# Patient Record
Sex: FEMALE | Race: WHITE | NOT HISPANIC OR LATINO | Employment: OTHER | URBAN - METROPOLITAN AREA
[De-identification: names, ages, dates, MRNs, and addresses within clinical notes are randomized per-mention and may not be internally consistent; named-entity substitution may affect disease eponyms.]

---

## 2017-01-01 ENCOUNTER — RESOLUTE PROFESSIONAL BILLING HOSPITAL PROF FEE (OUTPATIENT)
Dept: CARDIOLOGY | Facility: MEDICAL CENTER | Age: 72
End: 2017-01-01
Payer: MEDICARE

## 2017-01-01 ENCOUNTER — HOSPITAL ENCOUNTER (INPATIENT)
Facility: MEDICAL CENTER | Age: 72
LOS: 2 days | DRG: 247 | End: 2017-01-03
Attending: EMERGENCY MEDICINE | Admitting: INTERNAL MEDICINE
Payer: MEDICARE

## 2017-01-01 ENCOUNTER — APPOINTMENT (OUTPATIENT)
Dept: RADIOLOGY | Facility: MEDICAL CENTER | Age: 72
DRG: 247 | End: 2017-01-01
Attending: EMERGENCY MEDICINE
Payer: MEDICARE

## 2017-01-01 DIAGNOSIS — R07.2 PRECORDIAL PAIN: ICD-10-CM

## 2017-01-01 DIAGNOSIS — R00.1 SYMPTOMATIC BRADYCARDIA: ICD-10-CM

## 2017-01-01 PROBLEM — I49.5 SICK SINUS SYNDROME (HCC): Status: ACTIVE | Noted: 2017-01-01

## 2017-01-01 PROBLEM — R07.9 CHEST PAIN: Status: ACTIVE | Noted: 2017-01-01

## 2017-01-01 LAB
ALBUMIN SERPL BCP-MCNC: 4.1 G/DL (ref 3.2–4.9)
ALBUMIN/GLOB SERPL: 1.4 G/DL
ALP SERPL-CCNC: 107 U/L (ref 30–99)
ALT SERPL-CCNC: 15 U/L (ref 2–50)
ANION GAP SERPL CALC-SCNC: 8 MMOL/L (ref 0–11.9)
ANION GAP SERPL CALC-SCNC: 8 MMOL/L (ref 0–11.9)
APTT PPP: 28.6 SEC (ref 24.7–36)
AST SERPL-CCNC: 16 U/L (ref 12–45)
BASOPHILS # BLD AUTO: 1.2 % (ref 0–1.8)
BASOPHILS # BLD: 0.12 K/UL (ref 0–0.12)
BILIRUB SERPL-MCNC: 0.4 MG/DL (ref 0.1–1.5)
BNP SERPL-MCNC: 75 PG/ML (ref 0–100)
BUN SERPL-MCNC: 12 MG/DL (ref 8–22)
BUN SERPL-MCNC: 16 MG/DL (ref 8–22)
CALCIUM SERPL-MCNC: 8.5 MG/DL (ref 8.5–10.5)
CALCIUM SERPL-MCNC: 9.6 MG/DL (ref 8.5–10.5)
CHLORIDE SERPL-SCNC: 101 MMOL/L (ref 96–112)
CHLORIDE SERPL-SCNC: 105 MMOL/L (ref 96–112)
CO2 SERPL-SCNC: 23 MMOL/L (ref 20–33)
CO2 SERPL-SCNC: 26 MMOL/L (ref 20–33)
CREAT SERPL-MCNC: 0.61 MG/DL (ref 0.5–1.4)
CREAT SERPL-MCNC: 0.72 MG/DL (ref 0.5–1.4)
EKG IMPRESSION: NORMAL
EOSINOPHIL # BLD AUTO: 0.23 K/UL (ref 0–0.51)
EOSINOPHIL NFR BLD: 2.3 % (ref 0–6.9)
ERYTHROCYTE [DISTWIDTH] IN BLOOD BY AUTOMATED COUNT: 52 FL (ref 35.9–50)
ERYTHROCYTE [DISTWIDTH] IN BLOOD BY AUTOMATED COUNT: 52.2 FL (ref 35.9–50)
GFR SERPL CREATININE-BSD FRML MDRD: >60 ML/MIN/1.73 M 2
GFR SERPL CREATININE-BSD FRML MDRD: >60 ML/MIN/1.73 M 2
GLOBULIN SER CALC-MCNC: 2.9 G/DL (ref 1.9–3.5)
GLUCOSE SERPL-MCNC: 100 MG/DL (ref 65–99)
GLUCOSE SERPL-MCNC: 109 MG/DL (ref 65–99)
HCT VFR BLD AUTO: 39.4 % (ref 37–47)
HCT VFR BLD AUTO: 45.1 % (ref 37–47)
HGB BLD-MCNC: 12.8 G/DL (ref 12–16)
HGB BLD-MCNC: 14.5 G/DL (ref 12–16)
IMM GRANULOCYTES # BLD AUTO: 0.03 K/UL (ref 0–0.11)
IMM GRANULOCYTES NFR BLD AUTO: 0.3 % (ref 0–0.9)
INR PPP: 0.9 (ref 0.87–1.13)
INR PPP: 1.61 (ref 0.87–1.13)
LIPASE SERPL-CCNC: 44 U/L (ref 11–82)
LYMPHOCYTES # BLD AUTO: 2.12 K/UL (ref 1–4.8)
LYMPHOCYTES NFR BLD: 21.6 % (ref 22–41)
MCH RBC QN AUTO: 31.5 PG (ref 27–33)
MCH RBC QN AUTO: 32 PG (ref 27–33)
MCHC RBC AUTO-ENTMCNC: 32.2 G/DL (ref 33.6–35)
MCHC RBC AUTO-ENTMCNC: 32.5 G/DL (ref 33.6–35)
MCV RBC AUTO: 98 FL (ref 81.4–97.8)
MCV RBC AUTO: 98.5 FL (ref 81.4–97.8)
MONOCYTES # BLD AUTO: 0.66 K/UL (ref 0–0.85)
MONOCYTES NFR BLD AUTO: 6.7 % (ref 0–13.4)
NEUTROPHILS # BLD AUTO: 6.66 K/UL (ref 2–7.15)
NEUTROPHILS NFR BLD: 67.9 % (ref 44–72)
NRBC # BLD AUTO: 0 K/UL
NRBC BLD AUTO-RTO: 0 /100 WBC
PLATELET # BLD AUTO: 237 K/UL (ref 164–446)
PLATELET # BLD AUTO: 254 K/UL (ref 164–446)
PMV BLD AUTO: 11.1 FL (ref 9–12.9)
PMV BLD AUTO: 11.2 FL (ref 9–12.9)
POTASSIUM SERPL-SCNC: 3.8 MMOL/L (ref 3.6–5.5)
POTASSIUM SERPL-SCNC: 3.8 MMOL/L (ref 3.6–5.5)
PROT SERPL-MCNC: 7 G/DL (ref 6–8.2)
PROTHROMBIN TIME: 12.4 SEC (ref 12–14.6)
PROTHROMBIN TIME: 19.6 SEC (ref 12–14.6)
RBC # BLD AUTO: 4 M/UL (ref 4.2–5.4)
RBC # BLD AUTO: 4.6 M/UL (ref 4.2–5.4)
SODIUM SERPL-SCNC: 135 MMOL/L (ref 135–145)
SODIUM SERPL-SCNC: 136 MMOL/L (ref 135–145)
TROPONIN I SERPL-MCNC: 0.06 NG/ML (ref 0–0.04)
TROPONIN I SERPL-MCNC: 2.26 NG/ML (ref 0–0.04)
TROPONIN I SERPL-MCNC: 36.21 NG/ML (ref 0–0.04)
TSH SERPL DL<=0.005 MIU/L-ACNC: 1.87 UIU/ML (ref 0.3–3.7)
WBC # BLD AUTO: 12.4 K/UL (ref 4.8–10.8)
WBC # BLD AUTO: 9.8 K/UL (ref 4.8–10.8)

## 2017-01-01 PROCEDURE — 36415 COLL VENOUS BLD VENIPUNCTURE: CPT

## 2017-01-01 PROCEDURE — 027035Z DILATION OF CORONARY ARTERY, ONE ARTERY WITH TWO DRUG-ELUTING INTRALUMINAL DEVICES, PERCUTANEOUS APPROACH: ICD-10-PCS | Performed by: INTERNAL MEDICINE

## 2017-01-01 PROCEDURE — 700117 HCHG RX CONTRAST REV CODE 255: Performed by: INTERNAL MEDICINE

## 2017-01-01 PROCEDURE — C9600 PERC DRUG-EL COR STENT SING: HCPCS | Mod: LC

## 2017-01-01 PROCEDURE — 99291 CRITICAL CARE FIRST HOUR: CPT

## 2017-01-01 PROCEDURE — 3E0234Z INTRODUCTION OF SERUM, TOXOID AND VACCINE INTO MUSCLE, PERCUTANEOUS APPROACH: ICD-10-PCS | Performed by: INTERNAL MEDICINE

## 2017-01-01 PROCEDURE — 93005 ELECTROCARDIOGRAM TRACING: CPT

## 2017-01-01 PROCEDURE — 93454 CORONARY ARTERY ANGIO S&I: CPT

## 2017-01-01 PROCEDURE — 85027 COMPLETE CBC AUTOMATED: CPT

## 2017-01-01 PROCEDURE — C1894 INTRO/SHEATH, NON-LASER: HCPCS

## 2017-01-01 PROCEDURE — 700101 HCHG RX REV CODE 250

## 2017-01-01 PROCEDURE — C1874 STENT, COATED/COV W/DEL SYS: HCPCS

## 2017-01-01 PROCEDURE — 93010 ELECTROCARDIOGRAM REPORT: CPT | Mod: 76 | Performed by: INTERNAL MEDICINE

## 2017-01-01 PROCEDURE — 84443 ASSAY THYROID STIM HORMONE: CPT

## 2017-01-01 PROCEDURE — 700102 HCHG RX REV CODE 250 W/ 637 OVERRIDE(OP): Performed by: HOSPITALIST

## 2017-01-01 PROCEDURE — 85025 COMPLETE CBC W/AUTO DIFF WBC: CPT

## 2017-01-01 PROCEDURE — 99223 1ST HOSP IP/OBS HIGH 75: CPT | Performed by: INTERNAL MEDICINE

## 2017-01-01 PROCEDURE — 700102 HCHG RX REV CODE 250 W/ 637 OVERRIDE(OP)

## 2017-01-01 PROCEDURE — 83690 ASSAY OF LIPASE: CPT

## 2017-01-01 PROCEDURE — 96374 THER/PROPH/DIAG INJ IV PUSH: CPT

## 2017-01-01 PROCEDURE — C1725 CATH, TRANSLUMIN NON-LASER: HCPCS

## 2017-01-01 PROCEDURE — 85730 THROMBOPLASTIN TIME PARTIAL: CPT

## 2017-01-01 PROCEDURE — 71010 DX-CHEST-LIMITED (1 VIEW): CPT

## 2017-01-01 PROCEDURE — 93005 ELECTROCARDIOGRAM TRACING: CPT | Performed by: INTERNAL MEDICINE

## 2017-01-01 PROCEDURE — 360979 HCHG DIAGNOSTIC CATH

## 2017-01-01 PROCEDURE — C1769 GUIDE WIRE: HCPCS

## 2017-01-01 PROCEDURE — 83880 ASSAY OF NATRIURETIC PEPTIDE: CPT

## 2017-01-01 PROCEDURE — 770022 HCHG ROOM/CARE - ICU (200)

## 2017-01-01 PROCEDURE — 307093 HCHG TR BAND RADIAL

## 2017-01-01 PROCEDURE — 304952 HCHG R 2 PADS

## 2017-01-01 PROCEDURE — 700105 HCHG RX REV CODE 258: Performed by: INTERNAL MEDICINE

## 2017-01-01 PROCEDURE — C1887 CATHETER, GUIDING: HCPCS

## 2017-01-01 PROCEDURE — 80048 BASIC METABOLIC PNL TOTAL CA: CPT

## 2017-01-01 PROCEDURE — 80053 COMPREHEN METABOLIC PANEL: CPT

## 2017-01-01 PROCEDURE — B2111ZZ FLUOROSCOPY OF MULTIPLE CORONARY ARTERIES USING LOW OSMOLAR CONTRAST: ICD-10-PCS | Performed by: INTERNAL MEDICINE

## 2017-01-01 PROCEDURE — 84484 ASSAY OF TROPONIN QUANT: CPT

## 2017-01-01 PROCEDURE — 93005 ELECTROCARDIOGRAM TRACING: CPT | Performed by: EMERGENCY MEDICINE

## 2017-01-01 PROCEDURE — 700111 HCHG RX REV CODE 636 W/ 250 OVERRIDE (IP)

## 2017-01-01 PROCEDURE — A9270 NON-COVERED ITEM OR SERVICE: HCPCS

## 2017-01-01 PROCEDURE — 99223 1ST HOSP IP/OBS HIGH 75: CPT | Mod: 25 | Performed by: INTERNAL MEDICINE

## 2017-01-01 PROCEDURE — 700111 HCHG RX REV CODE 636 W/ 250 OVERRIDE (IP): Performed by: EMERGENCY MEDICINE

## 2017-01-01 PROCEDURE — A9270 NON-COVERED ITEM OR SERVICE: HCPCS | Performed by: EMERGENCY MEDICINE

## 2017-01-01 PROCEDURE — A9270 NON-COVERED ITEM OR SERVICE: HCPCS | Performed by: HOSPITALIST

## 2017-01-01 PROCEDURE — 85610 PROTHROMBIN TIME: CPT

## 2017-01-01 PROCEDURE — 700102 HCHG RX REV CODE 250 W/ 637 OVERRIDE(OP): Performed by: EMERGENCY MEDICINE

## 2017-01-01 RX ORDER — ACETAMINOPHEN 325 MG/1
650 TABLET ORAL EVERY 6 HOURS PRN
Status: DISCONTINUED | OUTPATIENT
Start: 2017-01-01 | End: 2017-01-03 | Stop reason: HOSPADM

## 2017-01-01 RX ORDER — MIDAZOLAM HYDROCHLORIDE 1 MG/ML
INJECTION INTRAMUSCULAR; INTRAVENOUS
Status: COMPLETED
Start: 2017-01-01 | End: 2017-01-01

## 2017-01-01 RX ORDER — CLOPIDOGREL BISULFATE 75 MG/1
150 TABLET ORAL DAILY
Status: DISCONTINUED | OUTPATIENT
Start: 2017-01-02 | End: 2017-01-03 | Stop reason: HOSPADM

## 2017-01-01 RX ORDER — CLOPIDOGREL 300 MG/1
TABLET, FILM COATED ORAL
Status: COMPLETED
Start: 2017-01-01 | End: 2017-01-01

## 2017-01-01 RX ORDER — VERAPAMIL HYDROCHLORIDE 2.5 MG/ML
INJECTION, SOLUTION INTRAVENOUS
Status: COMPLETED
Start: 2017-01-01 | End: 2017-01-01

## 2017-01-01 RX ORDER — SODIUM CHLORIDE 9 MG/ML
INJECTION, SOLUTION INTRAVENOUS CONTINUOUS
Status: DISPENSED | OUTPATIENT
Start: 2017-01-01 | End: 2017-01-02

## 2017-01-01 RX ORDER — SIMVASTATIN 10 MG
10 TABLET ORAL DAILY
Status: ON HOLD | COMMUNITY
End: 2017-01-03

## 2017-01-01 RX ORDER — LOSARTAN POTASSIUM 25 MG/1
50 TABLET ORAL
Status: DISCONTINUED | OUTPATIENT
Start: 2017-01-02 | End: 2017-01-03 | Stop reason: HOSPADM

## 2017-01-01 RX ORDER — BIVALIRUDIN 250 MG/5ML
INJECTION, POWDER, LYOPHILIZED, FOR SOLUTION INTRAVENOUS
Status: COMPLETED
Start: 2017-01-01 | End: 2017-01-01

## 2017-01-01 RX ORDER — HEPARIN SODIUM,PORCINE 1000/ML
VIAL (ML) INJECTION
Status: COMPLETED
Start: 2017-01-01 | End: 2017-01-01

## 2017-01-01 RX ORDER — SIMVASTATIN 20 MG
10 TABLET ORAL DAILY
Status: DISCONTINUED | OUTPATIENT
Start: 2017-01-02 | End: 2017-01-02

## 2017-01-01 RX ORDER — ASPIRIN 325 MG
325 TABLET ORAL DAILY
Status: DISCONTINUED | OUTPATIENT
Start: 2017-01-02 | End: 2017-01-03 | Stop reason: HOSPADM

## 2017-01-01 RX ORDER — CELECOXIB 100 MG/1
100 CAPSULE ORAL 2 TIMES DAILY PRN
Status: ON HOLD | COMMUNITY
End: 2017-01-03

## 2017-01-01 RX ORDER — ASPIRIN 81 MG/1
324 TABLET, CHEWABLE ORAL ONCE
Status: COMPLETED | OUTPATIENT
Start: 2017-01-01 | End: 2017-01-01

## 2017-01-01 RX ORDER — LIDOCAINE HYDROCHLORIDE 20 MG/ML
INJECTION, SOLUTION INFILTRATION; PERINEURAL
Status: COMPLETED
Start: 2017-01-01 | End: 2017-01-01

## 2017-01-01 RX ADMIN — IOHEXOL 274 ML: 350 INJECTION, SOLUTION INTRAVENOUS at 17:46

## 2017-01-01 RX ADMIN — ASPIRIN 324 MG: 81 TABLET, CHEWABLE ORAL at 12:43

## 2017-01-01 RX ADMIN — SODIUM CHLORIDE: 9 INJECTION, SOLUTION INTRAVENOUS at 18:33

## 2017-01-01 RX ADMIN — CLOPIDOGREL BISULFATE 600 MG: 300 TABLET, FILM COATED ORAL at 17:47

## 2017-01-01 RX ADMIN — MIDAZOLAM HYDROCHLORIDE 2 MG: 1 INJECTION, SOLUTION INTRAMUSCULAR; INTRAVENOUS at 17:12

## 2017-01-01 RX ADMIN — ATROPINE SULFATE 0.5 MG: 0.1 INJECTION PARENTERAL at 17:43

## 2017-01-01 RX ADMIN — ACETAMINOPHEN 650 MG: 325 TABLET, FILM COATED ORAL at 22:41

## 2017-01-01 RX ADMIN — HEPARIN SODIUM: 1000 INJECTION, SOLUTION INTRAVENOUS; SUBCUTANEOUS at 16:54

## 2017-01-01 RX ADMIN — NITROGLYCERIN 10 ML: 5 INJECTION, SOLUTION INTRAVENOUS at 16:54

## 2017-01-01 RX ADMIN — ATROPINE SULFATE 1 MG: 1 INJECTION, SOLUTION INTRAMUSCULAR; INTRAVENOUS; SUBCUTANEOUS at 11:58

## 2017-01-01 RX ADMIN — LIDOCAINE HYDROCHLORIDE: 20 INJECTION, SOLUTION INFILTRATION; PERINEURAL at 16:54

## 2017-01-01 RX ADMIN — HEPARIN SODIUM 2000 UNITS: 200 INJECTION, SOLUTION INTRAVENOUS at 16:55

## 2017-01-01 RX ADMIN — VERAPAMIL HYDROCHLORIDE 5 MG: 2.5 INJECTION, SOLUTION INTRAVENOUS at 16:54

## 2017-01-01 RX ADMIN — BIVALIRUDIN 250 MG: 250 INJECTION, POWDER, LYOPHILIZED, FOR SOLUTION INTRAVENOUS at 17:12

## 2017-01-01 RX ADMIN — BIVALIRUDIN 250 MG: 250 INJECTION, POWDER, LYOPHILIZED, FOR SOLUTION INTRAVENOUS at 17:47

## 2017-01-01 RX ADMIN — FENTANYL CITRATE 100 MCG: 50 INJECTION, SOLUTION INTRAMUSCULAR; INTRAVENOUS at 17:47

## 2017-01-01 ASSESSMENT — LIFESTYLE VARIABLES
ALCOHOL_USE: NO
DO YOU DRINK ALCOHOL: NO
EVER_SMOKED: YES

## 2017-01-01 ASSESSMENT — PAIN SCALES - GENERAL
PAINLEVEL_OUTOF10: 1
PAINLEVEL_OUTOF10: 0
PAINLEVEL_OUTOF10: 0
PAINLEVEL_OUTOF10: 8
PAINLEVEL_OUTOF10: 3

## 2017-01-01 NOTE — ED NOTES
Pt reports chest pain since this am. Pt was getting ready to go to store.pt denies sob. EKG done. Pt reports feeling diaphoretic at time of event. Pt reports pain at 5/10 pain. Pt in NAD. VSS.

## 2017-01-01 NOTE — IP AVS SNAPSHOT
[employer] Access Code: Activation code not generated  Current [employer] Status: Active    StrongView[employer]  A secure, online tool to manage your health information     [employer]’s [employer]® is a secure, online tool that connects you to your personalized health information from the privacy of your home -- day or night - making it very easy for you to manage your healthcare. Once the activation process is completed, you can even access your medical information using the [employer] bethany, which is available for free in the Apple Bethany store or Google Play store.     [employer] provides the following levels of access (as shown below):   My Chart Features   [facility] Primary Care Doctor [facility]  Specialists [facility]  Urgent  Care Non-[facility]  Primary Care  Doctor   Email your healthcare team securely and privately 24/7 X X X X   Manage appointments: schedule your next appointment; view details of past/upcoming appointments X      Request prescription refills. X      View recent personal medical records, including lab and immunizations X X X X   View health record, including health history, allergies, medications X X X X   Read reports about your outpatient visits, procedures, consult and ER notes X X X X   See your discharge summary, which is a recap of your hospital and/or ER visit that includes your diagnosis, lab results, and care plan. X X       How to register for [employer]:  1. Go to  [email or web address].  2. Click on the Sign Up Now box, which takes you to the New Member Sign Up page. You will need to provide the following information:  a. Enter your [employer] Access Code exactly as it appears at the top of this page. (You will not need to use this code after you’ve completed the sign-up process. If you do not sign up before the expiration date, you must request a new code.)   b. Enter your date of birth.   c. Enter your home email address.   d. Click Submit, and follow the next screen’s instructions.  3. Create a [employer] ID. This will  be your Movable login ID and cannot be changed, so think of one that is secure and easy to remember.  4. Create a Movable password. You can change your password at any time.  5. Enter your Password Reset Question and Answer. This can be used at a later time if you forget your password.   6. Enter your e-mail address. This allows you to receive e-mail notifications when new information is available in Movable.  7. Click Sign Up. You can now view your health information.    For assistance activating your Mov[employer] account, call [phone]

## 2017-01-01 NOTE — ED NOTES
Heart rate dropped down to 20's with pt feeling faint and appeared pale.  ERP called to room  Pt given 1mg Atropine with HR going to 70's. Pt reports improvement in symptoms   Pads on pt. Repeat EKG completed

## 2017-01-01 NOTE — ED PROVIDER NOTES
"ED Provider Note    CHIEF COMPLAINT  Chief Complaint   Patient presents with   • Chest Pain       HPI  Griselda Farmer is a 71 y.o. female who presents complaining of chest discomfort. She's been having some \"soreness\" or pressure across her chest in the central part way into the left. Contrary to the nursing notes, she did not fall on that side, said she tripped and fell on her right side, tripping over a trailer when she was spooked by a cat. She's been taking some Ultram for this large \"can be contributing. Associate with the patient having some shortness of breath, nausea. He seemed to be coming and going. They returned here in the ER, and I was called to the room with a heart rate in the 20s. She never had this before. She is not any beta blockers. She denies any previous heart problems. No abdominal pain. There is no other complaint.    PAST MEDICAL HISTORY  Past Medical History   Diagnosis Date   • Insomnia    • Flushing reaction      has had full work up with cardiology, would awake with symptoms at night   • Hyperlipidemia 10/20/2010   • GERD (gastroesophageal reflux disease)    • Liver cyst      has been seen by GI, ultrasound 9/12   • Need for Tdap vaccination      in 2012   • S/P colonoscopy 11/9/2012   • Indigestion    • Urinary bladder disorder    • Unspecified urinary incontinence    • Dental disorder      upper and lower dentures   • FRIDA (obstructive sleep apnea)      states no cpap   • Heart burn 2014     pt on prevacid   • Arthritis      generalized + hands   • Myocardial infarct (HCC) 1984     pt denies states sometimes irreg rhythm   • Angina 2014     pt denies    • Hypertension 2014     pt states well controlled on meds       FAMILY HISTORY  Family History   Problem Relation Age of Onset   • Diabetes Mother      mild   • Heart Disease Neg Hx    • Hypertension Neg Hx    • Cancer Father      melanoma mild       SOCIAL HISTORY  Social History   Substance Use Topics   • Smoking status: Former " "Smoker -- 0.50 packs/day for 15 years     Types: Cigarettes     Quit date: 08/11/1975   • Smokeless tobacco: Never Used   • Alcohol Use: No         SURGICAL HISTORY  Past Surgical History   Procedure Laterality Date   • Tonsillectomy     • Hysterectomy, vaginal       ovaries were left   • Recovery  3/29/2013     Performed by [facility]Recovery Surgery at SURGERY SAME DAY [facility] ORS   • Cataract phaco with iol  9/19/2013     Performed by Sylvester Raza M.D. at SURGERY [facility] ORS   • Cataract phaco with iol  10/3/2013     Performed by Sylvester Raza M.D. at SURGERY [facility] ORS   • Knee arthroplasty total  12/16/2014     Performed by Jose G Trotter M.D. at SURGERY [facility] ORS       CURRENT MEDICATIONS  Home Medications     Reviewed by Nilesh Dailey (Pharmacy Tech) on 01/01/17 at 1240  Med List Status: Not Addressed    Medication Last Dose Status    celecoxib (CELEBREX) 100 MG Cap 1/1/2017 Active    Cholecalciferol (VITAMIN D) 2000 UNIT TABS 1/1/2017 Active    lansoprazole (PREVACID) 30 MG CAPSULE DELAYED RELEASE 1/1/2017 Active    losartan (COZAAR) 100 MG Tab 1/1/2017 Active    simvastatin (ZOCOR) 10 MG Tab 1/1/2017 Active    SLO-NIACIN PO 12/31/2016 Active                I have reviewed the nurses notes and/or the list brought with the patient.    ALLERGIES  Allergies   Allergen Reactions   • Codeine Vomiting     Rxn = years ago          REVIEW OF SYSTEMS  See HPI for further details. Review of systems as above, otherwise all other systems are negative.     PHYSICAL EXAM  VITAL SIGNS: /77 mmHg  Pulse 73  Temp(Src) 36 °C (96.8 °F) (Temporal)  Resp 25  Ht 1.651 m (5' 5\")  Wt 94.6 kg (208 lb 8.9 oz)  BMI 34.71 kg/m2  SpO2 100%  Constitutional: Pale, diaphoretic, she looks queasy. Acutely ill.  HENT: Mucus membranes moist.  Oropharynx is clear.  Eyes: Pupils equally round.  No scleral icterus.   Neck: Full nontender range of motion.  Lymphatic: No cervical lymphadenopathy noted. "   Cardiovascular: Heart rates in the 20-30s, and appears to be sinus bradycardia.  No murmurs, rubs, nor gallop appreciated.   Thorax & Lungs: Chest is nontender.  Lungs are clear to auscultation with good air movement bilaterally.  No wheeze, rhonchi, nor rales.   Abdomen: Soft, with no tenderness, rebound nor guarding.  No mass, pulsatile mass, nor hepatosplenomegaly appreciated.  Skin: No petechia noted. She is in bruising over her right lower extremity.  Extremities/Musculoskeletal: No sign of trauma.  Calves are nontender with no cords nor edema.  No Marco's sign.  Pulses are intact all around.   Neurologic: Alert & oriented.  Strength and sensation is intact all around.  Gait is not assessed  Psychiatric: Normal affect appropriate for the clinical situation.    EKG #1  I interpreted this EKG myself.  This is a 12-lead study.  The rhythm is sinus bradycardia with a rate of 50.  There are no ST segment nor T wave abnormalities.  Interpretation: No ST segment elevation myocardial infarction.    EKG #2  I interpreted this EKG myself.  This is a 12-lead study.  The rhythm is sinus with a rate of 98.  There are no ST segment nor T wave abnormalities.  Interpretation: No ST segment elevation myocardial infarction. Resolution of sinus bradycardia otherwise no change.    LABS  Labs Reviewed   TROPONIN - Abnormal; Notable for the following:     Troponin I 0.06 (*)     All other components within normal limits    Narrative:     Indicate which anticoagulants the patient is on:->UNKNOWN   CBC WITH DIFFERENTIAL - Abnormal; Notable for the following:     MCV 98.0 (*)     MCHC 32.2 (*)     RDW 52.2 (*)     Lymphocytes 21.60 (*)     All other components within normal limits    Narrative:     Indicate which anticoagulants the patient is on:->UNKNOWN   COMP METABOLIC PANEL - Abnormal; Notable for the following:     Glucose 109 (*)     Alkaline Phosphatase 107 (*)     All other components within normal limits    Narrative:      Indicate which anticoagulants the patient is on:->UNKNOWN   BTYPE NATRIURETIC PEPTIDE    Narrative:     Indicate which anticoagulants the patient is on:->UNKNOWN   PROTHROMBIN TIME    Narrative:     Indicate which anticoagulants the patient is on:->UNKNOWN   APTT    Narrative:     Indicate which anticoagulants the patient is on:->UNKNOWN   LIPASE    Narrative:     Indicate which anticoagulants the patient is on:->UNKNOWN   ESTIMATED GFR    Narrative:     Indicate which anticoagulants the patient is on:->UNKNOWN         RADIOLOGY/PROCEDURES  I have reviewed the patient's film interpretations myself, and does not show an infiltrate. Formal read is still pending.    MEDICAL RECORD  I have reviewed patient's medical record and pertinent results are listed above.    COURSE & MEDICAL DECISION MAKING  I have reviewed any medical record information, laboratory studies and radiographic results as noted above.  This patient presents with chest pain, shortness of breath and feeling poorly. This is intermittent, and she has an episode of this while here in the department, I was called to the bed emergently. I found her to be appearing quite ill, with a bradycardia in the 20s or 30s. She was given atropine with immediate improvement in her pulse rate, and soon thereafter improvement in her symptoms. I checked on her several times since then, she continues to be asymptomatic and has a normal rate. External pacing pads have been applied although she has not required these as of now.    Laboratories returning as noted above. She is a minimal elevation of her glucose, and her 1st troponin is outside the normal range but not significantly elevated. I discussed the patient's case with Dr. Saunders from cardiology, he'll be seeing her. Also discussed the patient's case with Dr. Arboleda, hospitalist will be admitting. At this point, I believe the patient is fine to go to telemetry (as opposed to the ICU).    FINAL IMPRESSION  1. Symptomatic  bradycardia    2. Precordial pain     3.  Critical care time, 35 minutes, which includes obtaining history from patient historians, examination of patient, reevaluation of patient, direction of nursing staff, and discussion with admitting and consulting physicians. It does not include any procedures.       This dictation was created using voice recognition software.    Electronically signed by: Chito Allen, 1/1/2017 1:02 PM

## 2017-01-01 NOTE — CONSULTS
Cardiology Consult Note:    Anastacia To  Date & Time note created:    1/1/2017   2:58 PM     Referring MD:  Dr. Allen    Patient ID:   Name:             Griselda Farmer   YOB: 1945  Age:                 71 y.o.  female   MRN:               [id number]                                                             Chief Complaint / Reason for consult:  Symptomatic bradycardia    History of Present Illness:    This is a 71 years old woman with prior history of hypertension for which she is taken losartan, presented to the hospital because of not feeling well. Patient is been treated for possible pneumonia or influenza. Cardiology has been consult it because on telemetry, patient was noted to have up to 3 seconds sinus pause. Patient was symptomatic when these episodes happened. She felt very lightheaded and nauseated. She was lying in bed without having any type of stress or pain at that time. She did fell and hit the right side of her chest about 2 weeks ago. She still has occasional pain on the left side of her rib cage.    Griselda Farmer does not have any history of heart attack arrhythmias in the past. She never had cardiac catheterization or ablations procedure in the past.     Review of Systems:      Constitutional: Denies fevers, Denies weight changes  Eyes: Denies changes in vision, no eye pain  Ears/Nose/Throat/Mouth: Denies nasal congestion or sore throat   Cardiovascular: some chest pain, no palpitations   Respiratory: no shortness of breath , Denies cough  Gastrointestinal/Hepatic: Denies abdominal pain, nausea, vomiting, diarrhea, constipation or GI bleeding   Genitourinary: Denies dysuria or frequency  Musculoskeletal/Rheum: Denies  joint pain and swelling   Skin: Denies rash  Neurological: Denies headache, confusion, memory loss or focal weakness/parasthesias  Psychiatric: denies mood disorder   Endocrine: Neela thyroid problems  Heme/Oncology/Lymph Nodes: Denies  enlarged lymph nodes, denies brusing or known bleeding disorder  All other systems were reviewed and are negative (AMA/CMS criteria)                Past Medical History:   Past Medical History   Diagnosis Date   • Insomnia    • Flushing reaction      has had full work up with cardiology, would awake with symptoms at night   • Hyperlipidemia 10/20/2010   • GERD (gastroesophageal reflux disease)    • Liver cyst      has been seen by GI, ultrasound 9/12   • Need for Tdap vaccination      in 2012   • S/P colonoscopy 11/9/2012   • Indigestion    • Urinary bladder disorder    • Unspecified urinary incontinence    • Dental disorder      upper and lower dentures   • FRIDA (obstructive sleep apnea)      states no cpap   • Heart burn 2014     pt on prevacid   • Arthritis      generalized + hands   • Myocardial infarct (HCC) 1984     pt denies states sometimes irreg rhythm   • Angina 2014     pt denies    • Hypertension 2014     pt states well controlled on meds   • Sick sinus syndrome (HCC) 1/1/2017   • Symptomatic sinus bradycardia 1/1/2017     Active Hospital Problems    Diagnosis   • Chest pain [R07.9]   • Sick sinus syndrome (HCC) [I49.5]   • Symptomatic sinus bradycardia [R00.1]       Past Surgical History:  Past Surgical History   Procedure Laterality Date   • Tonsillectomy     • Hysterectomy, vaginal       ovaries were left   • Recovery  3/29/2013     Performed by Cath-Recovery Surgery at SURGERY SAME DAY [facility]   • Cataract phaco with iol  9/19/2013     Performed by Sylvester Raza M.D. at [facility]   • Cataract phaco with iol  10/3/2013     Performed by Sylvester Raza M.D. at [facility]   • Knee arthroplasty total  12/16/2014     Performed by Jose G Trotter M.D. at SURGERY [facility] Medications:    Current facility-administered medications:   •  [START ON 1/2/2017] losartan (COZAAR) tablet 50 mg, 50 mg, Oral, QDAY, Michelle Gallegos M.D.  •  [START ON  "1/2/2017] simvastatin (ZOCOR) tablet 10 mg, 10 mg, Oral, DAILY, Michelle Gallegos M.D.    Current outpatient prescriptions:   •  celecoxib (CELEBREX) 100 MG Cap, Take 100 mg by mouth 2 times a day as needed., Disp: , Rfl:   •  SLO-NIACIN PO, Take 1 Tab by mouth every evening., Disp: , Rfl:   •  simvastatin (ZOCOR) 10 MG Tab, Take 10 mg by mouth every day., Disp: , Rfl:   •  losartan (COZAAR) 100 MG Tab, Take 0.5 Tabs by mouth every day., Disp: 90 Tab, Rfl: 3  •  lansoprazole (PREVACID) 30 MG CAPSULE DELAYED RELEASE, TAKE ONE CAPSULE BY MOUTH EVERY DAY, Disp: 90 Cap, Rfl: 3  •  Cholecalciferol (VITAMIN D) 2000 UNIT TABS, Take 1 Tab by mouth every day., Disp: , Rfl:     Current Outpatient Medications:    (Not in a hospital admission)    Medication Allergy:  Allergies   Allergen Reactions   • Codeine Vomiting     Rxn = years ago          Family History:  Family History   Problem Relation Age of Onset   • Diabetes Mother      mild   • Heart Disease Neg Hx    • Hypertension Neg Hx    • Cancer Father      melanoma mild       Social History:  Social History     Social History   • Marital Status:      Spouse Name: N/A   • Number of Children: N/A   • Years of Education: N/A     Occupational History   • retired- human resources  for [employer]     Social History Main Topics   • Smoking status: Former Smoker -- 0.50 packs/day for 15 years     Types: Cigarettes     Quit date: 08/11/1975   • Smokeless tobacco: Never Used   • Alcohol Use: No   • Drug Use: No   • Sexual Activity: Not on file     Other Topics Concern   • Not on file     Social History Narrative         Physical Exam:  Vitals/ General Appearance:   Weight/BMI: Body mass index is 34.71 kg/(m^2).  Blood pressure 160/77, pulse 57, temperature 36 °C (96.8 °F), temperature source Temporal, resp. rate 24, height 1.651 m (5' 5\"), weight 94.6 kg (208 lb 8.9 oz), SpO2 100 %.  Filed Vitals:    01/01/17 1230 01/01/17 1331 01/01/17 1400 01/01/17 " 1423   BP:       Pulse: 73 58 66 57   Temp:       TempSrc:       Resp: 25 20 24 24   Height:       Weight:       SpO2: 100% 100% 98% 100%     Oxygen Therapy:  Pulse Oximetry: 100 %    Constitutional:   Well developed, Well nourished, No acute distress  HENMT:  Normocephalic, Atraumatic, Oropharynx moist mucous membranes, No oral exudates, Nose normal.  No thyromegaly.  Eyes:  EOMI, Conjunctiva normal, No discharge.  Neck:  Normal range of motion, No cervical tenderness,  no JVD.  Cardiovascular:  Normal heart rate, Normal rhythm, No murmurs, No rubs, No gallops.   Extremitites with intact distal pulses, no cyanosis, or edema.  Lungs:  Normal breath sounds, breath sounds clear to auscultation bilaterally,  no rales, no rhonchi, no wheezing.   Abdomen: Bowel sounds normal, Soft, No tenderness, No guarding, No rebound, No masses, No hepatosplenomegaly.  Skin: Warm, Dry, No erythema, No rash, no induration.  Neurologic: Alert & oriented x 3, No focal deficits noted, cranial nerves II through X are intact.  Psychiatric: Affect normal, Judgment normal, Mood normal.      MDM (Data Review):     Records reviewed and summarized in current documentation    Lab Data Review:  Recent Results (from the past 24 hour(s))   EKG (ER)    Collection Time: 17 11:12 AM   Result Value Ref Range    Report       [facility] Emergency Dept.    Test Date:  2017  Pt Name:    ONESIMO DURAN                Department: ER  MRN:        [id number]                      Room:  Gender:     F                            Technician: [id number]  :        [id number]                   Requested By:ER TRIAGE PROTOCOL  Order #:    [id number]                    Reading MD:    Measurements  Intervals                                Axis  Rate:       50                           P:          20  WA:         180                          QRS:        -35  QRSD:       92                           T:          86  QT:         456  QTc:         416    Interpretive Statements  SINUS BRADYCARDIA  LEFT AXIS DEVIATION  BORDERLINE REPOLARIZATION ABNORMALITY  Compared to ECG 12/05/2014 09:03:16  Sinus rhythm no longer present  T-wave abnormality no longer present  Left anterior fascicular block no longer present     TROPONIN    Collection Time: 01/01/17 11:41 AM   Result Value Ref Range    Troponin I 0.06 (H) 0.00 - 0.04 ng/mL   BTYPE NATRIURETIC PEPTIDE    Collection Time: 01/01/17 11:41 AM   Result Value Ref Range    B Natriuretic Peptide 75 0 - 100 pg/mL   CBC WITH DIFFERENTIAL    Collection Time: 01/01/17 11:41 AM   Result Value Ref Range    WBC 9.8 4.8 - 10.8 K/uL    RBC 4.60 4.20 - 5.40 M/uL    Hemoglobin 14.5 12.0 - 16.0 g/dL    Hematocrit 45.1 37.0 - 47.0 %    MCV 98.0 (H) 81.4 - 97.8 fL    MCH 31.5 27.0 - 33.0 pg    MCHC 32.2 (L) 33.6 - 35.0 g/dL    RDW 52.2 (H) 35.9 - 50.0 fL    Platelet Count 254 164 - 446 K/uL    MPV 11.2 9.0 - 12.9 fL    Neutrophils-Polys 67.90 44.00 - 72.00 %    Lymphocytes 21.60 (L) 22.00 - 41.00 %    Monocytes 6.70 0.00 - 13.40 %    Eosinophils 2.30 0.00 - 6.90 %    Basophils 1.20 0.00 - 1.80 %    Immature Granulocytes 0.30 0.00 - 0.90 %    Nucleated RBC 0.00 /100 WBC    Neutrophils (Absolute) 6.66 2.00 - 7.15 K/uL    Lymphs (Absolute) 2.12 1.00 - 4.80 K/uL    Monos (Absolute) 0.66 0.00 - 0.85 K/uL    Eos (Absolute) 0.23 0.00 - 0.51 K/uL    Baso (Absolute) 0.12 0.00 - 0.12 K/uL    Immature Granulocytes (abs) 0.03 0.00 - 0.11 K/uL    NRBC (Absolute) 0.00 K/uL   COMP METABOLIC PANEL    Collection Time: 01/01/17 11:41 AM   Result Value Ref Range    Sodium 135 135 - 145 mmol/L    Potassium 3.8 3.6 - 5.5 mmol/L    Chloride 101 96 - 112 mmol/L    Co2 26 20 - 33 mmol/L    Anion Gap 8.0 0.0 - 11.9    Glucose 109 (H) 65 - 99 mg/dL    Bun 16 8 - 22 mg/dL    Creatinine 0.72 0.50 - 1.40 mg/dL    Calcium 9.6 8.5 - 10.5 mg/dL    AST(SGOT) 16 12 - 45 U/L    ALT(SGPT) 15 2 - 50 U/L    Alkaline Phosphatase 107 (H) 30 - 99 U/L    Total Bilirubin  0.4 0.1 - 1.5 mg/dL    Albumin 4.1 3.2 - 4.9 g/dL    Total Protein 7.0 6.0 - 8.2 g/dL    Globulin 2.9 1.9 - 3.5 g/dL    A-G Ratio 1.4 g/dL   PROTHROMBIN TIME    Collection Time: 17 11:41 AM   Result Value Ref Range    PT 12.4 12.0 - 14.6 sec    INR 0.90 0.87 - 1.13   APTT    Collection Time: 17 11:41 AM   Result Value Ref Range    APTT 28.6 24.7 - 36.0 sec   LIPASE    Collection Time: 17 11:41 AM   Result Value Ref Range    Lipase 44 11 - 82 U/L   ESTIMATED GFR    Collection Time: 17 11:41 AM   Result Value Ref Range    GFR If African American >60 >60 mL/min/1.73 m 2    GFR If Non African American >60 >60 mL/min/1.73 m 2   EKG (NOW)    Collection Time: 17 12:00 PM   Result Value Ref Range    Report       [facility] Emergency Dept.    Test Date:  2017  Pt Name:    ONESIMO DURAN                Department: ER  MRN:        [id number]                      Room:       [facility]  Gender:     F                            Technician: [id number]  :        [id number]                   Requested By:BHANU MORALES  Order #:    [id number]                    Reading MD:    Measurements  Intervals                                Axis  Rate:       98                           P:          40  NY:         184                          QRS:        -31  QRSD:       86                           T:          81  QT:         376  QTc:        481    Interpretive Statements  SINUS RHYTHM  LEFT AXIS DEVIATION  LATE PRECORDIAL R/S TRANSITION  NONSPECIFIC REPOL ABNORMALITY, DIFFUSE LEADS  Compared to ECG 2017 11:12:19  Sinus bradycardia no longer present         Imaging/Procedures Review:    Chest Xray:  Reviewed    EKG:   As in HPI. I have reviewed patient's ECG, which shows normal sinus rhythm, normal [place], QT intervals. No evidence of acute coronary syndrome.    MDM (Assessment and Plan):     Active Hospital Problems    Diagnosis   • Chest pain [R07.9]   • Sick sinus syndrome (HCC) [I49.5]   •  Symptomatic sinus bradycardia [R00.1]       At this time, I suspect that patient is having symptomatically bradycardia with sick sinus syndrome.  Based on her clinical presentation, I don't think that this is a vagal event.  Optimize electrolytes to keep Mg above 2.5 and Potassium above 4.5.    Ok to obtain TTE.    Thank you for referring this patient to our cardiology service.  We will follow patient with you.    Anastacia Saunders MD.  [facility].    Addendum:  Her troponin is now at 2.3  Given her clinical presentation, will perform cardiac catherizaiton today for NSTEMI. Suspect RCA lesion.  Temporary pacer might be needed.  If no obstructive CAD on cath, will consider PPM for symptomatic bradycardia.

## 2017-01-01 NOTE — IP AVS SNAPSHOT
1/3/2017          Griselda Farmer  [address]  [place] [address]    Dear Griselda:    formerly [facility] wants to ensure your discharge home is safe and you or your loved ones have had all your questions answered regarding your care after you leave the hospital.    You may receive a telephone call within two days of your discharge.  This call is to make certain you understand your discharge instructions as well as ensure we provided you with the best care possible during your stay with us.     The call will only last approximately 3-5 minutes and will be done by a nurse.    Once again, we want to ensure your discharge home is safe and that you have a clear understanding of any next steps in your care.  If you have any questions or concerns, please do not hesitate to contact us, we are here for you.  Thank you for choosing [facility] for your healthcare needs.    Sincerely,    Nasir Nash    [facility]

## 2017-01-01 NOTE — PROGRESS NOTES
The Medication Reconciliation has been completed per patient  Allergies have been reviewed  Antibiotic use in 30 days - NONE    Pharmacy:  [facility] [place]

## 2017-01-01 NOTE — IP AVS SNAPSHOT
" <p align=\"LEFT\"><IMG SRC=\"//[email or web address]\" alt=\"Image\" WIDTH=\"50%\" HEIGHT=\"200\" BORDER=\"\"></p>                   Name:Griselda Farmer  Medical Record Number:[id number]  CSN: [id number]    YOB: 1945   Age: 71 y.o.  Sex: female  HT:1.651 m (5' 5\") WT: 96.2 kg (212 lb 1.3 oz)          Admit Date: 1/1/2017     Discharge Date:   Today's Date: 1/3/2017  Attending Doctor:  ZENAIDA Saeed*                  Allergies:  Codeine          Your appointments     Jun 05, 2017  7:00 AM   Adult Draw/Collection with LAB [facility]   [facility] (--)    [address] [place] [address] [place] [address] [phone]            Jun 08, 2017  7:40 AM   Established Patient with JACEK Lemos   [facility]    [address] [place] [address] [phone]           You will be receiving a confirmation call a few days before your appointment from our automated call confirmation system.              Follow-up Information     1. Schedule an appointment as soon as possible for a visit with Caro Peck M.D..    Specialty:  Pediatrics    Contact information    [address] [place] [address] [place] [address] [phone]          2. Follow up with Sy Mandujano M.D.. Schedule an appointment as soon as possible for a visit in 1 month.    Specialty:  Cardiology    Contact information    [address]  [address]  [place] [address] [phone]           Medication List      Take these Medications        Instructions    aspirin EC 81 MG Tbec   Commonly known as:  ECOTRIN    Take 1 Tab by mouth every day.   Dose:  81 mg       atorvastatin 40 MG Tabs   Commonly known as:  LIPITOR    Take 1 Tab by mouth every bedtime.   Dose:  40 mg       clopidogrel 75 MG Tabs   Commonly known as:  PLAVIX    Take 1 Tab by mouth every day.   Dose:  75 mg       lansoprazole 30 MG Cpdr   Commonly known as:  PREVACID    TAKE ONE CAPSULE BY MOUTH EVERY DAY       losartan 100 MG " Tabs   Commonly known as:  COZAAR    Take 0.5 Tabs by mouth every day.   Dose:  50 mg       SLO-NIACIN PO    Take 1 Tab by mouth every evening.   Dose:  1 Tab       vitamin D 2000 UNIT Tabs    Take 1 Tab by mouth every day.   Dose:  1 Tab

## 2017-01-01 NOTE — IP AVS SNAPSHOT
" After Visit Summary                                                                                                                  Name:Griselda Farmer  Medical Record Number:[id number]  CSN: [id number]    YOB: 1945   Age: 71 y.o.  Sex: female  HT:1.651 m (5' 5\") WT: 96.2 kg (212 lb 1.3 oz)          Admit Date: 1/1/2017     Discharge Date:   Today's Date: 1/3/2017  Attending Doctor:  ZENAIDA Saeed*                  Allergies:  Codeine            Discharge Instructions       Discharge Instructions    Discharged to home by car with relative. Discharged via wheelchair, hospital escort: Yes.  Special equipment needed: Not Applicable    Be sure to schedule a follow-up appointment with your primary care doctor or any specialists as instructed.     Discharge Plan:   Pneumococcal Vaccine Given - only chart on this line when given: Given (See MAR)  Influenza Vaccine Indication: Not indicated: Previously immunized this influenza season and > 8 years of age    I understand that a diet low in cholesterol, fat, and sodium is recommended for good health. Unless I have been given specific instructions below for another diet, I accept this instruction as my diet prescription.   Other diet: None    Special Instructions: Diagnosis:  Acute Coronary Syndrome (ACS) is a diagnosis that encompasses cardiac-related chest pain and heart attack. ACS occurs when the blood flow to the heart muscle is severely reduced or cut off completely due to a slow process called atherosclerosis.  Atherosclerosis is a disease in which the coronary arteries become narrow from a buildup of fat, cholesterol, and other substances that combine to form plaque. If the plaque breaks, a blood clot will form and block the blood flow to the heart muscle. This lack of blood flow can cause damage or death to the heart muscle which is called a heart attack or Myocardial Infarction (MI). There are two different types of MIs:  ST Elevation " Myocardial Infarction or STEMI (the most severe type of heart attack) and Non-ST Elevation Myocardial Infarction or NSTEMI.    Treatment Plan:  · Cardiac Diet  - Low fat, low salt, low cholesterol   · Cardiac Rehab  - Your doctor has ordered you a referral to [facility].  Call [phone] to schedule an appointment.  · Attend my follow-up appointment with my Cardiologist.  · Take my medications as prescribed by my doctor  · Exercise daily  · Lower my bad cholesterol and raise my good cholesterol and lower my blood pressure    Medications:  Certain medications are used to treat ACS.  Remember to always take medications as prescribed and never stop talking medications unless told by your doctor.    You have been prescribed the following medicatons:    Aspirin - Aspirin is used as a blood thinning medication and you will require this medication indefinitely.  Anti-platelet/blood thinner - Your Anti-platelet/Blood thinning medication is called Clopedigrel, and is used in combination with aspirin to prevent clots from forming in your heart and/or around your stent.  Your doctor will determine how long you need to be on this medicine, but generally if you have a Drug Eluding stent, you will need to take this medication for at least one month, and you have a Bare Metal stent, you will need the medication for at least 3 months.  Some patients require this medication indefinitely.  Statin - Statin Atorvastatin is used to lower cholesterol.  Angiotensin Receptor Blocker (ARB) - Angiotensin Receptor Blocker Losartan is used to lower blood pressure and treat heart failure.  Nitroglycerine - Nitroglycerine is used to relieve chest pain.    · Is patient discharged on Warfarin / Coumadin?   No     · Is patient Post Blood Transfusion?  No    Depression / Suicide Risk    As you are discharged from this [facility] facility, it is important to learn how to keep safe from harming yourself.    Recognize the warning signs:  · Abrupt  changes in personality, positive or negative- including increase in energy   · Giving away possessions  · Change in eating patterns- significant weight changes-  positive or negative  · Change in sleeping patterns- unable to sleep or sleeping all the time   · Unwillingness or inability to communicate  · Depression  · Unusual sadness, discouragement and loneliness  · Talk of wanting to die  · Neglect of personal appearance   · Rebelliousness- reckless behavior  · Withdrawal from people/activities they love  · Confusion- inability to concentrate     If you or a loved one observes any of these behaviors or has concerns about self-harm, here's what you can do:  · Talk about it- your feelings and reasons for harming yourself  · Remove any means that you might use to hurt yourself (examples: pills, rope, extension cords, firearm)  · Get professional help from the community (Mental Health, Substance Abuse, psychological counseling)  · Do not be alone:Call your Safe Contact- someone whom you trust who will be there for you.  · Call your local CRISIS HOTLINE 917-4368 or 303-326-1678  · Call your local Children's Mobile Crisis Response Team [place] (653) 191-8653 or [employer]  · Call the toll free National Suicide Prevention Hotlines   · National Suicide Prevention Lifeline 405-992-VSGB (0298)  · National Hope Line Network 800-SUICIDE (346-7644)    Heart Attack  A heart attack (myocardial infarction, MI) causes damage to your heart that cannot be fixed. A heart attack can happen when a heart (coronary) artery becomes blocked or narrowed. This cuts off the blood supply and oxygen to your heart.  When one or more of your coronary arteries becomes blocked, that area of your heart begins to die. This causes the pain you feel during a heart attack. Heart attack pain can also occur in one part of the body but be felt in another part of the body (referred pain). You may feel referred heart attack pain in your left  arm, neck, or jaw. Pain may even be felt in the right arm.  CAUSES   Many conditions can cause a heart attack. These include:   · Atherosclerosis. This is when a fatty substance (plaque) gradually builds up in the arteries. This buildup can block or reduce the blood supply to one or more of the heart arteries.  · A blood clot. A blood clot can develop suddenly when plaque breaks up (ruptures) within a heart artery. A blood clot can block the heart artery, which prevents blood flow to the heart.    · Severe tightening (spasm) of the heart artery. This cuts off blood flow through the artery.    RISK FACTORS  People at risk for heart attack usually have one or more of the following risk factors:   2. High blood pressure (hypertension).  3. High cholesterol.  4. Smoking.  5. Being male.  6. Being overweight or obese.  7. Older aged.     8. A family history of heart disease.  9. Lack of exercise.  10. Diabetes.  11. Stress.  12. Drinking too much alcohol.  13. Using illegal street drugs, such as cocaine and methamphetamines.  SYMPTOMS   Heart attack symptoms can vary from person to person. Symptoms depend on factors like gender and age.   7. In both men and women, heart attack symptoms can include the followin. Chest pain. This may feel like crushing, squeezing, or a feeling of pressure.  2. Shortness of breath.  3. Heartburn or indigestion with or without vomiting, shortness of breath, or sweating.  4. Sudden cold sweats.  5. Sudden light-headedness.  6. Upper back pain.    8. Women can have unique heart attack symptoms, such as:    1. Unexplained feelings of nervousness or anxiety.  2. Discomfort between the shoulder blades or upper back.  3. Tingling in the hands and arms.    9. Older people (of both genders) can have subtle heart attack symptoms, such as:    1. Sweating.  2. Shortness of breath.  3. General tiredness or not feeling well.    DIAGNOSIS   Diagnosing a heart attack involves several tests. They  include:   2. An assessment of your vital signs. This includes checking your:  1. Heart rhythm.  2. Blood pressure.  3. Breathing rate.  4. Oxygen level.    3. An ECG (electrocardiogram) to measure the electrical activity of your heart.  4. Blood tests called cardiac markers. In these tests, blood is drawn at scheduled times to check for the specific proteins or enzymes released by damaged heart muscle.  5. A chest X-ray.  6. An echocardiogram to evaluate heart motion and blood flow.  7. Coronary angiography to look at the heart arteries.    TREATMENT   Treatment for a heart attack may include:   2. Medicine that breaks up or dissolves blood clots in the heart artery.  3. Angioplasty.  4. Cardiac stent placement.  5. Intra-aortic balloon pump therapy (IABP).  6. Open heart surgery (coronary artery bypass graft, CABG).  HOME CARE INSTRUCTIONS  · Take medicines only as directed by your health care provider. You may need to take medicine after a heart attack to:    ¨ Keep your blood from clotting too easily.  ¨ Control your blood pressure.  ¨ Lower your cholesterol.  ¨ Control abnormal heart rhythms.    · Do not take the following medicines unless your health care provider approves:  ¨ Nonsteroidal anti-inflammatory drugs (NSAIDs), such as ibuprofen, naproxen, or celecoxib.  ¨ Vitamin supplements that contain vitamin A, vitamin E, or both.  ¨ Hormone replacement therapy that contains estrogen with or without progestin.  · Make lifestyle changes as directed by your health care provider. These may include:    ¨ Quitting smoking, if you smoke.  ¨ Getting regular exercise. Ask your health care provider to suggest some activities that are safe for you.  ¨ Eating a heart-healthy diet. A registered dietitian can help you learn healthy eating options.  ¨ Maintaining a healthy weight.    ¨ Managing diabetes, if necessary.  ¨ Reducing stress.  ¨ Limiting how much alcohol you drink.  SEEK IMMEDIATE MEDICAL CARE IF:   · You have  sudden, unexplained chest discomfort.  · You have sudden, unexplained discomfort in your arms, back, neck, or jaw.  · You have shortness of breath at any time.  · You suddenly start to sweat or your skin gets clammy.  · You feel nauseous or vomit.  · You suddenly feel light-headed or dizzy.  · Your heart begins to beat fast or feels like it is skipping beats.  These symptoms may represent a serious problem that is an emergency. Do not wait to see if the symptoms will go away. Get medical help right away. Call your local emergency services (911 in the [place] Do not drive yourself to the hospital.     This information is not intended to replace advice given to you by your health care provider. Make sure you discuss any questions you have with your health care provider.     Document Released: 12/18/2006 Document Revised: 01/08/2016 Document Reviewed: 02/20/2015  [employer] Interactive Patient Education ©2016 [employer]    Angiogram, Angioplasty, or Stent Placement  Care After  One of the following procedures was done today.  ANGIOGRAM:  A catheter was placed through the blood vessel in your groin, contrast was injected into the vessels, and pictures were taken.  ANGIOPLASTY:  A catheter was placed through the blood vessel in your groin and directed to an area of blocked blood flow. A balloon, and possibly a metal stent were used to open the blockage. If no other blockages are present below this area, your symptoms should improve. If blockages are present below this area, surgery may still be necessary.  STENT:  A catheter was placed in your groin through which a metal mesh tube was placed in a narrowed part of the artery to facilitate blood flow.  You were given intravenous sedation. These medications are rapidly cleared from your bloodstream. You may feel some discomfort at the insertion site after the local anesthetic wears off. This discomfort should gradually improve over the next several days.  · Only take  over-the-counter or prescription medicines for pain, discomfort, or fever as directed by your caregiver.  · Complications are very uncommon after this procedure. Go to the nearest emergency department if you develop any of the following symptoms:  · Worsening pain.  · Bleeding.  · Swelling at the puncture site.  · Lightheadedness.  · Dizziness or fainting.  · Fever or chills.  · If oozing, bleeding, or a lump appears at the puncture site, apply firm pressure directly to the site steadily for 15 minutes and go to the emergency department.  · Keep the skin around the insertion site dry. You may take showers after 24 hours. If the area does get wet, dry the skin completely. Avoid baths until the skin puncture site heals, usually 5 to 7 days.  · Development of redness, increased soreness, or swelling may be signs of a skin infection. Contact your physician.  · Rest for the remainder of the day and avoid any heavy lifting (more than 10 pounds or 4.5 kg). Do not operate heavy machinery, drive, or make legal decisions for the first 24 hours after the procedure. Have a responsible person drive you home.  · You may resume your usual diet after the procedure. Avoid alcoholic beverages for 24 hours after the procedure.  Document Released: 12/18/2006 Document Revised: 03/11/2013 Document Reviewed: 10/17/2007  [employer]® Patient Information ©2014 [employer].  Radial Site Care  Refer to this sheet in the next few weeks. These instructions provide you with information about caring for yourself after your procedure. Your health care provider may also give you more specific instructions. Your treatment has been planned according to current medical practices, but problems sometimes occur. Call your health care provider if you have any problems or questions after your procedure.  WHAT TO EXPECT AFTER THE PROCEDURE  After your procedure, it is typical to have the following:  · Bruising at the radial site that usually fades within 1-2  weeks.  · Blood collecting in the tissue (hematoma) that may be painful to the touch. It should usually decrease in size and tenderness within 1-2 weeks.  HOME CARE INSTRUCTIONS  14. Take medicines only as directed by your health care provider.  15. You may shower 24-48 hours after the procedure or as directed by your health care provider. Remove the bandage (dressing) and gently wash the site with plain soap and water. Pat the area dry with a clean towel. Do not rub the site, because this may cause bleeding.  16. Do not take baths, swim, or use a hot tub until your health care provider approves.  17. Check your insertion site every day for redness, swelling, or drainage.  18. Do not apply powder or lotion to the site.  19. Do not flex or bend the affected arm for 24 hours or as directed by your health care provider.  20. Do not push or pull heavy objects with the affected arm for 24 hours or as directed by your health care provider.  21. Do not lift over 10 lb (4.5 kg) for 5 days after your procedure or as directed by your health care provider.  22. Ask your health care provider when it is okay to:  1. Return to work or school.  2. Resume usual physical activities or sports.  3. Resume sexual activity.  23. Do not drive home if you are discharged the same day as the procedure. Have someone else drive you.  24. You may drive 24 hours after the procedure unless otherwise instructed by your health care provider.  25. Do not operate machinery or power tools for 24 hours after the procedure.  26. If your procedure was done as an outpatient procedure, which means that you went home the same day as your procedure, a responsible adult should be with you for the first 24 hours after you arrive home.  27. Keep all follow-up visits as directed by your health care provider. This is important.  SEEK MEDICAL CARE IF:  10. You have a fever.  11. You have chills.  12. You have increased bleeding from the radial site. Hold pressure  on the site.  SEEK IMMEDIATE MEDICAL CARE IF:  8. You have unusual pain at the radial site.  9. You have redness, warmth, or swelling at the radial site.  10. You have drainage (other than a small amount of blood on the dressing) from the radial site.  11. The radial site is bleeding, and the bleeding does not stop after 30 minutes of holding steady pressure on the site.  12. Your arm or hand becomes pale, cool, tingly, or numb.     This information is not intended to replace advice given to you by your health care provider. Make sure you discuss any questions you have with your health care provider.     Document Released: 01/20/2012 Document Revised: 01/08/2016 Document Reviewed: 07/06/2015  [employer] Interactive Patient Education ©2016 [employer]        Your appointments     Jun 05, 2017  7:00 AM   Adult Draw/Collection with [facility] (--)    [address] [place] [address] [place] [id number] [phone]            Jun 08, 2017  7:40 AM   Established Patient with JACEK Lemos   [facility]    [address] [place] [id number] [phone]           You will be receiving a confirmation call a few days before your appointment from our automated call confirmation system.              Follow-up Information     1. Schedule an appointment as soon as possible for a visit with Caro Peck M.D..    Specialty:  Pediatrics    Contact information    [facility]  [place] [address] [phone]          2. Follow up with Sy Mandujano M.D.. Schedule an appointment as soon as possible for a visit in 1 month.    Specialty:  Cardiology    Contact information    [address]  [address]  [place] [id number] [phone]           Discharge Medication Instructions:    Below are the medications your physician expects you to take upon discharge:    Review all your home medications and newly ordered medications with your doctor and/or pharmacist.  Follow medication instructions as directed by your doctor and/or pharmacist.    Please keep your medication list with you and share with your physician.               Medication List      START taking these medications        Instructions    atorvastatin 40 MG Tabs   Last time this was given:  40 mg on 1/2/2017  8:11 PM   Commonly known as:  LIPITOR    Take 1 Tab by mouth every bedtime.   Dose:  40 mg       clopidogrel 75 MG Tabs   Last time this was given:  150 mg on 1/3/2017  8:07 AM   Commonly known as:  PLAVIX    Take 1 Tab by mouth every day.   Dose:  75 mg         CONTINUE taking these medications        Instructions    aspirin EC 81 MG Tbec   Commonly known as:  ECOTRIN    Take 1 Tab by mouth every day.   Dose:  81 mg       lansoprazole 30 MG Cpdr   Commonly known as:  PREVACID    TAKE ONE CAPSULE BY MOUTH EVERY DAY       losartan 100 MG Tabs   Last time this was given:  50 mg on 1/3/2017  8:09 AM   Commonly known as:  COZAAR    Take 0.5 Tabs by mouth every day.   Dose:  50 mg       SLO-NIACIN PO    Take 1 Tab by mouth every evening.   Dose:  1 Tab       vitamin D 2000 UNIT Tabs    Take 1 Tab by mouth every day.   Dose:  1 Tab         STOP taking these medications     celecoxib 100 MG Caps   Commonly known as:  CELEBREX       simvastatin 10 MG Tabs   Commonly known as:  ZOCOR               Instructions           Diet / Nutrition:    Follow any diet instructions given to you by your doctor or the dietician, including how much salt (sodium) you are allowed each day.    If you are overweight, talk to your doctor about a weight reduction plan.    Activity:    Remain physically active following your doctor's instructions about exercise and activity.    Rest often.     Any time you become even a little tired or short of breath, SIT DOWN and rest.    Worsening Symptoms:    Report any of the following signs and symptoms to the doctor's office immediately:    *Pain of jaw, arm, or neck  *Chest pain not relieved by  medication                               *Dizziness or loss of consciousness  *Difficulty breathing even when at rest   *More tired than usual                                       *Bleeding drainage or swelling of surgical site  *Swelling of feet, ankles, legs or stomach                 *Fever (>100ºF)  *Pink or blood tinged sputum  *Weight gain (3lbs/day or 5lbs /week)           *Shock from internal defibrillator (if applicable)  *Palpitations or irregular heartbeats                *Cool and/or numb extremities    Stroke Awareness    Common Risk Factors for Stroke include:    Age  Atrial Fibrillation  Carotid Artery Stenosis  Diabetes Mellitus  Excessive alcohol consumption  High blood pressure  Overweight   Physical inactivity  Smoking    Warning signs and symptoms of a stroke include:    *Sudden numbness or weakness of the face, arm or leg (especially on one side of the body).  *Sudden confusion, trouble speaking or understanding.  *Sudden trouble seeing in one or both eyes.  *Sudden trouble walking, dizziness, loss of balance or coordination.Sudden severe headache with no known cause.    It is very important to get treatment quickly when a stroke occurs. If you experience any of the above warning signs, call 911 immediately.                   Disclaimer         Quit Smoking / Tobacco Use:    I understand the use of any tobacco products increases my chance of suffering from future heart disease or stroke and could cause other illnesses which may shorten my life. Quitting the use of tobacco products is the single most important thing I can do to improve my health. For further information on smoking / tobacco cessation call a Toll Free Quit Line at 1-100.941.8414 (*[employer]) or 1-710.562.2923 (American Lung Association) or you can access the web based program at www.lungusa.org.    [place] Tobacco Users Help Line:  (702) 389-8864       Toll Free: 1-926.274.4416  Quit Tobacco Program [facility]  Management Services (714)304-5284    Crisis Hotline:    [place] Crisis Hotline:  7-036-NVEHNVG or 1-137.475.7543    [place] Crisis Hotline:    1-855.249.1299 or 011-967-3480    Discharge Survey:   Thank you for choosing [facility]. We hope we did everything we could to make your hospital stay a pleasant one. You may be receiving a phone survey and we would appreciate your time and participation in answering the questions. Your input is very valuable to us in our efforts to improve our service to our patients and their families.        My signature on this form indicates that:    1. I have reviewed and understand the above information.  2. My questions regarding this information have been answered to my satisfaction.  3. I have formulated a plan with my discharge nurse to obtain my prescribed medications for home.                  Disclaimer         __________________________________                     __________       ________                       Patient Signature                                                 Date                    Time

## 2017-01-01 NOTE — ED NOTES
"Pt sitting in stretcher, c/o \"soreness\" to L side of chest. Pt states she has mechanical fall recently and fell onto L side. Pt states she thought pain was d/t fall but with pain started to experience SOB, nausea and diaphoresis so she came into ED. Per Marjorie pt started to have these symptoms again in the room and showed bradycardia in the 20's. Pt was given 1 mg IV atropine, currently NSR with HR at 75. Pt states pain in chest is 4/10, denies any SOB, nausea at this time. Pt is pink, warm, dry. Pt updated on poc, acknowledged. Code cart in room, pt attached to continuous monitoring as well as ZOLL monitor. Call light within reach.   "

## 2017-01-02 PROBLEM — I21.4 NSTEMI (NON-ST ELEVATED MYOCARDIAL INFARCTION) (HCC): Status: ACTIVE | Noted: 2017-01-02

## 2017-01-02 PROBLEM — R00.1 BRADYCARDIA WITH LESS THAN 30 BEATS PER MINUTE: Status: ACTIVE | Noted: 2017-01-02

## 2017-01-02 LAB
EKG IMPRESSION: NORMAL
LV EJECT FRACT  99904: 50
LV EJECT FRACT MOD 2C 99903: 65.15
LV EJECT FRACT MOD 4C 99902: 55.31
LV EJECT FRACT MOD BP 99901: 58.2
TROPONIN I SERPL-MCNC: 38.1 NG/ML (ref 0–0.04)

## 2017-01-02 PROCEDURE — 770020 HCHG ROOM/CARE - TELE (206)

## 2017-01-02 PROCEDURE — A9270 NON-COVERED ITEM OR SERVICE: HCPCS | Performed by: INTERNAL MEDICINE

## 2017-01-02 PROCEDURE — 84484 ASSAY OF TROPONIN QUANT: CPT

## 2017-01-02 PROCEDURE — 700102 HCHG RX REV CODE 250 W/ 637 OVERRIDE(OP): Performed by: INTERNAL MEDICINE

## 2017-01-02 PROCEDURE — 700111 HCHG RX REV CODE 636 W/ 250 OVERRIDE (IP): Performed by: INTERNAL MEDICINE

## 2017-01-02 PROCEDURE — 93010 ELECTROCARDIOGRAM REPORT: CPT | Performed by: INTERNAL MEDICINE

## 2017-01-02 PROCEDURE — 99232 SBSQ HOSP IP/OBS MODERATE 35: CPT | Performed by: HOSPITALIST

## 2017-01-02 PROCEDURE — 93306 TTE W/DOPPLER COMPLETE: CPT

## 2017-01-02 PROCEDURE — 90471 IMMUNIZATION ADMIN: CPT

## 2017-01-02 PROCEDURE — 90732 PPSV23 VACC 2 YRS+ SUBQ/IM: CPT | Performed by: INTERNAL MEDICINE

## 2017-01-02 PROCEDURE — 93005 ELECTROCARDIOGRAM TRACING: CPT | Performed by: INTERNAL MEDICINE

## 2017-01-02 PROCEDURE — 99233 SBSQ HOSP IP/OBS HIGH 50: CPT | Mod: 25 | Performed by: INTERNAL MEDICINE

## 2017-01-02 PROCEDURE — 93306 TTE W/DOPPLER COMPLETE: CPT | Mod: 26 | Performed by: INTERNAL MEDICINE

## 2017-01-02 PROCEDURE — 700105 HCHG RX REV CODE 258: Performed by: INTERNAL MEDICINE

## 2017-01-02 RX ORDER — PHENAZOPYRIDINE HYDROCHLORIDE 100 MG/1
100 TABLET, FILM COATED ORAL EVERY 8 HOURS PRN
Status: DISCONTINUED | OUTPATIENT
Start: 2017-01-02 | End: 2017-01-03 | Stop reason: HOSPADM

## 2017-01-02 RX ORDER — ATORVASTATIN CALCIUM 40 MG/1
40 TABLET, FILM COATED ORAL
Status: DISCONTINUED | OUTPATIENT
Start: 2017-01-02 | End: 2017-01-03 | Stop reason: HOSPADM

## 2017-01-02 RX ADMIN — ASPIRIN 325 MG: 325 TABLET, COATED ORAL at 08:04

## 2017-01-02 RX ADMIN — LOSARTAN POTASSIUM 50 MG: 25 TABLET, FILM COATED ORAL at 08:06

## 2017-01-02 RX ADMIN — ATORVASTATIN CALCIUM 40 MG: 40 TABLET, FILM COATED ORAL at 20:11

## 2017-01-02 RX ADMIN — CLOPIDOGREL 150 MG: 75 TABLET, FILM COATED ORAL at 08:05

## 2017-01-02 RX ADMIN — SODIUM CHLORIDE: 9 INJECTION, SOLUTION INTRAVENOUS at 05:28

## 2017-01-02 RX ADMIN — PNEUMOCOCCAL VACCINE POLYVALENT 25 MCG
25; 25; 25; 25; 25; 25; 25; 25; 25; 25; 25; 25; 25; 25; 25; 25; 25; 25; 25; 25; 25; 25; 25 INJECTION, SOLUTION INTRAMUSCULAR; SUBCUTANEOUS at 05:43

## 2017-01-02 RX ADMIN — SIMVASTATIN 10 MG: 20 TABLET, FILM COATED ORAL at 08:06

## 2017-01-02 ASSESSMENT — ENCOUNTER SYMPTOMS
FEVER: 0
VOMITING: 0
CARDIOVASCULAR NEGATIVE: 1
HEADACHES: 0
DEPRESSION: 0
BRUISES/BLEEDS EASILY: 0
WEAKNESS: 0
SHORTNESS OF BREATH: 0
DIZZINESS: 0
NERVOUS/ANXIOUS: 0
NAUSEA: 0
CHILLS: 0
MUSCULOSKELETAL NEGATIVE: 1
MYALGIAS: 0
COUGH: 0
BACK PAIN: 0
ABDOMINAL PAIN: 0
FLANK PAIN: 0
WEIGHT LOSS: 0

## 2017-01-02 ASSESSMENT — PAIN SCALES - GENERAL
PAINLEVEL_OUTOF10: 0
PAINLEVEL_OUTOF10: 0
PAINLEVEL_OUTOF10: 1
PAINLEVEL_OUTOF10: 0

## 2017-01-02 NOTE — PROGRESS NOTES
Cardiology Progress Note               Author: Sy Mandujano Date & Time created: 2017  9:25 AM     ID: 71 year old woman who presented with bradyarrhythmia and concern for sick sinus syndrome turned out to have NSTEMI with cultprit left dominant circumflex s/p PCI with a drug-eluting stent last night    Interval History:  - bradycardic, no significant pauses, no acute events (apart from PCI)    Review of Systems   Cardiovascular: Negative.        Physical Exam   Constitutional: She is oriented to person, place, and time. She appears well-developed and well-nourished. No distress.   Pleasant, obese, elderly woman in no distress   HENT:   Head: Normocephalic and atraumatic.   Eyes: Conjunctivae and EOM are normal. Pupils are equal, round, and reactive to light. No scleral icterus.   Neck: Neck supple. No JVD present. No tracheal deviation present.   Cardiovascular: Normal rate, regular rhythm, normal heart sounds and intact distal pulses.  Exam reveals no gallop and no friction rub.    No murmur heard.  Pulses:       Dorsalis pedis pulses are 2+ on the right side, and 2+ on the left side.   No carotid bruits   Pulmonary/Chest: Effort normal and breath sounds normal. No stridor. No respiratory distress. She has no wheezes. She has no rales.   Abdominal: Soft. Bowel sounds are normal. She exhibits no distension.   Musculoskeletal: She exhibits no edema.   Radial catheterization site noted, dressed, c/d/i no surrounding erythema or induration, normal capillary refill in fingers   Neurological: She is alert and oriented to person, place, and time.   Skin: Skin is warm and dry. No rash noted. She is not diaphoretic. No erythema. No pallor.   Psychiatric: She has a normal mood and affect. Judgment and thought content normal.   Vitals reviewed.      Hemodynamics:  Temp (24hrs), Av.3 °C (97.4 °F), Min:36 °C (96.8 °F), Max:36.7 °C (98.1 °F)  Temperature: 36.4 °C (97.5 °F)  Pulse  Av.5  Min: 22  Max: 101Heart  Rate (Monitored): 62  Blood Pressure : 137/60 mmHg, NIBP: 137/60 mmHg     Respiratory:    Respiration: 19, Pulse Oximetry: 93 %           Fluids:     Weight: 96.2 kg (212 lb 1.3 oz)  GI/Nutrition:  Orders Placed This Encounter   Procedures   • Diet Order     Standing Status: Standing      Number of Occurrences: 1      Standing Expiration Date:      Order Specific Question:  Diet:     Answer:  Cardiac [6]     Lab Results:  Recent Labs      01/01/17   1141 01/01/17 2040   WBC  9.8  12.4*   RBC  4.60  4.00*   HEMOGLOBIN  14.5  12.8   HEMATOCRIT  45.1  39.4   MCV  98.0*  98.5*   MCH  31.5  32.0   MCHC  32.2*  32.5*   RDW  52.2*  52.0*   PLATELETCT  254  237   MPV  11.2  11.1     Recent Labs      01/01/17   1141 01/01/17 2040   SODIUM  135  136   POTASSIUM  3.8  3.8   CHLORIDE  101  105   CO2  26  23   GLUCOSE  109*  100*   BUN  16  12   CREATININE  0.72  0.61   CALCIUM  9.6  8.5     Recent Labs      01/01/17   1141  01/01/17 2040   APTT  28.6   --    INR  0.90  1.61*     Recent Labs      01/01/17   1141   BNPBTYPENAT  75     Recent Labs      01/01/17   1141  01/01/17   1418  01/01/17 2040  01/02/17   0228   TROPONINI  0.06*  2.26*  36.21*  38.10*   BNPBTYPENAT  75   --    --    --              Medical Decision Making, by Problem:  Active Hospital Problems    Diagnosis   • NSTEMI (non-ST elevated myocardial infarction) (HCC) [I21.4]   • Chest pain [R07.9]   • Sick sinus syndrome (HCC) [I49.5]   • Symptomatic sinus bradycardia [R00.1]   • GERD (gastroesophageal reflux disease) [K21.9]   • Hyperlipidemia [E78.5]   • Hypertension [I10]   • FRIAD (obstructive sleep apnea) [G47.33]       Plan:    NSTEMI: Doing well and asymptomatic   - ready for transfer to telemetry from my perspective   - continue aspirin and plavix (though clarifying dose with interventionalist)   - statin changed to atorvastatin 40 mg PO QHS   - continue ARB   - beta blocker contraindicated in the setting of bradycardia and recent pauses    Sinus  bradycardia: conduction system disease and CAD involving amanda branches now status post PCI. No indication for pacemaker at this time.    EKG reviewed, Medications reviewed, Labs reviewed and Radiology images reviewed (today's EKG shows inferior Q waves (evolving infarct), LAFB, incomplete RBBB, no dynamic ischemic changes)                  Thank you for allowing me to participate in the care of this patient. Please call if I any clarification would be useful.    Sy Mandujano MD  Cardiologist, [facility] [place]

## 2017-01-02 NOTE — PROGRESS NOTES
Lab called with an increase in troponin level.   This critical lab result is within parameters and is expected post-cath procedure by  for this patient

## 2017-01-02 NOTE — PROGRESS NOTES
Report received, assumed pt care at 0645. Pt is A+O X 4. Pt denies pain. Pt ambulates to restroom. Bed in lowest position.  Call light within reach. POC discussed; questions answered; white board updated. Pt denies further needs at this time.    12 hour chart check completed

## 2017-01-02 NOTE — H&P
CHIEF COMPLAINT:  Chest pain.    HISTORY OF PRESENT ILLNESS:  This is a very pleasant 71-year-old female with   no prior cardiac history and only past medical history of hypertension who had   a fall last week, tripping over a cat and had some chest wall pain as a   result of that.  She woke up this morning and was feeling okay, but then she   started having some tightness around her left breast and underneath her left   breast, she initially wanted to attribute it to GERD, but then it did not   quite feel like GERD and then she wanted to attribute to the fall, but it   continued to intensify, she felt very clammy, sweaty and nauseous, and that is   when she decided she better get additional evaluation.  She denied any   accompanying short of breath.  She describes the chest pain as a bruise achy   feeling, it is mostly gone now, but not entirely.  She still has a vague   sensation of squeezing under her chest and a tightness, but it is much better   than earlier.  On presentation, she was significantly bradycardic in the 20s,   now she just states she feels very very tired.  This chest pain came on at   rest.  She had chest pain back in the 1980s for which they prescribed   nitroglycerin, but she has not had it since the 1980s and she is not really   sure what they said the cause of it was, they just stopped prescribing the   nitroglycerin because she was no longer having the pain.    PAST MEDICAL HISTORY:  She denies diabetes, heart disease or stroke.  She does   have hypertension and GERD.    PAST SURGICAL HISTORY:  She had a left knee replacement.  She had a   hysterectomy, tonsillectomy, and skin cancer removed from left side of her   jaw.    FAMILY MEDICAL HISTORY:  Positive for skin cancer, negative for heart disease.    Her mother had diabetes and a lot of people have arthritis.    SOCIAL HISTORY:  She lives alone.  Her son lives in [place].  She has a   daughter who lives in [facility].  She does not  smoke.  She does not   drink any alcohol.    CODE STATUS:  Full code.    REVIEW OF SYSTEMS:  She has had no fever, no chills, no sweats other than the   clamminess and sweatiness associated with this episode.  No flu-like symptoms.    No nausea, no vomiting.  Her bowel movements have been normal.  She has had   no urinary changes or dysuria.    PHYSICAL EXAMINATION:  GENERAL:  The patient is slightly pale, but not diaphoretic.  She appears   minimally uncomfortable, but is in no acute distress.  Her heart rate now is   in the 50s, sinus christian.  VITAL SIGNS:  She is afebrile, blood pressure 160s/70s, saturating 94% on 1   liter.  HEENT:  Her pupils are equal, round and reactive.  Extraocular movements are   clear and moist.  NECK:  Supple.  LUNGS:  Clear with good breath sounds.  She has mild kyphosis of the spine.  HEART:  Regular and christian.  She has minimal chest wall tenderness that does   not reproduce her pain.  ABDOMEN:  Obese but soft, nontender with positive bowel sounds.  No masses.  EXTREMITIES:  Show no clubbing, no cyanosis, no edema.  NEUROLOGIC:  She is grossly intact.    LABORATORY STUDIES:  Initial EKG, I did review the tracing myself.  She has   sinus bradycardia with poor voltage, nonspecific ST and T-wave changes, but no   significant ST elevation.      CBC and chemistry panels are unremarkable. Initial Troponin is  0.06  BNP is 75. Coagulation studies normal. TSH Normal    * Acute coronary syndrome w/ bradycardia  Patient is asking whether she can eat, however,   given the fact that she still has pain that the bradycardia presenting with   the chest pain and her chest pain story is a fairly convincing, I think we   should wait for the next troponins before deciding whether or not she can eat.   Patient does have external pacemaker on and I suspect she has acute coronary   syndrome, so she is appropriate for admission to the Cardiac ICU.      Patient iscritically ill with probable acute coronary  syndrome and significant   bradycardia which did require atropine in the emergency room.  I suspect she   has evolving MI.  She is at risk for further clinical deterioration and death.    Total critical care time spent on this admission is 40 minutes.       ____________________________________     MD MATTHEW SMALL / STEPHON    DD:  01/01/2017 21:24:36  DT:  01/01/2017 21:47:26    D#:  [id number]  Job#:  [id number]

## 2017-01-02 NOTE — PROCEDURES
DATE OF SERVICE:  01/01/2017    PROCEDURE:  Cardiac catheterization with Percutaneous Coronary Intervention.  A. Selective coronary angiography.  B. Coronary stent implantation of the distal dominant circumflex artery with   overlapping 2.75x20 mm and 3.0x12 mm drug-eluting Synergy stents.  C. Right radial artery approach.    PHYSICIAN:  Jose G Vallecillo MD    REFERRING PHYSICIAN:  Sadu Saunders MD    COMPLICATIONS:  None.    MEDICATIONS:  1.  Versed 2 mg IV.  2.  Fentanyl 100 mcg IV.  3.  Lidocaine 2% subcutaneous.  4.  Heparin 3000 units IA.  5.  Nitroglycerin 100 mcg IA.  6.  Verapamil 2.5 mg IA.  7.  Angiomax IV bolus and infusion.  8.  Atropine 0.5 mg IV.  9.  Nitroglycerin 600 mcg intracoronary in divided doses.  10.  Plavix 600 mg p.o.    INDICATIONS:  A 71-year-old female who presents with recurrent substernal   anginal chest pain associated with diaphoresis with transient recurrent   Bradycardia requiring atropine.  Serial EKGs showed inferior and lateral ST   elevation with reciprocal ST segment depression compared to previous ECG   tracings.  Troponin levels increased to 3.  The patient referred for cardiac   catheterization with consideration of therapeutic coronary intervention.    DESCRIPTION OF PROCEDURE:  After informed consent was obtained, the patient   was brought to the cardiac catheterization laboratory.    After establishing the presence of adequate collateral circulation to the   right hand with a pressure and oximetry-guided Luis's test, the volar surface   of the right wrist was prepped, draped and anesthetized in the usual manner.    Using modified Seldinger technique, a 6-Albanian x10 cm introducer sheath was   inserted in the right radial artery.  Heparin, verapamil and nitroglycerin   were given via the side port.    Next, a 6-Albanian JL 3.5 left coronary catheter was inserted in the ostium of   the left coronary artery and left coronary angiograms were obtained in various    projections.    Next, a 6-Marshallese JR 4.0 right coronary catheter was inserted in the ostium of   the right coronary artery and right coronary angiograms were obtained in   various projections.    After reviewing the films, we elected to proceed with coronary intervention of   the distal dominant circumflex artery, which showed a subtotal 99% stenosis.    Next, Angiomax was given.    Next, a 6-Marshallese extra backup 3.75 guiding catheter was inserted in the ostium   of the left coronary artery.    Next, a 0.014 Whisper wire was advanced into the distal circumflex artery.    Next, a 2.0x15 mm balloon catheter was inserted across the distal stenosis and   inflated up to 10 atmospheres.    Next, a 2.75x20 mm drug-eluting Synergy stent was inserted across the lesion   and inflated up to 8 atmospheres with a post-deployment inflation of 16   atmospheres.    Next, a 3.0x12 mm drug-eluting Synergy stent was placed just proximal to the   initial stent with slight overlap and inflated up to 11 atmospheres.  The   balloon was then advanced into the mid portion of the stented segment across   the anastomosis of both stents and inflated up to 11 atmospheres.  IC nitroglycerin   was given and final angiograms were performed.    At the end of procedure, catheters were removed.  Hemostasis was achieved with   a wrist band device.  Patient tolerated the procedure well.    FINDINGS:  HEMODYNAMICS:  Central aortic pressure systolic 116, diastolic 74, mean of   93.    LEFT VENTRICULOGRAPHY:  This was not performed.  Echocardiogram pending.    CORONARY ARTERIOGRAPHY:  1.  Left main artery:  Left main vessel is a large caliber vessel, angiographically   normal, and bifurcates into the left anterior descending artery and a large caliber   dominant circumflex artery.  2.  Left anterior descending artery:  The LAD gives rise to a normal   complement of septal and diagonal branches and extends around the apex.  The   LAD has diffuse mild focal  intimal atheroma with no stenotic segments.  3.  Circumflex artery:  The circumflex is a large, dominant vessel.  It   gives rise to a large bifurcating first marginal branch and a small caliber   posterolateral branch and then the posterior descending artery.  There is a 99%   stenosis of the proximal portion of the posterior descending artery.  4.  Right coronary artery:  The RCA is a small diminutive vessel, which gives   rise to a conus or SA amanda branch and RV branches.    POST-STENT IMPLANTATION of the distal circumflex artery with overlapping   drug-eluting stents of 3.0x12 mm Synergy and a 2.75x20 mm Synergy stent,   demonstrates good stent expansion, < 10% residual stenosis, no evidence of   dissection or thrombus and MANUEL 3 antegrade flow.    CONCLUSION:  1.  Distal circumflex 99% stenosis.  2.  Distal circumflex overlapping stents, 3.0x12 mm and 2.75x20 mm drug-eluting stents.       ____________________________________     MD JIM DUMONT / STEPHON    DD:  01/01/2017 18:14:02  DT:  01/01/2017 18:39:15    D#:  [id number]  Job#:  [id number]

## 2017-01-02 NOTE — PROGRESS NOTES
Lab called with critical result of Troponin at 2.26. Critical lab result read back to lab.   Dr. Saunders notified of critical lab result at 1535.  Critical lab result read back by Dr. Saunders.

## 2017-01-02 NOTE — CARE PLAN
Problem: Safety  Goal: Will remain free from injury  Mobility assessed and documented- one person assist needed. Treaded socks on pt, bed in low position. Pt instructed to call prior to getting out of bed. Assistance given when pt needing to use restroom. Pt steady, tolerates mobility well     Problem: Skin Integrity  Goal: Risk for impaired skin integrity will decrease  Air removed from hemoband per protocol. Skin intact, R radial pulse 2+. No complaints of pain per the pt

## 2017-01-02 NOTE — PROGRESS NOTES
Lab called with troponin level of 38.10.    This critical lab result is within parameters established by  for this patient

## 2017-01-02 NOTE — PROGRESS NOTES
Pt's HR is consistently in the 40's, pt is asymptomatic. Dr. Rocha notified- orders to continue to monitor and to notify MD if pt becomes symptomatic

## 2017-01-02 NOTE — PROGRESS NOTES
Pt admitted from ER. Report received. Family at bedside. Pt and family oriented to POC and unit routine; questions answered; denies further needs at this time.

## 2017-01-02 NOTE — PROGRESS NOTES
Hospital Medicine Progress Note, Adult, Complex               Author: Pura Arevalo Date & Time created: 1/2/2017  8:50 AM     Interval History:  72 yo F with NSTEMI, post stent.    She feels great and looking forward to when she goes home  No chest pain or shortness of breath with ambulation  Chronic urinary urgency and frequency takes Azo at home    Review of Systems:  Review of Systems   Constitutional: Negative for fever, chills, weight loss and malaise/fatigue.   Respiratory: Negative for cough and shortness of breath.    Cardiovascular: Negative for chest pain and leg swelling.   Gastrointestinal: Negative for nausea, vomiting and abdominal pain.   Genitourinary: Positive for dysuria. Negative for urgency, frequency, hematuria and flank pain.   Musculoskeletal: Negative.  Negative for myalgias and back pain.   Skin: Negative for rash.   Neurological: Negative for dizziness, weakness and headaches.   Endo/Heme/Allergies: Negative.  Does not bruise/bleed easily.   Psychiatric/Behavioral: Negative for depression. The patient is not nervous/anxious.    All other systems reviewed and are negative.      Physical Exam:  Physical Exam   Constitutional: She appears well-developed and well-nourished. No distress.   HENT:   Nose: Nose normal.   Mouth/Throat: Oropharynx is clear and moist. No oropharyngeal exudate.   Eyes: Conjunctivae are normal. Right eye exhibits no discharge. Left eye exhibits no discharge. No scleral icterus.   Neck: No JVD present. No tracheal deviation present.   Cardiovascular: Normal rate, regular rhythm and normal heart sounds.    Pulmonary/Chest: Effort normal and breath sounds normal. No stridor. No respiratory distress. She has no wheezes. She has no rales. She exhibits no tenderness.   Abdominal: Soft. Bowel sounds are normal. She exhibits no distension. There is no tenderness.   Musculoskeletal: She exhibits no edema or tenderness.   Neurological: She is alert. No cranial nerve deficit.  She exhibits normal muscle tone.   Skin: Skin is warm and dry. She is not diaphoretic. No pallor.   Psychiatric: She has a normal mood and affect. Her behavior is normal.   Nursing note and vitals reviewed.      Labs:        Invalid input(s): [id number]  Recent Labs      17   1141  17   1418  170  17   0228   TROPONINI  0.06*  2.26*  36.21*  38.10*   BNPBTYPENAT  75   --    --    --      Recent Labs      17   11417   SODIUM  135  136   POTASSIUM  3.8  3.8   CHLORIDE  101  105   CO2  26  23   BUN  16  12   CREATININE  0.72  0.61   CALCIUM  9.6  8.5     Recent Labs      17   11417   ALTSGPT  15   --    ASTSGOT  16   --    ALKPHOSPHAT  107*   --    TBILIRUBIN  0.4   --    LIPASE  44   --    GLUCOSE  109*  100*     Recent Labs      17   11417   RBC  4.60  4.00*   HEMOGLOBIN  14.5  12.8   HEMATOCRIT  45.1  39.4   PLATELETCT  254  237   PROTHROMBTM  12.4  19.6*   APTT  28.6   --    INR  0.90  1.61*     Recent Labs      17   11417   WBC  9.8  12.4*   NEUTSPOLYS  67.90   --    LYMPHOCYTES  21.60*   --    MONOCYTES  6.70   --    EOSINOPHILS  2.30   --    BASOPHILS  1.20   --    ASTSGOT  16   --    ALTSGPT  15   --    ALKPHOSPHAT  107*   --    TBILIRUBIN  0.4   --            Hemodynamics:  Temp (24hrs), Av.3 °C (97.4 °F), Min:36 °C (96.8 °F), Max:36.7 °C (98.1 °F)  Temperature: 36.4 °C (97.6 °F)  Pulse  Av.3  Min: 22  Max: 101Heart Rate (Monitored): (!) 59  Blood Pressure : 137/60 mmHg, NIBP: 145/61 mmHg     Respiratory:    Respiration: 20, Pulse Oximetry: 95 %           Fluids:    Intake/Output Summary (Last 24 hours) at 17 0850  Last data filed at 17 0600   Gross per 24 hour   Intake   1518 ml   Output    985 ml   Net    533 ml     Weight: 96.2 kg (212 lb 1.3 oz)  GI/Nutrition:  Orders Placed This Encounter   Procedures   • Diet Order     Standing Status: Standing      Number of  Occurrences: 1      Standing Expiration Date:      Order Specific Question:  Diet:     Answer:  Cardiac [6]     Medical Decision Making, by Problem:  Active Hospital Problems    Diagnosis   • NSTEMI (non-ST elevated myocardial infarction) (HCC) [I21.4]  Post stent  Aspirin, atorvastatin, plavix and cozaar   • Chest pain [R07.9] resolved   • Sick sinus syndrome (HCC) [I49.5] resolved, due to RCA lesion   • Symptomatic sinus bradycardia [R00.1] resolved   • GERD (gastroesophageal reflux disease) [K21.9] no issue at current   • Hyperlipidemia [E78.5] lipitor   • Hypertension [I10]   • FRIDA (obstructive sleep apnea) [G47.33] machine at home   Chronic dysuria: pyridium as needed  Pt currently feels better    Home when cleared by cardiology    Labs reviewed, EKG reviewed, Medications reviewed and Radiology images reviewed  Rhodes catheter: No Rhodes      DVT Prophylaxis: Not indicated at this time, ambulatory  DVT prophylaxis - mechanical: SCDs

## 2017-01-02 NOTE — PROGRESS NOTES
MS:  SB-SR 48-64 with unsustained HR as low as 42 with occasional PVCs rare couplets and rare triplets.  0.18/0.08/0.44

## 2017-01-02 NOTE — CARE PLAN
Problem: Bowel/Gastric:  Goal: Normal bowel function is maintained or improved  Outcome: PROGRESSING AS EXPECTED  LBM 1/1/17; Pt report regularity    Problem: Pain Management  Goal: Pain level will decrease to patient’s comfort goal  Outcome: PROGRESSING AS EXPECTED  Pt denies pain. Will reassess pain level at least Q4H and as needed.

## 2017-01-02 NOTE — CARE PLAN
Problem: Safety  Goal: Will remain free from falls  Outcome: PROGRESSING AS EXPECTED  Pt is high fall risk. Hourly rounding in place. Treaded socks on; appropriate sign on door placed for number of assistance needed. Personal belongings and call light within reach. Bed alarm in place. Pt in room next to RN station    Problem: Knowledge Deficit  Goal: Knowledge of disease process/condition, treatment plan, diagnostic tests, and medications will improve  Outcome: PROGRESSING AS EXPECTED  Pt and Family oriented to unit routine. Pt and family educated regarding activity, diet, meds and plan of care; questions answered; verbalized understanding. Pt and Family denies having further needs at this time.

## 2017-01-02 NOTE — DISCHARGE PLANNING
Admit Bradycardia    NSTEMI.  Cath lab.  A/O no pain.  SB-SR.  Cards following.  No IV ABX.       70 y/o  female.  Amy resident.  Admit profile indicates Lives alone able to care for self.  [facility] insurance and PCP list for post acute DC needs.  Has Adult son  Erick [phone]      Will follow.

## 2017-01-03 VITALS
HEIGHT: 65 IN | RESPIRATION RATE: 20 BRPM | DIASTOLIC BLOOD PRESSURE: 60 MMHG | TEMPERATURE: 98.2 F | OXYGEN SATURATION: 93 % | BODY MASS INDEX: 35.34 KG/M2 | WEIGHT: 212.08 LBS | SYSTOLIC BLOOD PRESSURE: 137 MMHG | HEART RATE: 67 BPM

## 2017-01-03 PROCEDURE — A9270 NON-COVERED ITEM OR SERVICE: HCPCS | Performed by: INTERNAL MEDICINE

## 2017-01-03 PROCEDURE — 700102 HCHG RX REV CODE 250 W/ 637 OVERRIDE(OP): Performed by: HOSPITALIST

## 2017-01-03 PROCEDURE — 99239 HOSP IP/OBS DSCHRG MGMT >30: CPT | Performed by: HOSPITALIST

## 2017-01-03 PROCEDURE — A9270 NON-COVERED ITEM OR SERVICE: HCPCS | Performed by: HOSPITALIST

## 2017-01-03 PROCEDURE — 700102 HCHG RX REV CODE 250 W/ 637 OVERRIDE(OP): Performed by: INTERNAL MEDICINE

## 2017-01-03 RX ORDER — DEXTROSE MONOHYDRATE 50 MG/ML
INJECTION, SOLUTION INTRAVENOUS
Status: DISCONTINUED
Start: 2017-01-03 | End: 2017-01-03 | Stop reason: HOSPADM

## 2017-01-03 RX ORDER — CLOPIDOGREL BISULFATE 75 MG/1
75 TABLET ORAL DAILY
Qty: 30 TAB | Refills: 11 | Status: SHIPPED | OUTPATIENT
Start: 2017-01-03 | End: 2017-02-09 | Stop reason: SDUPTHER

## 2017-01-03 RX ORDER — ATORVASTATIN CALCIUM 40 MG/1
40 TABLET, FILM COATED ORAL
Qty: 30 TAB | Refills: 3 | Status: SHIPPED | OUTPATIENT
Start: 2017-01-03 | End: 2017-02-09 | Stop reason: SDUPTHER

## 2017-01-03 RX ADMIN — PHENAZOPYRIDINE HYDROCHLORIDE 100 MG: 100 TABLET ORAL at 09:30

## 2017-01-03 RX ADMIN — CLOPIDOGREL 150 MG: 75 TABLET, FILM COATED ORAL at 08:07

## 2017-01-03 RX ADMIN — ASPIRIN 325 MG: 325 TABLET, COATED ORAL at 08:08

## 2017-01-03 RX ADMIN — LOSARTAN POTASSIUM 50 MG: 25 TABLET, FILM COATED ORAL at 08:09

## 2017-01-03 RX ADMIN — ACETAMINOPHEN 650 MG: 325 TABLET, FILM COATED ORAL at 09:30

## 2017-01-03 ASSESSMENT — PAIN SCALES - GENERAL
PAINLEVEL_OUTOF10: 0

## 2017-01-03 ASSESSMENT — LIFESTYLE VARIABLES: EVER_SMOKED: NEVER

## 2017-01-03 NOTE — PROGRESS NOTES
Pt received from another RN at 1100 for change in assignment. Pt stable, sitting in the chair. SR-SB on monitor. bp stable.

## 2017-01-03 NOTE — DISCHARGE INSTRUCTIONS
Discharge Instructions    Discharged to home by car with relative. Discharged via wheelchair, hospital escort: Yes.  Special equipment needed: Not Applicable    Be sure to schedule a follow-up appointment with your primary care doctor or any specialists as instructed.     Discharge Plan:   Pneumococcal Vaccine Given - only chart on this line when given: Given (See MAR)  Influenza Vaccine Indication: Not indicated: Previously immunized this influenza season and > 8 years of age    I understand that a diet low in cholesterol, fat, and sodium is recommended for good health. Unless I have been given specific instructions below for another diet, I accept this instruction as my diet prescription.   Other diet: None    Special Instructions: Diagnosis:  Acute Coronary Syndrome (ACS) is a diagnosis that encompasses cardiac-related chest pain and heart attack. ACS occurs when the blood flow to the heart muscle is severely reduced or cut off completely due to a slow process called atherosclerosis.  Atherosclerosis is a disease in which the coronary arteries become narrow from a buildup of fat, cholesterol, and other substances that combine to form plaque. If the plaque breaks, a blood clot will form and block the blood flow to the heart muscle. This lack of blood flow can cause damage or death to the heart muscle which is called a heart attack or Myocardial Infarction (MI). There are two different types of MIs:  ST Elevation Myocardial Infarction or STEMI (the most severe type of heart attack) and Non-ST Elevation Myocardial Infarction or NSTEMI.    Treatment Plan:  · Cardiac Diet  - Low fat, low salt, low cholesterol   · Cardiac Rehab  - Your doctor has ordered you a referral to [facility].  Call [phone] to schedule an appointment.  · Attend my follow-up appointment with my Cardiologist.  · Take my medications as prescribed by my doctor  · Exercise daily  · Lower my bad cholesterol and raise my good cholesterol and lower  my blood pressure    Medications:  Certain medications are used to treat ACS.  Remember to always take medications as prescribed and never stop talking medications unless told by your doctor.    You have been prescribed the following medicatons:    Aspirin - Aspirin is used as a blood thinning medication and you will require this medication indefinitely.  Anti-platelet/blood thinner - Your Anti-platelet/Blood thinning medication is called Clopedigrel, and is used in combination with aspirin to prevent clots from forming in your heart and/or around your stent.  Your doctor will determine how long you need to be on this medicine, but generally if you have a Drug Eluding stent, you will need to take this medication for at least one month, and you have a Bare Metal stent, you will need the medication for at least 3 months.  Some patients require this medication indefinitely.  Statin - Statin Atorvastatin is used to lower cholesterol.  Angiotensin Receptor Blocker (ARB) - Angiotensin Receptor Blocker Losartan is used to lower blood pressure and treat heart failure.  Nitroglycerine - Nitroglycerine is used to relieve chest pain.    · Is patient discharged on Warfarin / Coumadin?   No     · Is patient Post Blood Transfusion?  No    Depression / Suicide Risk    As you are discharged from this [facility] facility, it is important to learn how to keep safe from harming yourself.    Recognize the warning signs:  · Abrupt changes in personality, positive or negative- including increase in energy   · Giving away possessions  · Change in eating patterns- significant weight changes-  positive or negative  · Change in sleeping patterns- unable to sleep or sleeping all the time   · Unwillingness or inability to communicate  · Depression  · Unusual sadness, discouragement and loneliness  · Talk of wanting to die  · Neglect of personal appearance   · Rebelliousness- reckless behavior  · Withdrawal from people/activities they  love  · Confusion- inability to concentrate     If you or a loved one observes any of these behaviors or has concerns about self-harm, here's what you can do:  · Talk about it- your feelings and reasons for harming yourself  · Remove any means that you might use to hurt yourself (examples: pills, rope, extension cords, firearm)  · Get professional help from the community (Mental Health, Substance Abuse, psychological counseling)  · Do not be alone:Call your Safe Contact- someone whom you trust who will be there for you.  · Call your local CRISIS HOTLINE 166-9323 or 114-712-7405  · Call your local Children's Mobile Crisis Response Team [place] (311) 620-4460 or www.HackerOne  · Call the toll free National Suicide Prevention Hotlines   · National Suicide Prevention Lifeline 236-543-GTHR (1912)  · Emef[employer] Line Network 800-SUICIDE (836-7660)    Heart Attack  A heart attack (myocardial infarction, MI) causes damage to your heart that cannot be fixed. A heart attack can happen when a heart (coronary) artery becomes blocked or narrowed. This cuts off the blood supply and oxygen to your heart.  When one or more of your coronary arteries becomes blocked, that area of your heart begins to die. This causes the pain you feel during a heart attack. Heart attack pain can also occur in one part of the body but be felt in another part of the body (referred pain). You may feel referred heart attack pain in your left arm, neck, or jaw. Pain may even be felt in the right arm.  CAUSES   Many conditions can cause a heart attack. These include:   · Atherosclerosis. This is when a fatty substance (plaque) gradually builds up in the arteries. This buildup can block or reduce the blood supply to one or more of the heart arteries.  · A blood clot. A blood clot can develop suddenly when plaque breaks up (ruptures) within a heart artery. A blood clot can block the heart artery, which prevents blood flow to the heart.     · Severe tightening (spasm) of the heart artery. This cuts off blood flow through the artery.    RISK FACTORS  People at risk for heart attack usually have one or more of the following risk factors:   2. High blood pressure (hypertension).  3. High cholesterol.  4. Smoking.  5. Being male.  6. Being overweight or obese.  7. Older aged.     8. A family history of heart disease.  9. Lack of exercise.  10. Diabetes.  11. Stress.  12. Drinking too much alcohol.  13. Using illegal street drugs, such as cocaine and methamphetamines.  SYMPTOMS   Heart attack symptoms can vary from person to person. Symptoms depend on factors like gender and age.   7. In both men and women, heart attack symptoms can include the followin. Chest pain. This may feel like crushing, squeezing, or a feeling of pressure.  2. Shortness of breath.  3. Heartburn or indigestion with or without vomiting, shortness of breath, or sweating.  4. Sudden cold sweats.  5. Sudden light-headedness.  6. Upper back pain.    8. Women can have unique heart attack symptoms, such as:    1. Unexplained feelings of nervousness or anxiety.  2. Discomfort between the shoulder blades or upper back.  3. Tingling in the hands and arms.    9. Older people (of both genders) can have subtle heart attack symptoms, such as:    1. Sweating.  2. Shortness of breath.  3. General tiredness or not feeling well.    DIAGNOSIS   Diagnosing a heart attack involves several tests. They include:   2. An assessment of your vital signs. This includes checking your:  1. Heart rhythm.  2. Blood pressure.  3. Breathing rate.  4. Oxygen level.    3. An ECG (electrocardiogram) to measure the electrical activity of your heart.  4. Blood tests called cardiac markers. In these tests, blood is drawn at scheduled times to check for the specific proteins or enzymes released by damaged heart muscle.  5. A chest X-ray.  6. An echocardiogram to evaluate heart motion and blood flow.  7. Coronary  angiography to look at the heart arteries.    TREATMENT   Treatment for a heart attack may include:   2. Medicine that breaks up or dissolves blood clots in the heart artery.  3. Angioplasty.  4. Cardiac stent placement.  5. Intra-aortic balloon pump therapy (IABP).  6. Open heart surgery (coronary artery bypass graft, CABG).  HOME CARE INSTRUCTIONS  · Take medicines only as directed by your health care provider. You may need to take medicine after a heart attack to:    ¨ Keep your blood from clotting too easily.  ¨ Control your blood pressure.  ¨ Lower your cholesterol.  ¨ Control abnormal heart rhythms.    · Do not take the following medicines unless your health care provider approves:  ¨ Nonsteroidal anti-inflammatory drugs (NSAIDs), such as ibuprofen, naproxen, or celecoxib.  ¨ Vitamin supplements that contain vitamin A, vitamin E, or both.  ¨ Hormone replacement therapy that contains estrogen with or without progestin.  · Make lifestyle changes as directed by your health care provider. These may include:    ¨ Quitting smoking, if you smoke.  ¨ Getting regular exercise. Ask your health care provider to suggest some activities that are safe for you.  ¨ Eating a heart-healthy diet. A registered dietitian can help you learn healthy eating options.  ¨ Maintaining a healthy weight.    ¨ Managing diabetes, if necessary.  ¨ Reducing stress.  ¨ Limiting how much alcohol you drink.  SEEK IMMEDIATE MEDICAL CARE IF:   · You have sudden, unexplained chest discomfort.  · You have sudden, unexplained discomfort in your arms, back, neck, or jaw.  · You have shortness of breath at any time.  · You suddenly start to sweat or your skin gets clammy.  · You feel nauseous or vomit.  · You suddenly feel light-headed or dizzy.  · Your heart begins to beat fast or feels like it is skipping beats.  These symptoms may represent a serious problem that is an emergency. Do not wait to see if the symptoms will go away. Get medical help right  away. Call your local emergency services (911 in the [place] Do not drive yourself to the hospital.     This information is not intended to replace advice given to you by your health care provider. Make sure you discuss any questions you have with your health care provider.     Document Released: 12/18/2006 Document Revised: 01/08/2016 Document Reviewed: 02/20/2015  [employer] Interactive Patient Education ©2016 [employer]    Angiogram, Angioplasty, or Stent Placement  Care After  One of the following procedures was done today.  ANGIOGRAM:  A catheter was placed through the blood vessel in your groin, contrast was injected into the vessels, and pictures were taken.  ANGIOPLASTY:  A catheter was placed through the blood vessel in your groin and directed to an area of blocked blood flow. A balloon, and possibly a metal stent were used to open the blockage. If no other blockages are present below this area, your symptoms should improve. If blockages are present below this area, surgery may still be necessary.  STENT:  A catheter was placed in your groin through which a metal mesh tube was placed in a narrowed part of the artery to facilitate blood flow.  You were given intravenous sedation. These medications are rapidly cleared from your bloodstream. You may feel some discomfort at the insertion site after the local anesthetic wears off. This discomfort should gradually improve over the next several days.  · Only take over-the-counter or prescription medicines for pain, discomfort, or fever as directed by your caregiver.  · Complications are very uncommon after this procedure. Go to the nearest emergency department if you develop any of the following symptoms:  · Worsening pain.  · Bleeding.  · Swelling at the puncture site.  · Lightheadedness.  · Dizziness or fainting.  · Fever or chills.  · If oozing, bleeding, or a lump appears at the puncture site, apply firm pressure directly to the site steadily for 15 minutes  and go to the emergency department.  · Keep the skin around the insertion site dry. You may take showers after 24 hours. If the area does get wet, dry the skin completely. Avoid baths until the skin puncture site heals, usually 5 to 7 days.  · Development of redness, increased soreness, or swelling may be signs of a skin infection. Contact your physician.  · Rest for the remainder of the day and avoid any heavy lifting (more than 10 pounds or 4.5 kg). Do not operate heavy machinery, drive, or make legal decisions for the first 24 hours after the procedure. Have a responsible person drive you home.  · You may resume your usual diet after the procedure. Avoid alcoholic beverages for 24 hours after the procedure.  Document Released: 12/18/2006 Document Revised: 03/11/2013 Document Reviewed: 10/17/2007  [employer]® Patient Information ©2014 [employer].  Radial Site Care  Refer to this sheet in the next few weeks. These instructions provide you with information about caring for yourself after your procedure. Your health care provider may also give you more specific instructions. Your treatment has been planned according to current medical practices, but problems sometimes occur. Call your health care provider if you have any problems or questions after your procedure.  WHAT TO EXPECT AFTER THE PROCEDURE  After your procedure, it is typical to have the following:  · Bruising at the radial site that usually fades within 1-2 weeks.  · Blood collecting in the tissue (hematoma) that may be painful to the touch. It should usually decrease in size and tenderness within 1-2 weeks.  HOME CARE INSTRUCTIONS  14. Take medicines only as directed by your health care provider.  15. You may shower 24-48 hours after the procedure or as directed by your health care provider. Remove the bandage (dressing) and gently wash the site with plain soap and water. Pat the area dry with a clean towel. Do not rub the site, because this may cause  bleeding.  16. Do not take baths, swim, or use a hot tub until your health care provider approves.  17. Check your insertion site every day for redness, swelling, or drainage.  18. Do not apply powder or lotion to the site.  19. Do not flex or bend the affected arm for 24 hours or as directed by your health care provider.  20. Do not push or pull heavy objects with the affected arm for 24 hours or as directed by your health care provider.  21. Do not lift over 10 lb (4.5 kg) for 5 days after your procedure or as directed by your health care provider.  22. Ask your health care provider when it is okay to:  1. Return to work or school.  2. Resume usual physical activities or sports.  3. Resume sexual activity.  23. Do not drive home if you are discharged the same day as the procedure. Have someone else drive you.  24. You may drive 24 hours after the procedure unless otherwise instructed by your health care provider.  25. Do not operate machinery or power tools for 24 hours after the procedure.  26. If your procedure was done as an outpatient procedure, which means that you went home the same day as your procedure, a responsible adult should be with you for the first 24 hours after you arrive home.  27. Keep all follow-up visits as directed by your health care provider. This is important.  SEEK MEDICAL CARE IF:  10. You have a fever.  11. You have chills.  12. You have increased bleeding from the radial site. Hold pressure on the site.  SEEK IMMEDIATE MEDICAL CARE IF:  8. You have unusual pain at the radial site.  9. You have redness, warmth, or swelling at the radial site.  10. You have drainage (other than a small amount of blood on the dressing) from the radial site.  11. The radial site is bleeding, and the bleeding does not stop after 30 minutes of holding steady pressure on the site.  12. Your arm or hand becomes pale, cool, tingly, or numb.     This information is not intended to replace advice given to you by  your health care provider. Make sure you discuss any questions you have with your health care provider.     Document Released: 01/20/2012 Document Revised: 01/08/2016 Document Reviewed: 07/06/2015  Elsevier Interactive Patient Education ©2016 [employer].

## 2017-01-03 NOTE — DISCHARGE PLANNING
Overnight slept well.  Wants to DC ome.  A/O SR-SB.  50-80's.  No pressors.   Cardiac diet.  Eats most of it.  Up walking by her self.   Up to bathroom.     Plan: Follow for post acute needs.

## 2017-01-03 NOTE — CARE PLAN
Problem: Knowledge Deficit  Goal: Knowledge of disease process/condition, treatment plan, diagnostic tests, and medications will improve  Cardiac anatomy and disease process discussed with patient.  All questions answered at this time.     Problem: Urinary Elimination:  Goal: Ability to reestablish a normal urinary elimination pattern will improve  Patient complains of urinary frequency.  Frequent toileting offered.

## 2017-01-03 NOTE — PROGRESS NOTES
Multidisciplinary Rounds: Nursing    Griselda Asiya Farmer is a 71 y.o. Female admitted 1/1/2017 for     Bradycardia with less than 30 beats per minute  Chest pain  Bradycardia with less than 30 beats per minute  Chest pain  Bradycardia with less than 30 beats per minute  Chest pain    ID Rounds     Overnight Events: Ms. Farmer slept throughout the night, and states that she feels great this morning and is eager to go home.  Neuro/Sedation/Pain: AAO4; PERRLA; Bilaterally Strong in all extremities.  CV: Rhythm NSR-SB Rate: 50-80 Blood Pressure: 140-170 Pressors None  ID: Tmax: 36.8  GI/Nutrition: Last BM 1/1/2017, Cardiac Diet, and eating well  :     Urine Output       Intake/Output Summary (Last 24 hours) at 01/03/17 0902  Last data filed at 01/03/17 0800   Gross per 24 hour   Intake   1230 ml   Output    950 ml   Net    280 ml        IV Fluid: None  Lines: Right AC 18g  Rhodes: No  Mobility: Patient is up walking, steady gait    Further points to address    POC is for the patient to D/C home after clearance from cardiology service.

## 2017-01-03 NOTE — PROGRESS NOTES
MS:  SB-SR with occasional PVCs, rare couplets and triplets  0.20/0.10/0.40    12 Hour Chart Check

## 2017-01-04 ENCOUNTER — PATIENT OUTREACH (OUTPATIENT)
Dept: HEALTH INFORMATION MANAGEMENT | Facility: OTHER | Age: 72
End: 2017-01-04

## 2017-01-04 NOTE — DISCHARGE SUMMARY
DATE OF ADMISSION:  01/01/2017    DATE OF DISCHARGE:  01/03/2017    DISCHARGE DIAGNOSES:  1.  Bradycardia with possible sick sinus syndrome.  2.  Non-ST elevation myocardial infarction, status post drug eluting stent.  3.  History of hypertension.  4.  Dyslipidemia.  5.  Obstructive sleep apnea on continuous positive airway pressure.    CONSULTATIONS:  Dr. Saunders, cardiology, was consulted.    PROCEDURES:  She underwent a heart catheterization 01/01/2017, with Dr. Vallecillo, had a drug-eluting stents to the circumflex artery.    IMAGING STUDIES:  Echocardiogram showed an ejection fraction of 50% with mild   concentric left ventricular hypertrophy and aortic sclerosis without stenosis.    LABORATORY DATA:  Her troponin went as high as 38, creatinine is 0.61.  TSH is   1.8.    HOSPITAL COURSE:  On 01/01/2017, this 71-year-old female patient of Dr. Peck   presented with chest pain, was noted to have significant bradycardia with rate   down in the 20s.  She had elevated troponins, underwent heart catheterization   with stenting x2 to the circumflex as noted above.  Her bradycardia improved   substantially.  She has no indication for pacemaker.  Today, she has been seen   and evaluated.  She is without chest pain, feeling markedly improved and will   be discharged home in stable condition.  She has been seen by Dr. Sy Mandujano, cardiology, and he has authorized discharge home.    The patient will be on dual antiplatelet therapy and increased dose of her   statin.  She will not be on a beta blocker because of her bradycardia.    DISCHARGE MEDICATIONS:  Lipitor 40 mg daily, Plavix 75 mg daily, aspirin 81 mg   daily, Prevacid 30 mg daily, losartan 150 mg daily, niacin slow release   tablet daily, vitamin D 2000 units daily.    FOLLOWUP:  She is to follow up with cardiology for an appointment and her   primary care provider, Dr. Peck.    Thirty five minutes were spent on discharge arrangements.        ____________________________________     STEPHANIE MD FREDDY KESSLER    DD:  01/03/2017 12:40:26  DT:  01/03/2017 15:33:39    D#:  [id number]  Job#:  [id number]

## 2017-01-19 ENCOUNTER — APPOINTMENT (OUTPATIENT)
Dept: RADIOLOGY | Facility: MEDICAL CENTER | Age: 72
DRG: 281 | End: 2017-01-19
Attending: EMERGENCY MEDICINE
Payer: MEDICARE

## 2017-01-19 ENCOUNTER — RESOLUTE PROFESSIONAL BILLING HOSPITAL PROF FEE (OUTPATIENT)
Dept: HOSPITALIST | Facility: MEDICAL CENTER | Age: 72
End: 2017-01-19
Payer: MEDICARE

## 2017-01-19 ENCOUNTER — OFFICE VISIT (OUTPATIENT)
Dept: URGENT CARE | Facility: PHYSICIAN GROUP | Age: 72
End: 2017-01-19
Payer: MEDICARE

## 2017-01-19 ENCOUNTER — HOSPITAL ENCOUNTER (INPATIENT)
Facility: MEDICAL CENTER | Age: 72
LOS: 1 days | DRG: 281 | End: 2017-01-20
Attending: EMERGENCY MEDICINE | Admitting: HOSPITALIST
Payer: MEDICARE

## 2017-01-19 VITALS
HEART RATE: 117 BPM | SYSTOLIC BLOOD PRESSURE: 130 MMHG | RESPIRATION RATE: 16 BRPM | WEIGHT: 208 LBS | BODY MASS INDEX: 34.61 KG/M2 | DIASTOLIC BLOOD PRESSURE: 100 MMHG | TEMPERATURE: 97.3 F | OXYGEN SATURATION: 97 %

## 2017-01-19 DIAGNOSIS — R07.9 CHEST PAIN, UNSPECIFIED TYPE: ICD-10-CM

## 2017-01-19 DIAGNOSIS — Z95.5 PRESENCE OF STENT IN CORONARY ARTERY: ICD-10-CM

## 2017-01-19 LAB
ALBUMIN SERPL BCP-MCNC: 4.2 G/DL (ref 3.2–4.9)
ALBUMIN/GLOB SERPL: 1.5 G/DL
ALP SERPL-CCNC: 109 U/L (ref 30–99)
ALT SERPL-CCNC: 13 U/L (ref 2–50)
ANION GAP SERPL CALC-SCNC: 11 MMOL/L (ref 0–11.9)
AST SERPL-CCNC: 15 U/L (ref 12–45)
BASOPHILS # BLD AUTO: 0.9 % (ref 0–1.8)
BASOPHILS # BLD: 0.1 K/UL (ref 0–0.12)
BILIRUB SERPL-MCNC: 0.4 MG/DL (ref 0.1–1.5)
BNP SERPL-MCNC: 95 PG/ML (ref 0–100)
BUN SERPL-MCNC: 15 MG/DL (ref 8–22)
CALCIUM SERPL-MCNC: 9.5 MG/DL (ref 8.5–10.5)
CHLORIDE SERPL-SCNC: 103 MMOL/L (ref 96–112)
CO2 SERPL-SCNC: 25 MMOL/L (ref 20–33)
CREAT SERPL-MCNC: 0.82 MG/DL (ref 0.5–1.4)
EOSINOPHIL # BLD AUTO: 0.13 K/UL (ref 0–0.51)
EOSINOPHIL NFR BLD: 1.1 % (ref 0–6.9)
ERYTHROCYTE [DISTWIDTH] IN BLOOD BY AUTOMATED COUNT: 47.9 FL (ref 35.9–50)
GFR SERPL CREATININE-BSD FRML MDRD: >60 ML/MIN/1.73 M 2
GLOBULIN SER CALC-MCNC: 2.8 G/DL (ref 1.9–3.5)
GLUCOSE SERPL-MCNC: 109 MG/DL (ref 65–99)
HCT VFR BLD AUTO: 44.7 % (ref 37–47)
HGB BLD-MCNC: 15 G/DL (ref 12–16)
IMM GRANULOCYTES # BLD AUTO: 0.05 K/UL (ref 0–0.11)
IMM GRANULOCYTES NFR BLD AUTO: 0.4 % (ref 0–0.9)
INR PPP: 1.02 (ref 0.87–1.13)
LYMPHOCYTES # BLD AUTO: 2.13 K/UL (ref 1–4.8)
LYMPHOCYTES NFR BLD: 18.6 % (ref 22–41)
MCH RBC QN AUTO: 32.3 PG (ref 27–33)
MCHC RBC AUTO-ENTMCNC: 33.6 G/DL (ref 33.6–35)
MCV RBC AUTO: 96.1 FL (ref 81.4–97.8)
MONOCYTES # BLD AUTO: 0.72 K/UL (ref 0–0.85)
MONOCYTES NFR BLD AUTO: 6.3 % (ref 0–13.4)
NEUTROPHILS # BLD AUTO: 8.32 K/UL (ref 2–7.15)
NEUTROPHILS NFR BLD: 72.7 % (ref 44–72)
NRBC # BLD AUTO: 0 K/UL
NRBC BLD AUTO-RTO: 0 /100 WBC
PLATELET # BLD AUTO: 283 K/UL (ref 164–446)
PMV BLD AUTO: 11.5 FL (ref 9–12.9)
POTASSIUM SERPL-SCNC: 3.8 MMOL/L (ref 3.6–5.5)
PROT SERPL-MCNC: 7 G/DL (ref 6–8.2)
PROTHROMBIN TIME: 13.7 SEC (ref 12–14.6)
RBC # BLD AUTO: 4.65 M/UL (ref 4.2–5.4)
SODIUM SERPL-SCNC: 139 MMOL/L (ref 135–145)
TROPONIN I SERPL-MCNC: 0.01 NG/ML (ref 0–0.04)
TROPONIN I SERPL-MCNC: <0.01 NG/ML (ref 0–0.04)
WBC # BLD AUTO: 11.5 K/UL (ref 4.8–10.8)

## 2017-01-19 PROCEDURE — 36415 COLL VENOUS BLD VENIPUNCTURE: CPT

## 2017-01-19 PROCEDURE — 84484 ASSAY OF TROPONIN QUANT: CPT

## 2017-01-19 PROCEDURE — C1894 INTRO/SHEATH, NON-LASER: HCPCS

## 2017-01-19 PROCEDURE — 700117 HCHG RX CONTRAST REV CODE 255: Performed by: INTERNAL MEDICINE

## 2017-01-19 PROCEDURE — 99152 MOD SED SAME PHYS/QHP 5/>YRS: CPT

## 2017-01-19 PROCEDURE — 93005 ELECTROCARDIOGRAM TRACING: CPT | Performed by: EMERGENCY MEDICINE

## 2017-01-19 PROCEDURE — 85610 PROTHROMBIN TIME: CPT

## 2017-01-19 PROCEDURE — 93458 L HRT ARTERY/VENTRICLE ANGIO: CPT

## 2017-01-19 PROCEDURE — A9270 NON-COVERED ITEM OR SERVICE: HCPCS | Performed by: HOSPITALIST

## 2017-01-19 PROCEDURE — 99223 1ST HOSP IP/OBS HIGH 75: CPT | Mod: 25 | Performed by: INTERNAL MEDICINE

## 2017-01-19 PROCEDURE — 307093 HCHG TR BAND RADIAL

## 2017-01-19 PROCEDURE — 700111 HCHG RX REV CODE 636 W/ 250 OVERRIDE (IP): Performed by: HOSPITALIST

## 2017-01-19 PROCEDURE — 360979 HCHG DIAGNOSTIC CATH

## 2017-01-19 PROCEDURE — 93010 ELECTROCARDIOGRAM REPORT: CPT | Performed by: INTERNAL MEDICINE

## 2017-01-19 PROCEDURE — C1769 GUIDE WIRE: HCPCS

## 2017-01-19 PROCEDURE — 4A023N7 MEASUREMENT OF CARDIAC SAMPLING AND PRESSURE, LEFT HEART, PERCUTANEOUS APPROACH: ICD-10-PCS | Performed by: INTERNAL MEDICINE

## 2017-01-19 PROCEDURE — B2111ZZ FLUOROSCOPY OF MULTIPLE CORONARY ARTERIES USING LOW OSMOLAR CONTRAST: ICD-10-PCS | Performed by: INTERNAL MEDICINE

## 2017-01-19 PROCEDURE — 770020 HCHG ROOM/CARE - TELE (206)

## 2017-01-19 PROCEDURE — 700111 HCHG RX REV CODE 636 W/ 250 OVERRIDE (IP)

## 2017-01-19 PROCEDURE — 93005 ELECTROCARDIOGRAM TRACING: CPT | Performed by: HOSPITALIST

## 2017-01-19 PROCEDURE — 85025 COMPLETE CBC W/AUTO DIFF WBC: CPT

## 2017-01-19 PROCEDURE — 99214 OFFICE O/P EST MOD 30 MIN: CPT | Performed by: PHYSICIAN ASSISTANT

## 2017-01-19 PROCEDURE — B2151ZZ FLUOROSCOPY OF LEFT HEART USING LOW OSMOLAR CONTRAST: ICD-10-PCS | Performed by: INTERNAL MEDICINE

## 2017-01-19 PROCEDURE — 99285 EMERGENCY DEPT VISIT HI MDM: CPT

## 2017-01-19 PROCEDURE — 71010 DX-CHEST-PORTABLE (1 VIEW): CPT

## 2017-01-19 PROCEDURE — 83880 ASSAY OF NATRIURETIC PEPTIDE: CPT

## 2017-01-19 PROCEDURE — 700101 HCHG RX REV CODE 250

## 2017-01-19 PROCEDURE — 700102 HCHG RX REV CODE 250 W/ 637 OVERRIDE(OP): Performed by: HOSPITALIST

## 2017-01-19 PROCEDURE — 80053 COMPREHEN METABOLIC PANEL: CPT

## 2017-01-19 RX ORDER — LIDOCAINE HYDROCHLORIDE 20 MG/ML
INJECTION, SOLUTION INFILTRATION; PERINEURAL
Status: COMPLETED
Start: 2017-01-19 | End: 2017-01-19

## 2017-01-19 RX ORDER — MORPHINE SULFATE 4 MG/ML
2-4 INJECTION, SOLUTION INTRAMUSCULAR; INTRAVENOUS
Status: DISCONTINUED | OUTPATIENT
Start: 2017-01-19 | End: 2017-01-20 | Stop reason: HOSPADM

## 2017-01-19 RX ORDER — NITROGLYCERIN 0.4 MG/1
0.4 TABLET SUBLINGUAL ONCE
Status: COMPLETED | OUTPATIENT
Start: 2017-01-19 | End: 2017-01-19

## 2017-01-19 RX ORDER — LACTULOSE 20 G/30ML
30 SOLUTION ORAL
Status: DISCONTINUED | OUTPATIENT
Start: 2017-01-19 | End: 2017-01-20 | Stop reason: HOSPADM

## 2017-01-19 RX ORDER — NITROGLYCERIN 0.4 MG/1
0.4 TABLET SUBLINGUAL
Status: DISCONTINUED | OUTPATIENT
Start: 2017-01-19 | End: 2017-01-20 | Stop reason: HOSPADM

## 2017-01-19 RX ORDER — HEPARIN SODIUM,PORCINE 1000/ML
VIAL (ML) INJECTION
Status: COMPLETED
Start: 2017-01-19 | End: 2017-01-19

## 2017-01-19 RX ORDER — CALCIUM CARBONATE 500 MG/1
1000 TABLET, CHEWABLE ORAL
Status: DISCONTINUED | OUTPATIENT
Start: 2017-01-19 | End: 2017-01-20 | Stop reason: HOSPADM

## 2017-01-19 RX ORDER — LOSARTAN POTASSIUM 50 MG/1
50 TABLET ORAL DAILY
Status: DISCONTINUED | OUTPATIENT
Start: 2017-01-20 | End: 2017-01-20 | Stop reason: HOSPADM

## 2017-01-19 RX ORDER — HEPARIN SODIUM 5000 [USP'U]/ML
5000 INJECTION, SOLUTION INTRAVENOUS; SUBCUTANEOUS EVERY 8 HOURS
Status: DISCONTINUED | OUTPATIENT
Start: 2017-01-19 | End: 2017-01-20 | Stop reason: HOSPADM

## 2017-01-19 RX ORDER — VERAPAMIL HYDROCHLORIDE 2.5 MG/ML
INJECTION, SOLUTION INTRAVENOUS
Status: COMPLETED
Start: 2017-01-19 | End: 2017-01-19

## 2017-01-19 RX ORDER — DOCUSATE SODIUM 100 MG/1
100 CAPSULE, LIQUID FILLED ORAL EVERY MORNING
Status: DISCONTINUED | OUTPATIENT
Start: 2017-01-20 | End: 2017-01-20 | Stop reason: HOSPADM

## 2017-01-19 RX ORDER — BISACODYL 10 MG
10 SUPPOSITORY, RECTAL RECTAL
Status: DISCONTINUED | OUTPATIENT
Start: 2017-01-19 | End: 2017-01-20 | Stop reason: HOSPADM

## 2017-01-19 RX ORDER — ATORVASTATIN CALCIUM 40 MG/1
40 TABLET, FILM COATED ORAL
Status: DISCONTINUED | OUTPATIENT
Start: 2017-01-19 | End: 2017-01-20 | Stop reason: HOSPADM

## 2017-01-19 RX ORDER — LABETALOL HYDROCHLORIDE 5 MG/ML
10 INJECTION, SOLUTION INTRAVENOUS EVERY 4 HOURS PRN
Status: DISCONTINUED | OUTPATIENT
Start: 2017-01-19 | End: 2017-01-20 | Stop reason: HOSPADM

## 2017-01-19 RX ORDER — MIDAZOLAM HYDROCHLORIDE 1 MG/ML
INJECTION INTRAMUSCULAR; INTRAVENOUS
Status: COMPLETED
Start: 2017-01-19 | End: 2017-01-19

## 2017-01-19 RX ORDER — CLOPIDOGREL BISULFATE 75 MG/1
75 TABLET ORAL DAILY
Status: DISCONTINUED | OUTPATIENT
Start: 2017-01-20 | End: 2017-01-20 | Stop reason: HOSPADM

## 2017-01-19 RX ORDER — ENEMA 19; 7 G/133ML; G/133ML
1 ENEMA RECTAL
Status: DISCONTINUED | OUTPATIENT
Start: 2017-01-19 | End: 2017-01-20 | Stop reason: HOSPADM

## 2017-01-19 RX ORDER — ACETAMINOPHEN 325 MG/1
650 TABLET ORAL EVERY 6 HOURS PRN
Status: DISCONTINUED | OUTPATIENT
Start: 2017-01-19 | End: 2017-01-20 | Stop reason: HOSPADM

## 2017-01-19 RX ORDER — ASPIRIN 81 MG/1
324 TABLET, CHEWABLE ORAL ONCE
Status: COMPLETED | OUTPATIENT
Start: 2017-01-19 | End: 2017-01-19

## 2017-01-19 RX ORDER — AMOXICILLIN 250 MG
1 CAPSULE ORAL
Status: DISCONTINUED | OUTPATIENT
Start: 2017-01-19 | End: 2017-01-20 | Stop reason: HOSPADM

## 2017-01-19 RX ORDER — OMEPRAZOLE 20 MG/1
20 CAPSULE, DELAYED RELEASE ORAL DAILY
Status: DISCONTINUED | OUTPATIENT
Start: 2017-01-20 | End: 2017-01-20 | Stop reason: HOSPADM

## 2017-01-19 RX ORDER — AMOXICILLIN 250 MG
1 CAPSULE ORAL NIGHTLY
Status: DISCONTINUED | OUTPATIENT
Start: 2017-01-20 | End: 2017-01-20 | Stop reason: HOSPADM

## 2017-01-19 RX ORDER — ASPIRIN 81 MG/1
324 TABLET, CHEWABLE ORAL ONCE
Status: DISCONTINUED | OUTPATIENT
Start: 2017-01-19 | End: 2017-01-20 | Stop reason: HOSPADM

## 2017-01-19 RX ADMIN — HEPARIN SODIUM 2000 UNITS: 200 INJECTION, SOLUTION INTRAVENOUS at 17:33

## 2017-01-19 RX ADMIN — ATORVASTATIN CALCIUM 40 MG: 40 TABLET, FILM COATED ORAL at 20:44

## 2017-01-19 RX ADMIN — NITROGLYCERIN 0.4 MG: 0.4 TABLET SUBLINGUAL at 12:19

## 2017-01-19 RX ADMIN — VERAPAMIL HYDROCHLORIDE 2.5 MG: 2.5 INJECTION, SOLUTION INTRAVENOUS at 17:34

## 2017-01-19 RX ADMIN — NITROGLYCERIN 0.4 MG: 0.4 TABLET SUBLINGUAL at 16:38

## 2017-01-19 RX ADMIN — ASPIRIN 324 MG: 81 TABLET, CHEWABLE ORAL at 12:17

## 2017-01-19 RX ADMIN — CALCIUM CARBONATE 1000 MG: 500 TABLET ORAL at 22:22

## 2017-01-19 RX ADMIN — LIDOCAINE HYDROCHLORIDE: 20 INJECTION, SOLUTION INFILTRATION; PERINEURAL at 17:33

## 2017-01-19 RX ADMIN — HEPARIN SODIUM: 1000 INJECTION, SOLUTION INTRAVENOUS; SUBCUTANEOUS at 17:33

## 2017-01-19 RX ADMIN — NITROGLYCERIN 10 ML: 20 INJECTION INTRAVENOUS at 17:34

## 2017-01-19 RX ADMIN — MIDAZOLAM 1 MG: 1 INJECTION INTRAMUSCULAR; INTRAVENOUS at 17:34

## 2017-01-19 RX ADMIN — NITROGLYCERIN 0.4 MG: 0.4 TABLET SUBLINGUAL at 16:34

## 2017-01-19 RX ADMIN — FENTANYL CITRATE 50 MCG: 50 INJECTION, SOLUTION INTRAMUSCULAR; INTRAVENOUS at 17:34

## 2017-01-19 RX ADMIN — IOHEXOL 128 ML: 350 INJECTION, SOLUTION INTRAVENOUS at 17:44

## 2017-01-19 RX ADMIN — HEPARIN SODIUM 5000 UNITS: 5000 INJECTION, SOLUTION INTRAVENOUS; SUBCUTANEOUS at 20:45

## 2017-01-19 RX ADMIN — NITROGLYCERIN 0.4 MG: 0.4 TABLET SUBLINGUAL at 16:29

## 2017-01-19 ASSESSMENT — PAIN SCALES - GENERAL
PAINLEVEL_OUTOF10: 2
PAINLEVEL_OUTOF10: 0
PAINLEVEL_OUTOF10: 2
PAINLEVEL_OUTOF10: 7
PAINLEVEL_OUTOF10: 0
PAINLEVEL_OUTOF10: 0

## 2017-01-19 ASSESSMENT — LIFESTYLE VARIABLES
DO YOU DRINK ALCOHOL: NO
DO YOU DRINK ALCOHOL: NO
EVER_SMOKED: YES

## 2017-01-19 ASSESSMENT — ENCOUNTER SYMPTOMS
FEVER: 0
NAUSEA: 1
SHORTNESS OF BREATH: 1
DIZZINESS: 1
CHILLS: 0
TINGLING: 1

## 2017-01-19 ASSESSMENT — COPD QUESTIONNAIRES
DURING THE PAST 4 WEEKS HOW MUCH DID YOU FEEL SHORT OF BREATH: NONE/LITTLE OF THE TIME
COPD SCREENING SCORE: 2
HAVE YOU SMOKED AT LEAST 100 CIGARETTES IN YOUR ENTIRE LIFE: NO/DON'T KNOW
DO YOU EVER COUGH UP ANY MUCUS OR PHLEGM?: NO/ONLY WITH OCCASIONAL COLDS OR INFECTIONS

## 2017-01-19 NOTE — IP AVS SNAPSHOT
1/20/2017          Griselda Farmer  [address]  [place] [address]    Dear Griselda:    [facility] wants to ensure your discharge home is safe and you or your loved ones have had all your questions answered regarding your care after you leave the hospital.    You may receive a telephone call within two days of your discharge.  This call is to make certain you understand your discharge instructions as well as ensure we provided you with the best care possible during your stay with us.     The call will only last approximately 3-5 minutes and will be done by a nurse.    Once again, we want to ensure your discharge home is safe and that you have a clear understanding of any next steps in your care.  If you have any questions or concerns, please do not hesitate to contact us, we are here for you.  Thank you for choosing [facility] for your healthcare needs.    Sincerely,    Nasir Nash    [facility]

## 2017-01-19 NOTE — PROGRESS NOTES
Chief Complaint   Patient presents with   • Chest Pain       HISTORY OF PRESENT ILLNESS: Patient is a 72 y.o. female who presents today because she has a 30 minute history of crushing chest pain, shortness of breath, lightheadedness, and belching. These are symptoms that she had less than 2 weeks ago and went to the emergency room and was admitted and had 2 coronary artery stent placements. She had been doing well up until half an hour ago when her symptoms started. No diaphoresis, loss of consciousness, numbness or tingling in her extremities.    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   • Nonhealing nonsurgical wound 06/23/2015   • Verruca plana 06/23/2015   • Overriding toe of left foot 01/20/2015   • Primary localized osteoarthrosis, lower leg 12/16/2014   • Left knee pain 08/28/2014   • Elevated fasting glucose 07/16/2014   • Osteoarthrosis involving multiple sites 07/15/2014   • Vitamin D deficiency disease 07/15/2014   • CAD (coronary artery disease) 03/29/2013   • Dyspnea 03/18/2013   • Abnormal ECG 03/18/2013   • GERD (gastroesophageal reflux disease) 03/06/2013   • Clicking jaw syndrome 03/06/2013   • S/P colonoscopy 11/09/2012   • Cough due to ACE inhibitor 06/26/2012   • Liver cyst 10/17/2011   • Arthritis 11/29/2010   • Hyperlipidemia 10/20/2010   • Hypertension 08/11/2010   • Insomnia 08/11/2010   • FRIDA (obstructive sleep apnea) 08/11/2010       Allergies:Codeine    Current Outpatient Prescriptions Ordered in Epic   Medication Sig  Dispense Refill   • atorvastatin (LIPITOR) 40 MG Tab Take 1 Tab by mouth every bedtime. 30 Tab 3   • clopidogrel (PLAVIX) 75 MG Tab Take 1 Tab by mouth every day. 30 Tab 11   • aspirin EC (ECOTRIN) 81 MG Tablet Delayed Response Take 1 Tab by mouth every day. 30 Tab 0   • SLO-NIACIN PO Take 1 Tab by mouth every evening.     • losartan (COZAAR) 100 MG Tab Take 0.5 Tabs by mouth every day. 90 Tab 3   • lansoprazole (PREVACID) 30 MG CAPSULE DELAYED RELEASE TAKE ONE CAPSULE BY MOUTH EVERY DAY 90 Cap 3   • Cholecalciferol (VITAMIN D) 2000 UNIT TABS Take 1 Tab by mouth every day.       Current Facility-Administered Medications Ordered in Epic   Medication Dose Route Frequency Provider Last Rate Last Dose   • aspirin (ASA) chewable tab 324 mg  324 mg Oral Once Ted Ventura PA-C       • nitroglycerin (NITROSTAT) tablet 0.4 mg  0.4 mg Sublingual Once Ted Ventura PA-C           Past Medical History   Diagnosis Date   • Insomnia    • Flushing reaction      has had full work up with cardiology, would awake with symptoms at night   • Hyperlipidemia 10/20/2010   • GERD (gastroesophageal reflux disease)    • Liver cyst      has been seen by GI, ultrasound 9/12   • Need for Tdap vaccination      in 2012   • S/P colonoscopy 11/9/2012   • Indigestion    • Urinary bladder disorder    • Unspecified urinary incontinence    • Dental disorder      upper and lower dentures   • FRIDA (obstructive sleep apnea)      states no cpap   • Heart burn 2014     pt on prevacid   • Arthritis      generalized + hands   • Myocardial infarct (CMS-[facility]) 1984     pt denies states sometimes irreg rhythm   • Angina 2014     pt denies    • Hypertension 2014     pt states well controlled on meds   • Sick sinus syndrome (CMS-[facility]) 1/1/2017   • Symptomatic sinus bradycardia 1/1/2017       Social History   Substance Use Topics   • Smoking status: Former Smoker -- 0.50 packs/day for 15 years     Types: Cigarettes     Quit date: 08/11/1975   • Smokeless  tobacco: Never Used   • Alcohol Use: No       Family Status   Relation Status Death Age   • Mother  88   • Father  93     Family History   Problem Relation Age of Onset   • Diabetes Mother      mild   • Heart Disease Neg Hx    • Hypertension Neg Hx    • Cancer Father      melanoma mild       ROS:  Review of Systems   Constitutional: Negative for fever, chills, weight loss and malaise/fatigue.   HENT: Negative for ear pain, nosebleeds, congestion, sore throat and neck pain.    Eyes: Negative for blurred vision.   Respiratory: Negative for cough, sputum production, positive for shortness of breath and no wheezing.    Cardiovascular: Negative for chest pain, palpitations, orthopnea and leg swelling.     Exam:  Blood pressure 134/96, pulse 112, temperature 36.3 °C (97.3 °F), resp. rate 16, weight 94.348 kg (208 lb), SpO2 97 %.  General:  Well nourished, well developed female in NAD  Head:Normocephalic, atraumatic  Eyes: PERRLA, EOM within normal limits, no conjunctival injection, no scleral icterus, visual fields and acuity grossly intact.  Pulmonary: chest is symmetrical with respiration, no wheezes, crackles, or rhonchi.  Cardiovascular: regular rate and rhythm without murmurs, rubs, or gallops.  Extremities: no clubbing, cyanosis, or edema.    EKG in the office shows a sinus tachycardia rhythm with a rate of 104 . There are inferior Q waves, as well as poor R-wave progression and diminished T waves in the anterior leads. No ectopy    Please note that this dictation was created using voice recognition software. I have made every reasonable attempt to correct obvious errors, but I expect that there are errors of grammar and possibly content that I did not discover before finalizing the note.    Assessment/Plan:  1. Chest pain, unspecified type  aspirin (ASA) chewable tab 324 mg    nitroglycerin (NITROSTAT) tablet 0.4 mg   2. Presence of stent in coronary artery       I called report to the emergency  room. Ambulance was summoned to the scene, assumed care of patient for transfer.

## 2017-01-19 NOTE — IP AVS SNAPSHOT
VISUALPLANT Access Code: Activation code not generated  Current VISUALPLANT Status: Active    [employer]  A secure, online tool to manage your health information     [employer]’s VISUALPLANT® is a secure, online tool that connects you to your personalized health information from the privacy of your home -- day or night - making it very easy for you to manage your healthcare. Once the activation process is completed, you can even access your medical information using the VISUALPLANT bethany, which is available for free in the Apple Bethany store or Google Play store.     VISUALPLANT provides the following levels of access (as shown below):   My Chart Features   [facility] Primary Care Doctor [facility]  Specialists [facility]  Urgent  Care Non-[facility]  Primary Care  Doctor   Email your healthcare team securely and privately 24/7 X X X X   Manage appointments: schedule your next appointment; view details of past/upcoming appointments X      Request prescription refills. X      View recent personal medical records, including lab and immunizations X X X X   View health record, including health history, allergies, medications X X X X   Read reports about your outpatient visits, procedures, consult and ER notes X X X X   See your discharge summary, which is a recap of your hospital and/or ER visit that includes your diagnosis, lab results, and care plan. X X       How to register for VISUALPLANT:  1. Go to  [email or web address].  2. Click on the Sign Up Now box, which takes you to the New Member Sign Up page. You will need to provide the following information:  a. Enter your VISUALPLANT Access Code exactly as it appears at the top of this page. (You will not need to use this code after you’ve completed the sign-up process. If you do not sign up before the expiration date, you must request a new code.)   b. Enter your date of birth.   c. Enter your home email address.   d. Click Submit, and follow the next screen’s instructions.  3. Create a VISUALPLANT ID. This will  be your Brainly login ID and cannot be changed, so think of one that is secure and easy to remember.  4. Create a Brainly password. You can change your password at any time.  5. Enter your Password Reset Question and Answer. This can be used at a later time if you forget your password.   6. Enter your e-mail address. This allows you to receive e-mail notifications when new information is available in Brainly.  7. Click Sign Up. You can now view your health information.    For assistance activating your Brainly account, call [phone]

## 2017-01-19 NOTE — ED PROVIDER NOTES
ED Provider Note    Scribed for Fred Gaines M.D. by Lindsey Siddiqi. 1/19/2017, 1:42 PM.    Primary care provider: Caro Peck M.D.  Means of arrival: Ambulance   History obtained from: Patient   History limited by: None     CHIEF COMPLAINT  Chief Complaint   Patient presents with   • Chest Pain       HPI  Griselda Farmer is a 72 y.o. female who presents to the Emergency Department with chest pain. Patient states it feels like someone is squeezing her heart. She had two stents placed on 1/1/2017. She states her symptoms feel similar to those occurring prior to her surgery. She reports associated shortness of breath and dizziness. Patient was given nitro and 4 baby aspirin at the clinic, which helped her symptoms improve. She also reports tingling to the lips and nausea. She denies history of gallbladder issues. Patient denies issues on PO intake, fever, or chills.     Review of old medical records shows the following:  Date of admission: 01/01/2017  Date of discharge: 01/03/2017    Discharge diagnoses:  1. Bradycardia with possible sick sinus syndrome.   2. Non-ST elevation myocardial infarction, status post drug eluting stent.   3. History of hypertension.   4. Dyslipidemia.   5. Obstructive sleep apnea on continuous positive airway pressure.     Consultations: Dr. Saunders, cardiology, was consulted.     Procedures: She underwent a heart catheterization 01/01/2017, with Dr. Vallecillo, had a drug-eluting stents to the circumflex artery.     Imaging studies: Echocardiogram showed an ejection fraction of 50% with mild concentric left ventricular hypertrophy and aortic sclerosis without stenosis.     Laboratory data: Her troponin went as high as 38, creatinine is 0.61. TSH is 1.8.       REVIEW OF SYSTEMS  Review of Systems   Constitutional: Negative for fever and chills.   Respiratory: Positive for shortness of breath.    Cardiovascular: Positive for chest pain.   Gastrointestinal: Positive for nausea.         Negative issues on PO intake.    Neurological: Positive for dizziness and tingling (lips).   All other systems reviewed and are negative.      PAST MEDICAL HISTORY   has a past medical history of Insomnia; Flushing reaction; Hyperlipidemia (10/20/2010); GERD (gastroesophageal reflux disease); Liver cyst; Need for Tdap vaccination; S/P colonoscopy (11/9/2012); Indigestion; Urinary bladder disorder; Unspecified urinary incontinence; Dental disorder; FRIDA (obstructive sleep apnea); Heart burn (2014); Arthritis; Myocardial infarct (CMS-[facility]) (1984); Angina (2014); Hypertension (2014); Sick sinus syndrome (CMS-HCC) (1/1/2017); and Symptomatic sinus bradycardia (1/1/2017).    SURGICAL HISTORY   has past surgical history that includes tonsillectomy; hysterectomy, vaginal; recovery (3/29/2013); cataract phaco with iol (9/19/2013); cataract phaco with iol (10/3/2013); and knee arthroplasty total (12/16/2014).    SOCIAL HISTORY  Social History   Substance Use Topics   • Smoking status: Former Smoker -- 0.50 packs/day for 15 years     Types: Cigarettes     Quit date: 08/11/1975   • Smokeless tobacco: Never Used   • Alcohol Use: No      History   Drug Use No       FAMILY HISTORY  Family History   Problem Relation Age of Onset   • Diabetes Mother      mild   • Heart Disease Neg Hx    • Hypertension Neg Hx    • Cancer Father      melanoma mild       CURRENT MEDICATIONS  Home Medications     Reviewed by Marjorie Flores R.N. (Registered Nurse) on 01/19/17 at 1334  Med List Status: Partial    Medication Last Dose Status    aspirin EC (ECOTRIN) 81 MG Tablet Delayed Response 1/19/2017 Active    atorvastatin (LIPITOR) 40 MG Tab 1/18/2017 Active    Cholecalciferol (VITAMIN D) 2000 UNIT TABS 1/18/2017 Active    clopidogrel (PLAVIX) 75 MG Tab 1/19/2017 Active    lansoprazole (PREVACID) 30 MG CAPSULE DELAYED RELEASE 1/1/2017 Active    losartan (COZAAR) 100 MG Tab 1/18/2017 Active    SLO-NIACIN PO 1/18/2017 Active           "      ALLERGIES  Allergies   Allergen Reactions   • Codeine Vomiting     Rxn = years ago          PHYSICAL EXAM  VITAL SIGNS: /56 mmHg  Pulse 74  Temp(Src) 36.3 °C (97.3 °F)  Resp 11  Ht 1.651 m (5' 5\")  Wt 86.183 kg (190 lb)  BMI 31.62 kg/m2    Constitutional: Well developed, Well nourished, No acute distress, Non-toxic appearance.   HENT: Normocephalic, Atraumatic, Bilateral external ears normal, Oropharynx moist, no evidence of dehydration, No oral exudates, Nose normal.   Eyes: PERRLA, EOMI, Conjunctiva normal, No discharge.   Neck: Normal range of motion, No tenderness, Supple, No stridor. No masses. No evidence of meningitis or meningismus.   Lymphatic: No lymphadenopathy noted.   Cardiovascular: Normal heart rate, Normal rhythm, No murmurs, No rubs, No gallops.   Thorax & Lungs: Normal breath sounds, No respiratory distress, No wheezing or rhonchi, No chest tenderness.   Abdomen: Bowel sounds quiet, Soft, No tenderness, No masses, No pulsatile masses. No guarding or rebound. No evidence of peritoneal findings.   Skin: Warm, Dry, No erythema, No rash. No exanthem.   Extremities:  No edema, No tenderness, No cyanosis, No clubbing.   Musculoskeletal: Good range of motion in all major joints. No major deformities noted.   Neurologic: Alert & oriented x 3, Normal motor function, No focal deficits noted.   Psychiatric: Affect normal, mood normal.                                                              LABS  Results for orders placed or performed during the hospital encounter of 01/19/17   CBC WITH DIFFERENTIAL   Result Value Ref Range    WBC 11.5 (H) 4.8 - 10.8 K/uL    RBC 4.65 4.20 - 5.40 M/uL    Hemoglobin 15.0 12.0 - 16.0 g/dL    Hematocrit 44.7 37.0 - 47.0 %    MCV 96.1 81.4 - 97.8 fL    MCH 32.3 27.0 - 33.0 pg    MCHC 33.6 33.6 - 35.0 g/dL    RDW 47.9 35.9 - 50.0 fL    Platelet Count 283 164 - 446 K/uL    MPV 11.5 9.0 - 12.9 fL    Neutrophils-Polys 72.70 (H) 44.00 - 72.00 %    Lymphocytes " 18.60 (L) 22.00 - 41.00 %    Monocytes 6.30 0.00 - 13.40 %    Eosinophils 1.10 0.00 - 6.90 %    Basophils 0.90 0.00 - 1.80 %    Immature Granulocytes 0.40 0.00 - 0.90 %    Nucleated RBC 0.00 /100 WBC    Neutrophils (Absolute) 8.32 (H) 2.00 - 7.15 K/uL    Lymphs (Absolute) 2.13 1.00 - 4.80 K/uL    Monos (Absolute) 0.72 0.00 - 0.85 K/uL    Eos (Absolute) 0.13 0.00 - 0.51 K/uL    Baso (Absolute) 0.10 0.00 - 0.12 K/uL    Immature Granulocytes (abs) 0.05 0.00 - 0.11 K/uL    NRBC (Absolute) 0.00 K/uL   COMP METABOLIC PANEL   Result Value Ref Range    Sodium 139 135 - 145 mmol/L    Potassium 3.8 3.6 - 5.5 mmol/L    Chloride 103 96 - 112 mmol/L    Co2 25 20 - 33 mmol/L    Anion Gap 11.0 0.0 - 11.9    Glucose 109 (H) 65 - 99 mg/dL    Bun 15 8 - 22 mg/dL    Creatinine 0.82 0.50 - 1.40 mg/dL    Calcium 9.5 8.5 - 10.5 mg/dL    AST(SGOT) 15 12 - 45 U/L    ALT(SGPT) 13 2 - 50 U/L    Alkaline Phosphatase 109 (H) 30 - 99 U/L    Total Bilirubin 0.4 0.1 - 1.5 mg/dL    Albumin 4.2 3.2 - 4.9 g/dL    Total Protein 7.0 6.0 - 8.2 g/dL    Globulin 2.8 1.9 - 3.5 g/dL    A-G Ratio 1.5 g/dL   TROPONIN   Result Value Ref Range    Troponin I <0.01 0.00 - 0.04 ng/mL   ESTIMATED GFR   Result Value Ref Range    GFR If African American >60 >60 mL/min/1.73 m 2    GFR If Non African American >60 >60 mL/min/1.73 m 2    All labs reviewed by me.    EKG  Interpreted by me    Rhythm: normal sinus   Rate:  which is normal  Axis: normal  Ectopy: none  Conduction: normal  ST Segments: no acute change  T Waves: no acute change  Q Waves: Inferior and anterior    Clinical Impression: no acute changes and normal EKG      New anterior Q waves as compared to previous EKG.    RADIOLOGY  DX-CHEST-PORTABLE (1 VIEW)   Final Result      Borderline prominence of the cardiomediastinal silhouette.      Atherosclerotic plaque.      The radiologist's interpretation of all radiological studies have been reviewed by me.    COURSE & MEDICAL DECISION MAKING  Nursing notes, VS,  PMSFHx reviewed in chart.    Review of old medical records shows the following:  Date of admission: 01/01/2017  Date of discharge: 01/03/2017    Discharge diagnoses:  1. Bradycardia with possible sick sinus syndrome.   2. Non-ST elevation myocardial infarction, status post drug eluting stent.   3. History of hypertension.   4. Dyslipidemia.   5. Obstructive sleep apnea on continuous positive airway pressure.     Consultations: Dr. Saunders, cardiology, was consulted.     Procedures: She underwent a heart catheterization 01/01/2017, with Dr. Vallecillo, had a drug-eluting stents to the circumflex artery.     Imaging studies: Echocardiogram showed an ejection fraction of 50% with mild concentric left ventricular hypertrophy and aortic sclerosis without stenosis.     Laboratory data: Her troponin went as high as 38, creatinine is 0.61. TSH is 1.8.       1:42 PM - Patient seen and examined at bedside. Patient will be treated with aspirin 324 mg oral. Ordered chest x-ray, CBC with differential, CMP, troponin, BNP, and EKG to evaluate her symptoms. The differential diagnoses include but are not limited to: chest pain, acute coronary syndrome, shunt failure.      Patient has remained symptom-free no hypoxia dysrhythmia or hypotension.    4:45 PM the patient remains symptom-free.    Presentation discussed with internal medicine patient will be admitted.    FINAL IMPRESSION  1. Acute chest pain  2. Recent non-ST MI  3. Abdominal bloating     Lindsey ROACH ([place]), am scribing for, and in the presence of, Fred Gaines M.D..    Electronically signed by: Lindsey Siddiqi ([place]), 1/19/2017    Fred ROACH M.D. personally performed the services described in this documentation, as scribed by Lindsey Siddiqi in my presence, and it is both accurate and complete.    The note accurately reflects work and decisions made by me.  Fred Gaines  1/19/2017  2:48 PM

## 2017-01-19 NOTE — IP AVS SNAPSHOT
" After Visit Summary                                                                                                                  Name:Griselda Farmer  Medical Record Number:[id number]  CSN: [id number]    YOB: 1945   Age: 72 y.o.  Sex: female  HT:1.651 m (5' 5\") WT: 86.183 kg (190 lb)          Admit Date: 1/19/2017     Discharge Date:   Today's Date: 1/20/2017  Attending Doctor:  Hank Foley M.D.                  Allergies:  Codeine            Discharge Instructions       Discharge Instructions    Discharged to home by car with relative. Discharged via wheelchair, hospital escort: Yes.  Special equipment needed: Not Applicable    Be sure to schedule a follow-up appointment with your primary care doctor or any specialists as instructed.     Discharge Plan:   Diet Plan: Discussed  Activity Level: Discussed  Confirmed Follow up Appointment: Appointment Scheduled  Confirmed Symptoms Management: Discussed  Medication Reconciliation Updated: Yes  Influenza Vaccine Indication: Not indicated: Previously immunized this influenza season and > 8 years of age    I understand that a diet low in cholesterol, fat, and sodium is recommended for good health. Unless I have been given specific instructions below for another diet, I accept this instruction as my diet prescription.   Other diet: cardiac    Special Instructions: Diagnosis:  Acute Coronary Syndrome (ACS) is a diagnosis that encompasses cardiac-related chest pain and heart attack. ACS occurs when the blood flow to the heart muscle is severely reduced or cut off completely due to a slow process called atherosclerosis.  Atherosclerosis is a disease in which the coronary arteries become narrow from a buildup of fat, cholesterol, and other substances that combine to form plaque. If the plaque breaks, a blood clot will form and block the blood flow to the heart muscle. This lack of blood flow can cause damage or death to the heart muscle which is called " a heart attack or Myocardial Infarction (MI). There are two different types of MIs:  ST Elevation Myocardial Infarction or STEMI (the most severe type of heart attack) and Non-ST Elevation Myocardial Infarction or NSTEMI.    Treatment Plan:  · Cardiac Diet  - Low fat, low salt, low cholesterol   · Cardiac Rehab  - Your doctor has ordered you a referral to [facility].  Call [phone] to schedule an appointment.  · Attend my follow-up appointment with my Cardiologist.  · Take my medications as prescribed by my doctor  · Exercise daily  · Lower my bad cholesterol and raise my good cholesterol, lower my blood pressure and Reduce stress    Medications:  Certain medications are used to treat ACS.  Remember to always take medications as prescribed and never stop talking medications unless told by your doctor.    You have been prescribed the following medicatons:    Aspirin - Aspirin is used as a blood thinning medication and you will require this medication indefinitely.  Anti-platelet/blood thinner - Your Anti-platelet/Blood thinning medication is called Clopidogrel, and is used in combination with aspirin to prevent clots from forming in your heart and/or around your stent.  Your doctor will determine how long you need to be on this medicine, but generally if you have a Drug Eluding stent, you will need to take this medication for at least one month, and you have a Bare Metal stent, you will need the medication for at least 3 months.  Some patients require this medication indefinitely.  · Is patient discharged on Warfarin / Coumadin?   No     · Is patient Post Blood Transfusion?  No    Depression / Suicide Risk    As you are discharged from this [facility] facility, it is important to learn how to keep safe from harming yourself.    Recognize the warning signs:  · Abrupt changes in personality, positive or negative- including increase in energy   · Giving away possessions  · Change in eating patterns- significant  weight changes-  positive or negative  · Change in sleeping patterns- unable to sleep or sleeping all the time   · Unwillingness or inability to communicate  · Depression  · Unusual sadness, discouragement and loneliness  · Talk of wanting to die  · Neglect of personal appearance   · Rebelliousness- reckless behavior  · Withdrawal from people/activities they love  · Confusion- inability to concentrate     If you or a loved one observes any of these behaviors or has concerns about self-harm, here's what you can do:  · Talk about it- your feelings and reasons for harming yourself  · Remove any means that you might use to hurt yourself (examples: pills, rope, extension cords, firearm)  · Get professional help from the community (Mental Health, Substance Abuse, psychological counseling)  · Do not be alone:Call your Safe Contact- someone whom you trust who will be there for you.  · Call your local CRISIS HOTLINE 155-9898 or 015-630-9846  · Call your local Children's Mobile Crisis Response Team [place] (182) 893-7152 or www1000jobboersen.de  · Call the toll free National Suicide Prevention Hotlines   · National Suicide Prevention Lifeline 182-756-FNLZ (1363)  · National Hope Line Network 800-SUICIDE (596-0272)        Your appointments     Feb 09, 2017  3:00 PM   HOSPITAL FOLLOW UP with VANDANA Goode.   [facility] (--)    [address] [place] [address] [address]  [place] [id number] [phone]            Feb 16, 2017 11:20 AM   [facility] [place] [facility]    [facility] [facility]  [place] [address] [phone]           You will be receiving a confirmation call a few days before your appointment from our automated call confirmation system.            Jun 05, 2017  7:00 AM   Adult Draw/Collection with [facility]   [facility] (--)    [address] [place] [address] Dr. [place] [id number] [phone]            Jun 08, 2017   7:40 AM   Established Patient with JACEK Lemos   [facility]    [address] [facility]  [place] [address] [phone]           You will be receiving a confirmation call a few days before your appointment from our automated call confirmation system.                 Discharge Medication Instructions:    Below are the medications your physician expects you to take upon discharge:    Review all your home medications and newly ordered medications with your doctor and/or pharmacist. Follow medication instructions as directed by your doctor and/or pharmacist.    Please keep your medication list with you and share with your physician.               Medication List      START taking these medications        Instructions    nitroglycerin 0.4 MG Subl   Last time this was given:  0.4 mg on 1/19/2017  4:38 PM   Commonly known as:  NITROSTAT   Next Dose Due:  Place 1 tab under tongue as needed for chest pain. (Up to 3 doses if systolic or top number of your blood pressure is greater than 90)    Place 1 Tab under tongue as needed for Chest Pain (up to 3 doses (if SBP greater than 90 mmHg)).   Dose:  0.4 mg         CONTINUE taking these medications        Instructions    aspirin EC 81 MG Tbec   Last time this was given:  81 mg on 1/20/2017  8:56 AM   Commonly known as:  ECOTRIN   Next Dose Due:  Take every evening beginning today 1/20/2017    Take 81 mg by mouth every evening.   Dose:  81 mg       atorvastatin 40 MG Tabs   Last time this was given:  40 mg on 1/19/2017  8:44 PM   Commonly known as:  LIPITOR   Next Dose Due:  Take at bedtime beginning today 1/20/2017    Take 1 Tab by mouth every bedtime.   Dose:  40 mg       clopidogrel 75 MG Tabs   Last time this was given:  75 mg on 1/20/2017  8:56 AM   Commonly known as:  PLAVIX   Next Dose Due:  Take each morning before breakfast.    Take 1 Tab by mouth every day.   Dose:  75 mg       lansoprazole 30 MG Cpdr   Commonly known as:   PREVACID   Next Dose Due:  Take each morning before breakfast.    TAKE ONE CAPSULE BY MOUTH EVERY DAY       losartan 100 MG Tabs   Last time this was given:  50 mg on 1/20/2017  8:56 AM   Commonly known as:  COZAAR   Next Dose Due:  Take each morning before breakfast.    Take 0.5 Tabs by mouth every day.   Dose:  50 mg       SLO-NIACIN PO   Next Dose Due:  Take every evening, beginning tonight.     Take 1 Tab by mouth every evening. OTC   Dose:  1 Tab       vitamin D 2000 UNIT Tabs   Next Dose Due:  Take every morning, beginning tomorrow 101/21/2017    Take 1 Tab by mouth every day.   Dose:  1 Tab               Instructions           Diet / Nutrition:    Follow any diet instructions given to you by your doctor or the dietician, including how much salt (sodium) you are allowed each day.    If you are overweight, talk to your doctor about a weight reduction plan.    Activity:    Remain physically active following your doctor's instructions about exercise and activity.    Rest often.     Any time you become even a little tired or short of breath, SIT DOWN and rest.    Worsening Symptoms:    Report any of the following signs and symptoms to the doctor's office immediately:    *Pain of jaw, arm, or neck  *Chest pain not relieved by medication                               *Dizziness or loss of consciousness  *Difficulty breathing even when at rest   *More tired than usual                                       *Bleeding drainage or swelling of surgical site  *Swelling of feet, ankles, legs or stomach                 *Fever (>100ºF)  *Pink or blood tinged sputum  *Weight gain (3lbs/day or 5lbs /week)           *Shock from internal defibrillator (if applicable)  *Palpitations or irregular heartbeats                *Cool and/or numb extremities    Stroke Awareness    Common Risk Factors for Stroke include:    Age  Atrial Fibrillation  Carotid Artery Stenosis  Diabetes Mellitus  Excessive alcohol consumption  High blood  pressure  Overweight   Physical inactivity  Smoking    Warning signs and symptoms of a stroke include:    *Sudden numbness or weakness of the face, arm or leg (especially on one side of the body).  *Sudden confusion, trouble speaking or understanding.  *Sudden trouble seeing in one or both eyes.  *Sudden trouble walking, dizziness, loss of balance or coordination.Sudden severe headache with no known cause.    It is very important to get treatment quickly when a stroke occurs. If you experience any of the above warning signs, call 911 immediately.                   Disclaimer         Quit Smoking / Tobacco Use:    I understand the use of any tobacco products increases my chance of suffering from future heart disease or stroke and could cause other illnesses which may shorten my life. Quitting the use of tobacco products is the single most important thing I can do to improve my health. For further information on smoking / tobacco cessation call a Toll Free Quit Line at 1-711.765.2099 (*National Cancer [place]) or 1-145.389.2710 (American Lung Association) or you can access the web based program at www.lungSportomato.org.    [place] Tobacco Users Help Line:  (995) 558-9755       Toll Free: 1-929.262.8109  Quit Tobacco Program [facility] (310)203-2721    Crisis Hotline:    [place] Crisis Hotline:  7-189-GSKMUYF or 1-671.781.1703    [place] Crisis Hotline:    1-729.979.3651 or 471-569-3472    Discharge Survey:   Thank you for choosing [facility]. We hope we did everything we could to make your hospital stay a pleasant one. You may be receiving a phone survey and we would appreciate your time and participation in answering the questions. Your input is very valuable to us in our efforts to improve our service to our patients and their families.        My signature on this form indicates that:    1. I have reviewed and understand the above information.  2. My questions regarding this information have  been answered to my satisfaction.  3. I have formulated a plan with my discharge nurse to obtain my prescribed medications for home.                  Disclaimer         __________________________________                     __________       ________                       Patient Signature                                                 Date                    Time

## 2017-01-19 NOTE — ED NOTES
Break rn note: pt assisted up to restroom. Pt denies any dizziness or c/p while walking. Returned to bed without incident. Xray at bedside

## 2017-01-19 NOTE — IP AVS SNAPSHOT
" <p align=\"LEFT\"><IMG SRC=\"//EMRWB/blob$/Images/[email or web address]\" alt=\"Image\" WIDTH=\"50%\" HEIGHT=\"200\" BORDER=\"\"></p>                   Name:Griselda Farmer  Medical Record Number:[id number]  CSN: [id number]    YOB: 1945   Age: 72 y.o.  Sex: female  HT:1.651 m (5' 5\") WT: 86.183 kg (190 lb)          Admit Date: 1/19/2017     Discharge Date:   Today's Date: 1/20/2017  Attending Doctor:  Hank Foley M.D.                  Allergies:  Codeine          Your appointments     Feb 09, 2017  3:00 PM   HOSPITAL FOLLOW UP with KERI Goode   [facility] (--)    [address] [facility] [address]   [phone]            Feb 16, 2017 11:20 AM   Established Patient with Caro Peck M.D.   [facility] ([place])    [address] [place] [address] [phone]           You will be receiving a confirmation call a few days before your appointment from our automated call confirmation system.            Jun 05, 2017  7:00 AM   Adult Draw/Collection with LAB [facility]   [facility] (--)    [address] [place] [address]   [place] [address]   [phone]            Jun 08, 2017  7:40 AM   Established Patient with JACEK Lemos   [facility] ([place]    [address] [facility] [address] [phone]           You will be receiving a confirmation call a few days before your appointment from our automated call confirmation system.                 Medication List      Take these Medications        Instructions    aspirin EC 81 MG Tbec   Commonly known as:  ECOTRIN    Take 81 mg by mouth every evening.   Dose:  81 mg       atorvastatin 40 MG Tabs   Commonly known as:  LIPITOR    Take 1 Tab by mouth every bedtime.   Dose:  40 mg       clopidogrel 75 MG Tabs   Commonly known as:  PLAVIX    Take 1 Tab by mouth every day.   Dose:  75 mg       lansoprazole 30 MG Cpdr   Commonly known as:  PREVACID   " TAKE ONE CAPSULE BY MOUTH EVERY DAY       losartan 100 MG Tabs   Commonly known as:  COZAAR    Take 0.5 Tabs by mouth every day.   Dose:  50 mg       nitroglycerin 0.4 MG Subl   Commonly known as:  NITROSTAT    Place 1 Tab under tongue as needed for Chest Pain (up to 3 doses (if SBP greater than 90 mmHg)).   Dose:  0.4 mg       SLO-NIACIN PO    Take 1 Tab by mouth every evening. OTC   Dose:  1 Tab       vitamin D 2000 UNIT Tabs    Take 1 Tab by mouth every day.   Dose:  1 Tab

## 2017-01-19 NOTE — ED NOTES
Pt BIB EMS from urgent care of chest pain. Non radiating chest pain developed while starting down. Tingling to lips, (+) Nausea, (+) SOB (+) Dizziness. Pt received one nitro and 4 81mg ASA PTA ,symptoms improving.   Pt had two stents on 1/1/17

## 2017-01-19 NOTE — MR AVS SNAPSHOT
Griselda Farmer   2017 11:40 AM   Office Visit   MRN: [id number]    Department:  [facility]   Dept Phone:  [phone]    Description:  Female : 1945   Provider:  Ted Ventura PA-C           Reason for Visit     Chest Pain           Allergies as of 2017     Allergen Noted Reactions    Codeine 2010   Vomiting    Rxn = years ago         You were diagnosed with     Chest pain, unspecified type   [4489230]       Presence of stent in coronary artery   [807047]         Vital Signs     Blood Pressure Pulse Temperature Respirations Weight Oxygen Saturation    134/96 mmHg 112 36.3 °C (97.3 °F) 16 94.348 kg (208 lb) 97%    Smoking Status                   Former Smoker           Basic Information     Date Of Birth Sex Race Ethnicity Preferred Language    1945 Female White Non- English      Your appointments     2017  3:00 PM   HOSPITAL FOLLOW UP with KERI Goode   [facility] (--)    [address], [facility] [address]  [facility] [address]   [phone]            2017 11:20 AM   Established Patient with Caro Peck M.D.   [facility] ([place])    [address] [place] [address] [phone]           You will be receiving a confirmation call a few days before your appointment from our automated call confirmation system.            2017  7:00 AM   Adult Draw/Collection with [facility] (--)    [address] [place] [address]   [phone]            2017  7:40 AM   Established Patient with JACEK Lemos   [facility] ([place]    [address] [facility] [place] [address] [phone]           You will be receiving a confirmation call a few days before your appointment from our automated call confirmation system.              Problem List              ICD-10-CM Priority Class Noted - Resolved    Hypertension I10   8/11/2010 - Present    Insomnia G47.00   8/11/2010 - Present    FRIDA (obstructive sleep apnea) G47.33   8/11/2010 - Present    Hyperlipidemia E78.5   10/20/2010 - Present    Arthritis M19.90   11/29/2010 - Present    Liver cyst K76.89   10/17/2011 - Present    Cough due to ACE inhibitor R05, T46.4X5A   6/26/2012 - Present    S/P colonoscopy Z98.890   11/9/2012 - Present    GERD (gastroesophageal reflux disease) K21.9   3/6/2013 - Present    Clicking jaw syndrome M26.629   3/6/2013 - Present    Dyspnea R06.00   3/18/2013 - Present    Abnormal ECG R94.31   3/18/2013 - Present    CAD (coronary artery disease) I25.10   3/29/2013 - Present    Osteoarthrosis involving multiple sites M15.9   7/15/2014 - Present    Vitamin D deficiency disease E55.9   7/15/2014 - Present    Elevated fasting glucose R73.01   7/16/2014 - Present    Left knee pain M25.562   8/28/2014 - Present    Primary localized osteoarthrosis, lower leg M17.10   12/16/2014 - Present    Overriding toe of left foot M20.5X2   1/20/2015 - Present    Nonhealing nonsurgical wound T14.8   6/23/2015 - Present    Verruca plana B07.8   6/23/2015 - Present    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   4/25/2016 - Present    Right shoulder pain M25.511   6/6/2016 - Present    Incomplete tear of right rotator cuff M75.111   9/19/2016 - Present    Obesity (BMI 30-39.9) E66.9   12/7/2016 - Present    Chest pain R07.9   1/1/2017 - Present    Sick sinus syndrome (CMS-HCC) I49.5 High  1/1/2017 - Present    Symptomatic sinus bradycardia R00.1   1/1/2017 - Present    NSTEMI (non-ST elevated myocardial infarction) (CMS-HCC) I21.4 High  1/2/2017 - Present    Bradycardia with less than 30 beats per minute R00.1   1/2/2017 - Present      Health Maintenance        Date Due Completion Dates    IMM DTaP/Tdap/Td Vaccine (1 - Tdap) 1/15/1962 ---    BONE DENSITY 3/23/2016 3/23/2011    MAMMOGRAM 2/23/2017 2/23/2015, 6/29/2012, 3/23/2011    COLONOSCOPY 10/9/2021 10/9/2011  (N/S)    Override on 10/9/2011: (N/S)            Current Immunizations     13-VALENT PCV PREVNAR 9/7/2015    Influenza TIV (IM) 11/7/2016, 1/28/2016, 10/1/2014, 9/23/2013, 11/9/2012  8:24 AM, 10/3/2011    Pneumococcal Vaccine (UF)Historical Data 10/17/2009    Pneumococcal polysaccharide vaccine (PPSV-23) 1/2/2017  5:43 AM    SHINGLES VACCINE 11/9/2012  8:24 AM    Tetanus Vaccine 1/1/2005      Below and/or attached are the medications your provider expects you to take. Review all of your home medications and newly ordered medications with your provider and/or pharmacist. Follow medication instructions as directed by your provider and/or pharmacist. Please keep your medication list with you and share with your provider. Update the information when medications are discontinued, doses are changed, or new medications (including over-the-counter products) are added; and carry medication information at all times in the event of emergency situations     Allergies:  CODEINE - Vomiting               Medications  Valid as of: January 19, 2017 - 12:02 PM    Generic Name Brand Name Tablet Size Instructions for use    Aspirin (Tablet Delayed Response) ECOTRIN 81 MG Take 1 Tab by mouth every day.        Atorvastatin Calcium (Tab) LIPITOR 40 MG Take 1 Tab by mouth every bedtime.        Cholecalciferol (Tab) vitamin D 2000 UNIT Take 1 Tab by mouth every day.        Clopidogrel Bisulfate (Tab) PLAVIX 75 MG Take 1 Tab by mouth every day.        Lansoprazole (CAPSULE DELAYED RELEASE) PREVACID 30 MG TAKE ONE CAPSULE BY MOUTH EVERY DAY        Losartan Potassium (Tab) COZAAR 100 MG Take 0.5 Tabs by mouth every day.        Niacin   Take 1 Tab by mouth every evening.        .                 Medicines prescribed today were sent to:     [facility] [address] [place] [address], [place] - [address] [facility]    [address] [facility] [address]    Phone: [phone] Fax: [phone]    Open 24 Hours?: No      Medication refill instructions:        If your prescription bottle indicates you have medication refills left, it is not necessary to call your provider’s office. Please contact your pharmacy and they will refill your medication.    If your prescription bottle indicates you do not have any refills left, you may request refills at any time through one of the following ways: The online [facility] system (except Urgent Care), by calling your provider’s office, or by asking your pharmacy to contact your provider’s office with a refill request. Medication refills are processed only during regular business hours and may not be available until the next business day. Your provider may request additional information or to have a follow-up visit with you prior to refilling your medication.   *Please Note: Medication refills are assigned a new Rx number when refilled electronically. Your pharmacy may indicate that no refills were authorized even though a new prescription for the same medication is available at the pharmacy. Please request the medicine by name with the pharmacy before contacting your provider for a refill.           [facility] Access Code: Activation code not generated  Current [facility] Status: Active

## 2017-01-20 VITALS
HEART RATE: 63 BPM | RESPIRATION RATE: 17 BRPM | TEMPERATURE: 97.1 F | DIASTOLIC BLOOD PRESSURE: 77 MMHG | SYSTOLIC BLOOD PRESSURE: 133 MMHG | OXYGEN SATURATION: 98 % | WEIGHT: 190 LBS | BODY MASS INDEX: 31.65 KG/M2 | HEIGHT: 65 IN

## 2017-01-20 PROBLEM — R07.9 CHEST PAIN: Status: RESOLVED | Noted: 2017-01-01 | Resolved: 2017-01-20

## 2017-01-20 LAB
ANION GAP SERPL CALC-SCNC: 9 MMOL/L (ref 0–11.9)
BUN SERPL-MCNC: 16 MG/DL (ref 8–22)
CALCIUM SERPL-MCNC: 9.8 MG/DL (ref 8.5–10.5)
CHLORIDE SERPL-SCNC: 104 MMOL/L (ref 96–112)
CHOLEST SERPL-MCNC: 120 MG/DL (ref 100–199)
CO2 SERPL-SCNC: 25 MMOL/L (ref 20–33)
CREAT SERPL-MCNC: 0.82 MG/DL (ref 0.5–1.4)
EKG IMPRESSION: NORMAL
EKG IMPRESSION: NORMAL
GFR SERPL CREATININE-BSD FRML MDRD: >60 ML/MIN/1.73 M 2
GLUCOSE SERPL-MCNC: 106 MG/DL (ref 65–99)
HDLC SERPL-MCNC: 64 MG/DL
LDLC SERPL CALC-MCNC: 38 MG/DL
POTASSIUM SERPL-SCNC: 3.6 MMOL/L (ref 3.6–5.5)
SODIUM SERPL-SCNC: 138 MMOL/L (ref 135–145)
TRIGL SERPL-MCNC: 92 MG/DL (ref 0–149)

## 2017-01-20 PROCEDURE — 99239 HOSP IP/OBS DSCHRG MGMT >30: CPT | Performed by: HOSPITALIST

## 2017-01-20 PROCEDURE — 80048 BASIC METABOLIC PNL TOTAL CA: CPT

## 2017-01-20 PROCEDURE — 36415 COLL VENOUS BLD VENIPUNCTURE: CPT

## 2017-01-20 PROCEDURE — 99233 SBSQ HOSP IP/OBS HIGH 50: CPT | Mod: 25 | Performed by: INTERNAL MEDICINE

## 2017-01-20 PROCEDURE — 80061 LIPID PANEL: CPT

## 2017-01-20 PROCEDURE — A9270 NON-COVERED ITEM OR SERVICE: HCPCS | Performed by: HOSPITALIST

## 2017-01-20 PROCEDURE — 700102 HCHG RX REV CODE 250 W/ 637 OVERRIDE(OP): Performed by: HOSPITALIST

## 2017-01-20 PROCEDURE — 700111 HCHG RX REV CODE 636 W/ 250 OVERRIDE (IP): Performed by: HOSPITALIST

## 2017-01-20 RX ORDER — NITROGLYCERIN 0.4 MG/1
0.4 TABLET SUBLINGUAL PRN
Qty: 25 TAB | Refills: 3 | Status: SHIPPED | OUTPATIENT
Start: 2017-01-20 | End: 2018-04-03 | Stop reason: SDUPTHER

## 2017-01-20 RX ADMIN — HEPARIN SODIUM 5000 UNITS: 5000 INJECTION, SOLUTION INTRAVENOUS; SUBCUTANEOUS at 05:10

## 2017-01-20 RX ADMIN — CLOPIDOGREL 75 MG: 75 TABLET, FILM COATED ORAL at 08:56

## 2017-01-20 RX ADMIN — LOSARTAN POTASSIUM 50 MG: 50 TABLET, FILM COATED ORAL at 08:56

## 2017-01-20 RX ADMIN — CALCIUM CARBONATE 1000 MG: 500 TABLET ORAL at 08:55

## 2017-01-20 RX ADMIN — ASPIRIN 81 MG: 81 TABLET ORAL at 08:56

## 2017-01-20 ASSESSMENT — ENCOUNTER SYMPTOMS
CLAUDICATION: 0
CONSTITUTIONAL NEGATIVE: 1
RESPIRATORY NEGATIVE: 1
GASTROINTESTINAL NEGATIVE: 1
ORTHOPNEA: 0
SPUTUM PRODUCTION: 0
EYES NEGATIVE: 1
WEAKNESS: 0
COUGH: 0
SORE THROAT: 0
MUSCULOSKELETAL NEGATIVE: 1
CHILLS: 0
WHEEZING: 0
HEMOPTYSIS: 0
DIZZINESS: 0
CARDIOVASCULAR NEGATIVE: 1
BRUISES/BLEEDS EASILY: 0
FEVER: 0
PND: 0
LOSS OF CONSCIOUSNESS: 0
STRIDOR: 0
NEUROLOGICAL NEGATIVE: 1
SHORTNESS OF BREATH: 0
PALPITATIONS: 0

## 2017-01-20 ASSESSMENT — PATIENT HEALTH QUESTIONNAIRE - PHQ9
SUM OF ALL RESPONSES TO PHQ QUESTIONS 1-9: 0
1. LITTLE INTEREST OR PLEASURE IN DOING THINGS: NOT AT ALL
SUM OF ALL RESPONSES TO PHQ9 QUESTIONS 1 AND 2: 0

## 2017-01-20 ASSESSMENT — PAIN SCALES - GENERAL
PAINLEVEL_OUTOF10: 0

## 2017-01-20 ASSESSMENT — LIFESTYLE VARIABLES
EVER_SMOKED: YES
DO YOU DRINK ALCOHOL: NO

## 2017-01-20 NOTE — PROCEDURES
DATE OF SERVICE:  01/19/2017    PROCEDURE:  Cardiac catheterization.  A.  Left heart catheterization.  B.  Left ventriculography.  C.  Selective coronary arteriography.  D.  Right radial artery approach.    PHYSICIAN:  Jose G Vallecillo MD    REFERRING PHYSICIAN: Eleno Akhtar MD    COMPLICATIONS:  None.    MEDICATIONS:  1.  Versed 1 mg IV.  2.  Fentanyl 50 mcg IV.  3.  Lidocaine 2% subcutaneous.  4.  Heparin 3000 units IA.  5.  Nitroglycerin 100 mcg intra-arterial.  6.  Verapamil 2.5 mg IA.    INDICATIONS: The patient is a 72-year-old female with recent inferolateral myocardial   infarction on 01/01/2017 and coronary stent implantation of the distal   dominant circumflex artery with 2 overlapping drug-eluting stents, 3.0x12 mm   and 2.75x20 mm drug-eluting stents who now presents with recurrent anginal   chest pain.  Myocardial infarction was ruled out.  EKG showed no acute EKG   changes.  The patient was referred for a diagnostic cardiac catheterization to   rule out recurrent coronary artery or stent obstruction.    DESCRIPTION OF PROCEDURE:  After informed consent was obtained, the patient   brought to the cardiac catheterization laboratory.    After establishing the presence of adequate collateral circulation to the   right hand with a pressure and oximetry-guided Luis's test, the volar surface   of the right wrist was prepped, draped and anesthetized in the usual manner.    Using a modified Seldinger technique, a 6-Malaysian x10 cm introducer sheath was   inserted in the right radial artery.  Heparin, verapamil, and nitroglycerin   were given via the side port.    Next, a 6-Malaysian JL 3.5 left coronary catheter was inserted in the ostium of   the left coronary artery and left coronary angiograms were obtained in various   projections.    Next, a 6-Malaysian JR 4.0 right coronary catheter was inserted in the ostium of   the right coronary artery and right coronary angiograms were obtained in   various  projections.    Next, a 6-Bengali pigtail catheter was inserted in left ventricle under fluoroscopic   guidance.  Single plane MARCELINO and a single plane ELOY angiogram was obtained.    Pre and post angiogram LVEDP, LV and aortic pressures were obtained.    At the end of procedure, catheters were removed.  Hemostasis was achieved with   a wrist band device.  Patient tolerated the procedure well.    FINDINGS:   HEMODYNAMICS:  LEFT HEART PRESSURES:  LVEDP of 14.  Left ventricular systolic pressure 134,   central aortic pressure systolic 121, diastolic 63.    LEFT VENTRICULOGRAPHY:  Left ventricular chamber size is normal.  There is a   focal mid inferior wall diverticulum with very mild hypokinesis of the distal   inferior wall with vigorous contractility anterior wall and normal appearing   contractility of the proximal inferior wall.  Calculated ejection fraction is   54% visually.  Ejection fraction appears to be greater than 65%.    CORONARY ARTERIOGRAPHY:  1.  LEFT MAIN ARTERY:  Left main vessel is of large caliber vessel, angiographically   normal, and bifurcates into the left anterior descending artery and a large dominant   circumflex artery.  2.  LEFT ANTERIOR DESCENDING ARTERY:  The LAD is a moderately large caliber   vessel, gives rise to a normal complement of septal and diagonal branches and   extends around the apex.  The LAD has mild diffuse focal intimal atheroma, but   no stenotic segments.  Diagonal branches are widely patent.  3.  CIRCUMFLEX ARTERY:  The circumflex is a large caliber dominant vessel,   gives rise to a large bifurcating first marginal branch and a small caliber   posterolateral branch and then the posterior descending artery.  The distal   circumflex artery stents extending into the proximal posterior descending artery   are widely patent. There is no evidence of in-stent restenosis or acute stent   thrombosis. There is MANUEL 3 antegrade flow.   4.  RIGHT CORONARY ARTERY:  The RCA is a  nondominant vessel and gives   rise to 2 small RV branches and is angiographically widely patent.    CONCLUSION:  1.  EF 65% with mild inferior wall motion abnormalities.  2.  Patent distal dominant circumflex artery stents.    PLAN:  Continue medical therapy.       ____________________________________     MD JIM DUMONT / STEPHON    DD:  01/19/2017 18:22:12  DT:  01/19/2017 18:43:59    D#:  [id number]  Job#:  [id number]

## 2017-01-20 NOTE — CARE PLAN
Problem: Communication  Goal: The ability to communicate needs accurately and effectively will improve  Outcome: PROGRESSING AS EXPECTED  PT communicates effectively and is able to teach back disease process, medications, and discharge goals.     Problem: Safety  Goal: Will remain free from injury  Outcome: PROGRESSING AS EXPECTED  PT utilizes the call bell effectively, is able to teach back her limitations concerning ambulation, and has had no injury during her stay.

## 2017-01-20 NOTE — PROGRESS NOTES
Bedside report completed. Pt denies chest pain, sitting up in bed eating dinner. Post-op vitals running. Son at bedside. Bed alarm on, safety policy reviewed with patient. Reviewed plan of care with pt and day RN. Answered all questions the son and patient had.

## 2017-01-20 NOTE — PROGRESS NOTES
Pt had some mild chest pain and epigastric pain, which started after eating dinner. Pain was relieved with Tums and omeprazole. Pt also states that when she lifts her breasts, the pain is relieved. Will pass onto day team.  Pt now sleeping, vitals stable.

## 2017-01-20 NOTE — CONSULTS
Cardiology Consult Note:    Eleno Akhtar  Date & Time note created:    1/19/2017   6:23 PM     Referring MD:  Dr. Obrien    Patient ID:   Name:             Griselda Farmer   YOB: 1945  Age:                 72 y.o.  female   MRN:               [id number]                                                             Reason for Consult:      Chest pain    History of Present Illness:    72-year-old female with recent stents to the RCA/PDA approximately 2 weeks ago. The replaced for the reasons of chest pain which he described as a belching like pressure sensation in her chest with radiation to her left side of her chest. There is also some associated mild shortness of breath. Today she was sitting at home and developed the exact same sensations. This lasted approximately an hour and a half and she decided to have further evaluation. She is brought to the ER where her ECG showed no ischemic ST segment changes and pathologic Q waves in the inferior and anterior leads. Her troponin was unremarkable. She was given 3 sublingual nitroglycerin which brought her pain down from a 5 to 2.    Review of Systems:      Constitutional: Denies fevers, Denies weight changes  Eyes: Denies changes in vision, no eye pain  Ears/Nose/Throat/Mouth: Denies nasal congestion or sore throat   Cardiovascular: + chest pain, no palpitations   Respiratory: no shortness of breath , Denies cough  Gastrointestinal/Hepatic: Denies abdominal pain, nausea, vomiting, diarrhea, constipation or GI bleeding   Genitourinary: Denies dysuria or frequency  Musculoskeletal/Rheum: Denies  joint pain and swelling, no edema  Skin: Denies rash  Neurological: Denies headache, confusion, memory loss or focal weakness/parasthesias  Psychiatric: denies mood disorder   Endocrine: Neela thyroid problems  Heme/Oncology/Lymph Nodes: Denies enlarged lymph nodes, denies brusing or known bleeding disorder  All other systems were reviewed and are  negative (AMA/CMS criteria)                Past Medical History:   Past Medical History   Diagnosis Date   • Insomnia    • Flushing reaction      has had full work up with cardiology, would awake with symptoms at night   • Hyperlipidemia 10/20/2010   • GERD (gastroesophageal reflux disease)    • Liver cyst      has been seen by GI, ultrasound 9/12   • Need for Tdap vaccination      in 2012   • S/P colonoscopy 11/9/2012   • Indigestion    • Urinary bladder disorder    • Unspecified urinary incontinence    • Dental disorder      upper and lower dentures   • FRIDA (obstructive sleep apnea)      states no cpap   • Heart burn 2014     pt on prevacid   • Arthritis      generalized + hands   • Myocardial infarct (CMS-HCC) 1984     pt denies states sometimes irreg rhythm   • Angina 2014     pt denies    • Hypertension 2014     pt states well controlled on meds   • Sick sinus syndrome (CMS-HCC) 1/1/2017   • Symptomatic sinus bradycardia 1/1/2017     Active Hospital Problems    Diagnosis   • Chest pain [R07.9]       Past Surgical History:  Past Surgical History   Procedure Laterality Date   • Tonsillectomy     • Hysterectomy, vaginal       ovaries were left   • Recovery  3/29/2013     Performed by Cath-Recovery Surgery at SURGERY SAME DAY [facility]   • Cataract phaco with iol  9/19/2013     Performed by Sylvester Raza M.D. at [facility]   • Cataract phaco with iol  10/3/2013     Performed by Sylvester Raza M.D. at [facility]   • Knee arthroplasty total  12/16/2014     Performed by Jose G Trotter M.D. at SURGERY [facility] Medications:  Current Facility-Administered Medications   Medication Dose   • aspirin (ASA) chewable tab 324 mg  324 mg   • [START ON 1/20/2017] losartan (COZAAR) tablet 50 mg  50 mg   • [START ON 1/20/2017] omeprazole (PRILOSEC) capsule 20 mg  20 mg   • [START ON 1/20/2017] clopidogrel (PLAVIX) tablet 75 mg  75 mg   • atorvastatin (LIPITOR) tablet 40 mg   40 mg   • [START ON 1/20/2017] aspirin EC (ECOTRIN) tablet 81 mg  81 mg   • [START ON 1/20/2017] docusate sodium (COLACE) capsule 100 mg  100 mg    And   • [START ON 1/20/2017] senna-docusate (PERICOLACE or SENOKOT S) 8.6-50 MG per tablet 1 Tab  1 Tab    And   • senna-docusate (PERICOLACE or SENOKOT S) 8.6-50 MG per tablet 1 Tab  1 Tab    And   • lactulose 20 GM/30ML solution 30 mL  30 mL    And   • bisacodyl (DULCOLAX) suppository 10 mg  10 mg    And   • fleet enema 133 mL  1 Each   • heparin injection 5,000 Units  5,000 Units   • acetaminophen (TYLENOL) tablet 650 mg  650 mg   • nitroglycerin (NITROSTAT) tablet 0.4 mg  0.4 mg   • morphine (pf) 4 mg/ml injection 2-4 mg  2-4 mg   • labetalol (NORMODYNE,TRANDATE) injection 10 mg  10 mg         Current Outpatient Medications:  Prescriptions prior to admission   Medication Sig Dispense Refill Last Dose   • atorvastatin (LIPITOR) 40 MG Tab Take 1 Tab by mouth every bedtime. 30 Tab 3 1/18/2017 at pm   • clopidogrel (PLAVIX) 75 MG Tab Take 1 Tab by mouth every day. 30 Tab 11 1/19/2017 at am   • aspirin EC (ECOTRIN) 81 MG Tablet Delayed Response Take 81 mg by mouth every evening. 30 Tab 0 1/18/2017 at pm   • SLO-NIACIN PO Take 1 Tab by mouth every evening. OTC   1/18/2017 at pm   • losartan (COZAAR) 100 MG Tab Take 0.5 Tabs by mouth every day. 90 Tab 3 1/19/2017 at am   • lansoprazole (PREVACID) 30 MG CAPSULE DELAYED RELEASE TAKE ONE CAPSULE BY MOUTH EVERY DAY 90 Cap 3 1/19/2017 at am   • Cholecalciferol (VITAMIN D) 2000 UNIT TABS Take 1 Tab by mouth every day.   1/18/2017 at 0900       Medication Allergy:  Allergies   Allergen Reactions   • Codeine Itching and Vomiting     Rxn = years ago          Family History:  Family History   Problem Relation Age of Onset   • Diabetes Mother      mild   • Heart Disease Neg Hx    • Hypertension Neg Hx    • Cancer Father      melanoma mild       Social History:  Social History     Social History   • Marital Status:      Spouse  "Name: N/A   • Number of Children: N/A   • Years of Education: N/A     Occupational History   • retired- human resources  for [employer]     Social History Main Topics   • Smoking status: Former Smoker -- 0.50 packs/day for 15 years     Types: Cigarettes     Quit date: 08/11/1975   • Smokeless tobacco: Never Used   • Alcohol Use: No   • Drug Use: No   • Sexual Activity: Not on file     Other Topics Concern   • Not on file     Social History Narrative         Physical Exam:  Vitals/ General Appearance:   Weight/BMI: Body mass index is 31.62 kg/(m^2).  Blood pressure 118/75, pulse 71, temperature 36.4 °C (97.6 °F), resp. rate 18, height 1.651 m (5' 5\"), weight 86.183 kg (190 lb), SpO2 97 %.  Filed Vitals:    01/19/17 1459 01/19/17 1615 01/19/17 1634 01/19/17 1800   BP: 137/99 158/82 136/84 118/75   Pulse: 77 87  71   Temp:    36.4 °C (97.6 °F)   Resp: 15 14  18   Height:       Weight:       SpO2: 98% 99%  97%     Oxygen Therapy:  Pulse Oximetry: 97 %, O2 (LPM): 2, O2 Delivery: Silicone Nasal Cannula    Constitutional:   Well developed, Well nourished, No acute distress  HENMT:  Normocephalic, Atraumatic, Oropharynx moist mucous membranes, No oral exudates, Nose normal.  No thyromegaly.  Eyes:  EOMI, Conjunctiva normal, No discharge.  Neck:  Normal range of motion, No cervical tenderness,  no JVD.  Cardiovascular:  Normal heart rate, Normal rhythm, No murmurs, No rubs, No gallops.   Extremitites with intact distal pulses, no cyanosis, or edema.  Lungs:  Normal breath sounds, breath sounds clear to auscultation bilaterally,  no crackles, no wheezing.   Abdomen: Bowel sounds normal, Soft, No tenderness, No guarding, No rebound, No masses, No hepatosplenomegaly.  Skin: Warm, Dry, No erythema, No rash, no induration.  Neurologic: Alert & oriented x 3, No focal deficits noted, cranial nerves II through X are grossly intact.  Psychiatric: Affect normal, Judgment normal, Mood normal.      [facility] (Data " Review):     Records reviewed and summarized in current documentation    Lab Data Review:  Recent Results (from the past 24 hour(s))   CBC WITH DIFFERENTIAL    Collection Time: 01/19/17  1:42 PM   Result Value Ref Range    WBC 11.5 (H) 4.8 - 10.8 K/uL    RBC 4.65 4.20 - 5.40 M/uL    Hemoglobin 15.0 12.0 - 16.0 g/dL    Hematocrit 44.7 37.0 - 47.0 %    MCV 96.1 81.4 - 97.8 fL    MCH 32.3 27.0 - 33.0 pg    MCHC 33.6 33.6 - 35.0 g/dL    RDW 47.9 35.9 - 50.0 fL    Platelet Count 283 164 - 446 K/uL    MPV 11.5 9.0 - 12.9 fL    Neutrophils-Polys 72.70 (H) 44.00 - 72.00 %    Lymphocytes 18.60 (L) 22.00 - 41.00 %    Monocytes 6.30 0.00 - 13.40 %    Eosinophils 1.10 0.00 - 6.90 %    Basophils 0.90 0.00 - 1.80 %    Immature Granulocytes 0.40 0.00 - 0.90 %    Nucleated RBC 0.00 /100 WBC    Neutrophils (Absolute) 8.32 (H) 2.00 - 7.15 K/uL    Lymphs (Absolute) 2.13 1.00 - 4.80 K/uL    Monos (Absolute) 0.72 0.00 - 0.85 K/uL    Eos (Absolute) 0.13 0.00 - 0.51 K/uL    Baso (Absolute) 0.10 0.00 - 0.12 K/uL    Immature Granulocytes (abs) 0.05 0.00 - 0.11 K/uL    NRBC (Absolute) 0.00 K/uL   COMP METABOLIC PANEL    Collection Time: 01/19/17  1:42 PM   Result Value Ref Range    Sodium 139 135 - 145 mmol/L    Potassium 3.8 3.6 - 5.5 mmol/L    Chloride 103 96 - 112 mmol/L    Co2 25 20 - 33 mmol/L    Anion Gap 11.0 0.0 - 11.9    Glucose 109 (H) 65 - 99 mg/dL    Bun 15 8 - 22 mg/dL    Creatinine 0.82 0.50 - 1.40 mg/dL    Calcium 9.5 8.5 - 10.5 mg/dL    AST(SGOT) 15 12 - 45 U/L    ALT(SGPT) 13 2 - 50 U/L    Alkaline Phosphatase 109 (H) 30 - 99 U/L    Total Bilirubin 0.4 0.1 - 1.5 mg/dL    Albumin 4.2 3.2 - 4.9 g/dL    Total Protein 7.0 6.0 - 8.2 g/dL    Globulin 2.8 1.9 - 3.5 g/dL    A-G Ratio 1.5 g/dL   TROPONIN    Collection Time: 01/19/17  1:42 PM   Result Value Ref Range    Troponin I <0.01 0.00 - 0.04 ng/mL   BTYPE NATRIURETIC PEPTIDE    Collection Time: 01/19/17  1:42 PM   Result Value Ref Range    B Natriuretic Peptide 95 0 - 100 pg/mL    ESTIMATED GFR    Collection Time: 17  1:42 PM   Result Value Ref Range    GFR If African American >60 >60 mL/min/1.73 m 2    GFR If Non African American >60 >60 mL/min/1.73 m 2   EKG (NOW)    Collection Time: 17  1:47 PM   Result Value Ref Range    Report       [facility] Emergency Dept.    Test Date:  2017  Pt Name:    ONESIMO DURAN                Department: ER  MRN:        [id number]                      Room:       TRAUMA - EXAM 1  Gender:     F                            Technician: [id number]  :        1945                   Requested By:PAM NICOLE  Order #:    [id number]                    Reading MD:    Measurements  Intervals                                Axis  Rate:       93                           P:          9  RI:         200                          QRS:        -57  QRSD:       82                           T:          -30  QT:         344  QTc:        428    Interpretive Statements  SINUS RHYTHM  INFERIOR INFARCT, AGE INDETERMINATE  ANTERIOR INFARCT, AGE INDETERMINATE  Compared to ECG 2017 05:09:36  Sinus bradycardia no longer present  T-wave abnormality no longer present  Possible ischemia no longer present  Myocardial infarct finding still present         Imaging/Procedures Review:    Chest Xray:  Reviewed    EKG:   As in HPI.     ECHO:  N/A    MDM (Assessment and Plan):     Active Hospital Problems    Diagnosis   • Chest pain [R07.9]     72-year-old female with atypical chest pain but the exact same descriptions of what she had last time which led to 2 stents placed in the RCA to PDA region.  Her chest pain is atypical but does have some typical components. Her troponin is equivocal. Based upon discussion with the patient and the fact that her symptoms do match her prior symptoms we would like to obtain a coronary angiogram for further diangostic clarification.    Thank for you allowing me to take part in your patient's care, please call should  you have any questions or would like to discuss this patient.

## 2017-01-20 NOTE — PROGRESS NOTES
PT discharged. IV and cardiac monitor removed. PT educated on medications, follow-up, and discharge plan. PT discharged to home via car with her son driving.

## 2017-01-20 NOTE — CARE PLAN
Problem: Safety  Goal: Will remain free from injury  Intervention: Provide assistance with mobility  Bed alarm on, bed locked in low position, education provided on preventing falls, treaded socks on. Assisted to restroom.      Problem: Venous Thromboembolism (VTW)/Deep Vein Thrombosis (DVT) Prevention:  Goal: Patient will participate in Venous Thrombosis (VTE)/Deep Vein Thrombosis (DVT)Prevention Measures  Intervention: Assess and monitor for anticoagulation complications  Administered heparin prophylaxis and educated on range of motion exercises

## 2017-01-20 NOTE — H&P
PRIMARY CARE PHYSICIAN:  Caro Peck MD.  I believe she is out in [place].    CHIEF COMPLAINT:  Upper abdominal pain.    HISTORY OF PRESENT ILLNESS:  This is a 72-year-old female with his previous   medical history is significant for an NSTEMI, which she suffered earlier this   month.  At that time, she underwent cardiac catheterization by Dr. Jose G Vallecillo with 2 drug-eluting stents placed in the distal RCA.  She was   subsequently discharged on Plavix and aspirin.    The patient reports last night, she woke up and felt some dizziness and   abdominal discomfort.  She went to the bathroom, then went back to sleep.    When she woke this morning, these symptoms were present, but were more   intense.  They have wax and wane to the day and progressed through the day.    She describes a sense of pressure and tightness around the upper portion of   her belly and lower chest.  These symptoms are associated with some chills and   a sensation of dizziness.  She has had some nausea, but no vomiting.  She   states the symptoms are exactly like what she experienced earlier this month   when she had her .    REVIEW OF SYSTEMS:  Positive as noted above, otherwise all systems are   reviewed and negative.    PREVIOUS MEDICAL HISTORY:  1.  Coronary artery disease with recent NSTEMI as noted.  2.  Dyslipidemia.  3.  Gastroesophageal reflux disease.  4.  Obstructive sleep apnea:  The patient is not on CPAP or BiPAP.  5.  Hypertension.  6.  Questionable sick sinus syndrome.  7.  Systolic congestive heart failure with an LVEF of 50%.    SURGICAL HISTORY:  1.  Tonsillectomy.  2.  Hysterectomy.  3.  Cataract extraction.  4.  Total knee arthroplasty.    MEDICATIONS:  1.  Aspirin 81 mg p.o. daily.  2.  Atorvastatin 40 mg at bedtime.  3.  Vitamin D supplement.  4.  Plavix 75 mg p.o. daily.  5.  Prevacid 30 mg p.o. daily.  6.  Cozaar 100 mg:  One half p.o. daily.  7.  Slo-Niacin, unknown dose.    ALLERGIES:   CODEINE.    SOCIAL HISTORY:  The patient quit smoking in 1975, total of 7-1/2 pack year   exposure.  She does not drink any alcohol.    FAMILY HISTORY:  Reviewed but not relevant to presentation.    PHYSICAL EXAMINATION:  VITAL SIGNS:  Temperature 36.3, heart rate 87, respiratory rate 14, /84,   sating 99% on room air.  GENERAL:  The patient is awake, alert.  She is in no acute distress.  HEENT:  Head is normocephalic and atraumatic.  Mucous membranes are moist.    Sclerae and conjunctivae are benign.  NECK:  Trachea is in the midline.  Neck is supple.  There is no JVD.  There   are no bruits.  RESPIRATORY:  Clear to auscultation bilaterally.  CARDIAC:  Regular rate and rhythm.  No murmurs, rubs or clicks.  ABDOMEN:  Soft.  No guarding or rebound, hepatosplenomegaly or masses.  EXTREMITIES:  No clubbing, cyanosis or edema.  Calves nontender to palpation.  SKIN:  Warm and dry.  No rashes are appreciated.  NEUROLOGIC:  The patient is alert and oriented.  Her speech is clear and   content is logical.  PSYCHIATRIC:  Mood and affect are appropriate.  Hygiene neat and clean.    EKG:  EKG is reviewed and compared to study from January 2nd.  Today's study   demonstrates normal sinus rhythm with a relative tachycardia.  There are   significant Q-waves in the anterior septal leads as well as the inferior   leads.  There are T-wave inversions noted diffusely.  As compared to the   previous study, the Q-waves in the anterior septal leads are more prominent.    Impression, concerning.    LABORATORY DATA:  White count 11.5, hemoglobin 15.0, platelet count 283.    Sodium is 139, potassium 3.8, BUN 15, creatinine 0.82.  LFTs are benign.    Troponin I is less than 0.01.  BNP is 95.    ASSESSMENT AND PLAN:  This is a 72-year-old female with presentation as   follows:  1.  Chest pain equivalent:  Her presentation is quite concerning for an   occlusion of her stents.  I have discussed the case today with Dr. Eleno Akhtar  who has graciously agreed to see the patient.  We look forward to his   evaluation and recommendations.  For the moment, we will continue her double   antiplatelet therapy and beta blockade.  2.  History of coronary artery disease:  Continue as noted above.  3.  Dyslipidemia:  Continue statin.  4.  Gastroesophageal reflux disease:  Continue proton pump inhibitor.  5.  Obstructive sleep apnea:  Q.h.s. O2.  Not on CPAP or BiPAP at home.  6.  Hypertension:  Continue outpatient regimen and monitor and titrate.  7.  History of systolic congestive heart failure, left ventricular ejection   fraction of 50%.  The patient appears to be compensated.  She is on a beta   blocker and angiotensin receptor blockers presently.       ____________________________________     DO SARAH BETH Finch / STEPHON    DD:  01/19/2017 18:13:23  DT:  01/19/2017 21:00:15    D#:  [id number]  Job#:  [id number]    cc: CARLOS GODWIN MD

## 2017-01-20 NOTE — PROGRESS NOTES
No further chest pain after the one episode after eating, which Tums resolved. Vitals stable all night, and troponins remained negative.

## 2017-01-20 NOTE — PROGRESS NOTES
Per report, 13 PSI in R rad hemoband, no bleeding,  on R thumb    1821 - 3mL removed    1835 - no bleeding, 3mL removed (total 6mL)    1850 - no bleeding, 3mL removed (total 9mL)

## 2017-01-20 NOTE — PROGRESS NOTES
Pt arrived from cath lab s/o R radial clean cath, hemoband intact, pt A/O x 4, vitals stable, pain - denies    possessions & call light within reach, strip alarm ON, bed in low locked position, will continue to monitor.

## 2017-01-20 NOTE — ED NOTES
The Medication Reconciliation has been completed per patient  Allergies have been reviewed  Antibiotic use in 30 days - None at home    Pharmacy:  [facility] [place]

## 2017-01-20 NOTE — DISCHARGE INSTRUCTIONS
Discharge Instructions    Discharged to home by car with relative. Discharged via wheelchair, hospital escort: Yes.  Special equipment needed: Not Applicable    Be sure to schedule a follow-up appointment with your primary care doctor or any specialists as instructed.     Discharge Plan:   Diet Plan: Discussed  Activity Level: Discussed  Confirmed Follow up Appointment: Appointment Scheduled  Confirmed Symptoms Management: Discussed  Medication Reconciliation Updated: Yes  Influenza Vaccine Indication: Not indicated: Previously immunized this influenza season and > 8 years of age    I understand that a diet low in cholesterol, fat, and sodium is recommended for good health. Unless I have been given specific instructions below for another diet, I accept this instruction as my diet prescription.   Other diet: cardiac    Special Instructions: Diagnosis:  Acute Coronary Syndrome (ACS) is a diagnosis that encompasses cardiac-related chest pain and heart attack. ACS occurs when the blood flow to the heart muscle is severely reduced or cut off completely due to a slow process called atherosclerosis.  Atherosclerosis is a disease in which the coronary arteries become narrow from a buildup of fat, cholesterol, and other substances that combine to form plaque. If the plaque breaks, a blood clot will form and block the blood flow to the heart muscle. This lack of blood flow can cause damage or death to the heart muscle which is called a heart attack or Myocardial Infarction (MI). There are two different types of MIs:  ST Elevation Myocardial Infarction or STEMI (the most severe type of heart attack) and Non-ST Elevation Myocardial Infarction or NSTEMI.    Treatment Plan:  · Cardiac Diet  - Low fat, low salt, low cholesterol   · Cardiac Rehab  - Your doctor has ordered you a referral to [facility].  Call [phone] to schedule an appointment.  · Attend my follow-up appointment with my Cardiologist.  · Take my medications as  prescribed by my doctor  · Exercise daily  · Lower my bad cholesterol and raise my good cholesterol, lower my blood pressure and Reduce stress    Medications:  Certain medications are used to treat ACS.  Remember to always take medications as prescribed and never stop talking medications unless told by your doctor.    You have been prescribed the following medicatons:    Aspirin - Aspirin is used as a blood thinning medication and you will require this medication indefinitely.  Anti-platelet/blood thinner - Your Anti-platelet/Blood thinning medication is called Clopidogrel, and is used in combination with aspirin to prevent clots from forming in your heart and/or around your stent.  Your doctor will determine how long you need to be on this medicine, but generally if you have a Drug Eluding stent, you will need to take this medication for at least one month, and you have a Bare Metal stent, you will need the medication for at least 3 months.  Some patients require this medication indefinitely.  · Is patient discharged on Warfarin / Coumadin?   No     · Is patient Post Blood Transfusion?  No    Depression / Suicide Risk    As you are discharged from this [facility] facility, it is important to learn how to keep safe from harming yourself.    Recognize the warning signs:  · Abrupt changes in personality, positive or negative- including increase in energy   · Giving away possessions  · Change in eating patterns- significant weight changes-  positive or negative  · Change in sleeping patterns- unable to sleep or sleeping all the time   · Unwillingness or inability to communicate  · Depression  · Unusual sadness, discouragement and loneliness  · Talk of wanting to die  · Neglect of personal appearance   · Rebelliousness- reckless behavior  · Withdrawal from people/activities they love  · Confusion- inability to concentrate     If you or a loved one observes any of these behaviors or has concerns about self-harm,  here's what you can do:  · Talk about it- your feelings and reasons for harming yourself  · Remove any means that you might use to hurt yourself (examples: pills, rope, extension cords, firearm)  · Get professional help from the community (Mental Health, Substance Abuse, psychological counseling)  · Do not be alone:Call your Safe Contact- someone whom you trust who will be there for you.  · Call your local CRISIS HOTLINE 946-4075 or 996-400-5142  · Call your local Children's Mobile Crisis Response Team [place] (877) 605-1501 or www.uSpeak  · Call the toll free National Suicide Prevention Hotlines   · National Suicide Prevention Lifeline 856-847-LPYJ (9281)  · National Hope Line Network 800-SUICIDE (283-8579)

## 2017-01-22 NOTE — DISCHARGE SUMMARY
CONSULT ON THE CASE:  Dr. Akhtar, cardiology.    REASON FOR EVALUATION:  Chest discomfort.    A 72-year-old female who came in complaining of some atypical chest pain.  She   has known history of coronary artery disease.  Patient was taken to the cath   lab and patient was found _____ patent distal dominant circumflex artery   stents with EF 65%.  Medical management is commended.  Patient was stable for   discharge with all of her previous outpatient medication.  In addition,   patient given a prescription for nitroglycerin 0.4 mg p.r.n. sublingually for   chest discomfort.  Patient is already on aspirin, Plavix, statin, Cozaar _____   mg daily, Prevacid 30 mg daily.  No beta blocker given secondary to   documented bradycardia.  Patient is to follow up with cardiology within the   next 1-2 weeks.    PERTINENT LABORATORY DATA:  Sodium 138, potassium 3.6, BUN of 16, creatinine   0.82.  White cell count 11, hemoglobin 15, hematocrit 44, platelets 283.    X-ray of chest shows no acute abnormality.    IMPRESSION:  1.  Angina.  2.  Known coronary artery disease.  3.  Status post heart catheterization with patent cardiac stents.  4.  History of hypertension.  5.  Bradycardia.  6.  Recent non-ST elevation myocardial infarction.  7.  History of gastroesophageal reflux disease.    On the day of discharge, 37 minutes taken with this patient.  Patient will   follow up with cardiology within the next 1-2 weeks.       ____________________________________     MD CHARLES JARRETT / STEPHON    DD:  01/21/2017 07:49:06  DT:  01/21/2017 20:00:08    D#:  [id number]  Job#:  [id number]

## 2017-02-08 LAB — EKG IMPRESSION: NORMAL

## 2017-02-09 ENCOUNTER — OFFICE VISIT (OUTPATIENT)
Dept: CARDIOLOGY | Facility: MEDICAL CENTER | Age: 72
End: 2017-02-09
Payer: MEDICARE

## 2017-02-09 ENCOUNTER — TELEPHONE (OUTPATIENT)
Dept: CARDIOLOGY | Facility: MEDICAL CENTER | Age: 72
End: 2017-02-09

## 2017-02-09 VITALS
WEIGHT: 208 LBS | SYSTOLIC BLOOD PRESSURE: 106 MMHG | OXYGEN SATURATION: 96 % | DIASTOLIC BLOOD PRESSURE: 72 MMHG | HEIGHT: 64 IN | BODY MASS INDEX: 35.51 KG/M2 | HEART RATE: 98 BPM

## 2017-02-09 DIAGNOSIS — I21.4 NSTEMI (NON-ST ELEVATED MYOCARDIAL INFARCTION) (HCC): Primary | ICD-10-CM

## 2017-02-09 DIAGNOSIS — I25.10 CORONARY ARTERY DISEASE INVOLVING NATIVE CORONARY ARTERY OF NATIVE HEART WITHOUT ANGINA PECTORIS: ICD-10-CM

## 2017-02-09 DIAGNOSIS — G47.33 OSA (OBSTRUCTIVE SLEEP APNEA): ICD-10-CM

## 2017-02-09 DIAGNOSIS — E78.2 MIXED HYPERLIPIDEMIA: ICD-10-CM

## 2017-02-09 DIAGNOSIS — I10 ESSENTIAL HYPERTENSION, BENIGN: ICD-10-CM

## 2017-02-09 LAB — EKG IMPRESSION: NORMAL

## 2017-02-09 PROCEDURE — 93000 ELECTROCARDIOGRAM COMPLETE: CPT | Performed by: NURSE PRACTITIONER

## 2017-02-09 PROCEDURE — 3014F SCREEN MAMMO DOC REV: CPT | Mod: 8P | Performed by: NURSE PRACTITIONER

## 2017-02-09 PROCEDURE — G8598 ASA/ANTIPLAT THER USED: HCPCS | Performed by: NURSE PRACTITIONER

## 2017-02-09 PROCEDURE — 1111F DSCHRG MED/CURRENT MED MERGE: CPT | Performed by: NURSE PRACTITIONER

## 2017-02-09 PROCEDURE — 1036F TOBACCO NON-USER: CPT | Performed by: NURSE PRACTITIONER

## 2017-02-09 PROCEDURE — 4040F PNEUMOC VAC/ADMIN/RCVD: CPT | Performed by: NURSE PRACTITIONER

## 2017-02-09 PROCEDURE — G8482 FLU IMMUNIZE ORDER/ADMIN: HCPCS | Performed by: NURSE PRACTITIONER

## 2017-02-09 PROCEDURE — 3017F COLORECTAL CA SCREEN DOC REV: CPT | Mod: 1P | Performed by: NURSE PRACTITIONER

## 2017-02-09 PROCEDURE — G8432 DEP SCR NOT DOC, RNG: HCPCS | Performed by: NURSE PRACTITIONER

## 2017-02-09 PROCEDURE — 1101F PT FALLS ASSESS-DOCD LE1/YR: CPT | Mod: 8P | Performed by: NURSE PRACTITIONER

## 2017-02-09 PROCEDURE — 99214 OFFICE O/P EST MOD 30 MIN: CPT | Performed by: NURSE PRACTITIONER

## 2017-02-09 PROCEDURE — G8419 CALC BMI OUT NRM PARAM NOF/U: HCPCS | Performed by: NURSE PRACTITIONER

## 2017-02-09 RX ORDER — ATORVASTATIN CALCIUM 40 MG/1
40 TABLET, FILM COATED ORAL EVERY EVENING
Qty: 90 TAB | Refills: 3 | Status: SHIPPED | OUTPATIENT
Start: 2017-02-09 | End: 2017-07-10

## 2017-02-09 RX ORDER — LOSARTAN POTASSIUM 25 MG/1
25 TABLET ORAL DAILY
Qty: 90 TAB | Refills: 3 | Status: ON HOLD | OUTPATIENT
Start: 2017-02-09 | End: 2017-07-11

## 2017-02-09 RX ORDER — CLOPIDOGREL BISULFATE 75 MG/1
75 TABLET ORAL DAILY
Qty: 90 TAB | Refills: 3 | Status: SHIPPED | OUTPATIENT
Start: 2017-02-09 | End: 2018-04-03 | Stop reason: SDUPTHER

## 2017-02-09 RX ORDER — LOSARTAN POTASSIUM 50 MG/1
50 TABLET ORAL DAILY
Qty: 90 TAB | Refills: 3 | Status: SHIPPED | OUTPATIENT
Start: 2017-02-09 | End: 2017-02-09

## 2017-02-09 ASSESSMENT — ENCOUNTER SYMPTOMS
DIZZINESS: 0
ORTHOPNEA: 0
WEAKNESS: 0
COUGH: 0
MYALGIAS: 0
PND: 0
PALPITATIONS: 0
ABDOMINAL PAIN: 0
CLAUDICATION: 0
SHORTNESS OF BREATH: 0
HEARTBURN: 1

## 2017-02-09 NOTE — MR AVS SNAPSHOT
"        Griselda Farmer   2017 3:00 PM   Office Visit   MRN: [id number]    Department:  [facility]   Dept Phone:  [phone]    Description:  Female : 1945   Provider:  KERI Goode           Reason for Visit     Follow-Up           Allergies as of 2017     Allergen Noted Reactions    Codeine 2010   Itching, Vomiting    Rxn = years ago         You were diagnosed with     NSTEMI (non-ST elevated myocardial infarction) (CMS-HCC)   [338346]  -  Primary     Coronary artery disease involving native coronary artery of native heart without angina pectoris   [0249751]       Mixed hyperlipidemia   [272.2.ICD-9-CM]       Essential hypertension, benign   [401.1.ICD-9-CM]       FRIDA (obstructive sleep apnea)   [584862]         Vital Signs     Blood Pressure Pulse Height Weight Body Mass Index Oxygen Saturation    106/72 mmHg 98 1.626 m (5' 4.02\") 94.348 kg (208 lb) 35.69 kg/m2 96%    Smoking Status                   Former Smoker           Basic Information     Date Of Birth Sex Race Ethnicity Preferred Language    1945 Female White Non- English      Your appointments     2017 11:20 AM   Established Patient with Caro Peck M.D.   [facility]    [address] [place] [id number] [phone]           You will be receiving a confirmation call a few days before your appointment from our automated call confirmation system.            2017  7:00 AM   Adult Draw/Collection with [facility] (--)    [address] [place] [address] Dr. Re[place] [id number] [phone]            2017 12:20 PM   FOLLOW UP with Jose G Vallecillo M.D.   [facility] (--)    [address] [place] [address] [address]  [facility] [id number] [phone]            2017  7:40 AM   Established Patient with JACEK Lemos   [facility]    [address] [place] [address] " [place] [address] [phone]           You will be receiving a confirmation call a few days before your appointment from our automated call confirmation system.              Problem List              ICD-10-CM Priority Class Noted - Resolved    Essential hypertension, benign I10   8/11/2010 - Present    Insomnia G47.00   8/11/2010 - Present    FRIDA (obstructive sleep apnea) G47.33   8/11/2010 - Present    Mixed hyperlipidemia E78.2   10/20/2010 - Present    Arthritis M19.90   11/29/2010 - Present    Liver cyst K76.89   10/17/2011 - Present    Cough due to ACE inhibitor R05, T46.4X5A   6/26/2012 - Present    GERD (gastroesophageal reflux disease) K21.9   3/6/2013 - Present    CAD (coronary artery disease) I25.10   3/29/2013 - Present    Osteoarthrosis involving multiple sites M15.9   7/15/2014 - Present    Vitamin D deficiency disease E55.9   7/15/2014 - Present    Elevated fasting glucose R73.01   7/16/2014 - Present    Left knee pain M25.562   8/28/2014 - Present    Primary localized osteoarthrosis, lower leg M17.10   12/16/2014 - Present    Overriding toe of left foot M20.5X2   1/20/2015 - Present    Nonhealing nonsurgical wound T14.8   6/23/2015 - Present    Verruca plana B07.8   6/23/2015 - Present    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   4/25/2016 - Present    Right shoulder pain M25.511   6/6/2016 - Present    Incomplete tear of right rotator cuff M75.111   9/19/2016 - Present    Obesity (BMI 30-39.9) E66.9   12/7/2016 - Present    Sick sinus syndrome (CMS-HCC) I49.5 High  1/1/2017 - Present    Symptomatic sinus bradycardia R00.1   1/1/2017 - Present    NSTEMI (non-ST elevated myocardial infarction) (CMS-HCC) I21.4 High  1/2/2017 - Present    Bradycardia with less than 30 beats per minute R00.1   1/2/2017 - Present      Health Maintenance        Date Due Completion Dates    IMM DTaP/Tdap/Td Vaccine (1 - Tdap) 1/15/1962 ---    BONE DENSITY 3/23/2016 3/23/2011    MAMMOGRAM 2/23/2017 2/23/2015,  6/29/2012, 3/23/2011    COLONOSCOPY 10/9/2021 10/9/2011 (N/S)    Override on 10/9/2011: (N/S)            Results       Current Immunizations     13-VALENT PCV PREVNAR 9/7/2015    Influenza TIV (IM) 11/7/2016, 1/28/2016, 10/1/2014, 9/23/2013, 11/9/2012  8:24 AM, 10/3/2011    Pneumococcal Vaccine (UF)Historical Data 10/17/2009    Pneumococcal polysaccharide vaccine (PPSV-23) 1/2/2017  5:43 AM    SHINGLES VACCINE 11/9/2012  8:24 AM    Tetanus Vaccine 1/1/2005      Below and/or attached are the medications your provider expects you to take. Review all of your home medications and newly ordered medications with your provider and/or pharmacist. Follow medication instructions as directed by your provider and/or pharmacist. Please keep your medication list with you and share with your provider. Update the information when medications are discontinued, doses are changed, or new medications (including over-the-counter products) are added; and carry medication information at all times in the event of emergency situations     Allergies:  CODEINE - Itching,Vomiting               Medications  Valid as of: February 09, 2017 -  4:16 PM    Generic Name Brand Name Tablet Size Instructions for use    Aspirin (Tablet Delayed Response) ECOTRIN 81 MG Take 81 mg by mouth every evening.        Atorvastatin Calcium (Tab) LIPITOR 40 MG Take 1 Tab by mouth every evening.        Cholecalciferol (Tab) vitamin D 2000 UNIT Take 1 Tab by mouth every day.        Clopidogrel Bisulfate (Tab) PLAVIX 75 MG Take 1 Tab by mouth every day.        Lansoprazole (CAPSULE DELAYED RELEASE) PREVACID 30 MG TAKE ONE CAPSULE BY MOUTH EVERY DAY        Losartan Potassium (Tab) COZAAR 50 MG Take 1 Tab by mouth every day.        Nitroglycerin (SL Tab) NITROSTAT 0.4 MG Place 1 Tab under tongue as needed for Chest Pain (up to 3 doses (if SBP greater than 90 mmHg)).        .                 Medicines prescribed today were sent to:     [facility] [address] [place] [address]  - [address] [facility]    [address] [facility] [place] [address]    Phone: [phone] Fax: [phone]    Open 24 Hours?: No      Medication refill instructions:       If your prescription bottle indicates you have medication refills left, it is not necessary to call your provider’s office. Please contact your pharmacy and they will refill your medication.    If your prescription bottle indicates you do not have any refills left, you may request refills at any time through one of the following ways: The online [facility] system (except Urgent Care), by calling your provider’s office, or by asking your pharmacy to contact your provider’s office with a refill request. Medication refills are processed only during regular business hours and may not be available until the next business day. Your provider may request additional information or to have a follow-up visit with you prior to refilling your medication.   *Please Note: Medication refills are assigned a new Rx number when refilled electronically. Your pharmacy may indicate that no refills were authorized even though a new prescription for the same medication is available at the pharmacy. Please request the medicine by name with the pharmacy before contacting your provider for a refill.        Your To Do List     Future Labs/Procedures Complete By Expires    COMP METABOLIC PANEL  As directed 2/9/2018    LIPID PROFILE  As directed 2/9/2018         [facility] Access Code: Activation code not generated  Current [facility] Status: Active

## 2017-02-09 NOTE — PROGRESS NOTES
Subjective:   Griselda Farmer is a 72 y.o. female who presents today for hospital follow-up. She presented to the hospital with chest pain on January 1 and was found to have a non-ST elevation MI. She underwent cardiac catheterization received 2 overlapping stents to her circumflex. She was discharged home without any complications. She returned to the hospital on January 19 complaining of chest pain that she described as heaviness and shortness of breath. She underwent repeat cardiac catheterization which showed that her stents were patent.    She feels that her chest pain on January 19 may have been related to acid reflux. She states that she suffers from reflux and often will have acid in the back for her throat. She does not think that her PPI is working well enough for her. She's not had any chest discomfort since being out of the hospital again.    She denies any shortness of breath. She does have some pain in her right shoulder due to a rotator cuff tear. She complains of feeling fatigued and wonders how long this will last. She denies any shortness of breath, palpitations or ankle edema.    She find it is difficult to lose weight. She has sleep apnea and discontinued her CPAP a few months ago due to discomfort from the mask.    Past Medical History   Diagnosis Date   • Insomnia    • Flushing reaction      has had full work up with cardiology, would awake with symptoms at night   • Hyperlipidemia 10/20/2010   • GERD (gastroesophageal reflux disease)    • Liver cyst      has been seen by GI, ultrasound 9/12   • Need for Tdap vaccination      in 2012   • S/P colonoscopy 11/9/2012   • Indigestion    • Urinary bladder disorder    • Unspecified urinary incontinence    • Dental disorder      upper and lower dentures   • FRIDA (obstructive sleep apnea)      states no cpap   • Heart burn 2014     pt on prevacid   • Arthritis      generalized + hands   • Myocardial infarct (CMS-HCC) 1984     pt denies states  sometimes irreg rhythm   • Angina 2014     pt denies    • Hypertension 2014     pt states well controlled on meds   • Sick sinus syndrome (CMS-HCC) 1/1/2017   • Symptomatic sinus bradycardia 1/1/2017     Past Surgical History   Procedure Laterality Date   • Tonsillectomy     • Hysterectomy, vaginal       ovaries were left   • Recovery  3/29/2013     Performed by Cath-Recovery Surgery at SURGERY SAME DAY [facility] ER ORS   • Cataract phaco with iol  9/19/2013     Performed by Sylvester Raza M.D. at SURGERY SURGICAL [facility] ORS   • Cataract phaco with iol  10/3/2013     Performed by Sylvester Raza M.D. at SURGERY [facility] ORS   • Knee arthroplasty total  12/16/2014     Performed by Jose G Trotter M.D. at SURGERY [facility] ORS   • Cardiac cath  1/1/17     Overlapping Synergy stents to 99% Circ   • Cardiac cath  1/19/17     Stents patent.     Family History   Problem Relation Age of Onset   • Diabetes Mother      mild   • Heart Disease Neg Hx    • Hypertension Neg Hx    • Cancer Father      melanoma mild     History   Smoking status   • Former Smoker -- 0.50 packs/day for 15 years   • Types: Cigarettes   • Quit date: 08/11/1975   Smokeless tobacco   • Never Used     Allergies   Allergen Reactions   • Codeine Itching and Vomiting     Rxn = years ago        Outpatient Encounter Prescriptions as of 2/9/2017   Medication Sig Dispense Refill   • atorvastatin (LIPITOR) 40 MG Tab Take 1 Tab by mouth every evening. 90 Tab 3   • clopidogrel (PLAVIX) 75 MG Tab Take 1 Tab by mouth every day. 90 Tab 3   • losartan (COZAAR) 50 MG Tab Take 1 Tab by mouth every day. 90 Tab 3   • aspirin EC (ECOTRIN) 81 MG Tablet Delayed Response Take 81 mg by mouth every evening. 30 Tab 0   • lansoprazole (PREVACID) 30 MG CAPSULE DELAYED RELEASE TAKE ONE CAPSULE BY MOUTH EVERY DAY 90 Cap 3   • Cholecalciferol (VITAMIN D) 2000 UNIT TABS Take 1 Tab by mouth every day.     • nitroglycerin (NITROSTAT) 0.4 MG SL Tab Place 1 Tab under tongue as needed  "for Chest Pain (up to 3 doses (if SBP greater than 90 mmHg)). 25 Tab 3   • [DISCONTINUED] atorvastatin (LIPITOR) 40 MG Tab Take 1 Tab by mouth every bedtime. 30 Tab 3   • [DISCONTINUED] clopidogrel (PLAVIX) 75 MG Tab Take 1 Tab by mouth every day. 30 Tab 11   • [DISCONTINUED] SLO-NIACIN PO Take 1 Tab by mouth every evening. OTC     • [DISCONTINUED] losartan (COZAAR) 100 MG Tab Take 0.5 Tabs by mouth every day. 90 Tab 3     No facility-administered encounter medications on file as of 2/9/2017.     Review of Systems   Constitutional: Positive for malaise/fatigue.   Respiratory: Negative for cough and shortness of breath.    Cardiovascular: Negative for chest pain, palpitations, orthopnea, claudication, leg swelling and PND.   Gastrointestinal: Positive for heartburn. Negative for abdominal pain.   Musculoskeletal: Positive for joint pain (right shoulder pain, rotator cuff.). Negative for myalgias.   Neurological: Negative for dizziness and weakness.        Objective:   /72 mmHg  Pulse 98  Ht 1.626 m (5' 4.02\")  Wt 94.348 kg (208 lb)  BMI 35.69 kg/m2  SpO2 96%    Physical Exam   Constitutional: She is oriented to person, place, and time. She appears well-developed and well-nourished.   HENT:   Head: Normocephalic.   Eyes: Conjunctivae are normal.   Neck: No JVD present. No thyromegaly present.   Cardiovascular: Normal rate, regular rhythm, S1 normal, S2 normal and normal heart sounds.  Exam reveals no gallop, no S3, no S4 and no friction rub.    No murmur heard.  Pulmonary/Chest: Effort normal and breath sounds normal. No respiratory distress. She has no wheezes. She has no rales.   Abdominal: Soft. Bowel sounds are normal. She exhibits no distension. There is no tenderness.   Musculoskeletal: She exhibits no edema.   Neurological: She is alert and oriented to person, place, and time.   Skin: Skin is warm and dry.   Psychiatric: She has a normal mood and affect.     Results for ORGER NELUZ MARINAPHILOMENA WILSONJONNATHAN (MRN " [id number]) as of 2/9/2017 15:34   Ref. Range 1/20/2017 02:40   Sodium Latest Ref Range: 135-145 mmol/L 138   Potassium Latest Ref Range: 3.6-5.5 mmol/L 3.6   Chloride Latest Ref Range:  mmol/L 104   Co2 Latest Ref Range: 20-33 mmol/L 25   Anion Gap Latest Ref Range: 0.0-11.9  9.0   Glucose Latest Ref Range: 65-99 mg/dL 106 (H)   Bun Latest Ref Range: 8-22 mg/dL 16   Creatinine Latest Ref Range: 0.50-1.40 mg/dL 0.82   GFR If  Latest Ref Range: >60 mL/min/1.73 m 2 >60   GFR If Non  Latest Ref Range: >60 mL/min/1.73 m 2 >60   Calcium Latest Ref Range: 8.5-10.5 mg/dL 9.8   Cholesterol,Tot Latest Ref Range: 100-199 mg/dL 120   Triglycerides Latest Ref Range: 0-149 mg/dL 92   HDL Latest Ref Range: >=40 mg/dL 64   LDL Latest Ref Range: <100 mg/dL 38     January 2, 2017: Transthoracic Echo Report  Left ventricular systolic function is low normal, EF 50%.  Mild concentric left ventricular hypertrophy.  Thickened mitral valve leaflets.  Mild mitral regurgitation.  Aortic sclerosis without stenosis.  No prior study is available for comparison.    Today: EKG shows sinus rhythm. Initially her heart rate was running in the 100s but came down to the mid 80s when the EKG was done. She is not in atrial fibrillation or flutter.    Assessment:     1. NSTEMI (non-ST elevated myocardial infarction) (CMS-[facility])  [facility] EKG (Clinic Performed)   2. Coronary artery disease involving native coronary artery of native heart without angina pectoris  clopidogrel (PLAVIX) 75 MG Tab   3. Mixed hyperlipidemia  atorvastatin (LIPITOR) 40 MG Tab    COMP METABOLIC PANEL    LIPID PROFILE   4. Essential hypertension, benign  losartan (COZAAR) 50 MG Tab   5. FRIDA (obstructive sleep apnea)         Medical Decision Making:  Today's Assessment / Status / Plan:     Non-ST elevation MI: No recurrent chest discomfort since her repeat catheterization.    Coronary artery disease: She has patent stents and her circumflex. She  will be on Plavix for at least a year. She will continue aspirin. If she were to have any exertional anginal symptoms she would let us know or activate EMS.    Hyperlipidemia: She was on a statin prior to her hospitalization. We'll check lipids and metabolic panel in 3 months.    Hypertension: Her blood pressure sectioned towards the low side in the office today. She is cutting losartan 100 mg tablets in half. I will change her to losartan 25 mg tablets since her blood pressure is low.    Sleep apnea: Currently not treated as she is not using her CPAP. She'll be in touch with her primary care for follow-up and probable refitting of her CPAP mask.    She will follow-up in 3 months with Dr. Sanchez with lab work prior. Sooner if problems.    Collaborating Provider: Dr. Vallecillo.

## 2017-02-09 NOTE — Clinical Note
[facility]   [address], [facility] [address]  [place] [address]  Phone: [phone]  Fax: [phone]              Griselda Farmer  1945    Encounter Date: 2/9/2017    KERI Goode          PROGRESS NOTE:  Subjective:   Griselda Farmer is a 72 y.o. female who presents today for hospital follow-up. She presented to the hospital with chest pain on January 1 and was found to have a non-ST elevation MI. She underwent cardiac catheterization received 2 overlapping stents to her circumflex. She was discharged home without any complications. She returned to the hospital on January 19 complaining of chest pain that she described as heaviness and shortness of breath. She underwent repeat cardiac catheterization which showed that her stents were patent.    She feels that her chest pain on January 19 may have been related to acid reflux. She states that she suffers from reflux and often will have acid in the back for her throat. She does not think that her PPI is working well enough for her. She's not had any chest discomfort since being out of the hospital again.    She denies any shortness of breath. She does have some pain in her right shoulder due to a rotator cuff tear. She complains of feeling fatigued and wonders how long this will last. She denies any shortness of breath, palpitations or ankle edema.    She find it is difficult to lose weight. She has sleep apnea and discontinued her CPAP a few months ago due to discomfort from the mask.    Past Medical History   Diagnosis Date   • Insomnia    • Flushing reaction      has had full work up with cardiology, would awake with symptoms at night   • Hyperlipidemia 10/20/2010   • GERD (gastroesophageal reflux disease)    • Liver cyst      has been seen by GI, ultrasound 9/12   • Need for Tdap vaccination      in 2012   • S/P colonoscopy 11/9/2012   • Indigestion    • Urinary bladder disorder    •  Unspecified urinary incontinence    • Dental disorder      upper and lower dentures   • FRIDA (obstructive sleep apnea)      states no cpap   • Heart burn 2014     pt on prevacid   • Arthritis      generalized + hands   • Myocardial infarct (CMS-HCC) 1984     pt denies states sometimes irreg rhythm   • Angina 2014     pt denies    • Hypertension 2014     pt states well controlled on meds   • Sick sinus syndrome (CMS-HCC) 1/1/2017   • Symptomatic sinus bradycardia 1/1/2017     Past Surgical History   Procedure Laterality Date   • Tonsillectomy     • Hysterectomy, vaginal       ovaries were left   • Recovery  3/29/2013     Performed by Cath-Recovery Surgery at SURGERY SAME DAY [facility] ORS   • Cataract phaco with iol  9/19/2013     Performed by Sylvester Raza M.D. at SURGERY [facility] ORS   • Cataract phaco with iol  10/3/2013     Performed by Sylvester Raza M.D. at SURGERY [facility] ORS   • Knee arthroplasty total  12/16/2014     Performed by Jose G Trotter M.D. at SURGERY [facility] ORS   • Cardiac cath  1/1/17     Overlapping Synergy stents to 99% Circ   • Cardiac cath  1/19/17     Stents patent.     Family History   Problem Relation Age of Onset   • Diabetes Mother      mild   • Heart Disease Neg Hx    • Hypertension Neg Hx    • Cancer Father      melanoma mild     History   Smoking status   • Former Smoker -- 0.50 packs/day for 15 years   • Types: Cigarettes   • Quit date: 08/11/1975   Smokeless tobacco   • Never Used     Allergies   Allergen Reactions   • Codeine Itching and Vomiting     Rxn = years ago        Outpatient Encounter Prescriptions as of 2/9/2017   Medication Sig Dispense Refill   • atorvastatin (LIPITOR) 40 MG Tab Take 1 Tab by mouth every evening. 90 Tab 3   • clopidogrel (PLAVIX) 75 MG Tab Take 1 Tab by mouth every day. 90 Tab 3   • losartan (COZAAR) 50 MG Tab Take 1 Tab by mouth every day. 90 Tab 3   • aspirin EC (ECOTRIN) 81 MG Tablet Delayed Response Take 81 mg by mouth every  "evening. 30 Tab 0   • lansoprazole (PREVACID) 30 MG CAPSULE DELAYED RELEASE TAKE ONE CAPSULE BY MOUTH EVERY DAY 90 Cap 3   • Cholecalciferol (VITAMIN D) 2000 UNIT TABS Take 1 Tab by mouth every day.     • nitroglycerin (NITROSTAT) 0.4 MG SL Tab Place 1 Tab under tongue as needed for Chest Pain (up to 3 doses (if SBP greater than 90 mmHg)). 25 Tab 3   • [DISCONTINUED] atorvastatin (LIPITOR) 40 MG Tab Take 1 Tab by mouth every bedtime. 30 Tab 3   • [DISCONTINUED] clopidogrel (PLAVIX) 75 MG Tab Take 1 Tab by mouth every day. 30 Tab 11   • [DISCONTINUED] SLO-NIACIN PO Take 1 Tab by mouth every evening. OTC     • [DISCONTINUED] losartan (COZAAR) 100 MG Tab Take 0.5 Tabs by mouth every day. 90 Tab 3     No facility-administered encounter medications on file as of 2/9/2017.     Review of Systems   Constitutional: Positive for malaise/fatigue.   Respiratory: Negative for cough and shortness of breath.    Cardiovascular: Negative for chest pain, palpitations, orthopnea, claudication, leg swelling and PND.   Gastrointestinal: Positive for heartburn. Negative for abdominal pain.   Musculoskeletal: Positive for joint pain (right shoulder pain, rotator cuff.). Negative for myalgias.   Neurological: Negative for dizziness and weakness.        Objective:   /72 mmHg  Pulse 98  Ht 1.626 m (5' 4.02\")  Wt 94.348 kg (208 lb)  BMI 35.69 kg/m2  SpO2 96%    Physical Exam   Constitutional: She is oriented to person, place, and time. She appears well-developed and well-nourished.   HENT:   Head: Normocephalic.   Eyes: Conjunctivae are normal.   Neck: No JVD present. No thyromegaly present.   Cardiovascular: Normal rate, regular rhythm, S1 normal, S2 normal and normal heart sounds.  Exam reveals no gallop, no S3, no S4 and no friction rub.    No murmur heard.  Pulmonary/Chest: Effort normal and breath sounds normal. No respiratory distress. She has no wheezes. She has no rales.   Abdominal: Soft. Bowel sounds are normal. She " exhibits no distension. There is no tenderness.   Musculoskeletal: She exhibits no edema.   Neurological: She is alert and oriented to person, place, and time.   Skin: Skin is warm and dry.   Psychiatric: She has a normal mood and affect.     Results for ONESIMO DURAN (MRN [id number]) as of 2/9/2017 15:34   Ref. Range 1/20/2017 02:40   Sodium Latest Ref Range: 135-145 mmol/L 138   Potassium Latest Ref Range: 3.6-5.5 mmol/L 3.6   Chloride Latest Ref Range:  mmol/L 104   Co2 Latest Ref Range: 20-33 mmol/L 25   Anion Gap Latest Ref Range: 0.0-11.9  9.0   Glucose Latest Ref Range: 65-99 mg/dL 106 (H)   Bun Latest Ref Range: 8-22 mg/dL 16   Creatinine Latest Ref Range: 0.50-1.40 mg/dL 0.82   GFR If  Latest Ref Range: >60 mL/min/1.73 m 2 >60   GFR If Non  Latest Ref Range: >60 mL/min/1.73 m 2 >60   Calcium Latest Ref Range: 8.5-10.5 mg/dL 9.8   Cholesterol,Tot Latest Ref Range: 100-199 mg/dL 120   Triglycerides Latest Ref Range: 0-149 mg/dL 92   HDL Latest Ref Range: >=40 mg/dL 64   LDL Latest Ref Range: <100 mg/dL 38     January 2, 2017: Transthoracic Echo Report  Left ventricular systolic function is low normal, EF 50%.  Mild concentric left ventricular hypertrophy.  Thickened mitral valve leaflets.  Mild mitral regurgitation.  Aortic sclerosis without stenosis.  No prior study is available for comparison.      Assessment:     1. NSTEMI (non-ST elevated myocardial infarction) (CMS-[facility])  [facility] EKG (Clinic Performed)   2. Coronary artery disease involving native coronary artery of native heart without angina pectoris  clopidogrel (PLAVIX) 75 MG Tab   3. Mixed hyperlipidemia  atorvastatin (LIPITOR) 40 MG Tab    COMP METABOLIC PANEL    LIPID PROFILE   4. Essential hypertension, benign  losartan (COZAAR) 50 MG Tab   5. FRIDA (obstructive sleep apnea)         Medical Decision Making:  Today's Assessment / Status / Plan:     Non-ST elevation MI: No recurrent chest discomfort  since her repeat catheterization.    Coronary artery disease: She has patent stents and her circumflex. She will be on Plavix for at least a year. She will continue aspirin. If she were to have any exertional anginal symptoms she would let us know or activate EMS.    Hyperlipidemia: She was on a statin prior to her hospitalization. We'll check lipids and metabolic panel in 3 months.    Hypertension: Her blood pressure sectioned towards the low side in the office today. She is cutting losartan 100 mg tablets in half. I will change her to losartan 25 mg tablets since her blood pressure is low.    Sleep apnea: Currently not treated as she is not using her CPAP. She'll be in touch with her primary care for follow-up and probable refitting of her CPAP mask.    She will follow-up in 3 months with Dr. Sanchez with lab work prior. Sooner if problems.    Collaborating Provider: Dr. Vallecillo.        No Recipients

## 2017-02-10 ENCOUNTER — TELEPHONE (OUTPATIENT)
Dept: CARDIOLOGY | Facility: MEDICAL CENTER | Age: 72
End: 2017-02-10

## 2017-02-10 NOTE — TELEPHONE ENCOUNTER
LORA for patient with this information.  I will call her tomorrow to make sure she is on the correct dose.

## 2017-02-10 NOTE — TELEPHONE ENCOUNTER
----- Message from KERI Goode sent at 2/9/2017  5:00 PM PST -----  Regarding: losartan dose  Isha, please call this patient until her  that I want her on losartan 25 mg since her blood pressure was low. I inadvertently ordered the losartan 50 mg tablets. I've canceled the order for the 50 mg and set a prescription for losartan 25 mg to the pharmacy with a note that this is the dose that I want.

## 2017-02-16 ENCOUNTER — OFFICE VISIT (OUTPATIENT)
Dept: MEDICAL GROUP | Facility: PHYSICIAN GROUP | Age: 72
End: 2017-02-16
Payer: MEDICARE

## 2017-02-16 VITALS
HEART RATE: 66 BPM | OXYGEN SATURATION: 96 % | DIASTOLIC BLOOD PRESSURE: 74 MMHG | SYSTOLIC BLOOD PRESSURE: 110 MMHG | BODY MASS INDEX: 35.17 KG/M2 | HEIGHT: 64 IN | WEIGHT: 206 LBS | TEMPERATURE: 98.2 F | RESPIRATION RATE: 16 BRPM

## 2017-02-16 DIAGNOSIS — E55.9 VITAMIN D DEFICIENCY DISEASE: ICD-10-CM

## 2017-02-16 DIAGNOSIS — K21.00 GASTROESOPHAGEAL REFLUX DISEASE WITH ESOPHAGITIS: ICD-10-CM

## 2017-02-16 DIAGNOSIS — M75.111 INCOMPLETE TEAR OF RIGHT ROTATOR CUFF: ICD-10-CM

## 2017-02-16 DIAGNOSIS — R73.01 ELEVATED FASTING GLUCOSE: ICD-10-CM

## 2017-02-16 DIAGNOSIS — I21.4 NSTEMI (NON-ST ELEVATED MYOCARDIAL INFARCTION) (HCC): ICD-10-CM

## 2017-02-16 PROCEDURE — G8598 ASA/ANTIPLAT THER USED: HCPCS | Performed by: INTERNAL MEDICINE

## 2017-02-16 PROCEDURE — G8432 DEP SCR NOT DOC, RNG: HCPCS | Performed by: INTERNAL MEDICINE

## 2017-02-16 PROCEDURE — G8482 FLU IMMUNIZE ORDER/ADMIN: HCPCS | Performed by: INTERNAL MEDICINE

## 2017-02-16 PROCEDURE — 1101F PT FALLS ASSESS-DOCD LE1/YR: CPT | Performed by: INTERNAL MEDICINE

## 2017-02-16 PROCEDURE — 4040F PNEUMOC VAC/ADMIN/RCVD: CPT | Performed by: INTERNAL MEDICINE

## 2017-02-16 PROCEDURE — 1111F DSCHRG MED/CURRENT MED MERGE: CPT | Performed by: INTERNAL MEDICINE

## 2017-02-16 PROCEDURE — 1036F TOBACCO NON-USER: CPT | Performed by: INTERNAL MEDICINE

## 2017-02-16 PROCEDURE — 3017F COLORECTAL CA SCREEN DOC REV: CPT | Mod: 1P | Performed by: INTERNAL MEDICINE

## 2017-02-16 PROCEDURE — G8419 CALC BMI OUT NRM PARAM NOF/U: HCPCS | Performed by: INTERNAL MEDICINE

## 2017-02-16 PROCEDURE — 3014F SCREEN MAMMO DOC REV: CPT | Mod: 8P | Performed by: INTERNAL MEDICINE

## 2017-02-16 PROCEDURE — 99214 OFFICE O/P EST MOD 30 MIN: CPT | Performed by: INTERNAL MEDICINE

## 2017-02-16 ASSESSMENT — PATIENT HEALTH QUESTIONNAIRE - PHQ9: CLINICAL INTERPRETATION OF PHQ2 SCORE: 1

## 2017-02-16 NOTE — ASSESSMENT & PLAN NOTE
Patient has a history of GERD initially thought that her symptoms were from when she had her heart attack. She recently has had some mildly worsening symptoms on her Prevacid. She's been using tony tea at night which has helped with symptoms.

## 2017-02-16 NOTE — ASSESSMENT & PLAN NOTE
Patient has an incomplete tear of her right rotator cuff. She's had an injection by orthopedics but she notes that using topical treatments such as Aspercreme helps almost as much. She follows up with orthopedics as needed.

## 2017-02-16 NOTE — PROGRESS NOTES
Chief Complaint   Patient presents with   • Hospital Follow-up     fv heart attack-2 stents       HISTORY OF PRESENT ILLNESS: Patient is a 72 y.o. female established patient who presents today to be seen for acute and chronic issues.    NSTEMI (non-ST elevated myocardial infarction) (CMS-HCC)  Patient is a 72-year-old female who presented to the hospital at the end of last month with a day and a half of chest pain. She initially thought her symptoms were from GERD but she was found to have NSTEMI. Patient went to the Cath Lab where she had 2 stents placed to her circumflex artery. She recently followed up with cardiology in the outpatient setting and she's been doing well. She is on aspirin, statin and losartan. She's had no new chest pain.    GERD (gastroesophageal reflux disease)  Patient has a history of GERD initially thought that her symptoms were from when she had her heart attack. She recently has had some mildly worsening symptoms on her Prevacid. She's been using ginger tea at night which has helped with symptoms.    Elevated fasting glucose  Patient has elevated fasting glucose. She's going to recheck labs this spring.    Incomplete tear of right rotator cuff  Patient has an incomplete tear of her right rotator cuff. She's had an injection by orthopedics but she notes that using topical treatments such as Aspercreme helps almost as much. She follows up with orthopedics as needed.      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   • Nonhealing nonsurgical wound 06/23/2015    • Verruca plana 06/23/2015   • Overriding toe of left foot 01/20/2015   • Primary localized osteoarthrosis, lower leg 12/16/2014   • Left knee pain 08/28/2014   • Elevated fasting glucose 07/16/2014   • Osteoarthrosis involving multiple sites 07/15/2014   • Vitamin D deficiency disease 07/15/2014   • CAD (coronary artery disease) 03/29/2013   • GERD (gastroesophageal reflux disease) 03/06/2013   • Cough due to ACE inhibitor 06/26/2012   • Liver cyst 10/17/2011   • Arthritis 11/29/2010   • Mixed hyperlipidemia 10/20/2010   • Essential hypertension, benign 08/11/2010   • Insomnia 08/11/2010   • FRIDA (obstructive sleep apnea) 08/11/2010       Allergies:Codeine    Current Outpatient Prescriptions Ordered in [facility]   Medication Sig Dispense Refill   • atorvastatin (LIPITOR) 40 MG Tab Take 1 Tab by mouth every evening. 90 Tab 3   • clopidogrel (PLAVIX) 75 MG Tab Take 1 Tab by mouth every day. 90 Tab 3   • losartan (COZAAR) 25 MG Tab Take 1 Tab by mouth every day. 90 Tab 3   • nitroglycerin (NITROSTAT) 0.4 MG SL Tab Place 1 Tab under tongue as needed for Chest Pain (up to 3 doses (if SBP greater than 90 mmHg)). 25 Tab 3   • aspirin EC (ECOTRIN) 81 MG Tablet Delayed Response Take 81 mg by mouth every evening. 30 Tab 0   • lansoprazole (PREVACID) 30 MG CAPSULE DELAYED RELEASE TAKE ONE CAPSULE BY MOUTH EVERY DAY 90 Cap 3   • Cholecalciferol (VITAMIN D) 2000 UNIT TABS Take 1 Tab by mouth every day.       No current [facility]-ordered facility-administered medications on file.       Past Medical History   Diagnosis Date   • Insomnia    • Flushing reaction      has had full work up with cardiology, would awake with symptoms at night   • Hyperlipidemia 10/20/2010   • GERD (gastroesophageal reflux disease)    • Liver cyst      has been seen by GI, ultrasound 9/12   • Need for Tdap vaccination      in 2012   • S/P colonoscopy 11/9/2012   • Indigestion    • Urinary bladder disorder    • Unspecified urinary incontinence    • Dental disorder  "     upper and lower dentures   • FRIDA (obstructive sleep apnea)      states no cpap   • Heart burn 2014     pt on prevacid   • Arthritis      generalized + hands   • Myocardial infarct (CMS-HCC)      pt denies states sometimes irreg rhythm   • Angina 2014     pt denies    • Hypertension 2014     pt states well controlled on meds   • Sick sinus syndrome (CMS-HCC) 2017   • Symptomatic sinus bradycardia 2017       Social History   Substance Use Topics   • Smoking status: Former Smoker -- 0.50 packs/day for 15 years     Types: Cigarettes     Quit date: 1975   • Smokeless tobacco: Never Used   • Alcohol Use: No       Family Status   Relation Status Death Age   • Mother  88   • Father  93     Family History   Problem Relation Age of Onset   • Diabetes Mother      mild   • Heart Disease Neg Hx    • Hypertension Neg Hx    • Cancer Father      melanoma mild       ROS:  Review of Systems   Constitutional: Negative for fever and malaise/fatigue.   HENT: Negative for congestion  Respiratory: Negative for cough  Cardiovascular: Negative for chest pain  Gastrointestinal: Positive for GERD  Musculoskeletal: Positive for episodic shoulder pain  All other systems reviewed and are negative except as in HPI.      Exam:  Blood pressure 110/74, pulse 66, temperature 36.8 °C (98.2 °F), resp. rate 16, height 1.626 m (5' 4.02\"), weight 93.441 kg (206 lb), SpO2 96 %.  General: Obese elderly female in NAD  Head is grossly normal.  Neck: Supple without JVD   Pulmonary: Clear to ausculation and percussion.  Normal effort. No rales, ronchi, or wheezing.  Cardiovascular: Regular rate and rhythm without murmur. Carotid and radial pulses are intact and equal bilaterally.  Extremities: no clubbing, cyanosis, or edema.    Hospital records reviewed   Assessment/Plan:  1. NSTEMI (non-ST elevated myocardial infarction) (CMS-HCC)      controlled, follows with cardiology   2. Gastroesophageal reflux disease with " esophagitis      Controlled, using over-the-counter treatment   3. Incomplete tear of right rotator cuff      Controlled, using topical treatment   4. Elevated fasting glucose  HEMOGLOBIN A1C    Uncontrolled, will recheck labs this spring   5. Vitamin D deficiency disease  VITAMIN D,25 HYDROXY     Please note that this dictation was created using voice recognition software. I have made every reasonable attempt to correct obvious errors, but I expect that there are errors of grammar and possibly content that I did not discover before finalizing the note.

## 2017-02-16 NOTE — MR AVS SNAPSHOT
"        Griselda Farmer   2017 11:20 AM   Office Visit   MRN: [id number]    Department:  [facility]   Dept Phone:  [phone]    Description:  Female : 1945   Provider:  Caro Peck M.D.           Reason for Visit     Hospital Follow-up fv heart attack-2 stents      Allergies as of 2017     Allergen Noted Reactions    Codeine 2010   Itching, Vomiting    Rxn = years ago         You were diagnosed with     NSTEMI (non-ST elevated myocardial infarction) (CMS-HCC)   [131547]       Gastroesophageal reflux disease with esophagitis   [8647765]       Incomplete tear of right rotator cuff   [371676]       Elevated fasting glucose   [059362]       Vitamin D deficiency disease   [675334]         Vital Signs     Blood Pressure Pulse Temperature Respirations Height Weight    110/74 mmHg 66 36.8 °C (98.2 °F) 16 1.626 m (5' 4.02\") 93.441 kg (206 lb)    Body Mass Index Oxygen Saturation Smoking Status             35.34 kg/m2 96% Former Smoker         Basic Information     Date Of Birth Sex Race Ethnicity Preferred Language    1945 Female White Non- English      Your appointments     2017  6:15 AM   Adult Draw/Collection with [facility]   [facility] (--)    [address] [place] [address] Dr. Amy [place]Z [id number] [phone]            2017 12:20 PM   FOLLOW UP with Jose G Vallecillo M.D.   [facility] (--)    [address], [facility] [address]  [place] [id number] [phone]            2017  7:40 AM   Established Patient with JACEK Lemos   [facility] ([facility]my)    [address] [facility]  [place]my [place] [address] [phone]           You will be receiving a confirmation call a few days before your appointment from our automated call confirmation system.              Problem List              ICD-10-CM Priority Class Noted - Resolved    Essential hypertension, benign I10   2010 - Present   " Insomnia G47.00   8/11/2010 - Present    FRIDA (obstructive sleep apnea) G47.33   8/11/2010 - Present    Mixed hyperlipidemia E78.2   10/20/2010 - Present    Arthritis M19.90   11/29/2010 - Present    Liver cyst K76.89   10/17/2011 - Present    Cough due to ACE inhibitor R05, T46.4X5A   6/26/2012 - Present    GERD (gastroesophageal reflux disease) K21.9   3/6/2013 - Present    CAD (coronary artery disease) I25.10   3/29/2013 - Present    Osteoarthrosis involving multiple sites M15.9   7/15/2014 - Present    Vitamin D deficiency disease E55.9   7/15/2014 - Present    Elevated fasting glucose R73.01   7/16/2014 - Present    Left knee pain M25.562   8/28/2014 - Present    Primary localized osteoarthrosis, lower leg M17.10   12/16/2014 - Present    Overriding toe of left foot M20.5X2   1/20/2015 - Present    Nonhealing nonsurgical wound T14.8   6/23/2015 - Present    Verruca plana B07.8   6/23/2015 - Present    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   4/25/2016 - Present    Right shoulder pain M25.511   6/6/2016 - Present    Incomplete tear of right rotator cuff M75.111   9/19/2016 - Present    Obesity (BMI 30-39.9) E66.9   12/7/2016 - Present    Sick sinus syndrome (CMS-HCC) I49.5 High  1/1/2017 - Present    Symptomatic sinus bradycardia R00.1   1/1/2017 - Present    NSTEMI (non-ST elevated myocardial infarction) (CMS-HCC) I21.4 High  1/2/2017 - Present    Bradycardia with less than 30 beats per minute R00.1   1/2/2017 - Present      Health Maintenance        Date Due Completion Dates    IMM DTaP/Tdap/Td Vaccine (1 - Tdap) 1/15/1962 ---    BONE DENSITY 3/23/2016 3/23/2011    MAMMOGRAM 2/23/2017 2/23/2015, 6/29/2012, 3/23/2011    COLONOSCOPY 10/9/2021 10/9/2011 (N/S)    Override on 10/9/2011: (N/S)            Current Immunizations     13-VALENT PCV PREVNAR 9/7/2015    Influenza TIV (IM) 11/7/2016, 1/28/2016, 10/1/2014, 9/23/2013, 11/9/2012  8:24 AM, 10/3/2011    Pneumococcal Vaccine (UF)Historical Data 10/17/2009     Pneumococcal polysaccharide vaccine (PPSV-23) 1/2/2017  5:43 AM    SHINGLES VACCINE 11/9/2012  8:24 AM    Tetanus Vaccine 1/1/2005      Below and/or attached are the medications your provider expects you to take. Review all of your home medications and newly ordered medications with your provider and/or pharmacist. Follow medication instructions as directed by your provider and/or pharmacist. Please keep your medication list with you and share with your provider. Update the information when medications are discontinued, doses are changed, or new medications (including over-the-counter products) are added; and carry medication information at all times in the event of emergency situations     Allergies:  CODEINE - Itching,Vomiting               Medications  Valid as of: February 16, 2017 - 11:44 AM    Generic Name Brand Name Tablet Size Instructions for use    Aspirin (Tablet Delayed Response) ECOTRIN 81 MG Take 81 mg by mouth every evening.        Atorvastatin Calcium (Tab) LIPITOR 40 MG Take 1 Tab by mouth every evening.        Cholecalciferol (Tab) vitamin D 2000 UNIT Take 1 Tab by mouth every day.        Clopidogrel Bisulfate (Tab) PLAVIX 75 MG Take 1 Tab by mouth every day.        Lansoprazole (CAPSULE DELAYED RELEASE) PREVACID 30 MG TAKE ONE CAPSULE BY MOUTH EVERY DAY        Losartan Potassium (Tab) COZAAR 25 MG Take 1 Tab by mouth every day.        Nitroglycerin (SL Tab) NITROSTAT 0.4 MG Place 1 Tab under tongue as needed for Chest Pain (up to 3 doses (if SBP greater than 90 mmHg)).        .                 Medicines prescribed today were sent to:     [facility] [address] [place], [place] [address] [address] [address]    Phone: [phone] Fax: [phone]    Open 24 Hours?: No      Medication refill instructions:       If your prescription bottle indicates you have medication refills left, it is not necessary to call your provider’s office. Please contact your  pharmacy and they will refill your medication.    If your prescription bottle indicates you do not have any refills left, you may request refills at any time through one of the following ways: The online [employer] system (except Urgent Care), by calling your provider’s office, or by asking your pharmacy to contact your provider’s office with a refill request. Medication refills are processed only during regular business hours and may not be available until the next business day. Your provider may request additional information or to have a follow-up visit with you prior to refilling your medication.   *Please Note: Medication refills are assigned a new Rx number when refilled electronically. Your pharmacy may indicate that no refills were authorized even though a new prescription for the same medication is available at the pharmacy. Please request the medicine by name with the pharmacy before contacting your provider for a refill.        Your To Do List     Future Labs/Procedures Complete By Expires    HEMOGLOBIN A1C  As directed 2/16/2018    VITAMIN D,25 HYDROXY  As directed 2/16/2018      Instructions    1. Have labs this summer and see Joleen in June.            [employer] Access Code: Activation code not generated  Current [employer] Status: Active

## 2017-02-16 NOTE — ASSESSMENT & PLAN NOTE
Patient is a 72-year-old female who presented to the hospital at the end of last month with a day and a half of chest pain. She initially thought her symptoms were from GERD but she was found to have NSTEMI. Patient went to the Cath Lab where she had 2 stents placed to her circumflex artery. She recently followed up with cardiology in the outpatient setting and she's been doing well. She is on aspirin, statin and losartan. She's had no new chest pain.

## 2017-04-12 LAB — EKG IMPRESSION: NORMAL

## 2017-05-25 DIAGNOSIS — K21.9 GASTROESOPHAGEAL REFLUX DISEASE, ESOPHAGITIS PRESENCE NOT SPECIFIED: ICD-10-CM

## 2017-05-26 RX ORDER — LANSOPRAZOLE 30 MG/1
30 CAPSULE, DELAYED RELEASE ORAL
Qty: 90 CAP | Refills: 2 | Status: SHIPPED | OUTPATIENT
Start: 2017-05-26 | End: 2018-05-15

## 2017-05-26 NOTE — TELEPHONE ENCOUNTER
Was the patient seen in the last year in this department? Yes     Does patient have an active prescription for medications requested? No     Received Request Via: Pharmacy      Pt met protocol?: Yes, OV 2/17

## 2017-06-01 ENCOUNTER — HOSPITAL ENCOUNTER (OUTPATIENT)
Dept: LAB | Facility: MEDICAL CENTER | Age: 72
End: 2017-06-01
Attending: INTERNAL MEDICINE
Payer: MEDICARE

## 2017-06-01 ENCOUNTER — HOSPITAL ENCOUNTER (OUTPATIENT)
Dept: LAB | Facility: MEDICAL CENTER | Age: 72
End: 2017-06-01
Attending: NURSE PRACTITIONER
Payer: MEDICARE

## 2017-06-01 DIAGNOSIS — R73.01 ELEVATED FASTING GLUCOSE: ICD-10-CM

## 2017-06-01 DIAGNOSIS — E55.9 VITAMIN D DEFICIENCY DISEASE: ICD-10-CM

## 2017-06-01 DIAGNOSIS — E78.2 MIXED HYPERLIPIDEMIA: ICD-10-CM

## 2017-06-01 LAB
25(OH)D3 SERPL-MCNC: 36 NG/ML (ref 30–100)
ALBUMIN SERPL BCP-MCNC: 4.1 G/DL (ref 3.2–4.9)
ALBUMIN/GLOB SERPL: 1.3 G/DL
ALP SERPL-CCNC: 101 U/L (ref 30–99)
ALT SERPL-CCNC: 25 U/L (ref 2–50)
ANION GAP SERPL CALC-SCNC: 11 MMOL/L (ref 0–11.9)
AST SERPL-CCNC: 19 U/L (ref 12–45)
BILIRUB SERPL-MCNC: 0.7 MG/DL (ref 0.1–1.5)
BUN SERPL-MCNC: 12 MG/DL (ref 8–22)
CALCIUM SERPL-MCNC: 9.8 MG/DL (ref 8.5–10.5)
CHLORIDE SERPL-SCNC: 104 MMOL/L (ref 96–112)
CHOLEST SERPL-MCNC: 139 MG/DL (ref 100–199)
CO2 SERPL-SCNC: 25 MMOL/L (ref 20–33)
CREAT SERPL-MCNC: 0.82 MG/DL (ref 0.5–1.4)
EST. AVERAGE GLUCOSE BLD GHB EST-MCNC: 108 MG/DL
GFR SERPL CREATININE-BSD FRML MDRD: >60 ML/MIN/1.73 M 2
GLOBULIN SER CALC-MCNC: 3.1 G/DL (ref 1.9–3.5)
GLUCOSE SERPL-MCNC: 102 MG/DL (ref 65–99)
HBA1C MFR BLD: 5.4 % (ref 0–5.6)
HDLC SERPL-MCNC: 63 MG/DL
LDLC SERPL CALC-MCNC: 49 MG/DL
POTASSIUM SERPL-SCNC: 3.9 MMOL/L (ref 3.6–5.5)
PROT SERPL-MCNC: 7.2 G/DL (ref 6–8.2)
SODIUM SERPL-SCNC: 140 MMOL/L (ref 135–145)
TRIGL SERPL-MCNC: 135 MG/DL (ref 0–149)

## 2017-06-01 PROCEDURE — 82306 VITAMIN D 25 HYDROXY: CPT

## 2017-06-01 PROCEDURE — 83036 HEMOGLOBIN GLYCOSYLATED A1C: CPT

## 2017-06-01 PROCEDURE — 36415 COLL VENOUS BLD VENIPUNCTURE: CPT

## 2017-06-06 ENCOUNTER — OFFICE VISIT (OUTPATIENT)
Dept: CARDIOLOGY | Facility: MEDICAL CENTER | Age: 72
End: 2017-06-06
Payer: MEDICARE

## 2017-06-06 VITALS
BODY MASS INDEX: 35.68 KG/M2 | HEART RATE: 98 BPM | WEIGHT: 209 LBS | SYSTOLIC BLOOD PRESSURE: 140 MMHG | HEIGHT: 64 IN | DIASTOLIC BLOOD PRESSURE: 90 MMHG | OXYGEN SATURATION: 95 %

## 2017-06-06 DIAGNOSIS — Z95.5 S/P CORONARY ARTERY STENT PLACEMENT: ICD-10-CM

## 2017-06-06 DIAGNOSIS — I21.4 NSTEMI (NON-ST ELEVATED MYOCARDIAL INFARCTION) (HCC): ICD-10-CM

## 2017-06-06 DIAGNOSIS — I10 ESSENTIAL HYPERTENSION, BENIGN: ICD-10-CM

## 2017-06-06 DIAGNOSIS — E78.2 MIXED HYPERLIPIDEMIA: ICD-10-CM

## 2017-06-06 DIAGNOSIS — I25.10 CORONARY ARTERY DISEASE, NON-OCCLUSIVE: ICD-10-CM

## 2017-06-06 PROCEDURE — 1101F PT FALLS ASSESS-DOCD LE1/YR: CPT | Performed by: INTERNAL MEDICINE

## 2017-06-06 PROCEDURE — 4040F PNEUMOC VAC/ADMIN/RCVD: CPT | Performed by: INTERNAL MEDICINE

## 2017-06-06 PROCEDURE — 1036F TOBACCO NON-USER: CPT | Performed by: INTERNAL MEDICINE

## 2017-06-06 PROCEDURE — 99214 OFFICE O/P EST MOD 30 MIN: CPT | Performed by: INTERNAL MEDICINE

## 2017-06-06 PROCEDURE — G8598 ASA/ANTIPLAT THER USED: HCPCS | Performed by: INTERNAL MEDICINE

## 2017-06-06 PROCEDURE — 3014F SCREEN MAMMO DOC REV: CPT | Mod: 8P | Performed by: INTERNAL MEDICINE

## 2017-06-06 PROCEDURE — G8432 DEP SCR NOT DOC, RNG: HCPCS | Performed by: INTERNAL MEDICINE

## 2017-06-06 PROCEDURE — G8419 CALC BMI OUT NRM PARAM NOF/U: HCPCS | Performed by: INTERNAL MEDICINE

## 2017-06-06 NOTE — Clinical Note
[facility]   [address], [facility] [address]  [place] [address]  Phone: [phone]  Fax: [phone]              Griselda Farmer  1945    Encounter Date: 6/6/2017    Jose G Vallecillo M.D.          PROGRESS NOTE:  Subjective:   Griselda Farmer is a 72 y.o. female who presents today for follow-up evaluation of coronary artery disease, NSTEMI, hypertension, hyperlipidemia and coronary stent implantation.    Last seen on 2/9/2017 by APN.    Since 2/9/2017 the patient's had no cardiac symptoms.  Tolerating her cardiac medications.    Medical history  NSTEMI 1/1/2017. Circumflex stents.  Hypertension.  Obstructive sleep apnea.  Hyperlipidemia.  Chest pain. Hospitalized 1/19/2017. MI ruled out. Coronary angiography demonstrated patent circumflex stents.       Past Medical History   Diagnosis Date   • Insomnia    • Flushing reaction      has had full work up with cardiology, would awake with symptoms at night   • Hyperlipidemia 10/20/2010   • GERD (gastroesophageal reflux disease)    • Liver cyst      has been seen by GI, ultrasound 9/12   • Need for Tdap vaccination      in 2012   • S/P colonoscopy 11/9/2012   • Indigestion    • Urinary bladder disorder    • Unspecified urinary incontinence    • Dental disorder      upper and lower dentures   • FRIDA (obstructive sleep apnea)      states no cpap   • Heart burn 2014     pt on prevacid   • Arthritis      generalized + hands   • Myocardial infarct (CMS-[facility]) 1984     pt denies states sometimes irreg rhythm   • Angina 2014     pt denies    • Hypertension 2014     pt states well controlled on meds   • Sick sinus syndrome (CMS-HCC) 1/1/2017   • Symptomatic sinus bradycardia 1/1/2017     Past Surgical History   Procedure Laterality Date   • Tonsillectomy     • Hysterectomy, vaginal       ovaries were left   • Recovery  3/29/2013     Performed by Cath-Recovery Surgery at [facility]   • Cataract  "phaco with iol  9/19/2013     Performed by Sylvester Raza M.D. at [facility]   • Cataract phaco with iol  10/3/2013     Performed by Sylvester Raaz M.D. at SUR[facility]   • Knee arthroplasty total  12/16/2014     Performed by Jose G Trotter M.D. at SURGERY [facility]   • Cardiac cath  1/1/17     Overlapping Synergy stents to 99% Circ   • Cardiac cath  1/19/17     Stents patent.     Family History   Problem Relation Age of Onset   • Diabetes Mother      mild   • Heart Disease Neg Hx    • Hypertension Neg Hx    • Cancer Father      melanoma mild     History   Smoking status   • Former Smoker -- 0.50 packs/day for 15 years   • Types: Cigarettes   • Quit date: 08/11/1975   Smokeless tobacco   • Never Used     Allergies   Allergen Reactions   • Codeine Itching and Vomiting     Rxn = years ago        Outpatient Encounter Prescriptions as of 6/6/2017   Medication Sig Dispense Refill   • lansoprazole (PREVACID) 30 MG CAPSULE DELAYED RELEASE Take 1 Cap by mouth every day. 90 Cap 2   • clopidogrel (PLAVIX) 75 MG Tab Take 1 Tab by mouth every day. 90 Tab 3   • losartan (COZAAR) 25 MG Tab Take 1 Tab by mouth every day. 90 Tab 3   • nitroglycerin (NITROSTAT) 0.4 MG SL Tab Place 1 Tab under tongue as needed for Chest Pain (up to 3 doses (if SBP greater than 90 mmHg)). 25 Tab 3   • aspirin EC (ECOTRIN) 81 MG Tablet Delayed Response Take 81 mg by mouth every evening. 30 Tab 0   • Cholecalciferol (VITAMIN D) 2000 UNIT TABS Take 1 Tab by mouth every day.     • atorvastatin (LIPITOR) 40 MG Tab Take 1 Tab by mouth every evening. 90 Tab 3     No facility-administered encounter medications on file as of 6/6/2017.     Review of Systems   Respiratory: Negative for cough and shortness of breath.    Cardiovascular: Negative for chest pain and palpitations.   Neurological: Negative for dizziness and loss of consciousness.        Objective:   /90 mmHg  Pulse 98  Ht 1.626 m (5' 4.02\")  Wt 94.802 kg " (209 lb)  BMI 35.86 kg/m2  SpO2 95%    Physical Exam   Constitutional: She is oriented to person, place, and time. She appears well-nourished. No distress.   HENT:   Head: Normocephalic.   Eyes: EOM are normal. No scleral icterus.   Neck: Carotid bruit is not present.   Normal jugular venous pressure.   Cardiovascular: Normal rate, regular rhythm, S1 normal, S2 normal and normal heart sounds.  Exam reveals no gallop and no friction rub.    No murmur heard.  Pulses:       Carotid pulses are 2+ on the right side, and 2+ on the left side.       Radial pulses are 2+ on the right side, and 2+ on the left side.   Pulmonary/Chest: Effort normal and breath sounds normal. She has no wheezes. She has no rhonchi. She has no rales.   Abdominal:   Protuberant.   Musculoskeletal: She exhibits no edema.   Neurological: She is alert and oriented to person, place, and time.   Skin: Skin is warm and dry.   Psychiatric: She has a normal mood and affect. Her behavior is normal.     01/02/2017 ECHOCARDIOGRAM  Left ventricular systolic function is low normal, EF 50%.  Mild concentric left ventricular hypertrophy.  Thickened mitral valve leaflets.  Mild mitral regurgitation.  Aortic sclerosis without stenosis.    DATE OF SERVICE:  01/01/2017  PROCEDURE:  Cardiac catheterization with Percutaneous Coronary Intervention.  A. Selective coronary angiography.  B. Coronary stent implantation of the distal dominant circumflex artery with    overlapping 2.75x20 mm and 3.0x12 mm drug-eluting Synergy stents.  C. Right radial artery approach.    DATE OF SERVICE:  01/19/2017  PROCEDURE:  Cardiac catheterization.  A.  Left heart catheterization.  B.  Left ventriculography.  C.  Selective coronary arteriography.  D.  Right radial artery approach.  Ejection fraction appears to be greater than 65%.  CONCLUSION:  1.  EF 65% with mild inferior wall motion abnormalities.  2.  Patent distal dominant circumflex artery stents.    Assessment:     1. Coronary  artery disease, non-occlusive     2. NSTEMI (non-ST elevated myocardial infarction) (CMS-[facility])     3. Essential hypertension, benign     4. Mixed hyperlipidemia     5. S/P coronary artery stent placement         Medical Decision Making:  Today's Assessment / Status / Plan:     CAD. Clinically stable.    NSTEMI. 1/2017.    Hypertension. Borderline. Generally controlled as an outpatient.    Hyperlipidemia. On atorvastatin.    Coronary stent implantation. 1/1/2017. Circumflex artery.    Plan  Continue current therapy.  Follow-up 6 months.      Joleen Sherman A.P.R.N.  [address] [facility] [address]  VIA In Basket

## 2017-06-06 NOTE — MR AVS SNAPSHOT
"        Griselda Farmer   2017 12:15 PM   Office Visit   MRN: [id number]    Department:  [facility]   Dept Phone:  [phone]    Description:  Female : 1945   Provider:  Jose G Vallecillo M.D.           Reason for Visit     Follow-Up           Allergies as of 2017     Allergen Noted Reactions    Codeine 2010   Itching, Vomiting    Rxn = years ago         Vital Signs     Blood Pressure Pulse Height Weight Body Mass Index Oxygen Saturation    140/90 mmHg 98 1.626 m (5' 4.02\") 94.802 kg (209 lb) 35.86 kg/m2 95%    Smoking Status                   Former Smoker           Basic Information     Date Of Birth Sex Race Ethnicity Preferred Language    1945 Female White Non- English      Your appointments     2017  7:40 AM   Established Patient with JACEK Lemos   [facility] [address] [address] [phone]           You will be receiving a confirmation call a few days before your appointment from our automated call confirmation system.            Dec 06, 2017 10:45 AM   FOLLOW UP with Jose G Vallecillo M.D.   [facility] (--)    [address] [place] [address] [address]  [facility] [address] [phone]              Problem List              ICD-10-CM Priority Class Noted - Resolved    Essential hypertension, benign I10   2010 - Present    Insomnia G47.00   2010 - Present    FRIDA (obstructive sleep apnea) G47.33   2010 - Present    Mixed hyperlipidemia E78.2   10/20/2010 - Present    Arthritis M19.90   2010 - Present    Liver cyst K76.89   10/17/2011 - Present    Cough due to ACE inhibitor R05, T46.4X5A   2012 - Present    GERD (gastroesophageal reflux disease) K21.9   3/6/2013 - Present    CAD (coronary artery disease) I25.10   3/29/2013 - Present    Osteoarthrosis involving multiple sites M15.9   7/15/2014 - Present    Vitamin D deficiency " disease E55.9   7/15/2014 - Present    Elevated fasting glucose R73.01   7/16/2014 - Present    Left knee pain M25.562   8/28/2014 - Present    Primary localized osteoarthrosis, lower leg M17.10   12/16/2014 - Present    Overriding toe of left foot M20.5X2   1/20/2015 - Present    Nonhealing nonsurgical wound T14.8   6/23/2015 - Present    Verruca plana B07.8   6/23/2015 - Present    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   4/25/2016 - Present    Right shoulder pain M25.511   6/6/2016 - Present    Incomplete tear of right rotator cuff M75.111   9/19/2016 - Present    Obesity (BMI 30-39.9) E66.9   12/7/2016 - Present    Sick sinus syndrome (CMS-HCC) I49.5 High  1/1/2017 - Present    Symptomatic sinus bradycardia R00.1   1/1/2017 - Present    NSTEMI (non-ST elevated myocardial infarction) (CMS-HCC) I21.4 High  1/2/2017 - Present    Bradycardia with less than 30 beats per minute R00.1   1/2/2017 - Present      Health Maintenance        Date Due Completion Dates    IMM DTaP/Tdap/Td Vaccine (1 - Tdap) 1/15/1962 ---    BONE DENSITY 3/23/2016 3/23/2011    MAMMOGRAM 2/23/2017 2/23/2015, 6/29/2012, 3/23/2011    COLONOSCOPY 10/9/2021 10/9/2011 (N/S)    Override on 10/9/2011: (N/S)            Current Immunizations     13-VALENT PCV PREVNAR 9/7/2015    Influenza TIV (IM) 11/7/2016, 1/28/2016, 10/1/2014, 9/23/2013, 11/9/2012  8:24 AM, 10/3/2011    Pneumococcal Vaccine (UF)Historical Data 10/17/2009    Pneumococcal polysaccharide vaccine (PPSV-23) 1/2/2017  5:43 AM    SHINGLES VACCINE 11/9/2012  8:24 AM    Tetanus Vaccine 1/1/2005      Below and/or attached are the medications your provider expects you to take. Review all of your home medications and newly ordered medications with your provider and/or pharmacist. Follow medication instructions as directed by your provider and/or pharmacist. Please keep your medication list with you and share with your provider. Update the information when medications are discontinued, doses are  changed, or new medications (including over-the-counter products) are added; and carry medication information at all times in the event of emergency situations     Allergies:  CODEINE - Itching,Vomiting               Medications  Valid as of: June 06, 2017 - 12:54 PM    Generic Name Brand Name Tablet Size Instructions for use    Aspirin (Tablet Delayed Response) ECOTRIN 81 MG Take 81 mg by mouth every evening.        Atorvastatin Calcium (Tab) LIPITOR 40 MG Take 1 Tab by mouth every evening.        Cholecalciferol (Tab) vitamin D 2000 UNIT Take 1 Tab by mouth every day.        Clopidogrel Bisulfate (Tab) PLAVIX 75 MG Take 1 Tab by mouth every day.        Lansoprazole (CAPSULE DELAYED RELEASE) PREVACID 30 MG Take 1 Cap by mouth every day.        Losartan Potassium (Tab) COZAAR 25 MG Take 1 Tab by mouth every day.        Nitroglycerin (SL Tab) NITROSTAT 0.4 MG Place 1 Tab under tongue as needed for Chest Pain (up to 3 doses (if SBP greater than 90 mmHg)).        .                 Medicines prescribed today were sent to:     [facility] [address] [place] [address]    Phone: [phone] Fax: [phone]    Open 24 Hours?: No      Medication refill instructions:       If your prescription bottle indicates you have medication refills left, it is not necessary to call your provider’s office. Please contact your pharmacy and they will refill your medication.    If your prescription bottle indicates you do not have any refills left, you may request refills at any time through one of the following ways: The online [employer] system (except Urgent Care), by calling your provider’s office, or by asking your pharmacy to contact your provider’s office with a refill request. Medication refills are processed only during regular business hours and may not be available until the next business day. Your provider may request additional information or to have a  follow-up visit with you prior to refilling your medication.   *Please Note: Medication refills are assigned a new Rx number when refilled electronically. Your pharmacy may indicate that no refills were authorized even though a new prescription for the same medication is available at the pharmacy. Please request the medicine by name with the pharmacy before contacting your provider for a refill.           [employer] Access Code: Activation code not generated  Current [employer] Status: Active

## 2017-06-08 ENCOUNTER — OFFICE VISIT (OUTPATIENT)
Dept: MEDICAL GROUP | Facility: PHYSICIAN GROUP | Age: 72
End: 2017-06-08
Payer: MEDICARE

## 2017-06-08 VITALS
DIASTOLIC BLOOD PRESSURE: 72 MMHG | SYSTOLIC BLOOD PRESSURE: 136 MMHG | HEIGHT: 64 IN | WEIGHT: 209 LBS | OXYGEN SATURATION: 94 % | HEART RATE: 78 BPM | TEMPERATURE: 96.6 F | BODY MASS INDEX: 35.68 KG/M2 | RESPIRATION RATE: 16 BRPM

## 2017-06-08 DIAGNOSIS — Z00.00 HEALTHCARE MAINTENANCE: ICD-10-CM

## 2017-06-08 DIAGNOSIS — I10 ESSENTIAL HYPERTENSION, BENIGN: ICD-10-CM

## 2017-06-08 DIAGNOSIS — E78.2 MIXED HYPERLIPIDEMIA: ICD-10-CM

## 2017-06-08 DIAGNOSIS — R73.01 ELEVATED FASTING GLUCOSE: ICD-10-CM

## 2017-06-08 DIAGNOSIS — E55.9 VITAMIN D DEFICIENCY DISEASE: ICD-10-CM

## 2017-06-08 DIAGNOSIS — Z23 NEED FOR VACCINATION: ICD-10-CM

## 2017-06-08 DIAGNOSIS — L60.3 ONYCHORRHEXIS: ICD-10-CM

## 2017-06-08 DIAGNOSIS — K21.00 GASTROESOPHAGEAL REFLUX DISEASE WITH ESOPHAGITIS: ICD-10-CM

## 2017-06-08 PROBLEM — Z95.5 S/P CORONARY ARTERY STENT PLACEMENT: Status: ACTIVE | Noted: 2017-06-08

## 2017-06-08 PROBLEM — I25.10 CORONARY ARTERY DISEASE, NON-OCCLUSIVE: Status: ACTIVE | Noted: 2017-06-08

## 2017-06-08 PROCEDURE — 1101F PT FALLS ASSESS-DOCD LE1/YR: CPT | Performed by: NURSE PRACTITIONER

## 2017-06-08 PROCEDURE — 99214 OFFICE O/P EST MOD 30 MIN: CPT | Mod: 25 | Performed by: NURSE PRACTITIONER

## 2017-06-08 PROCEDURE — 3014F SCREEN MAMMO DOC REV: CPT | Mod: 8P | Performed by: NURSE PRACTITIONER

## 2017-06-08 PROCEDURE — 90715 TDAP VACCINE 7 YRS/> IM: CPT | Performed by: NURSE PRACTITIONER

## 2017-06-08 PROCEDURE — G8598 ASA/ANTIPLAT THER USED: HCPCS | Performed by: NURSE PRACTITIONER

## 2017-06-08 PROCEDURE — 4040F PNEUMOC VAC/ADMIN/RCVD: CPT | Performed by: NURSE PRACTITIONER

## 2017-06-08 PROCEDURE — G8419 CALC BMI OUT NRM PARAM NOF/U: HCPCS | Performed by: NURSE PRACTITIONER

## 2017-06-08 PROCEDURE — 90471 IMMUNIZATION ADMIN: CPT | Performed by: NURSE PRACTITIONER

## 2017-06-08 PROCEDURE — G8432 DEP SCR NOT DOC, RNG: HCPCS | Performed by: NURSE PRACTITIONER

## 2017-06-08 PROCEDURE — 1036F TOBACCO NON-USER: CPT | Performed by: NURSE PRACTITIONER

## 2017-06-08 ASSESSMENT — ENCOUNTER SYMPTOMS
PALPITATIONS: 0
SHORTNESS OF BREATH: 0
LOSS OF CONSCIOUSNESS: 0
COUGH: 0
DIZZINESS: 0

## 2017-06-08 ASSESSMENT — PAIN SCALES - GENERAL: PAINLEVEL: NO PAIN

## 2017-06-08 NOTE — ASSESSMENT & PLAN NOTE
Patient has had elevated fasting glucose in the past, most recent A1c was reviewed and normal at 5.4%, we discussed diet and exercise, we will continue to monitor this.

## 2017-06-08 NOTE — ASSESSMENT & PLAN NOTE
Patient reports that for years she has dealt with vertical ridges in her nails and brittle nails. She tells me that she has a family history of iron deficiency and wonders if this is related. She has no nail color changes, no skin changes, no peeling of the nails. I recommend she continue to monitor, consider a MTV and biotic supplement, we will consider labs if this is something that continues to be bothersome for her.

## 2017-06-08 NOTE — MR AVS SNAPSHOT
"        Griselda Farmer   2017 7:40 AM   Office Visit   MRN: [id number]    Department:  [facility]   Dept Phone:  [phone]    Description:  Female : 1945   Provider:  JACEK Lemos           Reason for Visit     Results labs done on     Immunizations TDap      Allergies as of 2017     Allergen Noted Reactions    Codeine 2010   Itching, Vomiting    Rxn = years ago         You were diagnosed with     Need for vaccination   [267031]       Essential hypertension, benign   [401.1.ICD-9-CM]       Mixed hyperlipidemia   [272.2.ICD-9-CM]       Vitamin D deficiency disease   [905104]       Elevated fasting glucose   [730964]       Healthcare maintenance   [452290]         Vital Signs     Blood Pressure Pulse Temperature Respirations Height Weight    136/72 mmHg 78 35.9 °C (96.6 °F) 16 1.638 m (5' 4.49\") 94.802 kg (209 lb)    Body Mass Index Oxygen Saturation Smoking Status             35.33 kg/m2 94% Former Smoker         Basic Information     Date Of Birth Sex Race Ethnicity Preferred Language    1945 Female White Non- English      Your appointments     Dec 06, 2017 10:45 AM   FOLLOW UP with Jose G Vallecillo M.D.   [facility] (--)    [address] [place] [id number] [phone]            Dec 13, 2017  8:00 AM   NEW TO YOU with Anna Miranda, APRIL.P.RTRENTON   [address] [address] [phone]              Problem List              ICD-10-CM Priority Class Noted - Resolved    Essential hypertension, benign I10   2010 - Present    Insomnia G47.00   2010 - Present    FRIDA (obstructive sleep apnea) G47.33   2010 - Present    Mixed hyperlipidemia E78.2   10/20/2010 - Present    Arthritis M19.90   2010 - Present    Liver cyst K76.89   10/17/2011 - Present    Cough due to ACE inhibitor R05, T46.4X5A   2012 - Present "    GERD (gastroesophageal reflux disease) K21.9   3/6/2013 - Present    CAD (coronary artery disease) I25.10   3/29/2013 - Present    Osteoarthrosis involving multiple sites M15.9   7/15/2014 - Present    Vitamin D deficiency disease E55.9   7/15/2014 - Present    Elevated fasting glucose R73.01   7/16/2014 - Present    Left knee pain M25.562   8/28/2014 - Present    Primary localized osteoarthrosis, lower leg M17.10   12/16/2014 - Present    Overriding toe of left foot M20.5X2   1/20/2015 - Present    Nonhealing nonsurgical wound T14.8   6/23/2015 - Present    Verruca plana B07.8   6/23/2015 - Present    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   4/25/2016 - Present    Right shoulder pain M25.511   6/6/2016 - Present    Incomplete tear of right rotator cuff M75.111   9/19/2016 - Present    Obesity (BMI 30-39.9) E66.9   12/7/2016 - Present    Sick sinus syndrome (CMS-HCC) I49.5 High  1/1/2017 - Present    Symptomatic sinus bradycardia R00.1   1/1/2017 - Present    NSTEMI (non-ST elevated myocardial infarction) (CMS-HCC) I21.4 High  1/2/2017 - Present    Bradycardia with less than 30 beats per minute R00.1   1/2/2017 - Present    Coronary artery disease, non-occlusive I25.10   6/8/2017 - Present    S/P coronary artery stent placement Z95.5   6/8/2017 - Present    Healthcare maintenance Z00.00   6/8/2017 - Present      Health Maintenance        Date Due Completion Dates    IMM DTaP/Tdap/Td Vaccine (1 - Tdap) 1/15/1962 ---    BONE DENSITY 3/23/2016 3/23/2011    MAMMOGRAM 2/23/2017 2/23/2015, 6/29/2012, 3/23/2011    COLONOSCOPY 10/9/2021 10/9/2011 (N/S)    Override on 10/9/2011: (N/S)            Current Immunizations     13-VALENT PCV PREVNAR 9/7/2015    Influenza TIV (IM) 11/7/2016, 1/28/2016, 10/1/2014, 9/23/2013, 11/9/2012  8:24 AM, 10/3/2011    Pneumococcal Vaccine (UF)Historical Data 10/17/2009    Pneumococcal polysaccharide vaccine (PPSV-23) 1/2/2017  5:43 AM    SHINGLES VACCINE 11/9/2012  8:24 AM    Tdap Vaccine   Incomplete    Tetanus Vaccine 1/1/2005      Below and/or attached are the medications your provider expects you to take. Review all of your home medications and newly ordered medications with your provider and/or pharmacist. Follow medication instructions as directed by your provider and/or pharmacist. Please keep your medication list with you and share with your provider. Update the information when medications are discontinued, doses are changed, or new medications (including over-the-counter products) are added; and carry medication information at all times in the event of emergency situations     Allergies:  CODEINE - Itching,Vomiting               Medications  Valid as of: June 08, 2017 -  8:03 AM    Generic Name Brand Name Tablet Size Instructions for use    Aspirin (Tablet Delayed Response) ECOTRIN 81 MG Take 81 mg by mouth every evening.        Atorvastatin Calcium (Tab) LIPITOR 40 MG Take 1 Tab by mouth every evening.        Cholecalciferol (Tab) vitamin D 2000 UNIT Take 1 Tab by mouth every day.        Clopidogrel Bisulfate (Tab) PLAVIX 75 MG Take 1 Tab by mouth every day.        Lansoprazole (CAPSULE DELAYED RELEASE) PREVACID 30 MG Take 1 Cap by mouth every day.        Losartan Potassium (Tab) COZAAR 25 MG Take 1 Tab by mouth every day.        Nitroglycerin (SL Tab) NITROSTAT 0.4 MG Place 1 Tab under tongue as needed for Chest Pain (up to 3 doses (if SBP greater than 90 mmHg)).        .                 Medicines prescribed today were sent to:     [facility] [address] [place] [address] [address] [facility]    [address] [facility] [address]    Phone: [phone] Fax: [phone]    Open 24 Hours?: No      Medication refill instructions:       If your prescription bottle indicates you have medication refills left, it is not necessary to call your provider’s office. Please contact your pharmacy and they will refill your medication.    If your prescription bottle indicates you do not have any  refills left, you may request refills at any time through one of the following ways: The online [employer] system (except Urgent Care), by calling your provider’s office, or by asking your pharmacy to contact your provider’s office with a refill request. Medication refills are processed only during regular business hours and may not be available until the next business day. Your provider may request additional information or to have a follow-up visit with you prior to refilling your medication.   *Please Note: Medication refills are assigned a new Rx number when refilled electronically. Your pharmacy may indicate that no refills were authorized even though a new prescription for the same medication is available at the pharmacy. Please request the medicine by name with the pharmacy before contacting your provider for a refill.        Your To Do List     Future Labs/Procedures Complete By Expires    CBC WITH DIFFERENTIAL  12/1/2017 6/9/2018    COMP METABOLIC PANEL  12/1/2017 6/9/2018    HEMOGLOBIN A1C  12/1/2017 6/9/2018    LIPID PROFILE  12/1/2017 6/9/2018    VITAMIN D,25 HYDROXY  12/1/2017 6/8/2018         [employer] Access Code: Activation code not generated  Current [employer] Status: Active

## 2017-06-08 NOTE — PROGRESS NOTES
[facility]  Primary Care Office Visit - Problem-Oriented        History:     Griselda Farmer is a 72 y.o. female who is here today to discuss Results and Immunizations      Essential hypertension, benign  This is a chronic condition which is well controlled on medications. Patient is tolerating medications without side effects.      Elevated fasting glucose  Patient has had elevated fasting glucose in the past, most recent A1c was reviewed and normal at 5.4%, we discussed diet and exercise, we will continue to monitor this.     GERD (gastroesophageal reflux disease)  This is a chronic condition which is well controlled on medications. Patient is tolerating medications without side effects.      Vitamin D deficiency disease  Vitamin D is normal and reviewed with patient, she continues to take OTC vitamin D supplement.     Onychorrhexis  Patient reports that for years she has dealt with vertical ridges in her nails and brittle nails. She tells me that she has a family history of iron deficiency and wonders if this is related. She has no nail color changes, no skin changes, no peeling of the nails. I recommend she continue to monitor, consider a MTV and biotic supplement, we will consider labs if this is something that continues to be bothersome for her.     Healthcare maintenance  Reviewed USPSTF age appropriate recommendations with patient, she declines bone density and mammo. She would like Tdap today .          Past Medical History   Diagnosis Date   • Insomnia    • Flushing reaction      has had full work up with cardiology, would awake with symptoms at night   • Hyperlipidemia 10/20/2010   • GERD (gastroesophageal reflux disease)    • Liver cyst      has been seen by GI, ultrasound 9/12   • Need for Tdap vaccination      in 2012   • S/P colonoscopy 11/9/2012   • Indigestion    • Urinary bladder disorder    • Unspecified urinary incontinence    • Dental disorder      upper and lower dentures   •  FRIDA (obstructive sleep apnea)      states no cpap   • Heart burn 2014     pt on prevacid   • Arthritis      generalized + hands   • Myocardial infarct (CMS-HCC) 1984     pt denies states sometimes irreg rhythm   • Angina 2014     pt denies    • Hypertension 2014     pt states well controlled on meds   • Sick sinus syndrome (CMS-HCC) 1/1/2017   • Symptomatic sinus bradycardia 1/1/2017     Past Surgical History   Procedure Laterality Date   • Tonsillectomy     • Hysterectomy, vaginal       ovaries were left   • Recovery  3/29/2013     Performed by Cath-Recovery Surgery at SURGERY SAME DAY [facility] ORS   • Cataract phaco with iol  9/19/2013     Performed by Sylvester Raza M.D. at SURGERY [facility] ORS   • Cataract phaco with iol  10/3/2013     Performed by Sylvester Raza M.D. at SURGERY [facility] ORS   • Knee arthroplasty total  12/16/2014     Performed by Jose G Trotter M.D. at SURGERY [facility] ORS   • Cardiac cath  1/1/17     Overlapping Synergy stents to 99% Circ   • Cardiac cath  1/19/17     Stents patent.     Social History     Social History   • Marital Status:      Spouse Name: N/A   • Number of Children: N/A   • Years of Education: N/A     Occupational History   • retired- human resources  for [employer]     Social History Main Topics   • Smoking status: Former Smoker -- 0.50 packs/day for 15 years     Types: Cigarettes     Quit date: 08/11/1975   • Smokeless tobacco: Never Used   • Alcohol Use: No   • Drug Use: No   • Sexual Activity: Not on file     Other Topics Concern   • Not on file     Social History Narrative     History   Smoking status   • Former Smoker -- 0.50 packs/day for 15 years   • Types: Cigarettes   • Quit date: 08/11/1975   Smokeless tobacco   • Never Used     Family History   Problem Relation Age of Onset   • Diabetes Mother      mild   • Heart Disease Neg Hx    • Hypertension Neg Hx    • Cancer Father      melanoma mild     Allergies   Allergen  Reactions   • Codeine Itching and Vomiting     Rxn = years ago          Problem List:     Patient Active Problem List    Diagnosis Date Noted   • NSTEMI (non-ST elevated myocardial infarction) (CMS-HCC) 01/02/2017     Priority: High   • Sick sinus syndrome (CMS-HCC) 01/01/2017     Priority: High   • Coronary artery disease, non-occlusive 06/08/2017   • S/P coronary artery stent placement 06/08/2017   • Healthcare maintenance 06/08/2017   • Onychorrhexis 06/08/2017   • Bradycardia with less than 30 beats per minute 01/02/2017   • Symptomatic sinus bradycardia 01/01/2017   • Obesity (BMI 30-39.9) 12/07/2016   • Incomplete tear of right rotator cuff 09/19/2016   • Right shoulder pain 06/06/2016   • 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   • Nonhealing nonsurgical wound 06/23/2015   • Verruca plana 06/23/2015   • Overriding toe of left foot 01/20/2015   • Primary localized osteoarthrosis, lower leg 12/16/2014   • Left knee pain 08/28/2014   • Elevated fasting glucose 07/16/2014   • Osteoarthrosis involving multiple sites 07/15/2014   • Vitamin D deficiency disease 07/15/2014   • CAD (coronary artery disease) 03/29/2013   • GERD (gastroesophageal reflux disease) 03/06/2013   • Cough due to ACE inhibitor 06/26/2012   • Liver cyst 10/17/2011   • Arthritis 11/29/2010   • Mixed hyperlipidemia 10/20/2010   • Essential hypertension, benign 08/11/2010   • Insomnia 08/11/2010   • FRIDA (obstructive sleep apnea) 08/11/2010         Medications:     Current outpatient prescriptions:   •  lansoprazole (PREVACID) 30 MG CAPSULE DELAYED RELEASE, Take 1 Cap by mouth every day., Disp: 90 Cap, Rfl: 2  •  atorvastatin (LIPITOR) 40 MG Tab, Take 1 Tab by mouth every evening., Disp: 90 Tab, Rfl: 3  •  clopidogrel (PLAVIX) 75 MG Tab, Take 1 Tab by mouth every day., Disp: 90 Tab, Rfl: 3  •  losartan (COZAAR) 25 MG Tab, Take 1 Tab by mouth every day., Disp: 90 Tab, Rfl: 3  •  nitroglycerin (NITROSTAT) 0.4 MG SL Tab, Place 1 Tab under tongue  "as needed for Chest Pain (up to 3 doses (if SBP greater than 90 mmHg))., Disp: 25 Tab, Rfl: 3  •  aspirin EC (ECOTRIN) 81 MG Tablet Delayed Response, Take 81 mg by mouth every evening., Disp: 30 Tab, Rfl: 0  •  Cholecalciferol (VITAMIN D) 2000 UNIT TABS, Take 1 Tab by mouth every day., Disp: , Rfl:       Review of Systems:     Positives per HPI, all other systems reviewed and WNL       Physical Assessment:     VS: /72 mmHg  Pulse 78  Temp(Src) 35.9 °C (96.6 °F)  Resp 16  Ht 1.638 m (5' 4.49\")  Wt 94.802 kg (209 lb)  BMI 35.33 kg/m2  SpO2 94%    General: Well-developed, well-nourished, female     Head: PERRL, EOMI. Normocephalic. No facial asymmetry noted.  Cardiovasc:No JVD.  RRR, no MRG. No thrills or bruits. Pulses 2+ and symmetric at all distal extremities.  Pulmonary: Lungs clear bilaterally.  Normal respiratory effort. No wheeze or crackles.   Skin: superificial vertical lines in all nails, no change in color. No rashes noted. Skin warm, dry, intact    Psych: Dressed appropriately for the weather, pleasant and conversant.  Affect, mood & judgment appropriate.      Assessment/Plan:   Griselda was seen today for results and immunizations.    Diagnoses and all orders for this visit:    Essential hypertension, benign , chronic, stable on present treatment   -     COMP METABOLIC PANEL; Future  -     CBC WITH DIFFERENTIAL; Future    Mixed hyperlipidemia , chronic, stable on present treatment   -     LIPID PROFILE; Future    Vitamin D deficiency disease , chronic, stable on present treatment   -     VITAMIN D,25 HYDROXY; Future    Elevated fasting glucose, stable   -     HEMOGLOBIN A1C; Future    Onychorrhexis, ongoing, stable   - reassurance, continue to monitor, consider OTC MTV and biotic supplement, may consider labs in the future if this continues to be bothersome.     Gastroesophageal reflux disease with esophagitis, chronic, stable     Need for vaccination  -     TDAP VACCINE =>6YO     Healthcare " maintenance  - see HPI for recommendations    Follow up in 6 months with repeat labs for review    Patient is agreeable to the above plan and voiced understanding. All questions answered.     Please note that this dictation was created using voice recognition software. I have made every reasonable attempt to correct obvious errors, but I expect that there are errors of grammar and possibly content that I did not discover before finalizing the note.      JACKY Negro  6/8/2017, 8:23 AM

## 2017-06-08 NOTE — ASSESSMENT & PLAN NOTE
Reviewed USPSTF age appropriate recommendations with patient, she declines bone density and mammo. She would like Tdap today .

## 2017-06-08 NOTE — PROGRESS NOTES
Subjective:   Griselda Farmer is a 72 y.o. female who presents today for follow-up evaluation of coronary artery disease, NSTEMI, hypertension, hyperlipidemia and coronary stent implantation.    Last seen on 2/9/2017 by APN.    Since 2/9/2017 the patient's had no cardiac symptoms.  Tolerating her cardiac medications.    Medical history  NSTEMI 1/1/2017. Circumflex stents.  Hypertension.  Obstructive sleep apnea.  Hyperlipidemia.  Chest pain. Hospitalized 1/19/2017. MI ruled out. Coronary angiography demonstrated patent circumflex stents.       Past Medical History   Diagnosis Date   • Insomnia    • Flushing reaction      has had full work up with cardiology, would awake with symptoms at night   • Hyperlipidemia 10/20/2010   • GERD (gastroesophageal reflux disease)    • Liver cyst      has been seen by GI, ultrasound 9/12   • Need for Tdap vaccination      in 2012   • S/P colonoscopy 11/9/2012   • Indigestion    • Urinary bladder disorder    • Unspecified urinary incontinence    • Dental disorder      upper and lower dentures   • FRIDA (obstructive sleep apnea)      states no cpap   • Heart burn 2014     pt on prevacid   • Arthritis      generalized + hands   • Myocardial infarct (CMS-HCC) 1984     pt denies states sometimes irreg rhythm   • Angina 2014     pt denies    • Hypertension 2014     pt states well controlled on meds   • Sick sinus syndrome (CMS-HCC) 1/1/2017   • Symptomatic sinus bradycardia 1/1/2017     Past Surgical History   Procedure Laterality Date   • Tonsillectomy     • Hysterectomy, vaginal       ovaries were left   • Recovery  3/29/2013     Performed by Cath-Recovery Surgery at SURGERY SAME DAY [facility] ORS   • Cataract phaco with iol  9/19/2013     Performed by Sylvester Raza M.D. at SURGERY [facility]   • Cataract phaco with iol  10/3/2013     Performed by Sylvester Raza M.D. at [facility]   • Knee arthroplasty total  12/16/2014     Performed by Jose G Trotter M.D.  "at SURGERY [place] ORS   • Cardiac cath  1/1/17     Overlapping Synergy stents to 99% Circ   • Cardiac cath  1/19/17     Stents patent.     Family History   Problem Relation Age of Onset   • Diabetes Mother      mild   • Heart Disease Neg Hx    • Hypertension Neg Hx    • Cancer Father      melanoma mild     History   Smoking status   • Former Smoker -- 0.50 packs/day for 15 years   • Types: Cigarettes   • Quit date: 08/11/1975   Smokeless tobacco   • Never Used     Allergies   Allergen Reactions   • Codeine Itching and Vomiting     Rxn = years ago        Outpatient Encounter Prescriptions as of 6/6/2017   Medication Sig Dispense Refill   • lansoprazole (PREVACID) 30 MG CAPSULE DELAYED RELEASE Take 1 Cap by mouth every day. 90 Cap 2   • clopidogrel (PLAVIX) 75 MG Tab Take 1 Tab by mouth every day. 90 Tab 3   • losartan (COZAAR) 25 MG Tab Take 1 Tab by mouth every day. 90 Tab 3   • nitroglycerin (NITROSTAT) 0.4 MG SL Tab Place 1 Tab under tongue as needed for Chest Pain (up to 3 doses (if SBP greater than 90 mmHg)). 25 Tab 3   • aspirin EC (ECOTRIN) 81 MG Tablet Delayed Response Take 81 mg by mouth every evening. 30 Tab 0   • Cholecalciferol (VITAMIN D) 2000 UNIT TABS Take 1 Tab by mouth every day.     • atorvastatin (LIPITOR) 40 MG Tab Take 1 Tab by mouth every evening. 90 Tab 3     No facility-administered encounter medications on file as of 6/6/2017.     Review of Systems   Respiratory: Negative for cough and shortness of breath.    Cardiovascular: Negative for chest pain and palpitations.   Neurological: Negative for dizziness and loss of consciousness.        Objective:   /90 mmHg  Pulse 98  Ht 1.626 m (5' 4.02\")  Wt 94.802 kg (209 lb)  BMI 35.86 kg/m2  SpO2 95%    Physical Exam   Constitutional: She is oriented to person, place, and time. She appears well-nourished. No distress.   HENT:   Head: Normocephalic.   Eyes: EOM are normal. No scleral icterus.   Neck: Carotid bruit is not present. "   Normal jugular venous pressure.   Cardiovascular: Normal rate, regular rhythm, S1 normal, S2 normal and normal heart sounds.  Exam reveals no gallop and no friction rub.    No murmur heard.  Pulses:       Carotid pulses are 2+ on the right side, and 2+ on the left side.       Radial pulses are 2+ on the right side, and 2+ on the left side.   Pulmonary/Chest: Effort normal and breath sounds normal. She has no wheezes. She has no rhonchi. She has no rales.   Abdominal:   Protuberant.   Musculoskeletal: She exhibits no edema.   Neurological: She is alert and oriented to person, place, and time.   Skin: Skin is warm and dry.   Psychiatric: She has a normal mood and affect. Her behavior is normal.     01/02/2017 ECHOCARDIOGRAM  Left ventricular systolic function is low normal, EF 50%.  Mild concentric left ventricular hypertrophy.  Thickened mitral valve leaflets.  Mild mitral regurgitation.  Aortic sclerosis without stenosis.    DATE OF SERVICE:  01/01/2017  PROCEDURE:  Cardiac catheterization with Percutaneous Coronary Intervention.  A. Selective coronary angiography.  B. Coronary stent implantation of the distal dominant circumflex artery with    overlapping 2.75x20 mm and 3.0x12 mm drug-eluting Synergy stents.  C. Right radial artery approach.    DATE OF SERVICE:  01/19/2017  PROCEDURE:  Cardiac catheterization.  A.  Left heart catheterization.  B.  Left ventriculography.  C.  Selective coronary arteriography.  D.  Right radial artery approach.  Ejection fraction appears to be greater than 65%.  CONCLUSION:  1.  EF 65% with mild inferior wall motion abnormalities.  2.  Patent distal dominant circumflex artery stents.    Assessment:     1. Coronary artery disease, non-occlusive     2. NSTEMI (non-ST elevated myocardial infarction) (CMS-HCC)     3. Essential hypertension, benign     4. Mixed hyperlipidemia     5. S/P coronary artery stent placement         Medical Decision Making:  Today's Assessment / Status / Plan:      CAD. Clinically stable.    NSTEMI. 1/2017.    Hypertension. Borderline. Generally controlled as an outpatient.    Hyperlipidemia. On atorvastatin.    Coronary stent implantation. 1/1/2017. Circumflex artery.    Plan  Continue current therapy.  Follow-up 6 months.

## 2017-07-10 ENCOUNTER — OFFICE VISIT (OUTPATIENT)
Dept: URGENT CARE | Facility: PHYSICIAN GROUP | Age: 72
End: 2017-07-10
Payer: MEDICARE

## 2017-07-10 ENCOUNTER — HOSPITAL ENCOUNTER (INPATIENT)
Facility: MEDICAL CENTER | Age: 72
LOS: 1 days | DRG: 293 | End: 2017-07-11
Attending: EMERGENCY MEDICINE | Admitting: INTERNAL MEDICINE
Payer: MEDICARE

## 2017-07-10 ENCOUNTER — APPOINTMENT (OUTPATIENT)
Dept: RADIOLOGY | Facility: MEDICAL CENTER | Age: 72
DRG: 293 | End: 2017-07-10
Attending: EMERGENCY MEDICINE
Payer: MEDICARE

## 2017-07-10 ENCOUNTER — RESOLUTE PROFESSIONAL BILLING HOSPITAL PROF FEE (OUTPATIENT)
Dept: HOSPITALIST | Facility: MEDICAL CENTER | Age: 72
End: 2017-07-10
Payer: MEDICARE

## 2017-07-10 VITALS
BODY MASS INDEX: 34.79 KG/M2 | DIASTOLIC BLOOD PRESSURE: 100 MMHG | HEART RATE: 85 BPM | TEMPERATURE: 97 F | RESPIRATION RATE: 16 BRPM | SYSTOLIC BLOOD PRESSURE: 150 MMHG | WEIGHT: 208.8 LBS | OXYGEN SATURATION: 96 % | HEIGHT: 65 IN

## 2017-07-10 DIAGNOSIS — I10 ESSENTIAL HYPERTENSION, BENIGN: ICD-10-CM

## 2017-07-10 DIAGNOSIS — R07.89 CHEST PRESSURE: ICD-10-CM

## 2017-07-10 DIAGNOSIS — I25.10 CORONARY ARTERY DISEASE INVOLVING NATIVE HEART WITHOUT ANGINA PECTORIS, UNSPECIFIED VESSEL OR LESION TYPE: ICD-10-CM

## 2017-07-10 DIAGNOSIS — I10 ESSENTIAL HYPERTENSION: ICD-10-CM

## 2017-07-10 DIAGNOSIS — R94.31 EKG ABNORMALITY: ICD-10-CM

## 2017-07-10 DIAGNOSIS — I25.2 HISTORY OF NON-ST ELEVATION MYOCARDIAL INFARCTION (NSTEMI): ICD-10-CM

## 2017-07-10 DIAGNOSIS — R07.9 CHEST PAIN, UNSPECIFIED TYPE: ICD-10-CM

## 2017-07-10 DIAGNOSIS — R12 HEARTBURN: ICD-10-CM

## 2017-07-10 DIAGNOSIS — R06.02 SOB (SHORTNESS OF BREATH): ICD-10-CM

## 2017-07-10 PROBLEM — I50.9 CHF EXACERBATION (HCC): Status: ACTIVE | Noted: 2017-07-10

## 2017-07-10 LAB
ALBUMIN SERPL BCP-MCNC: 4.1 G/DL (ref 3.2–4.9)
ALBUMIN/GLOB SERPL: 1.5 G/DL
ALP SERPL-CCNC: 98 U/L (ref 30–99)
ALT SERPL-CCNC: 21 U/L (ref 2–50)
ANION GAP SERPL CALC-SCNC: 10 MMOL/L (ref 0–11.9)
APTT PPP: 31.5 SEC (ref 24.7–36)
AST SERPL-CCNC: 18 U/L (ref 12–45)
BASOPHILS # BLD AUTO: 0.9 % (ref 0–1.8)
BASOPHILS # BLD: 0.09 K/UL (ref 0–0.12)
BILIRUB SERPL-MCNC: 0.6 MG/DL (ref 0.1–1.5)
BNP SERPL-MCNC: 62 PG/ML (ref 0–100)
BUN SERPL-MCNC: 11 MG/DL (ref 8–22)
CALCIUM SERPL-MCNC: 9.5 MG/DL (ref 8.5–10.5)
CHLORIDE SERPL-SCNC: 105 MMOL/L (ref 96–112)
CO2 SERPL-SCNC: 25 MMOL/L (ref 20–33)
CREAT SERPL-MCNC: 0.69 MG/DL (ref 0.5–1.4)
EKG IMPRESSION: NORMAL
EKG IMPRESSION: NORMAL
EOSINOPHIL # BLD AUTO: 0.11 K/UL (ref 0–0.51)
EOSINOPHIL NFR BLD: 1.1 % (ref 0–6.9)
ERYTHROCYTE [DISTWIDTH] IN BLOOD BY AUTOMATED COUNT: 47.1 FL (ref 35.9–50)
GFR SERPL CREATININE-BSD FRML MDRD: >60 ML/MIN/1.73 M 2
GLOBULIN SER CALC-MCNC: 2.8 G/DL (ref 1.9–3.5)
GLUCOSE SERPL-MCNC: 96 MG/DL (ref 65–99)
HCT VFR BLD AUTO: 43.6 % (ref 37–47)
HGB BLD-MCNC: 14.1 G/DL (ref 12–16)
IMM GRANULOCYTES # BLD AUTO: 0.06 K/UL (ref 0–0.11)
IMM GRANULOCYTES NFR BLD AUTO: 0.6 % (ref 0–0.9)
INR PPP: 0.97 (ref 0.87–1.13)
LIPASE SERPL-CCNC: 31 U/L (ref 11–82)
LYMPHOCYTES # BLD AUTO: 2.05 K/UL (ref 1–4.8)
LYMPHOCYTES NFR BLD: 20.2 % (ref 22–41)
MCH RBC QN AUTO: 29.3 PG (ref 27–33)
MCHC RBC AUTO-ENTMCNC: 32.3 G/DL (ref 33.6–35)
MCV RBC AUTO: 90.5 FL (ref 81.4–97.8)
MONOCYTES # BLD AUTO: 0.64 K/UL (ref 0–0.85)
MONOCYTES NFR BLD AUTO: 6.3 % (ref 0–13.4)
NEUTROPHILS # BLD AUTO: 7.19 K/UL (ref 2–7.15)
NEUTROPHILS NFR BLD: 70.9 % (ref 44–72)
NRBC # BLD AUTO: 0 K/UL
NRBC BLD AUTO-RTO: 0 /100 WBC
PLATELET # BLD AUTO: 250 K/UL (ref 164–446)
PMV BLD AUTO: 11.6 FL (ref 9–12.9)
POTASSIUM SERPL-SCNC: 3.7 MMOL/L (ref 3.6–5.5)
PROT SERPL-MCNC: 6.9 G/DL (ref 6–8.2)
PROTHROMBIN TIME: 13.2 SEC (ref 12–14.6)
RBC # BLD AUTO: 4.82 M/UL (ref 4.2–5.4)
SODIUM SERPL-SCNC: 140 MMOL/L (ref 135–145)
TROPONIN I SERPL-MCNC: <0.01 NG/ML (ref 0–0.04)
TROPONIN I SERPL-MCNC: <0.01 NG/ML (ref 0–0.04)
WBC # BLD AUTO: 10.1 K/UL (ref 4.8–10.8)

## 2017-07-10 PROCEDURE — 99223 1ST HOSP IP/OBS HIGH 75: CPT | Performed by: INTERNAL MEDICINE

## 2017-07-10 PROCEDURE — 96374 THER/PROPH/DIAG INJ IV PUSH: CPT

## 2017-07-10 PROCEDURE — 85730 THROMBOPLASTIN TIME PARTIAL: CPT

## 2017-07-10 PROCEDURE — 85610 PROTHROMBIN TIME: CPT

## 2017-07-10 PROCEDURE — 84484 ASSAY OF TROPONIN QUANT: CPT

## 2017-07-10 PROCEDURE — 83690 ASSAY OF LIPASE: CPT

## 2017-07-10 PROCEDURE — 71010 DX-CHEST-LIMITED (1 VIEW): CPT

## 2017-07-10 PROCEDURE — 36415 COLL VENOUS BLD VENIPUNCTURE: CPT

## 2017-07-10 PROCEDURE — 93005 ELECTROCARDIOGRAM TRACING: CPT

## 2017-07-10 PROCEDURE — 700111 HCHG RX REV CODE 636 W/ 250 OVERRIDE (IP): Performed by: INTERNAL MEDICINE

## 2017-07-10 PROCEDURE — 85025 COMPLETE CBC W/AUTO DIFF WBC: CPT

## 2017-07-10 PROCEDURE — 700102 HCHG RX REV CODE 250 W/ 637 OVERRIDE(OP): Performed by: INTERNAL MEDICINE

## 2017-07-10 PROCEDURE — 99214 OFFICE O/P EST MOD 30 MIN: CPT | Performed by: PHYSICIAN ASSISTANT

## 2017-07-10 PROCEDURE — 700102 HCHG RX REV CODE 250 W/ 637 OVERRIDE(OP): Performed by: STUDENT IN AN ORGANIZED HEALTH CARE EDUCATION/TRAINING PROGRAM

## 2017-07-10 PROCEDURE — 93010 ELECTROCARDIOGRAM REPORT: CPT | Performed by: INTERNAL MEDICINE

## 2017-07-10 PROCEDURE — 80053 COMPREHEN METABOLIC PANEL: CPT

## 2017-07-10 PROCEDURE — 770020 HCHG ROOM/CARE - TELE (206)

## 2017-07-10 PROCEDURE — A9270 NON-COVERED ITEM OR SERVICE: HCPCS | Performed by: INTERNAL MEDICINE

## 2017-07-10 PROCEDURE — 83880 ASSAY OF NATRIURETIC PEPTIDE: CPT

## 2017-07-10 PROCEDURE — A9270 NON-COVERED ITEM OR SERVICE: HCPCS | Performed by: STUDENT IN AN ORGANIZED HEALTH CARE EDUCATION/TRAINING PROGRAM

## 2017-07-10 PROCEDURE — 304562 HCHG STAT O2 MASK/CANNULA

## 2017-07-10 PROCEDURE — 99285 EMERGENCY DEPT VISIT HI MDM: CPT

## 2017-07-10 PROCEDURE — 93005 ELECTROCARDIOGRAM TRACING: CPT | Performed by: INTERNAL MEDICINE

## 2017-07-10 RX ORDER — NITROGLYCERIN 0.4 MG/1
0.4 TABLET SUBLINGUAL PRN
Status: DISCONTINUED | OUTPATIENT
Start: 2017-07-10 | End: 2017-07-11 | Stop reason: HOSPADM

## 2017-07-10 RX ORDER — FUROSEMIDE 10 MG/ML
40 INJECTION INTRAMUSCULAR; INTRAVENOUS
Status: DISCONTINUED | OUTPATIENT
Start: 2017-07-10 | End: 2017-07-10

## 2017-07-10 RX ORDER — CLOPIDOGREL BISULFATE 75 MG/1
75 TABLET ORAL
Status: DISCONTINUED | OUTPATIENT
Start: 2017-07-10 | End: 2017-07-11 | Stop reason: HOSPADM

## 2017-07-10 RX ORDER — POLYETHYLENE GLYCOL 3350 17 G/17G
1 POWDER, FOR SOLUTION ORAL
Status: DISCONTINUED | OUTPATIENT
Start: 2017-07-10 | End: 2017-07-11 | Stop reason: HOSPADM

## 2017-07-10 RX ORDER — POTASSIUM CHLORIDE 20 MEQ/1
40 TABLET, EXTENDED RELEASE ORAL ONCE
Status: COMPLETED | OUTPATIENT
Start: 2017-07-10 | End: 2017-07-10

## 2017-07-10 RX ORDER — BISACODYL 10 MG
10 SUPPOSITORY, RECTAL RECTAL
Status: DISCONTINUED | OUTPATIENT
Start: 2017-07-10 | End: 2017-07-11 | Stop reason: HOSPADM

## 2017-07-10 RX ORDER — LOSARTAN POTASSIUM 50 MG/1
50 TABLET ORAL DAILY
Status: DISCONTINUED | OUTPATIENT
Start: 2017-07-11 | End: 2017-07-11 | Stop reason: HOSPADM

## 2017-07-10 RX ORDER — LOSARTAN POTASSIUM 25 MG/1
25 TABLET ORAL DAILY
Status: DISCONTINUED | OUTPATIENT
Start: 2017-07-11 | End: 2017-07-10

## 2017-07-10 RX ORDER — AMOXICILLIN 250 MG
2 CAPSULE ORAL 2 TIMES DAILY
Status: DISCONTINUED | OUTPATIENT
Start: 2017-07-10 | End: 2017-07-11 | Stop reason: HOSPADM

## 2017-07-10 RX ORDER — ATORVASTATIN CALCIUM 40 MG/1
40 TABLET, FILM COATED ORAL DAILY
Status: DISCONTINUED | OUTPATIENT
Start: 2017-07-11 | End: 2017-07-11 | Stop reason: HOSPADM

## 2017-07-10 RX ORDER — FUROSEMIDE 10 MG/ML
40 INJECTION INTRAMUSCULAR; INTRAVENOUS
Status: DISCONTINUED | OUTPATIENT
Start: 2017-07-11 | End: 2017-07-11

## 2017-07-10 RX ORDER — ATORVASTATIN CALCIUM 40 MG/1
40 TABLET, FILM COATED ORAL DAILY
COMMUNITY
End: 2018-04-03 | Stop reason: SDUPTHER

## 2017-07-10 RX ADMIN — POTASSIUM CHLORIDE 40 MEQ: 1500 TABLET, EXTENDED RELEASE ORAL at 17:48

## 2017-07-10 RX ADMIN — VITAMIN D, TAB 1000IU (100/BT) 2000 UNITS: 25 TAB at 14:03

## 2017-07-10 RX ADMIN — ENOXAPARIN SODIUM 40 MG: 100 INJECTION SUBCUTANEOUS at 17:47

## 2017-07-10 RX ADMIN — FUROSEMIDE 40 MG: 10 INJECTION, SOLUTION INTRAVENOUS at 14:04

## 2017-07-10 RX ADMIN — METOPROLOL TARTRATE 12.5 MG: 25 TABLET, FILM COATED ORAL at 16:33

## 2017-07-10 RX ADMIN — CLOPIDOGREL 75 MG: 75 TABLET, FILM COATED ORAL at 20:47

## 2017-07-10 ASSESSMENT — LIFESTYLE VARIABLES
ALCOHOL_USE: NO
EVER_SMOKED: YES

## 2017-07-10 ASSESSMENT — PAIN SCALES - GENERAL
PAINLEVEL: 1=MINIMAL PAIN
PAINLEVEL_OUTOF10: 0
PAINLEVEL_OUTOF10: 0
PAINLEVEL_OUTOF10: 2
PAINLEVEL_OUTOF10: 0

## 2017-07-10 ASSESSMENT — ENCOUNTER SYMPTOMS
CONSTITUTIONAL NEGATIVE: 1
SHORTNESS OF BREATH: 1

## 2017-07-10 NOTE — ED PROVIDER NOTES
ED Provider Note    Scribed for Parag Mohamud M.D. by Ben Brambila. 7/10/2017, 11:11 AM.    Primary care provider: JACEK Lemos  Means of arrival: EMS  History obtained from: patient  History limited by: none    CHIEF COMPLAINT  No chief complaint on file.  , chest pain    HPI  Griselda Farmer is a 72 y.o. female who presents to the Emergency Department complaining of constant chest pain starting 2 days ago. Patient describes her chest pain as pressure. She administered Nitroglycerin with only temporary relief to her pain. Patient was given 3 doses of aspirin en route to the hospital. She reports associated shortness of breath. Laying down exacerbates her chest pain and shortness of breath. Patient states that her pain and symptoms are similar to when she had stents placed. She has a history of hypertension and is prescribed Plavix. She denies chest pain currently, alcohol tobacco use, history of blood clots.  Off fevers or pain or swelling her legs.  No history of PE or DVT.  No other associated complaints    REVIEW OF SYSTEMS  Review of Systems   Constitutional: Negative.    Respiratory: Positive for shortness of breath.    Cardiovascular: Positive for chest pain.   All other systems reviewed and are negative.  C.    PAST MEDICAL HISTORY   has a past medical history of Insomnia; Flushing reaction; Hyperlipidemia (10/20/2010); GERD (gastroesophageal reflux disease); Liver cyst; Need for Tdap vaccination; S/P colonoscopy (11/9/2012); Indigestion; Urinary bladder disorder; Unspecified urinary incontinence; Dental disorder; FRIDA (obstructive sleep apnea); Heart burn (2014); Arthritis; Myocardial infarct (CMS-[facility]) (1984); Angina (2014); Hypertension (2014); Sick sinus syndrome (CMS-[facility]) (1/1/2017); and Symptomatic sinus bradycardia (1/1/2017).    SURGICAL HISTORY   has past surgical history that includes tonsillectomy; hysterectomy, vaginal; recovery (3/29/2013); cataract phaco with iol  "(9/19/2013); cataract phaco with iol (10/3/2013); knee arthroplasty total (12/16/2014); cardiac cath (1/1/17); cardiac cath (1/19/17); and other cardiac surgery (January 2017).    SOCIAL HISTORY  Social History   Substance Use Topics   • Smoking status: Former Smoker -- 0.50 packs/day for 15 years     Types: Cigarettes     Quit date: 08/11/1975   • Smokeless tobacco: Never Used   • Alcohol Use: No      History   Drug Use No       FAMILY HISTORY  Family History   Problem Relation Age of Onset   • Diabetes Mother      mild   • Heart Disease Neg Hx    • Hypertension Neg Hx    • Cancer Father      melanoma mild       CURRENT MEDICATIONS  Home Medications     Reviewed by Nilesh Rodney (Pharmacy Tech) on 07/10/17 at 1202  Med List Status: Complete    Medication Last Dose Status    aspirin EC (ECOTRIN) 81 MG Tablet Delayed Response 7/10/2017 Active    atorvastatin (LIPITOR) 40 MG Tab 7/10/2017 Active    Cholecalciferol (VITAMIN D) 2000 UNIT TABS 7/9/2017 Active    clopidogrel (PLAVIX) 75 MG Tab 7/9/2017 Active    lansoprazole (PREVACID) 30 MG CAPSULE DELAYED RELEASE 7/10/2017 Active    losartan (COZAAR) 25 MG Tab 7/10/2017 Active    nitroglycerin (NITROSTAT) 0.4 MG SL Tab 7/9/2017 Active                ALLERGIES  Allergies   Allergen Reactions   • Codeine Itching and Vomiting     Rxn = years ago          PHYSICAL EXAM  VITAL SIGNS: /99 mmHg  Pulse 74  Temp(Src) 36.6 °C (97.9 °F)  Resp 23  Ht 1.651 m (5' 5\")  Wt 88.451 kg (195 lb)  BMI 32.45 kg/m2  SpO2 97%  Vitals reviewed.  Constitutional: Well developed, Well nourished, No acute distress, Non-toxic appearance.   HENT: Normocephalic, Atraumatic, Bilateral external ears normal, Oropharynx moist, No oral exudates, Nose normal.   Eyes: PERRL, EOMI, Conjunctiva normal, No discharge.   Neck: Normal range of motion, No tenderness, Supple, No stridor.   Cardiovascular: Normal heart rate, Normal rhythm, No murmurs, No rubs, No gallops.   Thorax & Lungs: " Normal breath sounds, No respiratory distress, No wheezing  Abdomen: Bowel sounds normal, Soft, No tenderness  Skin: Warm, Dry, No erythema, No rash.   Back: No tenderness, No CVA tenderness.   Musculoskeletal: Good range of motion in all major joints.  Neurologic: Alert, No focal deficits noted.   Psychiatric: Affect normal    LABS  Results for orders placed or performed during the hospital encounter of 07/10/17   Troponin   Result Value Ref Range    Troponin I <0.01 0.00 - 0.04 ng/mL   CBC with Differential   Result Value Ref Range    WBC 10.1 4.8 - 10.8 K/uL    RBC 4.82 4.20 - 5.40 M/uL    Hemoglobin 14.1 12.0 - 16.0 g/dL    Hematocrit 43.6 37.0 - 47.0 %    MCV 90.5 81.4 - 97.8 fL    MCH 29.3 27.0 - 33.0 pg    MCHC 32.3 (L) 33.6 - 35.0 g/dL    RDW 47.1 35.9 - 50.0 fL    Platelet Count 250 164 - 446 K/uL    MPV 11.6 9.0 - 12.9 fL    Neutrophils-Polys 70.90 44.00 - 72.00 %    Lymphocytes 20.20 (L) 22.00 - 41.00 %    Monocytes 6.30 0.00 - 13.40 %    Eosinophils 1.10 0.00 - 6.90 %    Basophils 0.90 0.00 - 1.80 %    Immature Granulocytes 0.60 0.00 - 0.90 %    Nucleated RBC 0.00 /100 WBC    Neutrophils (Absolute) 7.19 (H) 2.00 - 7.15 K/uL    Lymphs (Absolute) 2.05 1.00 - 4.80 K/uL    Monos (Absolute) 0.64 0.00 - 0.85 K/uL    Eos (Absolute) 0.11 0.00 - 0.51 K/uL    Baso (Absolute) 0.09 0.00 - 0.12 K/uL    Immature Granulocytes (abs) 0.06 0.00 - 0.11 K/uL    NRBC (Absolute) 0.00 K/uL   Complete Metabolic Panel (CMP)   Result Value Ref Range    Sodium 140 135 - 145 mmol/L    Potassium 3.7 3.6 - 5.5 mmol/L    Chloride 105 96 - 112 mmol/L    Co2 25 20 - 33 mmol/L    Anion Gap 10.0 0.0 - 11.9    Glucose 96 65 - 99 mg/dL    Bun 11 8 - 22 mg/dL    Creatinine 0.69 0.50 - 1.40 mg/dL    Calcium 9.5 8.5 - 10.5 mg/dL    AST(SGOT) 18 12 - 45 U/L    ALT(SGPT) 21 2 - 50 U/L    Alkaline Phosphatase 98 30 - 99 U/L    Total Bilirubin 0.6 0.1 - 1.5 mg/dL    Albumin 4.1 3.2 - 4.9 g/dL    Total Protein 6.9 6.0 - 8.2 g/dL    Globulin 2.8 1.9  - 3.5 g/dL    A-G Ratio 1.5 g/dL   Prothrombin Time   Result Value Ref Range    PT 13.2 12.0 - 14.6 sec    INR 0.97 0.87 - 1.13   APTT   Result Value Ref Range    APTT 31.5 24.7 - 36.0 sec   Lipase   Result Value Ref Range    Lipase 31 11 - 82 U/L   ESTIMATED GFR   Result Value Ref Range    GFR If African American >60 >60 mL/min/1.73 m 2    GFR If Non African American >60 >60 mL/min/1.73 m 2   EKG (ER)   Result Value Ref Range    Report       [facility] Emergency Dept.    Test Date:  2017-07-10  Pt Name:    ONESIMO DURAN                Department: ER  MRN:        [id number]                      Room:        02  Gender:     F                            Technician: [id number]  :        [id number]                   Requested By:ER TRIAGE PROTOCOL  Order #:    [id number]                    Reading MD:    Measurements  Intervals                                Axis  Rate:       72                           P:          7  MS:         192                          QRS:        -50  QRSD:       84                           T:          -10  QT:         400  QTc:        438    Interpretive Statements  SINUS RHYTHM  LEFT ANTERIOR FASCICULAR BLOCK  CONSIDER ANTERIOR INFARCT  NONSPECIFIC T ABNORMALITIES, INFERIOR LEADS  Compared to ECG 2017 16:02:21  Poor R-wave progression no longer present  Myocardial infarct finding still present  T-wave abnormality still present     All labs reviewed by me.    EKG Interpretation  Interpreted by me    Rhythm:  Normal sinus rhythm   Rate: 72  Axis: normal  Ectopy: none  Conduction: normal  ST Segments: no acute change  T Waves: Nonspecific anterior ST changes, not significantly different than before.  Q Waves: none  Clinical Impression: Sinus rhythm and nonspecific anterior T wave changes, unchanged from prior EKG.    RADIOLOGY  DX-CHEST-LIMITED (1 VIEW)   Final Result      1.  Mildly enlarged cardiac silhouette with probable central vascular congestion.      The  radiologist's interpretation of all radiological studies have been reviewed by me.    COURSE & MEDICAL DECISION MAKING  Pertinent Labs & Imaging studies reviewed. (See chart for details)    11:11 AM Patient seen and examined at bedside. The patient presents with chest pain and shortness of breath, and the differential diagnosis includes but is not limited to chest wall pain, ACS, CAD, unstable angina, PE.. Ordered for DX chest, Troponin, BNP, CMP, PT/INR, APTT, Lipase to evaluate.     11:50 AM - I discussed the patient's case and the above findings with Dr. Breen (cardiology) who agrees to consult.    11:52 AM - I discussed the patient's case and the above findings with Dr. Restrepo (hospitalist) who agrees to admit the patient.     The patient has chest pain, she has known CAD, and an abnormal EKG.  She also has noted dyspnea and orthopnea.  She required admission.  She is on aspirin.  She is pain-free.    DISPOSITION:  Patient will be admitted to hospital in guarded condition.      FINAL IMPRESSION  1. Chest pain, unspecified type    2. Coronary artery disease involving native heart without angina pectoris, unspecified vessel or lesion type    3. EKG abnormality          Ben ROACH (Scribe), am scribing for, and in the presence of, Parag Mohamud M.D..    Electronically signed by: Ben Brambila (Scribe), 7/10/2017    Parag ROACH M.D. personally performed the services described in this documentation, as scribed by Ben Brambila in my presence, and it is both accurate and complete.    The note accurately reflects work and decisions made by me.  Parag Mohamud  7/10/2017  12:42 PM

## 2017-07-10 NOTE — H&P
HOSPITAL MEDICINE ADMISSION NOTE    Date of Service: 7/10/2017   Name: Griselda Farmer   : 1945  MRN: [id number]  [facility]: [id number]  Primary Care Physician: JACEK Lemos    Chief Complaint  Chest pain    History of Present Illness  Griselda Farmer is a 72 y.o. female with History of CAD with 2 stents in 2017. Echo then, EF 50%, Grade 2 diastolic dysfunction, Known GERD presents with burning chest pain acid, morning, no nausea, lower left side chest pressure rated 4-5/10 with shortness of breath. Sweating, clammy, lightheaded/dizzy positional, pleuritic, some tenderness on palpation, improved with nitro  At the ED, she was found to be hypoxic, elevated blood pressure. CXR showed pulmonary vascular congestion. EKG sinus with nonspecific T wave changes. Trop x 1 neg  When I saw him at ED, she was in no acute distress. JVD. Occ crackles on auscultatioon. Trace edema.  Occ rackles      Home Medications  No current facility-administered medications on file prior to encounter.     Current Outpatient Prescriptions on File Prior to Encounter   Medication Sig Dispense Refill   • lansoprazole (PREVACID) 30 MG CAPSULE DELAYED RELEASE Take 1 Cap by mouth every day. 90 Cap 2   • clopidogrel (PLAVIX) 75 MG Tab Take 1 Tab by mouth every day. 90 Tab 3   • losartan (COZAAR) 25 MG Tab Take 1 Tab by mouth every day. 90 Tab 3   • nitroglycerin (NITROSTAT) 0.4 MG SL Tab Place 1 Tab under tongue as needed for Chest Pain (up to 3 doses (if SBP greater than 90 mmHg)). 25 Tab 3   • aspirin EC (ECOTRIN) 81 MG Tablet Delayed Response Take 81 mg by mouth every evening. 30 Tab 0   • Cholecalciferol (VITAMIN D) 2000 UNIT TABS Take 1 Tab by mouth every day.         Allergies  Codeine    Past Medical and Surgical History  Past Medical History   Diagnosis Date   • Insomnia    • Flushing reaction      has had full work up with cardiology, would awake with symptoms at night   • Hyperlipidemia 10/20/2010   • GERD  (gastroesophageal reflux disease)    • Liver cyst      has been seen by GI, ultrasound 9/12   • Need for Tdap vaccination      in 2012   • S/P colonoscopy 11/9/2012   • Indigestion    • Urinary bladder disorder    • Unspecified urinary incontinence    • Dental disorder      upper and lower dentures   • FRIDA (obstructive sleep apnea)      states no cpap   • Heart burn 2014     pt on prevacid   • Arthritis      generalized + hands   • Myocardial infarct (CMS-HCC) 1984     pt denies states sometimes irreg rhythm   • Angina 2014     pt denies    • Hypertension 2014     pt states well controlled on meds   • Sick sinus syndrome (CMS-HCC) 1/1/2017   • Symptomatic sinus bradycardia 1/1/2017     Past Surgical History   Procedure Laterality Date   • Tonsillectomy     • Hysterectomy, vaginal       ovaries were left   • Recovery  3/29/2013     Performed by Cath-Recovery Surgery at SURGERY SAME DAY [facility] ORS   • Cataract phaco with iol  9/19/2013     Performed by Sylvester Raza M.D. at SURGERY [facility] ORS   • Cataract phaco with iol  10/3/2013     Performed by Sylvester Raza M.D. at SURGERY [facility] ORS   • Knee arthroplasty total  12/16/2014     Performed by Jose G Trotter M.D. at SURGERY [facility] ORS   • Cardiac cath  1/1/17     Overlapping Synergy stents to 99% Circ   • Cardiac cath  1/19/17     Stents patent.   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 No fevers or chills.  Eyes: No vision loss.  ENT: No nasal congestion, epistaxis. No hearing loss.  Respiratory: No shortness of breath, productive coughing, wheezing. No hemoptysis.  Cardiovascular: Chest pain. No palpitations, murmur or claudications. Orthopnea. No exertional chest pain. Dyspnea on exertion.  Gastrointestinal: No nausea, vomiting, diarrhea, constipation. No abdominal pain.  Genitourinary: No dysuria. No incontinence.  Musoskeletal: No falls, myalgias or arthralgias.  Integumentary: No new rashes  Heme: No obvious lymphadenopathy  Neurologic: No headaches, loss of consciousness or seizure activity.  Psychiatric: Stable mood. Not currently anxious or depressed.    Physical Exam  Filed Vitals:    07/10/17 1130 07/10/17 1146 07/10/17 1200 07/10/17 1216   BP:       Pulse:   63 59   Temp:       Resp: 15 21 20 16   Height:       Weight:       SpO2:   95% 97%       Physical Exam    General/Constitutional: No acute distress.   Head: Normocephalic, atraumatic  ENT: Oral mucosa is moist. No obvious pharyngeal exudates  Eyes: Pink conjunctiva, no scleral icterus  Neck: Supple, no lymphadenopathy. JVD  Cardiovascular: Normal rate and regular rhythm. S1,2 noted. No murmurs, gallops or rubs.  Pulmonary: Occ crackles  Abdominal: Soft, nontender, not distended, bowel sounds normoactive. No guarding or peritoneal signs.  Musculoskeletal: No tenderness to palpation of chest wall. Lower extremity edema  Neurologic: Alert and oriented. Grossly nonfocal, moving all extremities.  Genitourinary: No gross hematuria  Skin: No obvious rash.  Psychiatric: Pleasant, cooperative.  Vitals Reviewed  Labs Reviewed  Imaging reviewed  Nursing notes reviewed    Labs  Lab Results    Component Value Date/Time    WBC 10.1 07/10/2017 11:13 AM    RBC 4.82 07/10/2017 11:13 AM    HEMOGLOBIN 14.1 07/10/2017 11:13 AM    HEMATOCRIT 43.6 07/10/2017 11:13 AM    MCV 90.5 07/10/2017 11:13 AM    MCH 29.3 07/10/2017 11:13 AM    MCHC 32.3* 07/10/2017 11:13 AM    MPV 11.6 07/10/2017 11:13 AM    NEUTROPHILS-POLYS 70.90 07/10/2017 11:13 AM    LYMPHOCYTES 20.20* 07/10/2017 11:13 AM    MONOCYTES 6.30 07/10/2017 11:13 AM    EOSINOPHILS 1.10 07/10/2017 11:13 AM    BASOPHILS 0.90 07/10/2017 11:13 AM      Lab Results   Component Value Date/Time    SODIUM 140 07/10/2017 11:13 AM    POTASSIUM 3.7 07/10/2017 11:13 AM    CHLORIDE 105 07/10/2017 11:13 AM    CO2 25 07/10/2017 11:13 AM    GLUCOSE 96 07/10/2017 11:13 AM    BUN 11 07/10/2017 11:13 AM    CREATININE 0.69 07/10/2017 11:13 AM      Lab Results   Component Value Date/Time    PT 13.2 07/10/2017 11:13 AM    INR 0.97 07/10/2017 11:13 AM        Imaging  Dx-chest-limited (1 View)    7/10/2017  7/10/2017 10:38 AM HISTORY/REASON FOR EXAM:  Chest Pain TECHNIQUE/EXAM DESCRIPTION AND NUMBER OF VIEWS: Single portable view of the chest. COMPARISON: 1/19/2017 FINDINGS: The mediastinal and cardiac silhouette is mildly enlarged. Central vessels are slightly ill-defined. There is atherosclerosis of the aorta. The lung parenchyma is clear. There is no significant pleural effusion. There is no visible pneumothorax. There are no acute bony abnormalities.     7/10/2017  1.  Mildly enlarged cardiac silhouette with probable central vascular congestion.      Assessment and Plan    Congestive heart failure exacerbation. Acute on chronic LV systolic failure with EF 50% blast echo. Acute diastolic failure  Pulmonary vascular congestion. Respiratory, O2 per protocol, fluid restriction, diuresis    Uncontrolled hypertension. Could trigger heart failure. Continue her home antihypertensives, initiate PRN antihypertensives.    GERD. Use famotidine instead of PPI as she is on Plavix.    CAD.  Continue ASA, Plavix, BB and statin    Hyperlipidemia. Contiue statin    Pharmacoloic DVT prophylaxis    I spent 78 minutes evaluating Griselda Farmer, reviewing the chart, vitals, labs and imaging, discussing the case with ED physician, medication reconciliation, placing orders and enacting the plan above.    RENETTA Falcon.

## 2017-07-10 NOTE — PROGRESS NOTES
Chief Complaint   Patient presents with   • Chest Pain     pressure comes an goes x 2 days   • Gastrophageal Reflux       HISTORY OF PRESENT ILLNESS: Patient is a 72 y.o. female who presents today for evaluation of chest pressure that started 7/8/17 in the late afternoon. She was at rest when it started. The symptoms have been coming and going, describing it as pressure like difficulty breathing. She does report some low-grade nausea without vomiting and diaphoresis associated with the symptoms. She took a nitroglycerin 7/8 with no change in her symptoms. She has only had a few episodes today but had so many episodes yesterday she lost count. She denies overt pain but does report some heartburn and some acid reflux. Her symptoms are better when she is standing up and worse when she is sitting or supine. She feels like she needs to take a deep breath but has slight difficulty doing so. She has a NSTEMI 1/1/17 with a stented circumflex. Patient is on a daily low-dose aspirin and Plavix. Patient last saw her cardiologist 6/6/17.    Patient Active Problem List    Diagnosis Date Noted   • NSTEMI (non-ST elevated myocardial infarction) (CMS-HCC) 01/02/2017     Priority: High   • Sick sinus syndrome (CMS-HCC) 01/01/2017     Priority: High   • Coronary artery disease, non-occlusive 06/08/2017   • S/P coronary artery stent placement 06/08/2017   • Healthcare maintenance 06/08/2017   • Onychorrhexis 06/08/2017   • Bradycardia with less than 30 beats per minute 01/02/2017   • Symptomatic sinus bradycardia 01/01/2017   • Obesity (BMI 30-39.9) 12/07/2016   • Incomplete tear of right rotator cuff 09/19/2016   • Right shoulder pain 06/06/2016   • 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   • Nonhealing nonsurgical wound 06/23/2015   • Verruca plana 06/23/2015   • Overriding toe of left foot 01/20/2015   • Primary localized osteoarthrosis, lower leg 12/16/2014   • Left knee pain 08/28/2014   • Elevated fasting glucose 07/16/2014    • Osteoarthrosis involving multiple sites 07/15/2014   • Vitamin D deficiency disease 07/15/2014   • CAD (coronary artery disease) 03/29/2013   • GERD (gastroesophageal reflux disease) 03/06/2013   • Cough due to ACE inhibitor 06/26/2012   • Liver cyst 10/17/2011   • Arthritis 11/29/2010   • Mixed hyperlipidemia 10/20/2010   • Essential hypertension, benign 08/11/2010   • Insomnia 08/11/2010   • FRIDA (obstructive sleep apnea) 08/11/2010       Allergies:Codeine    Current Outpatient Prescriptions Ordered in [facility]   Medication Sig Dispense Refill   • lansoprazole (PREVACID) 30 MG CAPSULE DELAYED RELEASE Take 1 Cap by mouth every day. 90 Cap 2   • atorvastatin (LIPITOR) 40 MG Tab Take 1 Tab by mouth every evening. 90 Tab 3   • clopidogrel (PLAVIX) 75 MG Tab Take 1 Tab by mouth every day. 90 Tab 3   • losartan (COZAAR) 25 MG Tab Take 1 Tab by mouth every day. 90 Tab 3   • nitroglycerin (NITROSTAT) 0.4 MG SL Tab Place 1 Tab under tongue as needed for Chest Pain (up to 3 doses (if SBP greater than 90 mmHg)). 25 Tab 3   • aspirin EC (ECOTRIN) 81 MG Tablet Delayed Response Take 81 mg by mouth every evening. 30 Tab 0   • Cholecalciferol (VITAMIN D) 2000 UNIT TABS Take 1 Tab by mouth every day.       No current [facility]-ordered facility-administered medications on file.       Past Medical History   Diagnosis Date   • Insomnia    • Flushing reaction      has had full work up with cardiology, would awake with symptoms at night   • Hyperlipidemia 10/20/2010   • GERD (gastroesophageal reflux disease)    • Liver cyst      has been seen by GI, ultrasound 9/12   • Need for Tdap vaccination      in 2012   • S/P colonoscopy 11/9/2012   • Indigestion    • Urinary bladder disorder    • Unspecified urinary incontinence    • Dental disorder      upper and lower dentures   • FRIDA (obstructive sleep apnea)      states no cpap   • Heart burn 2014     pt on prevacid   • Arthritis      generalized + hands   • Myocardial infarct (CMS-HCC) 1984      "pt denies states sometimes irreg rhythm   • Angina      pt denies    • Hypertension      pt states well controlled on meds   • Sick sinus syndrome (CMS-HCC) 2017   • Symptomatic sinus bradycardia 2017       Social History   Substance Use Topics   • Smoking status: Former Smoker -- 0.50 packs/day for 15 years     Types: Cigarettes     Quit date: 1975   • Smokeless tobacco: Never Used   • Alcohol Use: No       Family Status   Relation Status Death Age   • Mother  88   • Father  93     Family History   Problem Relation Age of Onset   • Diabetes Mother      mild   • Heart Disease Neg Hx    • Hypertension Neg Hx    • Cancer Father      melanoma mild       ROS:    Review of Systems   Constitutional: Negative for fever, chills, weight loss and malaise/fatigue.   HENT: Negative for ear pain, nosebleeds, congestion, sore throat and neck pain.    Eyes: Negative for blurred vision.   Respiratory: Negative for cough, sputum production, and wheezing.    Cardiovascular: Negative for  palpitations, orthopnea and leg swelling.   Gastrointestinal: Negative for heartburn, vomiting and abdominal pain.   Genitourinary: Negative for dysuria, urgency and frequency.       Exam:  Blood pressure 150/100, pulse 85, temperature 36.1 °C (97 °F), resp. rate 16, height 1.651 m (5' 5\"), weight 94.711 kg (208 lb 12.8 oz), SpO2 96 %.  General: Normal appearing. No distress.  HEENT: Head is grossly normal.  Neck: No carotid bruits noted.  Pulmonary: Clear to ausculation and percussion.  Normal effort. No rales, ronchi, or wheezing.   Cardiovascular: Regular rate and rhythm without murmur.  Radial and pedal pulses are strong and equal bilaterally.  Neurologic: Grossly nonfocal.  Extremities: No lower extremity edema noted.  Skin: No obvious lesions.  Psych: Normal mood. Alert and oriented x3. Judgment and insight is normal.    EKG, per my interpretation: No significant changes when compared to the EKG from " February of this year. No ST elevation/depression noted.    Assessment/Plan:  Transferring to [facility] ED via ambulance. Discussed patient with Dr. Mohamud.   1. Chest pressure     2. SOB (shortness of breath)     3. Heartburn     4. History of non-ST elevation myocardial infarction (NSTEMI)      1/1/2017. Followed by cardiology.   5. Essential hypertension      Higher than normal.

## 2017-07-10 NOTE — MR AVS SNAPSHOT
"        Griselda Farmer   7/10/2017 9:05 AM   Office Visit   MRN: [id number]    Department:  [facility]   Dept Phone:  [phone]    Description:  Female : 1945   Provider:  Milagro Aparicio PA-C           Reason for Visit     Chest Pain pressure comes an goes x 2 days    Gastrophageal Reflux           Allergies as of 7/10/2017     Allergen Noted Reactions    Codeine 2010   Itching, Vomiting    Rxn = years ago         You were diagnosed with     Chest pressure   [753328]       SOB (shortness of breath)   [794691]       Heartburn   [787.1.ICD-9-CM]       History of non-ST elevation myocardial infarction (NSTEMI)   [458089]   2017. Followed by cardiology.    Essential hypertension   [4209878]   Higher than normal.      Vital Signs     Blood Pressure Pulse Temperature Respirations Height Weight    150/100 mmHg 85 36.1 °C (97 °F) 16 1.651 m (5' 5\") 94.711 kg (208 lb 12.8 oz)    Body Mass Index Oxygen Saturation Smoking Status             34.75 kg/m2 96% Former Smoker         Basic Information     Date Of Birth Sex Race Ethnicity Preferred Language    1945 Female White Non- English      Your appointments     Dec 04, 2017  6:30 AM   Adult Draw/Collection with [facility] (--)    [address] [place] [id number] [phone]            Dec 06, 2017 10:45 AM   FOLLOW UP with Jose G Vallecillo M.D.   [facility] (--)    [address] [place] [address] [place] [id number] [phone]            Dec 13, 2017  8:00 AM   NEW TO YOU with JACEK Matamoros   [facility] [place] ([place]    [address]  [place] [address] [phone]              Problem List              ICD-10-CM Priority Class Noted - Resolved    Essential hypertension, benign I10   2010 - Present    Insomnia G47.00   2010 - Present    FRIDA (obstructive sleep apnea) G47.33   2010 - Present    Mixed " hyperlipidemia E78.2   10/20/2010 - Present    Arthritis M19.90   11/29/2010 - Present    Liver cyst K76.89   10/17/2011 - Present    Cough due to ACE inhibitor R05, T46.4X5A   6/26/2012 - Present    GERD (gastroesophageal reflux disease) K21.9   3/6/2013 - Present    CAD (coronary artery disease) I25.10   3/29/2013 - Present    Osteoarthrosis involving multiple sites M15.9   7/15/2014 - Present    Vitamin D deficiency disease E55.9   7/15/2014 - Present    Elevated fasting glucose R73.01   7/16/2014 - Present    Left knee pain M25.562   8/28/2014 - Present    Primary localized osteoarthrosis, lower leg M17.10   12/16/2014 - Present    Overriding toe of left foot M20.5X2   1/20/2015 - Present    Nonhealing nonsurgical wound T14.8   6/23/2015 - Present    Verruca plana B07.8   6/23/2015 - Present    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   4/25/2016 - Present    Right shoulder pain M25.511   6/6/2016 - Present    Incomplete tear of right rotator cuff M75.111   9/19/2016 - Present    Obesity (BMI 30-39.9) E66.9   12/7/2016 - Present    Sick sinus syndrome (CMS-HCC) I49.5 High  1/1/2017 - Present    Symptomatic sinus bradycardia R00.1   1/1/2017 - Present    NSTEMI (non-ST elevated myocardial infarction) (CMS-HCC) I21.4 High  1/2/2017 - Present    Bradycardia with less than 30 beats per minute R00.1   1/2/2017 - Present    Coronary artery disease, non-occlusive I25.10   6/8/2017 - Present    S/P coronary artery stent placement Z95.5   6/8/2017 - Present    Healthcare maintenance Z00.00   6/8/2017 - Present    Onychorrhexis L60.3   6/8/2017 - Present    CHF exacerbation (CMS-HCC) I50.9   7/10/2017 - Present    Chest pain R07.9   7/10/2017 - Present      Health Maintenance        Date Due Completion Dates    BONE DENSITY 3/23/2016 3/23/2011    MAMMOGRAM 2/23/2017 2/23/2015, 6/29/2012, 3/23/2011    IMM INFLUENZA (1) 9/1/2017 11/7/2016, 1/28/2016, 10/1/2014, 9/23/2013, 11/9/2012, 10/3/2011    COLONOSCOPY 10/9/2021  10/9/2011 (N/S)    Override on 10/9/2011: (N/S)    IMM DTaP/Tdap/Td Vaccine (2 - Td) 6/8/2027 6/8/2017            Current Immunizations     13-VALENT PCV PREVNAR 9/7/2015    Influenza TIV (IM) 11/7/2016, 1/28/2016, 10/1/2014, 9/23/2013, 11/9/2012  8:24 AM, 10/3/2011    Pneumococcal Vaccine (UF)Historical Data 10/17/2009    Pneumococcal polysaccharide vaccine (PPSV-23) 1/2/2017  5:43 AM    SHINGLES VACCINE 11/9/2012  8:24 AM    Tdap Vaccine 6/8/2017    Tetanus Vaccine 1/1/2005      Below and/or attached are the medications your provider expects you to take. Review all of your home medications and newly ordered medications with your provider and/or pharmacist. Follow medication instructions as directed by your provider and/or pharmacist. Please keep your medication list with you and share with your provider. Update the information when medications are discontinued, doses are changed, or new medications (including over-the-counter products) are added; and carry medication information at all times in the event of emergency situations     Allergies:  CODEINE - Itching,Vomiting               Medications  Valid as of: July 10, 2017 -  3:43 PM    Generic Name Brand Name Tablet Size Instructions for use    Aspirin (Tablet Delayed Response) ECOTRIN 81 MG Take 81 mg by mouth every evening.        Atorvastatin Calcium (Tab) LIPITOR 40 MG Take 40 mg by mouth every day.        Cholecalciferol (Tab) vitamin D 2000 UNIT Take 1 Tab by mouth every day.        Clopidogrel Bisulfate (Tab) PLAVIX 75 MG Take 1 Tab by mouth every day.        Lansoprazole (CAPSULE DELAYED RELEASE) PREVACID 30 MG Take 1 Cap by mouth every day.        Losartan Potassium (Tab) COZAAR 25 MG Take 1 Tab by mouth every day.        Nitroglycerin (SL Tab) NITROSTAT 0.4 MG Place 1 Tab under tongue as needed for Chest Pain (up to 3 doses (if SBP greater than 90 mmHg)).        .                 Medicines prescribed today were sent to:     [id number] -  [place], [place] - [address]    [address] [place] [address]    Phone: [phone] Fax: [phone]    Open 24 Hours?: No      Medication refill instructions:       If your prescription bottle indicates you have medication refills left, it is not necessary to call your provider’s office. Please contact your pharmacy and they will refill your medication.    If your prescription bottle indicates you do not have any refills left, you may request refills at any time through one of the following ways: The online [employer] system (except Urgent Care), by calling your provider’s office, or by asking your pharmacy to contact your provider’s office with a refill request. Medication refills are processed only during regular business hours and may not be available until the next business day. Your provider may request additional information or to have a follow-up visit with you prior to refilling your medication.   *Please Note: Medication refills are assigned a new Rx number when refilled electronically. Your pharmacy may indicate that no refills were authorized even though a new prescription for the same medication is available at the pharmacy. Please request the medicine by name with the pharmacy before contacting your provider for a refill.           [employer] Access Code: Activation code not generated  Current [employer] Status: Active

## 2017-07-10 NOTE — IP AVS SNAPSHOT
" Home Care Instructions                                                                                                                  Name:Griselda Farmer  Medical Record Number:[id number]  CSN: [id number]    YOB: 1945   Age: 72 y.o.  Sex: female  HT:1.651 m (5' 5\") WT: 95.4 kg (210 lb 5.1 oz)          Admit Date: 7/10/2017     Discharge Date:   Today's Date: 7/11/2017  Attending Doctor:  Anthony Rm M.D.                  Allergies:  Codeine            Discharge Instructions       Discharge Instructions    Discharged to home by car with relative. Discharged via wheelchair, hospital escort: Yes.  Special equipment needed: Not Applicable    Be sure to schedule a follow-up appointment with your primary care doctor or any specialists as instructed.     Discharge Plan:   Diet Plan: Discussed  Activity Level: Discussed  Confirmed Follow up Appointment: Appointment Scheduled  Confirmed Symptoms Management: Discussed  Medication Reconciliation Updated: Yes  Influenza Vaccine Indication: Not indicated: Previously immunized this influenza season and > 8 years of age    I understand that a diet low in cholesterol, fat, and sodium is recommended for good health. Unless I have been given specific instructions below for another diet, I accept this instruction as my diet prescription.   Other diet:     Special Instructions:   HF Patient Discharge Instructions  · Monitor your weight daily, and maintain a weight chart, to track your weight changes.   · Activity as tolerated, unless your Doctor has ordered otherwise. Other activity order: light activity.  · Follow a low fat, low cholesterol, low salt diet unless instructed otherwise by your Doctor. Read the labels on the back of food products and track your intake of fat, cholesterol and salt.   · Fluid Restriction No. If a Fluid Restriction has been ordered by your Doctor, measure fluids with a measuring cup to ensure that you are not exceeding the restriction. "   · No smoking.  · Oxygen No. If your Doctor has ordered that you wear Oxygen at home, it is important to wear it as ordered.  · Did you receive an explanation from staff on the importance of taking each of your medications and why it is necessary to keep taking them unless your doctor says to stop? Yes  · Were all of your questions answered about how to manage your heart failure and what to do if you have increased signs and symptoms after you go home? Yes  · Do you feel like your heart failure care team involved you in the care treatment plan and allowed you to make decisions regarding your care while in the hospital and addressed any discharge needs you might have? Yes    See the educational handout provided at discharge for more information on monitoring your daily weight, activity and diet. This also explains more about Heart Failure, symptoms of a flare-up and some of the tests that you have undergone.     Warning Signs of a Flare-Up include:  · Swelling in the ankles or lower legs.  · Shortness of breath, while at rest, or while doing normal activities.   · Shortness of breath at night when in bed, or coughing in bed.   · Requiring more pillows to sleep at night, or needing to sit up at night to sleep.  · Feeling weak, dizzy or fatigued.     When to call your Doctor:  · Call [facility] seven days a week from 8:00 a.m. to 8:00 p.m. for medical questions [phone].  · Call your Primary Care Physician or Cardiologist if:   · You experience any pain radiating to your jaw or neck.  · You have any difficulty breathing.  · You experience weight gain of 3 lbs in a day or 5 lbs in a week.   · You feel any palpitations or irregular heartbeats.  · You become dizzy or lose consciousness.   If you have had an angiogram or had a pacemaker or AICD placed, and experience:  · Bleeding, drainage or swelling at the surgical / puncture site.  · Fever greater than 100.0 F  · Shock from internal  defibrillator.  · Cool and / or numb extremities.      · Is patient discharged on Warfarin / Coumadin?   No     · Is patient Post Blood Transfusion?  No    Depression / Suicide Risk    As you are discharged from [facility], it is important to learn how to keep safe from harming yourself.    Recognize the warning signs:  · Abrupt changes in personality, positive or negative- including increase in energy   · Giving away possessions  · Change in eating patterns- significant weight changes-  positive or negative  · Change in sleeping patterns- unable to sleep or sleeping all the time   · Unwillingness or inability to communicate  · Depression  · Unusual sadness, discouragement and loneliness  · Talk of wanting to die  · Neglect of personal appearance   · Rebelliousness- reckless behavior  · Withdrawal from people/activities they love  · Confusion- inability to concentrate     If you or a loved one observes any of these behaviors or has concerns about self-harm, here's what you can do:  · Talk about it- your feelings and reasons for harming yourself  · Remove any means that you might use to hurt yourself (examples: pills, rope, extension cords, firearm)  · Get professional help from the community (Mental Health, Substance Abuse, psychological counseling)  · Do not be alone:Call your Safe Contact- someone whom you trust who will be there for you.  · Call your local CRISIS HOTLINE 400-2805 or 646-339-3839  · Call your local Children's Mobile Crisis Response Team [place] (900) 270-7132 or www.[employer]  · Call the toll free National Suicide Prevention Hotlines   · National Suicide Prevention Lifeline 886-196-XWGE (1247)  · National Hope Line Network 800-SUICIDE (471-5461)      Metoprolol extended-release tablets  What is this medicine?  METOPROLOL (me TOE proe lole) is a beta-blocker. Beta-blockers reduce the workload on the heart and help it to beat more regularly. This medicine is used to  treat high blood pressure and to prevent chest pain. It is also used to after a heart attack and to prevent an additional heart attack from occurring.  This medicine may be used for other purposes; ask your health care provider or pharmacist if you have questions.  COMMON BRAND NAME(S): Toprol XL  What should I tell my health care provider before I take this medicine?  They need to know if you have any of these conditions:  -diabetes  -heart or vessel disease like slow heart rate, worsening heart failure, heart block, sick sinus syndrome or Raynaud's disease  -kidney disease  -liver disease  -lung or breathing disease, like asthma or emphysema  -pheochromocytoma  -thyroid disease  -an unusual or allergic reaction to metoprolol, other beta-blockers, medicines, foods, dyes, or preservatives  -pregnant or trying to get pregnant  -breast-feeding  How should I use this medicine?  Take this medicine by mouth with a glass of water. Follow the directions on the prescription label. Do not crush or chew. Take this medicine with or immediately after meals. Take your doses at regular intervals. Do not take more medicine than directed. Do not stop taking this medicine suddenly. This could lead to serious heart-related effects.  Talk to your pediatrician regarding the use of this medicine in children. While this drug may be prescribed for children as young as 6 years for selected conditions, precautions do apply.  Overdosage: If you think you have taken too much of this medicine contact a poison control center or emergency room at once.  NOTE: This medicine is only for you. Do not share this medicine with others.  What if I miss a dose?  If you miss a dose, take it as soon as you can. If it is almost time for your next dose, take only that dose. Do not take double or extra doses.  What may interact with this medicine?  Do not take this medicine with any of the following medications:  -sotalol  This medicine may also interact with  the following medications:  -clonidine  -digoxin  -dobutamine  -epinephrine  -isoproterenol  -medicine to control heart rhythm like quinidine, propafenone  -medicine for depression like monoamine oxidase (MAO) inhibitors, fluoxetine, and paroxetine  -medicine for blood pressure like calcium channel blockers  -reserpine  This list may not describe all possible interactions. Give your health care provider a list of all the medicines, herbs, non-prescription drugs, or dietary supplements you use. Also tell them if you smoke, drink alcohol, or use illegal drugs. Some items may interact with your medicine.  What should I watch for while using this medicine?  Visit your doctor or health care professional for regular check ups. Contact your doctor right away if your symptoms worsen. Check your blood pressure and pulse rate regularly. Ask your health care professional what your blood pressure and pulse rate should be, and when you should contact them.  You may get drowsy or dizzy. Do not drive, use machinery, or do anything that needs mental alertness until you know how this medicine affects you. Do not sit or stand up quickly, especially if you are an older patient. This reduces the risk of dizzy or fainting spells. Contact your doctor if these symptoms continue. Alcohol may interfere with the effect of this medicine. Avoid alcoholic drinks.  What side effects may I notice from receiving this medicine?  Side effects that you should report to your doctor or health care professional as soon as possible:  -allergic reactions like skin rash, itching or hives  -cold or numb hands or feet  -depression  -difficulty breathing  -faint  -fever with sore throat  -irregular heartbeat, chest pain  -rapid weight gain  -swollen legs or ankles  Side effects that usually do not require medical attention (report to your doctor or health care professional if they continue or are bothersome):  -anxiety or nervousness  -change in sex drive or  performance  -dry skin  -headache  -nightmares or trouble sleeping  -short term memory loss  -stomach upset or diarrhea  -unusually tired  This list may not describe all possible side effects. Call your doctor for medical advice about side effects. You may report side effects to FDA at 3-631-FDA-4041.  Where should I keep my medicine?  Keep out of the reach of children.  Store at room temperature between 15 and 30 degrees C (59 and 86 degrees F). Throw away any unused medicine after the expiration date.  NOTE: This sheet is a summary. It may not cover all possible information. If you have questions about this medicine, talk to your doctor, pharmacist, or health care provider.  © 2014, [employer]. (2/27/2009 5:08:10 PM)    Chlorthalidone tablets  What is this medicine?  CHLORTHALIDONE (klor GIRMA sargent done) is a diuretic. It increases the amount of urine passed, which causes the body to lose salt and water. This medicine is used to treat high blood pressure and edema or water retention.  This medicine may be used for other purposes; ask your health care provider or pharmacist if you have questions.  COMMON BRAND NAME(S): Thalitone   What should I tell my health care provider before I take this medicine?  They need to know if you have any of these conditions:  -asthma  -diabetes  -gout  -kidney disease  -liver disease  -parathyroid disease  -systemic lupus erythematosus (SLE)  -taking cortisone, digoxin, lithium carbonate, or drugs for diabetes  -an unusual or allergic reaction to chlorthalidone, sulfa drugs, other medicines, foods, dyes, or preservatives  -pregnant or trying to get pregnant  -breast-feeding  How should I use this medicine?  Take this medicine by mouth with a glass of water. Follow the directions on the prescription label. It is best to take your dose in the morning with food. Take your medicine at regular intervals. Do not take your medicine more often than directed. Do not stop taking except  on your doctor's advice.  Talk to your pediatrician regarding the use of this medicine in children. Special care may be needed.  Overdosage: If you think you have taken too much of this medicine contact a poison control center or emergency room at once.  NOTE: This medicine is only for you. Do not share this medicine with others.  What if I miss a dose?  If you miss a dose, take it as soon as you can. If it is almost time for your next dose, take only that dose. Do not take double or extra doses.  What may interact with this medicine?  -barbiturate medicines for sleep or seizure control  -digoxin  -lithium  -medicines for diabetes  -norepinephrine  -other medicines for high blood pressure  -some pain medicines  -steroid hormones like prednisone, cortisone, hydrocortisone, corticotropin  -tubocurarine  This list may not describe all possible interactions. Give your health care provider a list of all the medicines, herbs, non-prescription drugs, or dietary supplements you use. Also tell them if you smoke, drink alcohol, or use illegal drugs. Some items may interact with your medicine.  What should I watch for while using this medicine?  Visit your doctor or health care professional for regular check ups. Check your blood pressure as directed. Ask your doctor or health care professional what your blood pressure should be and when you should contact him or her.  You may need to be on a special diet while taking this medicine. Ask your doctor.  You may get drowsy or dizzy. Do not drive, use machinery, or do anything that needs mental alertness until you know how this medicine affects you. Do not stand or sit up quickly, especially if you are an older patient. This reduces the risk of dizzy or fainting spells. Alcohol may interfere with the effect of this medicine. Avoid alcoholic drinks.  This medicine may affect your blood sugar level. If you have diabetes, check with your doctor or health care professional before  changing the dose of your diabetic medicine.  This medicine can make you more sensitive to the sun. Keep out of the sun. If you cannot avoid being in the sun, wear protective clothing and use sunscreen. Do not use sun lamps or tanning beds/booths.  What side effects may I notice from receiving this medicine?  Side effects that you should report to your doctor or health care professional as soon as possible:  -allergic reactions like skin rash, itching or hives, swelling of the face, lips, or tongue  -dark urine  -dry mouth  -excess thirst  -fast, irregular heart rate  -fever, chills  -muscle pain, cramps, or spasm  -nausea, vomiting  -redness, blistering, peeling or loosening of the skin, including inside the mouth  -tingling, pain or numbness in the hands or feet  -unusually weak or tired  -yellowing of the eyes or skin  Side effects that usually do not require medical attention (report to your doctor or health care professional if they continue or are bothersome):  -diarrhea or constipation  -headache  -impotence  -loss of appetite  -stomach upset  This list may not describe all possible side effects. Call your doctor for medical advice about side effects. You may report side effects to FDA at 3-504-FDA-3295.  Where should I keep my medicine?  Keep out of the reach of children.  Store at room temperature between 15 and 30 degrees C (59 and 86 degrees F). Keep container tightly closed. Throw away any unused medicine after the expiration date.  NOTE: This sheet is a summary. It may not cover all possible information. If you have questions about this medicine, talk to your doctor, pharmacist, or health care provider.  © 2014, [employer]. (3/24/2009 3:28:48 PM)  Heart Failure  Heart failure is a condition in which the heart has trouble pumping blood. This means your heart does not pump blood efficiently for your body to work well. In some cases of heart failure, fluid may back up into your lungs or you may  have swelling (edema) in your lower legs. Heart failure is usually a long-term (chronic) condition. It is important for you to take good care of yourself and follow your health care provider's treatment plan.  CAUSES   Some health conditions can cause heart failure. Those health conditions include:  · High blood pressure (hypertension). Hypertension causes the heart muscle to work harder than normal. When pressure in the blood vessels is high, the heart needs to pump (contract) with more force in order to circulate blood throughout the body. High blood pressure eventually causes the heart to become stiff and weak.  · Coronary artery disease (CAD). CAD is the buildup of cholesterol and fat (plaque) in the arteries of the heart. The blockage in the arteries deprives the heart muscle of oxygen and blood. This can cause chest pain and may lead to a heart attack. High blood pressure can also contribute to CAD.  · Heart attack (myocardial infarction). A heart attack occurs when one or more arteries in the heart become blocked. The loss of oxygen damages the muscle tissue of the heart. When this happens, part of the heart muscle dies. The injured tissue does not contract as well and weakens the heart's ability to pump blood.  · Abnormal heart valves. When the heart valves do not open and close properly, it can cause heart failure. This makes the heart muscle pump harder to keep the blood flowing.  · Heart muscle disease (cardiomyopathy or myocarditis). Heart muscle disease is damage to the heart muscle from a variety of causes. These can include drug or alcohol abuse, infections, or unknown reasons. These can increase the risk of heart failure.  · Lung disease. Lung disease makes the heart work harder because the lungs do not work properly. This can cause a strain on the heart, leading it to fail.  · Diabetes. Diabetes increases the risk of heart failure. High blood sugar contributes to high fat (lipid) levels in the  blood. Diabetes can also cause slow damage to tiny blood vessels that carry important nutrients to the heart muscle. When the heart does not get enough oxygen and food, it can cause the heart to become weak and stiff. This leads to a heart that does not contract efficiently.  · Other conditions can contribute to heart failure. These include abnormal heart rhythms, thyroid problems, and low blood counts (anemia).  Certain unhealthy behaviors can increase the risk of heart failure, including:  · Being overweight.  · Smoking or chewing tobacco.  · Eating foods high in fat and cholesterol.  · Abusing illicit drugs or alcohol.  · Lacking physical activity.  SYMPTOMS   Heart failure symptoms may vary and can be hard to detect. Symptoms may include:  · Shortness of breath with activity, such as climbing stairs.  · Persistent cough.  · Swelling of the feet, ankles, legs, or abdomen.  · Unexplained weight gain.  · Difficulty breathing when lying flat (orthopnea).  · Waking from sleep because of the need to sit up and get more air.  · Rapid heartbeat.  · Fatigue and loss of energy.  · Feeling light-headed, dizzy, or close to fainting.  · Loss of appetite.  · Nausea.  · Increased urination during the night (nocturia).  DIAGNOSIS   A diagnosis of heart failure is based on your history, symptoms, physical examination, and diagnostic tests. Diagnostic tests for heart failure may include:  · Echocardiography.  · Electrocardiography.  · Chest X-ray.  · Blood tests.  · Exercise stress test.  · Cardiac angiography.  · Radionuclide scans.  TREATMENT   Treatment is aimed at managing the symptoms of heart failure. Medicines, behavioral changes, or surgical intervention may be necessary to treat heart failure.  · Medicines to help treat heart failure may include:  ¨ Angiotensin-converting enzyme (ACE) inhibitors. This type of medicine blocks the effects of a blood protein called angiotensin-converting enzyme. ACE inhibitors relax  (dilate) the blood vessels and help lower blood pressure.  ¨ Angiotensin receptor blockers (ARBs). This type of medicine blocks the actions of a blood protein called angiotensin. Angiotensin receptor blockers dilate the blood vessels and help lower blood pressure.  ¨ Water pills (diuretics). Diuretics cause the kidneys to remove salt and water from the blood. The extra fluid is removed through urination. This loss of extra fluid lowers the volume of blood the heart pumps.  ¨ Beta blockers. These prevent the heart from beating too fast and improve heart muscle strength.  ¨ Digitalis. This increases the force of the heartbeat.  · Healthy behavior changes include:  ¨ Obtaining and maintaining a healthy weight.  ¨ Stopping smoking or chewing tobacco.  ¨ Eating heart-healthy foods.  ¨ Limiting or avoiding alcohol.  ¨ Stopping illicit drug use.  ¨ Physical activity as directed by your health care provider.  · Surgical treatment for heart failure may include:  ¨ A procedure to open blocked arteries, repair damaged heart valves, or remove damaged heart muscle tissue.  ¨ A pacemaker to improve heart muscle function and control certain abnormal heart rhythms.  ¨ An internal cardioverter defibrillator to treat certain serious abnormal heart rhythms.  ¨ A left ventricular assist device (LVAD) to assist the pumping ability of the heart.  HOME CARE INSTRUCTIONS   · Take medicines only as directed by your health care provider. Medicines are important in reducing the workload of your heart, slowing the progression of heart failure, and improving your symptoms.  ¨ Do not stop taking your medicine unless directed by your health care provider.  ¨ Do not skip any dose of medicine.  ¨ Refill your prescriptions before you run out of medicine. Your medicines are needed every day.  · Engage in moderate physical activity if directed by your health care provider. Moderate physical activity can benefit some people. The elderly and people with  severe heart failure should consult with a health care provider for physical activity recommendations.  · Eat heart-healthy foods. Food choices should be free of trans fat and low in saturated fat, cholesterol, and salt (sodium). Healthy choices include fresh or frozen fruits and vegetables, fish, lean meats, legumes, fat-free or low-fat dairy products, and whole grain or high fiber foods. Talk to a dietitian to learn more about heart-healthy foods.  · Limit sodium if directed by your health care provider. Sodium restriction may reduce symptoms of heart failure in some people. Talk to a dietitian to learn more about heart-healthy seasonings.  · Use healthy cooking methods. Healthy cooking methods include roasting, grilling, broiling, baking, poaching, steaming, or stir-frying. Talk to a dietitian to learn more about healthy cooking methods.  · Limit fluids if directed by your health care provider. Fluid restriction may reduce symptoms of heart failure in some people.  · Weigh yourself every day. Daily weights are important in the early recognition of excess fluid. You should weigh yourself every morning after you urinate and before you eat breakfast. Wear the same amount of clothing each time you weigh yourself. Record your daily weight. Provide your health care provider with your weight record.  · Monitor and record your blood pressure if directed by your health care provider.  · Check your pulse if directed by your health care provider.  · Lose weight if directed by your health care provider. Weight loss may reduce symptoms of heart failure in some people.  · Stop smoking or chewing tobacco. Nicotine makes your heart work harder by causing your blood vessels to constrict. Do not use nicotine gum or patches before talking to your health care provider.  · Keep all follow-up visits as directed by your health care provider. This is important.  · Limit alcohol intake to no more than 1 drink per day for nonpregnant  women and 2 drinks per day for men. One drink equals 12 ounces of beer, 5 ounces of wine, or 1½ ounces of hard liquor. Drinking more than that is harmful to your heart. Tell your health care provider if you drink alcohol several times a week. Talk with your health care provider about whether alcohol is safe for you. If your heart has already been damaged by alcohol or you have severe heart failure, drinking alcohol should be stopped completely.  · Stop illicit drug use.  · Stay up-to-date with immunizations. It is especially important to prevent respiratory infections through current pneumococcal and influenza immunizations.  · Manage other health conditions such as hypertension, diabetes, thyroid disease, or abnormal heart rhythms as directed by your health care provider.  · Learn to manage stress.  · Plan rest periods when fatigued.  · Learn strategies to manage high temperatures. If the weather is extremely hot:  ¨ Avoid vigorous physical activity.  ¨ Use air conditioning or fans or seek a cooler location.  ¨ Avoid caffeine and alcohol.  ¨ Wear loose-fitting, lightweight, and light-colored clothing.  · Learn strategies to manage cold temperatures. If the weather is extremely cold:  ¨ Avoid vigorous physical activity.  ¨ Layer clothes.  ¨ Wear mittens or gloves, a hat, and a scarf when going outside.  ¨ Avoid alcohol.  · Obtain ongoing education and support as needed.  · Participate in or seek rehabilitation as needed to maintain or improve independence and quality of life.  SEEK MEDICAL CARE IF:   · You have a rapid weight gain.  · You have increasing shortness of breath that is unusual for you.  · You are unable to participate in your usual physical activities.  · You tire easily.  · You cough more than normal, especially with physical activity.  · You have any or more swelling in areas such as your hands, feet, ankles, or abdomen.  · You are unable to sleep because it is hard to breathe.  · You feel like your  heart is beating fast (palpitations).  · You become dizzy or light-headed upon standing up.  SEEK IMMEDIATE MEDICAL CARE IF:   · You have difficulty breathing.  · There is a change in mental status such as decreased alertness or difficulty with concentration.  · You have a pain or discomfort in your chest.  · You have an episode of fainting (syncope).  MAKE SURE YOU:   · Understand these instructions.  · Will watch your condition.  · Will get help right away if you are not doing well or get worse.     This information is not intended to replace advice given to you by your health care provider. Make sure you discuss any questions you have with your health care provider.     Document Released: 12/18/2006 Document Revised: 05/03/2016 Document Reviewed: 01/17/2014  [employer] Interactive Patient Education ©2016 [employer]vier Inc.      Your appointments     Jul 17, 2017  3:30 PM   Heart Failure New with Anastacia Saunders M.D.   [facility] (--)    [address] [place] [address] [address]  [facility] [id number] [phone]            Dec 04, 2017  6:30 AM   Adult Draw/Collection with LAB [facility]   [place] [facility] (--)    [address] [facility]   [facility] [address] [phone]            Dec 06, 2017 10:45 AM   FOLLOW UP with Jose G Vallecillo M.D.   [facility] (--)    [address] [place] [address] [address]  [facility] [id number] [phone]            Dec 13, 2017  8:00 AM   NEW TO YOU with JACEK Matamoros   [facility] [place]    [address] [facility] [place] [address] [phone]              Follow-up Information     1. Follow up with JACEK Lemos In 1 week.    Specialty:  Family Medicine    Contact information    [address] [facility] [id number] [phone]           Discharge Medication Instructions:    Below are the medications your physician expects you to take upon discharge:    Review all your home medications and  newly ordered medications with your doctor and/or pharmacist. Follow medication instructions as directed by your doctor and/or pharmacist.    Please keep your medication list with you and share with your physician.               Medication List      START taking these medications        Instructions    Morning Afternoon Evening Bedtime    chlorthalidone 25 MG Tabs   Start taking on:  7/12/2017   Commonly known as:  HYGROTON   Next Dose Due:  Take tomorrow morning. 7/12/2017          Take 1 Tab by mouth every day.   Dose:  25 mg                        metoprolol SR 25 MG Tb24   Commonly known as:  TOPROL XL   Next Dose Due:  Take tonight. 7/12/2017        Take 0.5 Tabs by mouth every evening.   Dose:  12.5 mg                          CONTINUE taking these medications        Instructions    Morning Afternoon Evening Bedtime    aspirin EC 81 MG Tbec   Commonly known as:  ECOTRIN   Next Dose Due:  Take tomorrow evening. 7/12/2017          Take 81 mg by mouth every evening.   Dose:  81 mg                        atorvastatin 40 MG Tabs   Last time this was given:  40 mg on 7/11/2017  9:11 AM   Commonly known as:  LIPITOR   Next Dose Due:  Take tomorrow morning. 7/12/2017        Take 40 mg by mouth every day.   Dose:  40 mg                        clopidogrel 75 MG Tabs   Last time this was given:  75 mg on 7/10/2017  8:47 PM   Commonly known as:  PLAVIX   Next Dose Due:  Take tomorrow morning. 7/12/2017        Take 1 Tab by mouth every day.   Dose:  75 mg                        lansoprazole 30 MG Cpdr   Commonly known as:  PREVACID   Next Dose Due:  Take tomorrow morning. 7/12/2017        Take 1 Cap by mouth every day.   Dose:  30 mg                        losartan 25 MG Tabs   Last time this was given:  50 mg on 7/11/2017  9:11 AM   Commonly known as:  COZAAR   Next Dose Due:  Take as directed          Doctor's comments:  Losartan 50 mg dose was done in error. Please give patient losartan 25 mg as her blood pressure is  low.   Take 1 Tab by mouth every day.   Dose:  25 mg                        nitroglycerin 0.4 MG Subl   Commonly known as:  NITROSTAT   Next Dose Due:  Take as needed.           Place 1 Tab under tongue as needed for Chest Pain (up to 3 doses (if SBP greater than 90 mmHg)).   Dose:  0.4 mg                        vitamin D 2000 UNIT Tabs   Last time this was given:  2,000 Units on 7/11/2017  9:11 AM   Next Dose Due:  Take tomorrow morning. 7/12/2017        Take 1 Tab by mouth every day.   Dose:  1 Tab                             Where to Get Your Medications      These medications were sent to [facility] - [place], [place] - [address]  [address], [place] [address]     Phone:  [phone]    - chlorthalidone 25 MG Tabs  - losartan 25 MG Tabs  - metoprolol SR 25 MG Tb24            Instructions           Diet / Nutrition:    Follow any diet instructions given to you by your doctor or the dietician, including how much salt (sodium) you are allowed each day.    If you are overweight, talk to your doctor about a weight reduction plan.    Activity:    Remain physically active following your doctor's instructions about exercise and activity.    Rest often.     Any time you become even a little tired or short of breath, SIT DOWN and rest.    Worsening Symptoms:    Report any of the following signs and symptoms to the doctor's office immediately:    *Pain of jaw, arm, or neck  *Chest pain not relieved by medication                               *Dizziness or loss of consciousness  *Difficulty breathing even when at rest   *More tired than usual                                       *Bleeding drainage or swelling of surgical site  *Swelling of feet, ankles, legs or stomach                 *Fever (>100ºF)  *Pink or blood tinged sputum  *Weight gain (3lbs/day or 5lbs /week)           *Shock from internal defibrillator (if applicable)  *Palpitations or irregular heartbeats                *Cool  and/or numb extremities    Stroke Awareness    Common Risk Factors for Stroke include:    Age  Atrial Fibrillation  Carotid Artery Stenosis  Diabetes Mellitus  Excessive alcohol consumption  High blood pressure  Overweight   Physical inactivity  Smoking    Warning signs and symptoms of a stroke include:    *Sudden numbness or weakness of the face, arm or leg (especially on one side of the body).  *Sudden confusion, trouble speaking or understanding.  *Sudden trouble seeing in one or both eyes.  *Sudden trouble walking, dizziness, loss of balance or coordination.Sudden severe headache with no known cause.    It is very important to get treatment quickly when a stroke occurs. If you experience any of the above warning signs, call 911 immediately.                   Disclaimer         Quit Smoking / Tobacco Use:    I understand the use of any tobacco products increases my chance of suffering from future heart disease or stroke and could cause other illnesses which may shorten my life. Quitting the use of tobacco products is the single most important thing I can do to improve my health. For further information on smoking / tobacco cessation call a Toll Free Quit Line at 1-691.267.9455 (*[employer] [place]) or 1-544.200.4503 (American Lung Association) or you can access the web based program at www.lungMovinto Fun.org.    [place] Tobacco Users Help Line:  (802) 785-5815       Toll Free: 1-877.727.3318  Quit Tobacco Program Critical access hospital Management Services (932)146-3195    Crisis Hotline:    [place] Crisis Hotline:  4-475-QVMDKKI or 1-729.812.3235    [place] Crisis Hotline:    1-686.257.9062 or 316-528-5143    Discharge Survey:   Thank you for choosing [facility]. We hope we did everything we could to make your hospital stay a pleasant one. You may be receiving a phone survey and we would appreciate your time and participation in answering the questions. Your input is very valuable to us in our efforts to improve our  service to our patients and their families.        My signature on this form indicates that:    1. I have reviewed and understand the above information.  2. My questions regarding this information have been answered to my satisfaction.  3. I have formulated a plan with my discharge nurse to obtain my prescribed medications for home.                  Disclaimer         __________________________________                     __________       ________                       Patient Signature                                                 Date                    Time

## 2017-07-10 NOTE — ED NOTES
Pt ambulatory to triage with steady gait with friend. Rx in hand, affirmation received with discharge instructions, teaching and followup

## 2017-07-10 NOTE — IP AVS SNAPSHOT
" <p align=\"LEFT\"><IMG SRC=\"//EMRWB/blob$/Images/[email or web address]\" alt=\"Image\" WIDTH=\"50%\" HEIGHT=\"200\" BORDER=\"\"></p>                   Name:Griselda Farmer  Medical Record Number:[id number]  CSN: [id number]    YOB: 1945   Age: 72 y.o.  Sex: female  HT:1.651 m (5' 5\") WT: 95.4 kg (210 lb 5.1 oz)          Admit Date: 7/10/2017     Discharge Date:   Today's Date: 7/11/2017  Attending Doctor:  Anthony Rm M.D.                  Allergies:  Codeine          Your appointments     Jul 17, 2017  3:30 PM   Heart Failure New with Anastacia Saunders M.D.   [facility] (--)    [address] [place] [address] [phone]            Dec 04, 2017  6:30 AM   Adult Draw/Collection with LAB [facility]   [place] [facility] (--)    [address] [place] [address] [address] [place] [address] [phone]            Dec 06, 2017 10:45 AM   FOLLOW UP with Jose G Vallecillo M.D.   [facility] (--)    [address] [place] [address] [phone]            Dec 13, 2017  8:00 AM   NEW TO YOU with JACEK Matamoros   [facility] [place]    [address] [place] [address] [phone]              Follow-up Information     1. Follow up with JACEK Lemos In 1 week.    Specialty:  Family Medicine    Contact information    [address] [place] [address] [phone]           Medication List      Take these Medications        Instructions    aspirin EC 81 MG Tbec   Commonly known as:  ECOTRIN    Take 81 mg by mouth every evening.   Dose:  81 mg       atorvastatin 40 MG Tabs   Commonly known as:  LIPITOR    Take 40 mg by mouth every day.   Dose:  40 mg       chlorthalidone 25 MG Tabs   Start taking on:  7/12/2017   Commonly known as:  HYGROTON    Take 1 Tab by mouth every day.   Dose:  25 mg       clopidogrel 75 MG Tabs   Commonly known as:  PLAVIX    Take 1 Tab by mouth every day.   Dose:  " 75 mg       lansoprazole 30 MG Cpdr   Commonly known as:  PREVACID    Take 1 Cap by mouth every day.   Dose:  30 mg       losartan 25 MG Tabs   Commonly known as:  COZAAR    Doctor's comments:  Losartan 50 mg dose was done in error. Please give patient losartan 25 mg as her blood pressure is low.   Take 1 Tab by mouth every day.   Dose:  25 mg       metoprolol SR 25 MG Tb24   Commonly known as:  TOPROL XL    Take 0.5 Tabs by mouth every evening.   Dose:  12.5 mg       nitroglycerin 0.4 MG Subl   Commonly known as:  NITROSTAT    Place 1 Tab under tongue as needed for Chest Pain (up to 3 doses (if SBP greater than 90 mmHg)).   Dose:  0.4 mg       vitamin D 2000 UNIT Tabs    Take 1 Tab by mouth every day.   Dose:  1 Tab

## 2017-07-10 NOTE — IP AVS SNAPSHOT
SkillHound Access Code: Activation code not generated  Current SkillHound Status: Active    [employer]  A secure, online tool to manage your health information     [employer]’s SkillHound® is a secure, online tool that connects you to your personalized health information from the privacy of your home -- day or night - making it very easy for you to manage your healthcare. Once the activation process is completed, you can even access your medical information using the SkillHound bethany, which is available for free in the Apple Bethany store or Google Play store.     SkillHound provides the following levels of access (as shown below):   My Chart Features   [facility] Primary Care Doctor [facility]  Specialists [facility]  Urgent  Care Non-[facility]  Primary Care  Doctor   Email your healthcare team securely and privately 24/7 X X X X   Manage appointments: schedule your next appointment; view details of past/upcoming appointments X      Request prescription refills. X      View recent personal medical records, including lab and immunizations X X X X   View health record, including health history, allergies, medications X X X X   Read reports about your outpatient visits, procedures, consult and ER notes X X X X   See your discharge summary, which is a recap of your hospital and/or ER visit that includes your diagnosis, lab results, and care plan. X X       How to register for SkillHound:  1. Go to  [email or web address].  2. Click on the Sign Up Now box, which takes you to the New Member Sign Up page. You will need to provide the following information:  a. Enter your SkillHound Access Code exactly as it appears at the top of this page. (You will not need to use this code after you’ve completed the sign-up process. If you do not sign up before the expiration date, you must request a new code.)   b. Enter your date of birth.   c. Enter your home email address.   d. Click Submit, and follow the next screen’s instructions.  3. Create a SkillHound ID. This will  be your [employer]ed[employer]Me login ID and cannot be changed, so think of one that is secure and easy to remember.  4. Create a [employer] password. You can change your password at any time.  5. Enter your Password Reset Question and Answer. This can be used at a later time if you forget your password.   6. Enter your e-mail address. This allows you to receive e-mail notifications when new information is available in FundedByMe.  7. Click Sign Up. You can now view your health information.    For assistance activating your FundedByMe account, call [phone]

## 2017-07-10 NOTE — ED NOTES
PT BIB [facility] from  for midsternal pressure CP and SOB x 3 days, Prior non- STEMI in January with stent placement x 2. Nitro at home with no relief, PTA pt given 1 nitro spray with full pain relief, 324mg ASA. VSS chart up for ERP. EKG done

## 2017-07-10 NOTE — ED NOTES
"Med rec updated and complete  Allergies reviewed  Pt states \"I take my 81MG ASPIRIN in the evening, but today (7/10/2017) I took one @ 0500\".  \"Then I had 3 more ASPIRIN 81MG in the ambulance and a nitrostat spray\".    "

## 2017-07-10 NOTE — CONSULTS
"                                                                         CARDIOLOGY CONSULT NOTE  Author : Suzanne Ellington (PGY2 IM Resident)    CHIEF COMPLAINT :  Chest pressure for 2 days.    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     Patient describes chest pressure - lower part of left chest, intermittent, comes and goes, 8/10 when severe, lasts few minutes, associated with mild nausea, shortness of breath, unrelated to exertion, no radiation. Relieved a little bit from nitroglycerin. Reports to have orthopnea.     Blood pressure elevated in the ED, patient reports to measure BP at home, which is around 130/80s, has been higher than normal today. Denies PND, leg edema, headache, visual changes, fever, chills, cough, sick contacts or prolonged travel. Reports to have changes in her diet for the past week, has been compliant with all her medications.     Does not smoke, does not drink alcohol, does not use recreational drugs.    REVIEW OF SYSTEMS :  Denies PND, leg edema, headache, visual changes, fever, chills, cough, sick contacts or prolonged travel.    PHYSICAL EXAM :  VITALS : /89, HR 74/min, Temp(Src) 36.6 °C (97.9 °F)  Resp 23  Ht 1.651 m (5' 5\")  Wt 88.451 kg (195 lb)  BMI 32.45 kg/m2  SpO2 97%  Constitutional: Well developed, Well nourished, No acute distress, Non-toxic appearance.    HENT: Normocephalic, Atraumatic, Bilateral external ears normal, Oropharynx moist, No oral exudates, Nose normal.    Eyes: PERRL, EOMI, Conjunctiva normal, No discharge.    Neck: Normal range of motion, No tenderness, Supple, No stridor. Mild elevated JVD.    Cardiovascular: Normal heart rate, Normal rhythm, No murmurs, No rubs, No gallops. "    Thorax & Lungs: Normal breath sounds, No respiratory distress, No wheezing  Abdomen: Bowel sounds normal, Soft, No tenderness  Skin: Warm, Dry, No erythema, No rash.    Back: No tenderness, No CVA tenderness.   Musculoskeletal: Trace pedal edema.  Neurologic: Alert, No focal deficits noted.    Psychiatric: Affect normal    LABS/ RADIOLOGY :    Results for orders placed or performed during the hospital encounter of 07/10/17    Troponin    Result  Value  Ref Range      Troponin I  <0.01  0.00 - 0.04 ng/mL    CBC with Differential    Result  Value  Ref Range      WBC  10.1  4.8 - 10.8 K/uL      RBC  4.82  4.20 - 5.40 M/uL      Hemoglobin  14.1  12.0 - 16.0 g/dL      Hematocrit  43.6  37.0 - 47.0 %      MCV  90.5  81.4 - 97.8 fL      MCH  29.3  27.0 - 33.0 pg      MCHC  32.3 (L)  33.6 - 35.0 g/dL      RDW  47.1  35.9 - 50.0 fL      Platelet Count  250  164 - 446 K/uL      MPV  11.6  9.0 - 12.9 fL      Neutrophils-Polys  70.90  44.00 - 72.00 %      Lymphocytes  20.20 (L)  22.00 - 41.00 %      Monocytes  6.30  0.00 - 13.40 %      Eosinophils  1.10  0.00 - 6.90 %      Basophils  0.90  0.00 - 1.80 %      Immature Granulocytes  0.60  0.00 - 0.90 %      Nucleated RBC  0.00  /100 WBC      Neutrophils (Absolute)  7.19 (H)  2.00 - 7.15 K/uL      Lymphs (Absolute)  2.05  1.00 - 4.80 K/uL      Monos (Absolute)  0.64  0.00 - 0.85 K/uL      Eos (Absolute)  0.11  0.00 - 0.51 K/uL      Baso (Absolute)  0.09  0.00 - 0.12 K/uL      Immature Granulocytes (abs)  0.06  0.00 - 0.11 K/uL      NRBC (Absolute)  0.00  K/uL    Complete Metabolic Panel (CMP)    Result  Value  Ref Range      Sodium  140  135 - 145 mmol/L      Potassium  3.7  3.6 - 5.5 mmol/L      Chloride  105  96 - 112 mmol/L      Co2  25  20 - 33 mmol/L      Anion Gap  10.0  0.0 - 11.9      Glucose  96  65 - 99 mg/dL      Bun  11  8 - 22 mg/dL      Creatinine  0.69  0.50 - 1.40 mg/dL      Calcium  9.5  8.5 - 10.5 mg/dL      AST(SGOT)  18  12 - 45 U/L      ALT(SGPT)  21  2 -  50 U/L      Alkaline Phosphatase  98  30 - 99 U/L      Total Bilirubin  0.6  0.1 - 1.5 mg/dL      Albumin  4.1  3.2 - 4.9 g/dL      Total Protein  6.9  6.0 - 8.2 g/dL      Globulin  2.8  1.9 - 3.5 g/dL      A-G Ratio  1.5  g/dL    Prothrombin Time    Result  Value  Ref Range      PT  13.2  12.0 - 14.6 sec      INR  0.97  0.87 - 1.13    APTT    Result  Value  Ref Range      APTT  31.5  24.7 - 36.0 sec    Lipase    Result  Value  Ref Range      Lipase  31  11 - 82 U/L    ESTIMATED GFR    Result  Value  Ref Range      GFR If African American  >60  >60 mL/min/1.73 m 2      GFR If Non African American  >60  >60 mL/min/1.73 m 2    EKG (ER)    Result  Value  Ref Range      Report            [facility] Emergency Dept.    Test Date:  2017-07-10  Pt Name:    ONESIMO DURAN                Department: ER  MRN:        [id number]                      Room:       [facility]  Gender:     F                            Technician: [id number]  :        1945                   Requested By:ER TRIAGE PROTOCOL  Order #:    [id number]                    Reading MD:    Measurements  Intervals                                Axis  Rate:       72                           P:          7  TX:         192                          QRS:        -50  QRSD:       84                           T:          -10  QT:         400  QTc:        438    Interpretive Statements  SINUS RHYTHM  LEFT ANTERIOR FASCICULAR BLOCK  CONSIDER ANTERIOR INFARCT  NONSPECIFIC T ABNORMALITIES, INFERIOR LEADS  Compared to ECG 2017 16:02:21  Poor R-wave progression no longer present  Myocardial infarct finding still present  T-wave abnormality still present          EKG Interpretation  Rhythm:  Normal sinus rhythm  , rate 70s  ST Segments: no acute change  T Waves: Nonspecific anterior ST changes, not significantly different than before.  Q Waves: none  Clinical Impression: Sinus rhythm and nonspecific anterior T wave changes, unchanged from prior  EKG.    RADIOLOGY  DX-CHEST-LIMITED (1 VIEW)    Final Result        1.  Mildly enlarged cardiac silhouette with probable central vascular congestion.        The radiologist's interpretation of all radiological studies have been reviewed by me.        ASSESSMENT AND PLAN :  Likely has CHF with preserved EF and mild pulmonary edema due to high blood pressure.  Cannot rule out further ischemia due to re-stenosis or thrombosis of the stents.   Trend troponin and EKG, if troponin becomes positive, consider starting patient on heparin drip.  MPI stress test tomorrow.  Continue diuresis with IV lasix.  Increased losartan from 25 mg to 50 mg with holding parameters.  Added metoprolol 12.5 mg BID with holding parameters.  Will continue to follow.

## 2017-07-10 NOTE — IP AVS SNAPSHOT
7/11/2017    Griselda Farmer  [address]  [place] [address]    Dear Griselda:    [facility] wants to ensure your discharge home is safe and you or your loved ones have had all of your questions answered regarding your care after you leave the hospital.    Below is a list of resources and contact information should you have any questions regarding your hospital stay, follow-up instructions, or active medical symptoms.    Questions or Concerns Regarding… Contact   Medical Questions Related to Your Discharge  (7 days a week, 8am-5pm) Contact a Nurse Care Coordinator   [phone]   Medical Questions Not Related to Your Discharge  (24 hours a day / 7 days a week)  Contact the Nurse Health Line   [phone]    Medications or Discharge Instructions Refer to your discharge packet   or contact your [facility] Primary Care Provider   [phone]   Follow-up Appointment(s) Schedule your appointment via [facility]   or contact Scheduling [phone]   Billing Review your statement via [facility]  or contact Billing [phone]   Medical Records Review your records via [facility]   or contact Medical Records [phone]     You may receive a telephone call within two days of discharge. This call is to make certain you understand your discharge instructions and have the opportunity to have any questions answered. You can also easily access your medical information, test results and upcoming appointments via the [facility] free online health management tool. You can learn more and sign up at [facility]. For assistance setting up your [facility] account, please call [phone].    Once again, we want to ensure your discharge home is safe and that you have a clear understanding of any next steps in your care. If you have any questions or concerns, please do not hesitate to contact us, we are here for you. Thank you for choosing [facility] for your healthcare needs.    Sincerely,    Your [facility] Healthcare Team

## 2017-07-11 ENCOUNTER — PATIENT OUTREACH (OUTPATIENT)
Dept: HEALTH INFORMATION MANAGEMENT | Facility: OTHER | Age: 72
End: 2017-07-11

## 2017-07-11 ENCOUNTER — APPOINTMENT (OUTPATIENT)
Dept: RADIOLOGY | Facility: MEDICAL CENTER | Age: 72
DRG: 293 | End: 2017-07-11
Attending: STUDENT IN AN ORGANIZED HEALTH CARE EDUCATION/TRAINING PROGRAM
Payer: MEDICARE

## 2017-07-11 VITALS
TEMPERATURE: 96.9 F | RESPIRATION RATE: 16 BRPM | BODY MASS INDEX: 35.04 KG/M2 | HEIGHT: 65 IN | WEIGHT: 210.32 LBS | DIASTOLIC BLOOD PRESSURE: 75 MMHG | OXYGEN SATURATION: 93 % | HEART RATE: 88 BPM | SYSTOLIC BLOOD PRESSURE: 118 MMHG

## 2017-07-11 PROCEDURE — A9502 TC99M TETROFOSMIN: HCPCS

## 2017-07-11 PROCEDURE — 700111 HCHG RX REV CODE 636 W/ 250 OVERRIDE (IP)

## 2017-07-11 PROCEDURE — 700102 HCHG RX REV CODE 250 W/ 637 OVERRIDE(OP): Performed by: STUDENT IN AN ORGANIZED HEALTH CARE EDUCATION/TRAINING PROGRAM

## 2017-07-11 PROCEDURE — A9270 NON-COVERED ITEM OR SERVICE: HCPCS | Performed by: INTERNAL MEDICINE

## 2017-07-11 PROCEDURE — A9270 NON-COVERED ITEM OR SERVICE: HCPCS | Performed by: STUDENT IN AN ORGANIZED HEALTH CARE EDUCATION/TRAINING PROGRAM

## 2017-07-11 PROCEDURE — 700111 HCHG RX REV CODE 636 W/ 250 OVERRIDE (IP): Performed by: STUDENT IN AN ORGANIZED HEALTH CARE EDUCATION/TRAINING PROGRAM

## 2017-07-11 PROCEDURE — 700102 HCHG RX REV CODE 250 W/ 637 OVERRIDE(OP): Performed by: INTERNAL MEDICINE

## 2017-07-11 PROCEDURE — 99239 HOSP IP/OBS DSCHRG MGMT >30: CPT | Performed by: INTERNAL MEDICINE

## 2017-07-11 RX ORDER — CHLORTHALIDONE 25 MG/1
25 TABLET ORAL DAILY
Qty: 30 TAB | Refills: 0 | Status: SHIPPED | OUTPATIENT
Start: 2017-07-12 | End: 2017-07-17

## 2017-07-11 RX ORDER — REGADENOSON 0.08 MG/ML
INJECTION, SOLUTION INTRAVENOUS
Status: COMPLETED
Start: 2017-07-11 | End: 2017-07-11

## 2017-07-11 RX ORDER — CHLORTHALIDONE 25 MG/1
25 TABLET ORAL
Status: DISCONTINUED | OUTPATIENT
Start: 2017-07-12 | End: 2017-07-11 | Stop reason: HOSPADM

## 2017-07-11 RX ORDER — LOSARTAN POTASSIUM 25 MG/1
25 TABLET ORAL DAILY
Qty: 90 TAB | Refills: 0 | Status: SHIPPED | OUTPATIENT
Start: 2017-07-11 | End: 2017-07-17

## 2017-07-11 RX ORDER — METOPROLOL SUCCINATE 25 MG/1
12.5 TABLET, EXTENDED RELEASE ORAL EVERY EVENING
Status: DISCONTINUED | OUTPATIENT
Start: 2017-07-11 | End: 2017-07-11 | Stop reason: HOSPADM

## 2017-07-11 RX ORDER — METOPROLOL SUCCINATE 25 MG/1
12.5 TABLET, EXTENDED RELEASE ORAL EVERY EVENING
Qty: 30 TAB | Refills: 0 | Status: SHIPPED | OUTPATIENT
Start: 2017-07-11 | End: 2017-07-17 | Stop reason: SDUPTHER

## 2017-07-11 RX ADMIN — VITAMIN D, TAB 1000IU (100/BT) 2000 UNITS: 25 TAB at 09:11

## 2017-07-11 RX ADMIN — LOSARTAN POTASSIUM 50 MG: 50 TABLET, FILM COATED ORAL at 09:11

## 2017-07-11 RX ADMIN — ATORVASTATIN CALCIUM 40 MG: 40 TABLET, FILM COATED ORAL at 09:11

## 2017-07-11 RX ADMIN — FUROSEMIDE 40 MG: 10 INJECTION, SOLUTION INTRAMUSCULAR; INTRAVENOUS at 09:11

## 2017-07-11 RX ADMIN — REGADENOSON 0.4 MG: 0.08 INJECTION, SOLUTION INTRAVENOUS at 11:25

## 2017-07-11 ASSESSMENT — ENCOUNTER SYMPTOMS
SPUTUM PRODUCTION: 0
HEARTBURN: 0
CHILLS: 0
HEADACHES: 0
EYE DISCHARGE: 0
ABDOMINAL PAIN: 0
NECK PAIN: 0
EYE PAIN: 0
FOCAL WEAKNESS: 0
DIARRHEA: 0
PALPITATIONS: 0
ORTHOPNEA: 0
WEIGHT LOSS: 0
FEVER: 0
SEIZURES: 0
EYE REDNESS: 0
BACK PAIN: 0
INSOMNIA: 0
VOMITING: 0
SHORTNESS OF BREATH: 0
BLURRED VISION: 0
STRIDOR: 0
MYALGIAS: 0
COUGH: 0
DEPRESSION: 0
NERVOUS/ANXIOUS: 0
NAUSEA: 0
DIZZINESS: 0

## 2017-07-11 ASSESSMENT — PAIN SCALES - GENERAL
PAINLEVEL_OUTOF10: 0
PAINLEVEL_OUTOF10: 0

## 2017-07-11 NOTE — DISCHARGE SUMMARY
CHIEF COMPLAINT ON ADMISSION  No chief complaint on file.      CODE STATUS  Full Code    HPI & HOSPITAL COURSE  This is a 72 y.o. female here with chest pain. She had history of CAD post 2 stents. Cardiology was consulted and recommended stress test and showed no reversible ischemia, Moderate inferolateral myocardial infarct. She was initially started on diuresis by cardiology for her CHF. Had echo done in 1/17: showed LVEF: 50%. Her BNP: wnl. She is euvolemic. Per cardiology she can be discharged home today.    Therefore, she is discharged in good and stable condition with close outpatient follow-up.      DISCHARGE PROBLEM LIST  Active Problems:    GERD (gastroesophageal reflux disease) POA: Yes    CHF exacerbation (CMS-[facility]) POA: Unknown    Chest pain POA: Unknown  Resolved Problems:    * No resolved hospital problems. *      FOLLOW UP  Future Appointments  Date Time Provider Department Center   12/4/2017 6:30 AM [facility] None   12/6/2017 10:45 AM Jose G Vallecillo M.D. [facility] None   12/13/2017 8:00 AM Anna Miranda [place]     APRIL Lemos.P.R.NDanay  [address] [id number]  [phone]    In 1 week        MEDICATIONS ON DISCHARGE   DarianGriselda Asiya   Home Medication Instructions CINTIA:[id number]    Printed on:07/11/17 1620   Medication Information                      aspirin EC (ECOTRIN) 81 MG Tablet Delayed Response  Take 81 mg by mouth every evening.             atorvastatin (LIPITOR) 40 MG Tab  Take 40 mg by mouth every day.             chlorthalidone (HYGROTON) 25 MG Tab  Take 1 Tab by mouth every day.             Cholecalciferol (VITAMIN D) 2000 UNIT TABS  Take 1 Tab by mouth every day.             clopidogrel (PLAVIX) 75 MG Tab  Take 1 Tab by mouth every day.             lansoprazole (PREVACID) 30 MG CAPSULE DELAYED RELEASE  Take 1 Cap by mouth every day.             losartan (COZAAR) 25 MG Tab  Take 1 Tab by mouth every day.             metoprolol SR (TOPROL XL)  25 MG TABLET SR 24 HR  Take 0.5 Tabs by mouth every evening.             nitroglycerin (NITROSTAT) 0.4 MG SL Tab  Place 1 Tab under tongue as needed for Chest Pain (up to 3 doses (if SBP greater than 90 mmHg)).                 DIET  Orders Placed This Encounter   Procedures   • Diet Order     Standing Status: Standing      Number of Occurrences: 1      Standing Expiration Date:      Order Specific Question:  Diet:     Answer:  Cardiac [6]     Order Specific Question:  Consistency/Fluid modifications:     Answer:  1500 ml Fluid Restriction [9]       ACTIVITY  As tolerated.  Weight bearing as tolerated      CONSULTATIONS  Dr. Breen    PROCEDURES      LABORATORY  Lab Results   Component Value Date/Time    SODIUM 140 07/10/2017 11:13 AM    POTASSIUM 3.7 07/10/2017 11:13 AM    CHLORIDE 105 07/10/2017 11:13 AM    CO2 25 07/10/2017 11:13 AM    GLUCOSE 96 07/10/2017 11:13 AM    BUN 11 07/10/2017 11:13 AM    CREATININE 0.69 07/10/2017 11:13 AM        Lab Results   Component Value Date/Time    WBC 10.1 07/10/2017 11:13 AM    HEMOGLOBIN 14.1 07/10/2017 11:13 AM    HEMATOCRIT 43.6 07/10/2017 11:13 AM    PLATELET COUNT 250 07/10/2017 11:13 AM        Total time of the discharge process exceeds 36 minutes

## 2017-07-11 NOTE — CARE PLAN
Problem: Safety  Goal: Will remain free from falls  Outcome: PROGRESSING AS EXPECTED  Pt had no falls this shift.    Problem: Venous Thromboembolism (VTW)/Deep Vein Thrombosis (DVT) Prevention:  Goal: Patient will participate in Venous Thrombosis (VTE)/Deep Vein Thrombosis (DVT)Prevention Measures  Outcome: PROGRESSING AS EXPECTED  Pt compliant with wearing SCD's and receiving lovenox injection as ordered for VTE prophylaxis.    Problem: Pain Management  Goal: Pain level will decrease to patient’s comfort goal  Outcome: PROGRESSING AS EXPECTED  Pt denied pain this shift.

## 2017-07-11 NOTE — CARE PLAN
Problem: Safety  Goal: Will remain free from injury  Outcome: PROGRESSING AS EXPECTED  Patient is alert and oriented x4, use call bell appropriately, call bell within reach     Problem: Pain Management  Goal: Pain level will decrease to patient’s comfort goal  Outcome: PROGRESSING AS EXPECTED  Denies any pain, or need at this time

## 2017-07-11 NOTE — DISCHARGE PLANNING
Care Transition Team Assessment    Met with pt at bedside, pt states she will discharge home with no needs. Pt lives alone but has family in the area for support. Pt drives.    Information Source  Orientation : Oriented x 4  Information Given By: Patient  Informant's Name: Griselda Farmer  Who is responsible for making decisions for patient? : Patient    Readmission Evaluation  Is this a readmission?: No    Elopement Risk  Legal Hold: No  Ambulatory or Self Mobile in Wheelchair: Yes  Disoriented: No  Psychiatric Symptoms: None  History of Wandering: No  Elopement this Admit: No  Vocalizing Wanting to Leave: No  Displays Behaviors, Body Language Wanting to Leave: No-Not at Risk for Elopement  Elopement Risk: Not at Risk for Elopement    Interdisciplinary Discharge Planning  Does Admitting Nurse Feel This Could be a Complex Discharge?: No  Lives with - Patient's Self Care Capacity: Alone and Able to Care For Self  Patient or legal guardian wants to designate a caregiver (see row info): No  Do You Take your Prescribed Medications Regularly: Yes  Able to Return to Previous ADL's: Yes  Mobility Issues: No  Prior Services: None  Patient Expects to be Discharged to:: home  Assistance Needed: No  Durable Medical Equipment: Not Applicable    Discharge Preparedness  What is your plan after discharge?: Home with help  What are your discharge supports?: Child, Other (comment) (Family)  Prior Functional Level: Ambulatory, Drives Self, Independent with Activities of Daily Living, Independent with Medication Management  Difficulity with ADLs: None  Difficulity with IADLs: None    Functional Assesment  Prior Functional Level: Ambulatory, Drives Self, Independent with Activities of Daily Living, Independent with Medication Management    Finances  Financial Barriers to Discharge: No  Prescription Coverage: Yes (Pharmacy: [facility], [place])    Vision / Hearing Impairment  Vision Impairment : No  Right Eye Vision: Impaired, Wears  Glasses  Left Eye Vision: Impaired, Wears Glasses  Hearing Impairment : No    Values / Beliefs / Concerns  Values / Beliefs Concerns : No    Advance Directive  Advance Directive?: Living Will    Domestic Abuse  Have you ever been the victim of abuse or violence?: No  Physical Abuse or Sexual Abuse: No  Verbal Abuse or Emotional Abuse: No  Possible Abuse Reported to:: Not Applicable    Psychological Assessment  History of Substance Abuse: None  History of Psychiatric Problems: No  Non-compliant with Treatment: No  Newly Diagnosed Illness: No    Discharge Risks or Barriers  Discharge risks or barriers?: No    Anticipated Discharge Information  Anticipated discharge disposition: Home  Discharge Address: [address] [place] [address] [place] [phone]  Discharge Contact Phone Number: [phone]

## 2017-07-11 NOTE — PROGRESS NOTES
Pt alert and oriented denying pain on arrival to unit. Cardiac monitor in place. Oriented to unit/room/call light. Pt denying chest pain or SOB at this time. Ambulated to bathroom independently and tolerated well.

## 2017-07-11 NOTE — PROGRESS NOTES
Bedside report taken on patient, Assume care of patient. Patient is alert and oriented, Assessment done on patient, patient denies any pain, headache or nausea at this, patient stated that she felt great, IV saline lock, patient helped to the bathroom, stated that she felt great, SCDs in place, patient denies any need at this time

## 2017-07-11 NOTE — DISCHARGE INSTRUCTIONS
Discharge Instructions    Discharged to home by car with relative. Discharged via wheelchair, hospital escort: Yes.  Special equipment needed: Not Applicable    Be sure to schedule a follow-up appointment with your primary care doctor or any specialists as instructed.     Discharge Plan:   Diet Plan: Discussed  Activity Level: Discussed  Confirmed Follow up Appointment: Appointment Scheduled  Confirmed Symptoms Management: Discussed  Medication Reconciliation Updated: Yes  Influenza Vaccine Indication: Not indicated: Previously immunized this influenza season and > 8 years of age    I understand that a diet low in cholesterol, fat, and sodium is recommended for good health. Unless I have been given specific instructions below for another diet, I accept this instruction as my diet prescription.   Other diet:     Special Instructions:   HF Patient Discharge Instructions  · Monitor your weight daily, and maintain a weight chart, to track your weight changes.   · Activity as tolerated, unless your Doctor has ordered otherwise. Other activity order: light activity.  · Follow a low fat, low cholesterol, low salt diet unless instructed otherwise by your Doctor. Read the labels on the back of food products and track your intake of fat, cholesterol and salt.   · Fluid Restriction No. If a Fluid Restriction has been ordered by your Doctor, measure fluids with a measuring cup to ensure that you are not exceeding the restriction.   · No smoking.  · Oxygen No. If your Doctor has ordered that you wear Oxygen at home, it is important to wear it as ordered.  · Did you receive an explanation from staff on the importance of taking each of your medications and why it is necessary to keep taking them unless your doctor says to stop? Yes  · Were all of your questions answered about how to manage your heart failure and what to do if you have increased signs and symptoms after you go home? Yes  · Do you feel like your heart failure care  team involved you in the care treatment plan and allowed you to make decisions regarding your care while in the hospital and addressed any discharge needs you might have? Yes    See the educational handout provided at discharge for more information on monitoring your daily weight, activity and diet. This also explains more about Heart Failure, symptoms of a flare-up and some of the tests that you have undergone.     Warning Signs of a Flare-Up include:  · Swelling in the ankles or lower legs.  · Shortness of breath, while at rest, or while doing normal activities.   · Shortness of breath at night when in bed, or coughing in bed.   · Requiring more pillows to sleep at night, or needing to sit up at night to sleep.  · Feeling weak, dizzy or fatigued.     When to call your Doctor:  · Call [facility] seven days a week from 8:00 a.m. to 8:00 p.m. for medical questions [phone].  · Call your Primary Care Physician or Cardiologist if:   · You experience any pain radiating to your jaw or neck.  · You have any difficulty breathing.  · You experience weight gain of 3 lbs in a day or 5 lbs in a week.   · You feel any palpitations or irregular heartbeats.  · You become dizzy or lose consciousness.   If you have had an angiogram or had a pacemaker or AICD placed, and experience:  · Bleeding, drainage or swelling at the surgical / puncture site.  · Fever greater than 100.0 F  · Shock from internal defibrillator.  · Cool and / or numb extremities.      · Is patient discharged on Warfarin / Coumadin?   No     · Is patient Post Blood Transfusion?  No    Depression / Suicide Risk    As you are discharged from this [facility], it is important to learn how to keep safe from harming yourself.    Recognize the warning signs:  · Abrupt changes in personality, positive or negative- including increase in energy   · Giving away possessions  · Change in eating patterns- significant weight changes-  positive or  negative  · Change in sleeping patterns- unable to sleep or sleeping all the time   · Unwillingness or inability to communicate  · Depression  · Unusual sadness, discouragement and loneliness  · Talk of wanting to die  · Neglect of personal appearance   · Rebelliousness- reckless behavior  · Withdrawal from people/activities they love  · Confusion- inability to concentrate     If you or a loved one observes any of these behaviors or has concerns about self-harm, here's what you can do:  · Talk about it- your feelings and reasons for harming yourself  · Remove any means that you might use to hurt yourself (examples: pills, rope, extension cords, firearm)  · Get professional help from the community (Mental Health, Substance Abuse, psychological counseling)  · Do not be alone:Call your Safe Contact- someone whom you trust who will be there for you.  · Call your local CRISIS HOTLINE 591-1762 or 244-738-9939  · Call your local Children's Mobile Crisis Response Team [place] (035) 035-6351 or wwwLandingi  · Call the toll free National Suicide Prevention Hotlines   · National Suicide Prevention Lifeline 866-886-RZMX (4736)  · National Hope Line Network 800-SUICIDE (423-3841)      Metoprolol extended-release tablets  What is this medicine?  METOPROLOL (me TOE proe lole) is a beta-blocker. Beta-blockers reduce the workload on the heart and help it to beat more regularly. This medicine is used to treat high blood pressure and to prevent chest pain. It is also used to after a heart attack and to prevent an additional heart attack from occurring.  This medicine may be used for other purposes; ask your health care provider or pharmacist if you have questions.  COMMON BRAND NAME(S): Toprol XL  What should I tell my health care provider before I take this medicine?  They need to know if you have any of these conditions:  -diabetes  -heart or vessel disease like slow heart rate, worsening heart failure, heart block,  sick sinus syndrome or Raynaud's disease  -kidney disease  -liver disease  -lung or breathing disease, like asthma or emphysema  -pheochromocytoma  -thyroid disease  -an unusual or allergic reaction to metoprolol, other beta-blockers, medicines, foods, dyes, or preservatives  -pregnant or trying to get pregnant  -breast-feeding  How should I use this medicine?  Take this medicine by mouth with a glass of water. Follow the directions on the prescription label. Do not crush or chew. Take this medicine with or immediately after meals. Take your doses at regular intervals. Do not take more medicine than directed. Do not stop taking this medicine suddenly. This could lead to serious heart-related effects.  Talk to your pediatrician regarding the use of this medicine in children. While this drug may be prescribed for children as young as 6 years for selected conditions, precautions do apply.  Overdosage: If you think you have taken too much of this medicine contact a poison control center or emergency room at once.  NOTE: This medicine is only for you. Do not share this medicine with others.  What if I miss a dose?  If you miss a dose, take it as soon as you can. If it is almost time for your next dose, take only that dose. Do not take double or extra doses.  What may interact with this medicine?  Do not take this medicine with any of the following medications:  -sotalol  This medicine may also interact with the following medications:  -clonidine  -digoxin  -dobutamine  -epinephrine  -isoproterenol  -medicine to control heart rhythm like quinidine, propafenone  -medicine for depression like monoamine oxidase (MAO) inhibitors, fluoxetine, and paroxetine  -medicine for blood pressure like calcium channel blockers  -reserpine  This list may not describe all possible interactions. Give your health care provider a list of all the medicines, herbs, non-prescription drugs, or dietary supplements you use. Also tell them if you  smoke, drink alcohol, or use illegal drugs. Some items may interact with your medicine.  What should I watch for while using this medicine?  Visit your doctor or health care professional for regular check ups. Contact your doctor right away if your symptoms worsen. Check your blood pressure and pulse rate regularly. Ask your health care professional what your blood pressure and pulse rate should be, and when you should contact them.  You may get drowsy or dizzy. Do not drive, use machinery, or do anything that needs mental alertness until you know how this medicine affects you. Do not sit or stand up quickly, especially if you are an older patient. This reduces the risk of dizzy or fainting spells. Contact your doctor if these symptoms continue. Alcohol may interfere with the effect of this medicine. Avoid alcoholic drinks.  What side effects may I notice from receiving this medicine?  Side effects that you should report to your doctor or health care professional as soon as possible:  -allergic reactions like skin rash, itching or hives  -cold or numb hands or feet  -depression  -difficulty breathing  -faint  -fever with sore throat  -irregular heartbeat, chest pain  -rapid weight gain  -swollen legs or ankles  Side effects that usually do not require medical attention (report to your doctor or health care professional if they continue or are bothersome):  -anxiety or nervousness  -change in sex drive or performance  -dry skin  -headache  -nightmares or trouble sleeping  -short term memory loss  -stomach upset or diarrhea  -unusually tired  This list may not describe all possible side effects. Call your doctor for medical advice about side effects. You may report side effects to FDA at 2-112-EWI-9287.  Where should I keep my medicine?  Keep out of the reach of children.  Store at room temperature between 15 and 30 degrees C (59 and 86 degrees F). Throw away any unused medicine after the expiration date.  NOTE: This  sheet is a summary. It may not cover all possible information. If you have questions about this medicine, talk to your doctor, pharmacist, or health care provider.  © 2014, [employer]. (2/27/2009 5:08:10 PM)    Chlorthalidone tablets  What is this medicine?  CHLORTHALIDONE (klor THAL i done) is a diuretic. It increases the amount of urine passed, which causes the body to lose salt and water. This medicine is used to treat high blood pressure and edema or water retention.  This medicine may be used for other purposes; ask your health care provider or pharmacist if you have questions.  COMMON BRAND NAME(S): Thalitone   What should I tell my health care provider before I take this medicine?  They need to know if you have any of these conditions:  -asthma  -diabetes  -gout  -kidney disease  -liver disease  -parathyroid disease  -systemic lupus erythematosus (SLE)  -taking cortisone, digoxin, lithium carbonate, or drugs for diabetes  -an unusual or allergic reaction to chlorthalidone, sulfa drugs, other medicines, foods, dyes, or preservatives  -pregnant or trying to get pregnant  -breast-feeding  How should I use this medicine?  Take this medicine by mouth with a glass of water. Follow the directions on the prescription label. It is best to take your dose in the morning with food. Take your medicine at regular intervals. Do not take your medicine more often than directed. Do not stop taking except on your doctor's advice.  Talk to your pediatrician regarding the use of this medicine in children. Special care may be needed.  Overdosage: If you think you have taken too much of this medicine contact a poison control center or emergency room at once.  NOTE: This medicine is only for you. Do not share this medicine with others.  What if I miss a dose?  If you miss a dose, take it as soon as you can. If it is almost time for your next dose, take only that dose. Do not take double or extra doses.  What may interact  with this medicine?  -barbiturate medicines for sleep or seizure control  -digoxin  -lithium  -medicines for diabetes  -norepinephrine  -other medicines for high blood pressure  -some pain medicines  -steroid hormones like prednisone, cortisone, hydrocortisone, corticotropin  -tubocurarine  This list may not describe all possible interactions. Give your health care provider a list of all the medicines, herbs, non-prescription drugs, or dietary supplements you use. Also tell them if you smoke, drink alcohol, or use illegal drugs. Some items may interact with your medicine.  What should I watch for while using this medicine?  Visit your doctor or health care professional for regular check ups. Check your blood pressure as directed. Ask your doctor or health care professional what your blood pressure should be and when you should contact him or her.  You may need to be on a special diet while taking this medicine. Ask your doctor.  You may get drowsy or dizzy. Do not drive, use machinery, or do anything that needs mental alertness until you know how this medicine affects you. Do not stand or sit up quickly, especially if you are an older patient. This reduces the risk of dizzy or fainting spells. Alcohol may interfere with the effect of this medicine. Avoid alcoholic drinks.  This medicine may affect your blood sugar level. If you have diabetes, check with your doctor or health care professional before changing the dose of your diabetic medicine.  This medicine can make you more sensitive to the sun. Keep out of the sun. If you cannot avoid being in the sun, wear protective clothing and use sunscreen. Do not use sun lamps or tanning beds/booths.  What side effects may I notice from receiving this medicine?  Side effects that you should report to your doctor or health care professional as soon as possible:  -allergic reactions like skin rash, itching or hives, swelling of the face, lips, or tongue  -dark urine  -dry  mouth  -excess thirst  -fast, irregular heart rate  -fever, chills  -muscle pain, cramps, or spasm  -nausea, vomiting  -redness, blistering, peeling or loosening of the skin, including inside the mouth  -tingling, pain or numbness in the hands or feet  -unusually weak or tired  -yellowing of the eyes or skin  Side effects that usually do not require medical attention (report to your doctor or health care professional if they continue or are bothersome):  -diarrhea or constipation  -headache  -impotence  -loss of appetite  -stomach upset  This list may not describe all possible side effects. Call your doctor for medical advice about side effects. You may report side effects to FDA at 0-465-FDA-6673.  Where should I keep my medicine?  Keep out of the reach of children.  Store at room temperature between 15 and 30 degrees C (59 and 86 degrees F). Keep container tightly closed. Throw away any unused medicine after the expiration date.  NOTE: This sheet is a summary. It may not cover all possible information. If you have questions about this medicine, talk to your doctor, pharmacist, or health care provider.  © 2014, [employer]. (3/24/2009 3:28:48 PM)  Heart Failure  Heart failure is a condition in which the heart has trouble pumping blood. This means your heart does not pump blood efficiently for your body to work well. In some cases of heart failure, fluid may back up into your lungs or you may have swelling (edema) in your lower legs. Heart failure is usually a long-term (chronic) condition. It is important for you to take good care of yourself and follow your health care provider's treatment plan.  CAUSES   Some health conditions can cause heart failure. Those health conditions include:  · High blood pressure (hypertension). Hypertension causes the heart muscle to work harder than normal. When pressure in the blood vessels is high, the heart needs to pump (contract) with more force in order to circulate  blood throughout the body. High blood pressure eventually causes the heart to become stiff and weak.  · Coronary artery disease (CAD). CAD is the buildup of cholesterol and fat (plaque) in the arteries of the heart. The blockage in the arteries deprives the heart muscle of oxygen and blood. This can cause chest pain and may lead to a heart attack. High blood pressure can also contribute to CAD.  · Heart attack (myocardial infarction). A heart attack occurs when one or more arteries in the heart become blocked. The loss of oxygen damages the muscle tissue of the heart. When this happens, part of the heart muscle dies. The injured tissue does not contract as well and weakens the heart's ability to pump blood.  · Abnormal heart valves. When the heart valves do not open and close properly, it can cause heart failure. This makes the heart muscle pump harder to keep the blood flowing.  · Heart muscle disease (cardiomyopathy or myocarditis). Heart muscle disease is damage to the heart muscle from a variety of causes. These can include drug or alcohol abuse, infections, or unknown reasons. These can increase the risk of heart failure.  · Lung disease. Lung disease makes the heart work harder because the lungs do not work properly. This can cause a strain on the heart, leading it to fail.  · Diabetes. Diabetes increases the risk of heart failure. High blood sugar contributes to high fat (lipid) levels in the blood. Diabetes can also cause slow damage to tiny blood vessels that carry important nutrients to the heart muscle. When the heart does not get enough oxygen and food, it can cause the heart to become weak and stiff. This leads to a heart that does not contract efficiently.  · Other conditions can contribute to heart failure. These include abnormal heart rhythms, thyroid problems, and low blood counts (anemia).  Certain unhealthy behaviors can increase the risk of heart failure, including:  · Being  overweight.  · Smoking or chewing tobacco.  · Eating foods high in fat and cholesterol.  · Abusing illicit drugs or alcohol.  · Lacking physical activity.  SYMPTOMS   Heart failure symptoms may vary and can be hard to detect. Symptoms may include:  · Shortness of breath with activity, such as climbing stairs.  · Persistent cough.  · Swelling of the feet, ankles, legs, or abdomen.  · Unexplained weight gain.  · Difficulty breathing when lying flat (orthopnea).  · Waking from sleep because of the need to sit up and get more air.  · Rapid heartbeat.  · Fatigue and loss of energy.  · Feeling light-headed, dizzy, or close to fainting.  · Loss of appetite.  · Nausea.  · Increased urination during the night (nocturia).  DIAGNOSIS   A diagnosis of heart failure is based on your history, symptoms, physical examination, and diagnostic tests. Diagnostic tests for heart failure may include:  · Echocardiography.  · Electrocardiography.  · Chest X-ray.  · Blood tests.  · Exercise stress test.  · Cardiac angiography.  · Radionuclide scans.  TREATMENT   Treatment is aimed at managing the symptoms of heart failure. Medicines, behavioral changes, or surgical intervention may be necessary to treat heart failure.  · Medicines to help treat heart failure may include:  ¨ Angiotensin-converting enzyme (ACE) inhibitors. This type of medicine blocks the effects of a blood protein called angiotensin-converting enzyme. ACE inhibitors relax (dilate) the blood vessels and help lower blood pressure.  ¨ Angiotensin receptor blockers (ARBs). This type of medicine blocks the actions of a blood protein called angiotensin. Angiotensin receptor blockers dilate the blood vessels and help lower blood pressure.  ¨ Water pills (diuretics). Diuretics cause the kidneys to remove salt and water from the blood. The extra fluid is removed through urination. This loss of extra fluid lowers the volume of blood the heart pumps.  ¨ Beta blockers. These prevent the  heart from beating too fast and improve heart muscle strength.  ¨ Digitalis. This increases the force of the heartbeat.  · Healthy behavior changes include:  ¨ Obtaining and maintaining a healthy weight.  ¨ Stopping smoking or chewing tobacco.  ¨ Eating heart-healthy foods.  ¨ Limiting or avoiding alcohol.  ¨ Stopping illicit drug use.  ¨ Physical activity as directed by your health care provider.  · Surgical treatment for heart failure may include:  ¨ A procedure to open blocked arteries, repair damaged heart valves, or remove damaged heart muscle tissue.  ¨ A pacemaker to improve heart muscle function and control certain abnormal heart rhythms.  ¨ An internal cardioverter defibrillator to treat certain serious abnormal heart rhythms.  ¨ A left ventricular assist device (LVAD) to assist the pumping ability of the heart.  HOME CARE INSTRUCTIONS   · Take medicines only as directed by your health care provider. Medicines are important in reducing the workload of your heart, slowing the progression of heart failure, and improving your symptoms.  ¨ Do not stop taking your medicine unless directed by your health care provider.  ¨ Do not skip any dose of medicine.  ¨ Refill your prescriptions before you run out of medicine. Your medicines are needed every day.  · Engage in moderate physical activity if directed by your health care provider. Moderate physical activity can benefit some people. The elderly and people with severe heart failure should consult with a health care provider for physical activity recommendations.  · Eat heart-healthy foods. Food choices should be free of trans fat and low in saturated fat, cholesterol, and salt (sodium). Healthy choices include fresh or frozen fruits and vegetables, fish, lean meats, legumes, fat-free or low-fat dairy products, and whole grain or high fiber foods. Talk to a dietitian to learn more about heart-healthy foods.  · Limit sodium if directed by your health care provider.  Sodium restriction may reduce symptoms of heart failure in some people. Talk to a dietitian to learn more about heart-healthy seasonings.  · Use healthy cooking methods. Healthy cooking methods include roasting, grilling, broiling, baking, poaching, steaming, or stir-frying. Talk to a dietitian to learn more about healthy cooking methods.  · Limit fluids if directed by your health care provider. Fluid restriction may reduce symptoms of heart failure in some people.  · Weigh yourself every day. Daily weights are important in the early recognition of excess fluid. You should weigh yourself every morning after you urinate and before you eat breakfast. Wear the same amount of clothing each time you weigh yourself. Record your daily weight. Provide your health care provider with your weight record.  · Monitor and record your blood pressure if directed by your health care provider.  · Check your pulse if directed by your health care provider.  · Lose weight if directed by your health care provider. Weight loss may reduce symptoms of heart failure in some people.  · Stop smoking or chewing tobacco. Nicotine makes your heart work harder by causing your blood vessels to constrict. Do not use nicotine gum or patches before talking to your health care provider.  · Keep all follow-up visits as directed by your health care provider. This is important.  · Limit alcohol intake to no more than 1 drink per day for nonpregnant women and 2 drinks per day for men. One drink equals 12 ounces of beer, 5 ounces of wine, or 1½ ounces of hard liquor. Drinking more than that is harmful to your heart. Tell your health care provider if you drink alcohol several times a week. Talk with your health care provider about whether alcohol is safe for you. If your heart has already been damaged by alcohol or you have severe heart failure, drinking alcohol should be stopped completely.  · Stop illicit drug use.  · Stay up-to-date with immunizations. It  is especially important to prevent respiratory infections through current pneumococcal and influenza immunizations.  · Manage other health conditions such as hypertension, diabetes, thyroid disease, or abnormal heart rhythms as directed by your health care provider.  · Learn to manage stress.  · Plan rest periods when fatigued.  · Learn strategies to manage high temperatures. If the weather is extremely hot:  ¨ Avoid vigorous physical activity.  ¨ Use air conditioning or fans or seek a cooler location.  ¨ Avoid caffeine and alcohol.  ¨ Wear loose-fitting, lightweight, and light-colored clothing.  · Learn strategies to manage cold temperatures. If the weather is extremely cold:  ¨ Avoid vigorous physical activity.  ¨ Layer clothes.  ¨ Wear mittens or gloves, a hat, and a scarf when going outside.  ¨ Avoid alcohol.  · Obtain ongoing education and support as needed.  · Participate in or seek rehabilitation as needed to maintain or improve independence and quality of life.  SEEK MEDICAL CARE IF:   · You have a rapid weight gain.  · You have increasing shortness of breath that is unusual for you.  · You are unable to participate in your usual physical activities.  · You tire easily.  · You cough more than normal, especially with physical activity.  · You have any or more swelling in areas such as your hands, feet, ankles, or abdomen.  · You are unable to sleep because it is hard to breathe.  · You feel like your heart is beating fast (palpitations).  · You become dizzy or light-headed upon standing up.  SEEK IMMEDIATE MEDICAL CARE IF:   · You have difficulty breathing.  · There is a change in mental status such as decreased alertness or difficulty with concentration.  · You have a pain or discomfort in your chest.  · You have an episode of fainting (syncope).  MAKE SURE YOU:   · Understand these instructions.  · Will watch your condition.  · Will get help right away if you are not doing well or get worse.     This  information is not intended to replace advice given to you by your health care provider. Make sure you discuss any questions you have with your health care provider.     Document Released: 12/18/2006 Document Revised: 05/03/2016 Document Reviewed: 01/17/2014  Elsevier Interactive Patient Education ©2016 [employer]

## 2017-07-11 NOTE — PROGRESS NOTES
[facility] Hospitalist Progress Note    Date of Service: 2017    Chief Complaint  72 y.o. female admitted 7/10/2017 with chest pain    Interval Problem Update  71 y/o F with pMH of CAD post 2 stents, GERD, DLD: admitted for above.  Denies any chest pain. No acute issue overnight.    Consultants/Specialty  Dr. Breen(cardiology)    Disposition  Remain in the hospital        Review of Systems   Constitutional: Negative for fever, chills and weight loss.   HENT: Negative for congestion and nosebleeds.    Eyes: Negative for blurred vision, pain, discharge and redness.   Respiratory: Negative for cough, sputum production, shortness of breath and stridor.    Cardiovascular: Negative for chest pain, palpitations and orthopnea.   Gastrointestinal: Negative for heartburn, nausea, vomiting, abdominal pain and diarrhea.   Genitourinary: Negative for dysuria, urgency and frequency.   Musculoskeletal: Negative for myalgias, back pain and neck pain.   Skin: Negative for itching and rash.   Neurological: Negative for dizziness, focal weakness, seizures and headaches.   Psychiatric/Behavioral: Negative for depression. The patient is not nervous/anxious and does not have insomnia.       Physical Exam  Laboratory/Imaging   Hemodynamics  Temp (24hrs), Av.2 °C (97.2 °F), Min:35.8 °C (96.4 °F), Max:36.7 °C (98 °F)   Temperature: 36.1 °C (96.9 °F)  Pulse  Av.5  Min: 47  Max: 88   Blood Pressure : 118/75 mmHg      Respiratory      Respiration: 16, Pulse Oximetry: 93 %     Work Of Breathing / Effort: Mild  RUL Breath Sounds: Clear, RML Breath Sounds: Clear, RLL Breath Sounds: Diminished, BRITANY Breath Sounds: Clear, LLL Breath Sounds: Diminished    Fluids    Intake/Output Summary (Last 24 hours) at 17 1448  Last data filed at 07/10/17 2026   Gross per 24 hour   Intake    390 ml   Output    800 ml   Net   -410 ml       Nutrition  Orders Placed This Encounter   Procedures   • Diet Order     Standing Status: Standing      Number  of Occurrences: 1      Standing Expiration Date:      Order Specific Question:  Diet:     Answer:  Cardiac [6]     Order Specific Question:  Consistency/Fluid modifications:     Answer:  1500 ml Fluid Restriction [9]     Physical Exam   Constitutional: She is oriented to person, place, and time. No distress.   HENT:   Head: Normocephalic and atraumatic.   Mouth/Throat: Oropharynx is clear and moist.   Eyes: Conjunctivae and EOM are normal. Pupils are equal, round, and reactive to light.   Neck: Normal range of motion. Neck supple. No tracheal deviation present. No thyromegaly present.   Cardiovascular: Normal rate and regular rhythm.    No murmur heard.  Pulmonary/Chest: Effort normal and breath sounds normal. No respiratory distress. She has no wheezes.   Abdominal: Soft. Bowel sounds are normal. She exhibits no distension. There is no tenderness.   Musculoskeletal: She exhibits no edema or tenderness.   Neurological: She is alert and oriented to person, place, and time. No cranial nerve deficit.   Skin: Skin is warm and dry. She is not diaphoretic. No erythema.   Psychiatric: She has a normal mood and affect. Her behavior is normal. Thought content normal.       Recent Labs      07/10/17   1113   WBC  10.1   RBC  4.82   HEMOGLOBIN  14.1   HEMATOCRIT  43.6   MCV  90.5   MCH  29.3   MCHC  32.3*   RDW  47.1   PLATELETCT  250   MPV  11.6     Recent Labs      07/10/17   1113   SODIUM  140   POTASSIUM  3.7   CHLORIDE  105   CO2  25   GLUCOSE  96   BUN  11   CREATININE  0.69   CALCIUM  9.5     Recent Labs      07/10/17   1113   APTT  31.5   INR  0.97     Recent Labs      07/10/17   1113   BNPBTYPENAT  62              Assessment/Plan     CHF exacerbation (CMS-[facility])  Assessment & Plan  Improving  BNP: wnl  Echo: 1/17: LVEF: 50%  On IV lasix per cardiology  Continue losartan, bblocker    Chest pain  Assessment & Plan  Resolved  Stress test: no reversible ischemia, Moderate inferolateral myocardial infarct  On asa, statin,  plavix, bblocker  Cardiology following    Labs reviewed, Medications reviewed and Radiology images reviewed        DVT Prophylaxis: Enoxaparin (Lovenox)

## 2017-07-11 NOTE — ASSESSMENT & PLAN NOTE
Improving  BNP: wnl  Echo: 1/17: LVEF: 50%  On IV lasix per cardiology  Continue losartan, bblocker

## 2017-07-11 NOTE — ASSESSMENT & PLAN NOTE
Resolved  Stress test: no reversible ischemia, Moderate inferolateral myocardial infarct  On asa, statin, plavix, bblocker  Cardiology following

## 2017-07-11 NOTE — PROGRESS NOTES
Cardiology Progress Note               Author: Suzanneallie Ellington (PGY2 IM Resident) Date & Time created: 2017 @ 4.18 PM     Interval History:  Admitted for chest pressure for 2 days.  Hx of CAD and stents in the past.  Reports to be doing well this morning.   Had an episode of similar chest pressure early this am, lasted for few minutes and subsided.   Denies SOB, dizziness.    Monitor summary : SR- SB with HR 46-70/min, mostly below 60/min.    Review of systems : Negative     Physical exam :  Constitutional: She is oriented to person, place, and time. No distress.   HENT:    Head: Normocephalic and atraumatic.    Mouth/Throat: Oropharynx is clear and moist.   Eyes: Conjunctivae and EOM are normal. Pupils are equal, round, and reactive to light.   Neck: Normal range of motion. Neck supple. No tracheal deviation present. No thyromegaly present.   Cardiovascular: Normal rate and regular rhythm.     No murmur heard.  Pulmonary/Chest: Effort normal and breath sounds normal. No respiratory distress. She has no wheezes.   Abdominal: Soft. Bowel sounds are normal. She exhibits no distension. There is no tenderness.   Musculoskeletal: She exhibits no edema or tenderness.   Neurological: She is alert and oriented to person, place, and time. No cranial nerve deficit.   Skin: Skin is warm and dry. She is not diaphoretic. No erythema.   Psychiatric: She has a normal mood and affect. Her behavior is normal. Thought content normal.     Hemodynamics:  Temp (24hrs), Av.2 °C (97.2 °F), Min:35.8 °C (96.4 °F), Max:36.7 °C (98 °F)   Temperature: 36.1 °C (96.9 °F)  Pulse  Av.5  Min: 47  Max: 88   Blood Pressure : 118/75 mmHg      Respiratory:      Respiration: 16, Pulse Oximetry: 93 %     Work Of Breathing / Effort: Mild  RUL Breath Sounds: Clear, RML Breath Sounds: Clear, RLL Breath Sounds: Diminished, BRITANY Breath Sounds: Clear, LLL Breath Sounds: Diminished     Lab Results/ Radiology   Myocardial Perfusion  7/11/2017   Report   NUCLEAR IMAGING INTERPRETATION   Moderate inferolateral myocardial infarct.   No reversible ischemia.   Preserved LEFT ventricular function.    ASSESSMENT AND PLAN  MPI stress test showed moderate inferolateral infarct, likely old.  Will switch patient to metoprolol succinate 12.5 mg every evening for post-MI mortality benefit.  Will start patient on chlorthalidone 25 mg once a day for diuresis. Follow up BMP ordered in a week.  Continue aspirin, statin and ARB.   Recommend outpatient GI work up for GERD.  Follow up with Dr. Vallecillo in a week or two.

## 2017-07-11 NOTE — HEART FAILURE PROGRAM
"Cardiovascular Nurse Navigator () Progress Note:     Please note this is a HF pt admitted 7/10 still receiving IV diuresis MPI today. Therefore, this CNN has placed an order to schedulers to arrange f/u with either her PCP ([facility])or Cardiologist ([facility]) within seven days of anticipated d/c date: 7/13.    Pt must have an appointment scheduled within 7 days of discharge (PCP or Cards).      Bedside nursing to please provide patient with HF packet and begin teaching and documenting in education tab, each shift should teach sections until all are covered.     When completing the after visit summary (discharge instructions) please select \"Cardiac Diagnosis, and Heart Failure\" in the special instructions section so that the heart failure discharge instructions will populate.     Thank you and please call MOISES Rainey with any questions: [phone].   "

## 2017-07-12 ENCOUNTER — PATIENT OUTREACH (OUTPATIENT)
Dept: HEALTH INFORMATION MANAGEMENT | Facility: OTHER | Age: 72
End: 2017-07-12

## 2017-07-12 NOTE — PROGRESS NOTES
All discharge instructions reviewed with patient and CHF booklets provided. New medications reviewed with patient. Pt discharged home with son without complaints. Assisted off unit on wheelchair with CNA.

## 2017-07-17 ENCOUNTER — OFFICE VISIT (OUTPATIENT)
Dept: CARDIOLOGY | Facility: MEDICAL CENTER | Age: 72
End: 2017-07-17
Payer: MEDICARE

## 2017-07-17 VITALS
DIASTOLIC BLOOD PRESSURE: 72 MMHG | SYSTOLIC BLOOD PRESSURE: 116 MMHG | BODY MASS INDEX: 34.66 KG/M2 | OXYGEN SATURATION: 91 % | HEART RATE: 75 BPM | WEIGHT: 208 LBS | HEIGHT: 65 IN

## 2017-07-17 DIAGNOSIS — E66.9 OBESITY (BMI 30-39.9): ICD-10-CM

## 2017-07-17 DIAGNOSIS — I10 ESSENTIAL HYPERTENSION, BENIGN: ICD-10-CM

## 2017-07-17 DIAGNOSIS — Z95.5 S/P CORONARY ARTERY STENT PLACEMENT: ICD-10-CM

## 2017-07-17 DIAGNOSIS — I25.10 CORONARY ARTERY DISEASE INVOLVING NATIVE CORONARY ARTERY OF NATIVE HEART WITHOUT ANGINA PECTORIS: ICD-10-CM

## 2017-07-17 DIAGNOSIS — E78.2 MIXED HYPERLIPIDEMIA: ICD-10-CM

## 2017-07-17 DIAGNOSIS — R06.09 DYSPNEA ON EXERTION: ICD-10-CM

## 2017-07-17 PROCEDURE — 94620 PR PULMONARY STRESS TESTING,SIMPLE: CPT | Performed by: INTERNAL MEDICINE

## 2017-07-17 PROCEDURE — 99214 OFFICE O/P EST MOD 30 MIN: CPT | Mod: 25 | Performed by: INTERNAL MEDICINE

## 2017-07-17 RX ORDER — METOPROLOL SUCCINATE 25 MG/1
25 TABLET, EXTENDED RELEASE ORAL DAILY
Qty: 90 TAB | Refills: 3 | Status: SHIPPED | OUTPATIENT
Start: 2017-07-17 | End: 2018-01-08

## 2017-07-17 RX ORDER — METOPROLOL SUCCINATE 25 MG/1
25 TABLET, EXTENDED RELEASE ORAL EVERY EVENING
Qty: 90 TAB | Refills: 3 | Status: SHIPPED | OUTPATIENT
Start: 2017-07-17 | End: 2018-04-03 | Stop reason: SDUPTHER

## 2017-07-17 ASSESSMENT — ENCOUNTER SYMPTOMS
SENSORY CHANGE: 0
CHILLS: 0
LOSS OF CONSCIOUSNESS: 0
BLOOD IN STOOL: 0
EYE PAIN: 0
DOUBLE VISION: 0
CLAUDICATION: 0
DEPRESSION: 0
PND: 0
ORTHOPNEA: 0
FEVER: 0
ABDOMINAL PAIN: 0
VOMITING: 0
FALLS: 0
SPEECH CHANGE: 0
COUGH: 0
HEADACHES: 0
NAUSEA: 0
DIZZINESS: 0
EYE DISCHARGE: 0
BLURRED VISION: 0
MYALGIAS: 0
SHORTNESS OF BREATH: 0
HALLUCINATIONS: 0
BRUISES/BLEEDS EASILY: 0
PALPITATIONS: 0
WEIGHT LOSS: 0

## 2017-07-17 ASSESSMENT — MINNESOTA LIVING WITH HEART FAILURE QUESTIONNAIRE (MLHF)
SWELLING IN ANKLES OR LEGS: 0
DIFFICULTY TO CONCENTRATE OR REMEMBERING THINGS: 1
TOTAL_SCORE: 13
DIFFICULTY SOCIALIZING WITH FAMILY OR FRIENDS: 1
COSTING YOU MONEY FOR MEDICAL CARE: 0
HAVING TO SIT OR LIE DOWN DURING THE DAY: 2
DIFFICULTY WORKING TO EARN A LIVING: 0
FEELING LIKE A BURDEN TO FAMILY AND FRIENDS: 0
MAKING YOU SHORT OF BREATH: 1
WALKING ABOUT OR CLIMBING STAIRS DIFFICULT: 2
EATING LESS FOODS YOU LIKE: 0
TIRED, FATIGUED OR LOW ON ENERGY: 2
DIFFICULTY WITH SEXUAL ACTIVITIES: 0
MAKING YOU FEEL DEPRESSED: 0
MAKING YOU STAY IN A HOSPITAL: 0
DIFFICULTY WITH RECREATIONAL PASTIMES, SPORTS, HOBBIES: 0
DIFFICULTY SLEEPING WELL AT NIGHT: 1
LOSS OF SELF CONTROL IN YOUR LIFE: 0
MAKING YOU WORRY: 0
WORKING AROUND THE HOUSE OR YARD DIFFICULT: 2
DIFFICULTY GOING AWAY FROM HOME: 1
GIVING YOU SIDE EFFECTS FROM TREATMENTS: 0

## 2017-07-17 ASSESSMENT — 6 MINUTE WALK TEST (6MWT): TOTAL DISTANCE WALKED (METERS): 344

## 2017-07-17 NOTE — Clinical Note
[facility]   [address], [address]  [place] [address]  Phone: [phone]  Fax: [phone]              Griselda Farmer  1945    Encounter Date: 7/17/2017    Anastacia Saunders M.D.          PROGRESS NOTE:  Subjective:   Griselda Farmer is a 72 y.o. female who presents today for cardiac care due to prior CAD with 2 stents placed in Lx artery, HTN, HLP. In the interim, patient has had repeated ER visits because of chest pain. She even had a repeated cardiac catheterization which did not show any thing wrong with her stents. She came in to the hospital again in July earlier because of chest pain. She was discharged under stable condition. Her left ventricular systolic function was found to be 50%.    Patient was able to complete 344 m during his 6 minute walk test. her O2 saturation at baseline was 91% and at the end of the test, the O2 saturation was 94%. she reported 1 level of dyspnea on Chuckie scale.    No chest pain.    Past Medical History   Diagnosis Date   • Insomnia    • Flushing reaction      has had full work up with cardiology, would awake with symptoms at night   • Hyperlipidemia 10/20/2010   • GERD (gastroesophageal reflux disease)    • Liver cyst      has been seen by GI, ultrasound 9/12   • Need for Tdap vaccination      in 2012   • S/P colonoscopy 11/9/2012   • Indigestion    • Urinary bladder disorder    • Unspecified urinary incontinence    • Dental disorder      upper and lower dentures   • FRIDA (obstructive sleep apnea)      states no cpap   • Heart burn 2014     pt on prevacid   • Arthritis      generalized + hands   • Myocardial infarct (CMS-[facility]) 1984     pt denies states sometimes irreg rhythm   • Angina 2014     pt denies    • Hypertension 2014     pt states well controlled on meds   • Sick sinus syndrome (CMS-[facility]) 1/1/2017   • Symptomatic sinus bradycardia 1/1/2017     Past Surgical History   Procedure Laterality Date   •  Tonsillectomy     • Hysterectomy, vaginal       ovaries were left   • Recovery  3/29/2013     Performed by Cath-Recovery Surgery at SURGERY SAME DAY [facility] ORS   • Cataract phaco with iol  9/19/2013     Performed by Sylvester Raza M.D. at SURGERY [facility] ORS   • Cataract phaco with iol  10/3/2013     Performed by Sylvester Raza M.D. at SURGERY [facility] ORS   • Knee arthroplasty total  12/16/2014     Performed by Jose G Trotter M.D. at SURGERY [facility] ORS   • Cardiac cath  1/1/17     Overlapping Synergy stents to 99% Circ   • Cardiac cath  1/19/17     Stents patent.   • Other cardiac surgery  January 2017     NON STEMI with stent     Family History   Problem Relation Age of Onset   • Diabetes Mother      mild   • Heart Disease Neg Hx    • Hypertension Neg Hx    • Cancer Father      melanoma mild     History   Smoking status   • Former Smoker -- 0.50 packs/day for 15 years   • Types: Cigarettes   • Quit date: 08/11/1975   Smokeless tobacco   • Never Used     Allergies   Allergen Reactions   • Codeine Itching and Vomiting     Rxn = years ago        Outpatient Encounter Prescriptions as of 7/17/2017   Medication Sig Dispense Refill   • metoprolol SR (TOPROL XL) 25 MG TABLET SR 24 HR Take 1 Tab by mouth every evening. 90 Tab 3   • metoprolol SR (TOPROL XL) 25 MG TABLET SR 24 HR Take 1 Tab by mouth every day. 90 Tab 3   • atorvastatin (LIPITOR) 40 MG Tab Take 40 mg by mouth every day.     • lansoprazole (PREVACID) 30 MG CAPSULE DELAYED RELEASE Take 1 Cap by mouth every day. 90 Cap 2   • clopidogrel (PLAVIX) 75 MG Tab Take 1 Tab by mouth every day. 90 Tab 3   • nitroglycerin (NITROSTAT) 0.4 MG SL Tab Place 1 Tab under tongue as needed for Chest Pain (up to 3 doses (if SBP greater than 90 mmHg)). 25 Tab 3   • aspirin EC (ECOTRIN) 81 MG Tablet Delayed Response Take 81 mg by mouth every evening. 30 Tab 0   • Cholecalciferol (VITAMIN D) 2000 UNIT TABS Take 1 Tab by mouth every day.     • [DISCONTINUED]  "losartan (COZAAR) 25 MG Tab Take 1 Tab by mouth every day. 90 Tab 0   • [DISCONTINUED] metoprolol SR (TOPROL XL) 25 MG TABLET SR 24 HR Take 0.5 Tabs by mouth every evening. 30 Tab 0   • [DISCONTINUED] chlorthalidone (HYGROTON) 25 MG Tab Take 1 Tab by mouth every day. 30 Tab 0     No facility-administered encounter medications on file as of 7/17/2017.     Review of Systems   Constitutional: Negative for fever, chills, weight loss and malaise/fatigue.   HENT: Negative for ear discharge, ear pain, hearing loss and nosebleeds.    Eyes: Negative for blurred vision, double vision, pain and discharge.   Respiratory: Negative for cough and shortness of breath.    Cardiovascular: Negative for chest pain, palpitations, orthopnea, claudication, leg swelling and PND.   Gastrointestinal: Negative for nausea, vomiting, abdominal pain, blood in stool and melena.   Genitourinary: Negative for dysuria and hematuria.   Musculoskeletal: Negative for myalgias, joint pain and falls.   Skin: Negative for itching and rash.   Neurological: Negative for dizziness, sensory change, speech change, loss of consciousness and headaches.   Endo/Heme/Allergies: Negative for environmental allergies. Does not bruise/bleed easily.   Psychiatric/Behavioral: Negative for depression, suicidal ideas and hallucinations.        Objective:   /72 mmHg  Pulse 75  Ht 1.651 m (5' 5\")  Wt 94.348 kg (208 lb)  BMI 34.61 kg/m2  SpO2 91%    Physical Exam   Constitutional: She is oriented to person, place, and time. She appears well-developed and well-nourished.   HENT:   Head: Normocephalic and atraumatic.   Eyes: EOM are normal.   Neck: No JVD present.   Cardiovascular: Normal rate, regular rhythm, normal heart sounds and intact distal pulses.  Exam reveals no gallop and no friction rub.    No murmur heard.  Pulmonary/Chest: No respiratory distress. She has no wheezes. She has no rales. She exhibits no tenderness.   Abdominal: She exhibits no distension. " There is no tenderness. There is no rebound and no guarding.   Musculoskeletal: She exhibits no edema or tenderness.   Lymphadenopathy:     She has no cervical adenopathy.   Neurological: She is alert and oriented to person, place, and time.   Skin: Skin is dry.   Psychiatric: She has a normal mood and affect.   Nursing note and vitals reviewed.      Assessment:     1. S/P coronary artery stent placement  metoprolol SR (TOPROL XL) 25 MG TABLET SR 24 HR    metoprolol SR (TOPROL XL) 25 MG TABLET SR 24 HR    COMP METABOLIC PANEL    LIPID PANEL   2. Obesity (BMI 30-39.9)  metoprolol SR (TOPROL XL) 25 MG TABLET SR 24 HR    metoprolol SR (TOPROL XL) 25 MG TABLET SR 24 HR    COMP METABOLIC PANEL    LIPID PANEL   3. Mixed hyperlipidemia  metoprolol SR (TOPROL XL) 25 MG TABLET SR 24 HR    metoprolol SR (TOPROL XL) 25 MG TABLET SR 24 HR    COMP METABOLIC PANEL    LIPID PANEL   4. Essential hypertension, benign  metoprolol SR (TOPROL XL) 25 MG TABLET SR 24 HR    metoprolol SR (TOPROL XL) 25 MG TABLET SR 24 HR    COMP METABOLIC PANEL    LIPID PANEL   5. Coronary artery disease involving native coronary artery of native heart without angina pectoris  metoprolol SR (TOPROL XL) 25 MG TABLET SR 24 HR    metoprolol SR (TOPROL XL) 25 MG TABLET SR 24 HR    COMP METABOLIC PANEL    LIPID PANEL   6. Dyspnea on exertion  NC PULMONARY STRESS TESTING,SIMPLE    metoprolol SR (TOPROL XL) 25 MG TABLET SR 24 HR    metoprolol SR (TOPROL XL) 25 MG TABLET SR 24 HR    COMP METABOLIC PANEL    LIPID PANEL       Medical Decision Making:  Today's Assessment / Status / Plan:     Will increase Metoprolol SR (Toprol XL) To 25 mg po daily.  Will increase Losartan to 50 mg po daily.  Stop Chlorthalidone.      Joleen Sherman, [email or web address].  [address] [address]  VIA In Basket

## 2017-07-17 NOTE — MR AVS SNAPSHOT
"        Grisleda Farmer   2017 3:30 PM   Office Visit   MRN: [id number]    Department:  [facility]   Dept Phone:  [phone]    Description:  Female : 1945   Provider:  Anastacia Saunders M.D.           Reason for Visit     New Patient           Allergies as of 2017     Allergen Noted Reactions    Codeine 2010   Itching, Vomiting    Rxn = years ago         You were diagnosed with     S/P coronary artery stent placement   [835011]       Obesity (BMI 30-39.9)   [152827]       Mixed hyperlipidemia   [272.2.ICD-9-CM]       Essential hypertension, benign   [401.1.ICD-9-CM]       Coronary artery disease involving native coronary artery of native heart without angina pectoris   [9449761]       Dyspnea on exertion   [399511]         Vital Signs     Blood Pressure Pulse Height Weight Body Mass Index Oxygen Saturation    116/72 mmHg 75 1.651 m (5' 5\") 94.348 kg (208 lb) 34.61 kg/m2 91%    Smoking Status                   Former Smoker           Basic Information     Date Of Birth Sex Race Ethnicity Preferred Language    1945 Female White Non- English      Your appointments     Aug 30, 2017  9:45 AM   FOLLOW UP with Anastacia Saunders M.D.   [facility] (--)    [address] [place] [address] [address]  [place] [id number] [phone]            Dec 04, 2017  6:30 AM   Adult Draw/Collection with LAB [facility]   [facility] (--)    [address] [place] [address] [address] [place] [address]   [phone]            Dec 06, 2017 10:45 AM   FOLLOW UP with Jose G Vallecillo M.D.   [facility] (--)    [address] [facility] [address]  [place] [address] [phone]            Dec 13, 2017  8:00 AM   NEW TO YOU with JACEK Matamoros   [facility] [place]    [address] [facility] [place] [address] [phone]              Problem List              ICD-10-CM Priority Class Noted - Resolved    Essential " hypertension, benign I10   8/11/2010 - Present    Insomnia G47.00   8/11/2010 - Present    FRIDA (obstructive sleep apnea) G47.33   8/11/2010 - Present    Mixed hyperlipidemia E78.2   10/20/2010 - Present    Arthritis M19.90   11/29/2010 - Present    Liver cyst K76.89   10/17/2011 - Present    Cough due to ACE inhibitor R05, T46.4X5A   6/26/2012 - Present    GERD (gastroesophageal reflux disease) K21.9   3/6/2013 - Present    CAD (coronary artery disease) I25.10   3/29/2013 - Present    Osteoarthrosis involving multiple sites M15.9   7/15/2014 - Present    Vitamin D deficiency disease E55.9   7/15/2014 - Present    Elevated fasting glucose R73.01   7/16/2014 - Present    Left knee pain M25.562   8/28/2014 - Present    Primary localized osteoarthrosis, lower leg M17.10   12/16/2014 - Present    Overriding toe of left foot M20.5X2   1/20/2015 - Present    Nonhealing nonsurgical wound T14.8   6/23/2015 - Present    Verruca plana B07.8   6/23/2015 - Present    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   4/25/2016 - Present    Incomplete tear of right rotator cuff M75.111   9/19/2016 - Present    Obesity (BMI 30-39.9) E66.9   12/7/2016 - Present    Sick sinus syndrome (CMS-HCC) I49.5 High  1/1/2017 - Present    Symptomatic sinus bradycardia R00.1   1/1/2017 - Present    Bradycardia with less than 30 beats per minute R00.1   1/2/2017 - Present    Coronary artery disease, non-occlusive I25.10   6/8/2017 - Present    S/P coronary artery stent placement Z95.5   6/8/2017 - Present    Healthcare maintenance Z00.00   6/8/2017 - Present    Onychorrhexis L60.3   6/8/2017 - Present    CHF exacerbation (CMS-HCC) I50.9   7/10/2017 - Present    Chest pain R07.9   7/10/2017 - Present      Health Maintenance        Date Due Completion Dates    BONE DENSITY 3/23/2016 3/23/2011    MAMMOGRAM 2/23/2017 2/23/2015, 6/29/2012, 3/23/2011    IMM INFLUENZA (1) 9/1/2017 11/7/2016, 1/28/2016, 10/1/2014, 9/23/2013, 11/9/2012, 10/3/2011    COLONOSCOPY  10/9/2021 10/9/2011 (N/S)    Override on 10/9/2011: (N/S)    IMM DTaP/Tdap/Td Vaccine (2 - Td) 6/8/2027 6/8/2017            Current Immunizations     13-VALENT PCV PREVNAR 9/7/2015    Influenza TIV (IM) 11/7/2016, 1/28/2016, 10/1/2014, 9/23/2013, 11/9/2012  8:24 AM, 10/3/2011    Pneumococcal Vaccine (UF)Historical Data 10/17/2009    Pneumococcal polysaccharide vaccine (PPSV-23) 1/2/2017  5:43 AM    SHINGLES VACCINE 11/9/2012  8:24 AM    Tdap Vaccine 6/8/2017    Tetanus Vaccine 1/1/2005      Below and/or attached are the medications your provider expects you to take. Review all of your home medications and newly ordered medications with your provider and/or pharmacist. Follow medication instructions as directed by your provider and/or pharmacist. Please keep your medication list with you and share with your provider. Update the information when medications are discontinued, doses are changed, or new medications (including over-the-counter products) are added; and carry medication information at all times in the event of emergency situations     Allergies:  CODEINE - Itching,Vomiting               Medications  Valid as of: July 17, 2017 -  3:43 PM    Generic Name Brand Name Tablet Size Instructions for use    Aspirin (Tablet Delayed Response) ECOTRIN 81 MG Take 81 mg by mouth every evening.        Atorvastatin Calcium (Tab) LIPITOR 40 MG Take 40 mg by mouth every day.        Cholecalciferol (Tab) vitamin D 2000 UNIT Take 1 Tab by mouth every day.        Clopidogrel Bisulfate (Tab) PLAVIX 75 MG Take 1 Tab by mouth every day.        Lansoprazole (CAPSULE DELAYED RELEASE) PREVACID 30 MG Take 1 Cap by mouth every day.        Metoprolol Succinate (TABLET SR 24 HR) TOPROL XL 25 MG Take 1 Tab by mouth every evening.        Metoprolol Succinate (TABLET SR 24 HR) TOPROL XL 25 MG Take 1 Tab by mouth every day.        Nitroglycerin (SL Tab) NITROSTAT 0.4 MG Place 1 Tab under tongue as needed for Chest Pain (up to 3 doses (if SBP  greater than 90 mmHg)).        .                 Medicines prescribed today were sent to:     [facility] [place], [place] - [address] [facility]    [address] [facility] [place] [address]    Phone: [phone] Fax: [phone]    Open 24 Hours?: No      Medication refill instructions:       If your prescription bottle indicates you have medication refills left, it is not necessary to call your provider’s office. Please contact your pharmacy and they will refill your medication.    If your prescription bottle indicates you do not have any refills left, you may request refills at any time through one of the following ways: The online [employer] system (except Urgent Care), by calling your provider’s office, or by asking your pharmacy to contact your provider’s office with a refill request. Medication refills are processed only during regular business hours and may not be available until the next business day. Your provider may request additional information or to have a follow-up visit with you prior to refilling your medication.   *Please Note: Medication refills are assigned a new Rx number when refilled electronically. Your pharmacy may indicate that no refills were authorized even though a new prescription for the same medication is available at the pharmacy. Please request the medicine by name with the pharmacy before contacting your provider for a refill.        Your To Do List     Future Labs/Procedures Complete By Expires    COMP METABOLIC PANEL  As directed 7/18/2018      Instructions    Will increase Metoprolol SR (Toprol XL) To 25 mg po daily.  Will increase Losartan to 50 mg po daily.  Stop Chlorthalidone.          [employer] Access Code: Activation code not generated  Current [employer] Status: Active

## 2017-07-17 NOTE — PROGRESS NOTES
Subjective:   Griselda Farmer is a 72 y.o. female who presents today for cardiac care due to prior CAD with 2 stents placed in Lx artery, HTN, HLP. In the interim, patient has had repeated ER visits because of chest pain. She even had a repeated cardiac catheterization which did not show any thing wrong with her stents. She came in to the hospital again in July earlier because of chest pain. She was discharged under stable condition. Her left ventricular systolic function was found to be 50%.    Patient was able to complete 344 m during his 6 minute walk test. her O2 saturation at baseline was 91% and at the end of the test, the O2 saturation was 94%. she reported 1 level of dyspnea on Chuckie scale.    No chest pain.    Past Medical History   Diagnosis Date   • Insomnia    • Flushing reaction      has had full work up with cardiology, would awake with symptoms at night   • Hyperlipidemia 10/20/2010   • GERD (gastroesophageal reflux disease)    • Liver cyst      has been seen by GI, ultrasound 9/12   • Need for Tdap vaccination      in 2012   • S/P colonoscopy 11/9/2012   • Indigestion    • Urinary bladder disorder    • Unspecified urinary incontinence    • Dental disorder      upper and lower dentures   • FRIDA (obstructive sleep apnea)      states no cpap   • Heart burn 2014     pt on prevacid   • Arthritis      generalized + hands   • Myocardial infarct (CMS-[facility]) 1984     pt denies states sometimes irreg rhythm   • Angina 2014     pt denies    • Hypertension 2014     pt states well controlled on meds   • Sick sinus syndrome (CMS-[facility]) 1/1/2017   • Symptomatic sinus bradycardia 1/1/2017     Past Surgical History   Procedure Laterality Date   • Tonsillectomy     • Hysterectomy, vaginal       ovaries were left   • Recovery  3/29/2013     Performed by Cath-Recovery Surgery at SURGERY SAME DAY [facility] ORS   • Cataract phaco with iol  9/19/2013     Performed by Sylvester Raza M.D. at SURGERY [facility]   •  Cataract phaco with iol  10/3/2013     Performed by Sylvester Raza M.D. at SURGERY SURGICAL [facility] ORS   • Knee arthroplasty total  12/16/2014     Performed by Jose G Trotter M.D. at SURGERY [facility] ORS   • Cardiac cath  1/1/17     Overlapping Synergy stents to 99% Circ   • Cardiac cath  1/19/17     Stents patent.   • Other cardiac surgery  January 2017     NON STEMI with stent     Family History   Problem Relation Age of Onset   • Diabetes Mother      mild   • Heart Disease Neg Hx    • Hypertension Neg Hx    • Cancer Father      melanoma mild     History   Smoking status   • Former Smoker -- 0.50 packs/day for 15 years   • Types: Cigarettes   • Quit date: 08/11/1975   Smokeless tobacco   • Never Used     Allergies   Allergen Reactions   • Codeine Itching and Vomiting     Rxn = years ago        Outpatient Encounter Prescriptions as of 7/17/2017   Medication Sig Dispense Refill   • metoprolol SR (TOPROL XL) 25 MG TABLET SR 24 HR Take 1 Tab by mouth every evening. 90 Tab 3   • metoprolol SR (TOPROL XL) 25 MG TABLET SR 24 HR Take 1 Tab by mouth every day. 90 Tab 3   • atorvastatin (LIPITOR) 40 MG Tab Take 40 mg by mouth every day.     • lansoprazole (PREVACID) 30 MG CAPSULE DELAYED RELEASE Take 1 Cap by mouth every day. 90 Cap 2   • clopidogrel (PLAVIX) 75 MG Tab Take 1 Tab by mouth every day. 90 Tab 3   • nitroglycerin (NITROSTAT) 0.4 MG SL Tab Place 1 Tab under tongue as needed for Chest Pain (up to 3 doses (if SBP greater than 90 mmHg)). 25 Tab 3   • aspirin EC (ECOTRIN) 81 MG Tablet Delayed Response Take 81 mg by mouth every evening. 30 Tab 0   • Cholecalciferol (VITAMIN D) 2000 UNIT TABS Take 1 Tab by mouth every day.     • [DISCONTINUED] losartan (COZAAR) 25 MG Tab Take 1 Tab by mouth every day. 90 Tab 0   • [DISCONTINUED] metoprolol SR (TOPROL XL) 25 MG TABLET SR 24 HR Take 0.5 Tabs by mouth every evening. 30 Tab 0   • [DISCONTINUED] chlorthalidone (HYGROTON) 25 MG Tab Take 1 Tab by mouth every day. 30 Tab  "0     No facility-administered encounter medications on file as of 7/17/2017.     Review of Systems   Constitutional: Negative for fever, chills, weight loss and malaise/fatigue.   HENT: Negative for ear discharge, ear pain, hearing loss and nosebleeds.    Eyes: Negative for blurred vision, double vision, pain and discharge.   Respiratory: Negative for cough and shortness of breath.    Cardiovascular: Negative for chest pain, palpitations, orthopnea, claudication, leg swelling and PND.   Gastrointestinal: Negative for nausea, vomiting, abdominal pain, blood in stool and melena.   Genitourinary: Negative for dysuria and hematuria.   Musculoskeletal: Negative for myalgias, joint pain and falls.   Skin: Negative for itching and rash.   Neurological: Negative for dizziness, sensory change, speech change, loss of consciousness and headaches.   Endo/Heme/Allergies: Negative for environmental allergies. Does not bruise/bleed easily.   Psychiatric/Behavioral: Negative for depression, suicidal ideas and hallucinations.        Objective:   /72 mmHg  Pulse 75  Ht 1.651 m (5' 5\")  Wt 94.348 kg (208 lb)  BMI 34.61 kg/m2  SpO2 91%    Physical Exam   Constitutional: She is oriented to person, place, and time. She appears well-developed and well-nourished.   HENT:   Head: Normocephalic and atraumatic.   Eyes: EOM are normal.   Neck: No JVD present.   Cardiovascular: Normal rate, regular rhythm, normal heart sounds and intact distal pulses.  Exam reveals no gallop and no friction rub.    No murmur heard.  Pulmonary/Chest: No respiratory distress. She has no wheezes. She has no rales. She exhibits no tenderness.   Abdominal: She exhibits no distension. There is no tenderness. There is no rebound and no guarding.   Musculoskeletal: She exhibits no edema or tenderness.   Lymphadenopathy:     She has no cervical adenopathy.   Neurological: She is alert and oriented to person, place, and time.   Skin: Skin is dry. "   Psychiatric: She has a normal mood and affect.   Nursing note and vitals reviewed.      Assessment:     1. S/P coronary artery stent placement  metoprolol SR (TOPROL XL) 25 MG TABLET SR 24 HR    metoprolol SR (TOPROL XL) 25 MG TABLET SR 24 HR    COMP METABOLIC PANEL    LIPID PANEL   2. Obesity (BMI 30-39.9)  metoprolol SR (TOPROL XL) 25 MG TABLET SR 24 HR    metoprolol SR (TOPROL XL) 25 MG TABLET SR 24 HR    COMP METABOLIC PANEL    LIPID PANEL   3. Mixed hyperlipidemia  metoprolol SR (TOPROL XL) 25 MG TABLET SR 24 HR    metoprolol SR (TOPROL XL) 25 MG TABLET SR 24 HR    COMP METABOLIC PANEL    LIPID PANEL   4. Essential hypertension, benign  metoprolol SR (TOPROL XL) 25 MG TABLET SR 24 HR    metoprolol SR (TOPROL XL) 25 MG TABLET SR 24 HR    COMP METABOLIC PANEL    LIPID PANEL   5. Coronary artery disease involving native coronary artery of native heart without angina pectoris  metoprolol SR (TOPROL XL) 25 MG TABLET SR 24 HR    metoprolol SR (TOPROL XL) 25 MG TABLET SR 24 HR    COMP METABOLIC PANEL    LIPID PANEL   6. Dyspnea on exertion  GA PULMONARY STRESS TESTING,SIMPLE    metoprolol SR (TOPROL XL) 25 MG TABLET SR 24 HR    metoprolol SR (TOPROL XL) 25 MG TABLET SR 24 HR    COMP METABOLIC PANEL    LIPID PANEL       Medical Decision Making:  Today's Assessment / Status / Plan:     Will increase Metoprolol SR (Toprol XL) To 25 mg po daily.  Will increase Losartan to 50 mg po daily.  Stop Chlorthalidone.

## 2017-07-17 NOTE — PATIENT INSTRUCTIONS
Will increase Metoprolol SR (Toprol XL) To 25 mg po daily.  Will increase Losartan to 50 mg po daily.  Stop Chlorthalidone.

## 2017-08-15 ENCOUNTER — OFFICE VISIT (OUTPATIENT)
Dept: MEDICAL GROUP | Facility: PHYSICIAN GROUP | Age: 72
End: 2017-08-15
Payer: MEDICARE

## 2017-08-15 VITALS
BODY MASS INDEX: 34.59 KG/M2 | HEIGHT: 65 IN | RESPIRATION RATE: 16 BRPM | SYSTOLIC BLOOD PRESSURE: 130 MMHG | DIASTOLIC BLOOD PRESSURE: 82 MMHG | TEMPERATURE: 98.8 F | WEIGHT: 207.6 LBS | OXYGEN SATURATION: 97 % | HEART RATE: 76 BPM

## 2017-08-15 DIAGNOSIS — K21.00 GASTROESOPHAGEAL REFLUX DISEASE WITH ESOPHAGITIS: ICD-10-CM

## 2017-08-15 DIAGNOSIS — Z95.5 S/P CORONARY ARTERY STENT PLACEMENT: ICD-10-CM

## 2017-08-15 DIAGNOSIS — I49.5 SICK SINUS SYNDROME (HCC): ICD-10-CM

## 2017-08-15 DIAGNOSIS — G47.33 OSA (OBSTRUCTIVE SLEEP APNEA): ICD-10-CM

## 2017-08-15 DIAGNOSIS — R35.0 INCREASED URINARY FREQUENCY: ICD-10-CM

## 2017-08-15 PROBLEM — I25.10 CORONARY ARTERY DISEASE, NON-OCCLUSIVE: Status: RESOLVED | Noted: 2017-06-08 | Resolved: 2017-08-15

## 2017-08-15 PROBLEM — R00.1 BRADYCARDIA WITH LESS THAN 30 BEATS PER MINUTE: Status: RESOLVED | Noted: 2017-01-02 | Resolved: 2017-08-15

## 2017-08-15 PROBLEM — R00.1 SYMPTOMATIC SINUS BRADYCARDIA: Status: RESOLVED | Noted: 2017-01-01 | Resolved: 2017-08-15

## 2017-08-15 PROCEDURE — 81002 URINALYSIS NONAUTO W/O SCOPE: CPT | Performed by: FAMILY MEDICINE

## 2017-08-15 PROCEDURE — 99214 OFFICE O/P EST MOD 30 MIN: CPT | Performed by: FAMILY MEDICINE

## 2017-08-15 RX ORDER — RANITIDINE 150 MG/1
150 TABLET ORAL 2 TIMES DAILY
Qty: 60 TAB | Refills: 11 | Status: SHIPPED | OUTPATIENT
Start: 2017-08-15 | End: 2018-05-15

## 2017-08-15 NOTE — ASSESSMENT & PLAN NOTE
This is a chronic condition. She puts her CPAP mask on but when she wakes up she finds it off her face.   Last 2-3 weeks her daytime energy is better. Before that she would even have to rest when making her bed.

## 2017-08-15 NOTE — ASSESSMENT & PLAN NOTE
She was on lasix in January after hospital admission for stenting.   She has similar frequency of needing to urinate every 5 minutes.   Now for last 10 days she has increasing burning with urination. She has less pain on cystex (methenamine) OTC antibiotic but symptoms have not resolved completely.

## 2017-08-15 NOTE — MR AVS SNAPSHOT
"        Griselda Farmer   8/15/2017 11:00 AM   Office Visit   MRN: [id number]    Department:  [facility]   Dept Phone:  [phone]    Description:  Female : 1945   Provider:  Alyssa Boyd M.D.           Reason for Visit     Gastrophageal Reflux     Urinary Frequency           Allergies as of 8/15/2017     Allergen Noted Reactions    Codeine 2010   Itching, Vomiting    Rxn = years ago         You were diagnosed with     S/P coronary artery stent placement   [[id number]]       Gastroesophageal reflux disease with esophagitis   [1736627]       Sick sinus syndrome (CMS-HCC)   [191624]       FRIDA (obstructive sleep apnea)   [805226]       Increased urinary frequency   [070059]         Vital Signs     Blood Pressure Pulse Temperature Respirations Height Weight    130/82 mmHg 76 37.1 °C (98.8 °F) 16 1.651 m (5' 5\") 94.167 kg (207 lb 9.6 oz)    Body Mass Index Oxygen Saturation Smoking Status             34.55 kg/m2 97% Former Smoker         Basic Information     Date Of Birth Sex Race Ethnicity Preferred Language    1945 Female White Non- English      Your appointments     Aug 30, 2017  9:45 AM   Heart Failure Established with Anastacia Saunders M.D.   [facility] (--)    [address], [facility] [address]  [place] [address] [phone]            Dec 04, 2017  6:30 AM   Adult Draw/Collection with LAB [facility]   [facility] (--)    [address] [place] [address]   [phone]            Dec 06, 2017 10:45 AM   FOLLOW UP with Jose G Vallecillo M.D.   [facility] (--)    [address], [place] [address]  [place] [id number] [phone]              Problem List              ICD-10-CM Priority Class Noted - Resolved    Essential hypertension, benign I10   2010 - Present    Insomnia G47.00   2010 - Present    FRIDA (obstructive sleep apnea) G47.33   2010 - Present    Mixed hyperlipidemia " E78.2   10/20/2010 - Present    Arthritis M19.90   11/29/2010 - Present    Liver cyst K76.89   10/17/2011 - Present    Cough due to ACE inhibitor R05, T46.4X5A   6/26/2012 - Present    GERD (gastroesophageal reflux disease) K21.9   3/6/2013 - Present    CAD (coronary artery disease) I25.10   3/29/2013 - Present    Osteoarthrosis involving multiple sites M15.9   7/15/2014 - Present    Vitamin D deficiency disease E55.9   7/15/2014 - Present    Elevated fasting glucose R73.01   7/16/2014 - Present    Left knee pain M25.562   8/28/2014 - Present    Primary localized osteoarthrosis, lower leg M17.10   12/16/2014 - Present    Overriding toe of left foot M20.5X2   1/20/2015 - Present    Verruca plana B07.8   6/23/2015 - Present    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   4/25/2016 - Present    Incomplete tear of right rotator cuff M75.111   9/19/2016 - Present    Obesity (BMI 30-39.9) E66.9   12/7/2016 - Present    Sick sinus syndrome (CMS-HCC) I49.5 High  1/1/2017 - Present    S/P coronary artery stent placement Z95.5   6/8/2017 - Present    Healthcare maintenance Z00.00   6/8/2017 - Present    Onychorrhexis L60.3   6/8/2017 - Present    CHF exacerbation (CMS-HCC) I50.9   7/10/2017 - Present    Chest pain R07.9   7/10/2017 - Present    Increased urinary frequency R35.0   8/15/2017 - Present      Health Maintenance        Date Due Completion Dates    BONE DENSITY 3/23/2016 3/23/2011    MAMMOGRAM 2/23/2017 2/23/2015, 6/29/2012, 3/23/2011    IMM INFLUENZA (1) 9/1/2017 11/7/2016, 1/28/2016, 10/1/2014, 9/23/2013, 11/9/2012, 10/3/2011    COLONOSCOPY 10/9/2021 10/9/2011 (N/S)    Override on 10/9/2011: (N/S)    IMM DTaP/Tdap/Td Vaccine (2 - Td) 6/8/2027 6/8/2017            Current Immunizations     13-VALENT PCV PREVNAR 9/7/2015    Influenza TIV (IM) 11/7/2016, 1/28/2016, 10/1/2014, 9/23/2013, 11/9/2012  8:24 AM, 10/3/2011    Pneumococcal Vaccine (UF)Historical Data 10/17/2009    Pneumococcal polysaccharide vaccine (PPSV-23)  1/2/2017  5:43 AM    SHINGLES VACCINE 11/9/2012  8:24 AM    Tdap Vaccine 6/8/2017    Tetanus Vaccine 1/1/2005      Below and/or attached are the medications your provider expects you to take. Review all of your home medications and newly ordered medications with your provider and/or pharmacist. Follow medication instructions as directed by your provider and/or pharmacist. Please keep your medication list with you and share with your provider. Update the information when medications are discontinued, doses are changed, or new medications (including over-the-counter products) are added; and carry medication information at all times in the event of emergency situations     Allergies:  CODEINE - Itching,Vomiting               Medications  Valid as of: August 15, 2017 - 12:02 PM    Generic Name Brand Name Tablet Size Instructions for use    Aspirin (Tablet Delayed Response) ECOTRIN 81 MG Take 81 mg by mouth every evening.        Atorvastatin Calcium (Tab) LIPITOR 40 MG Take 40 mg by mouth every day.        Cholecalciferol (Tab) vitamin D 2000 UNIT Take 1 Tab by mouth every day.        Clopidogrel Bisulfate (Tab) PLAVIX 75 MG Take 1 Tab by mouth every day.        Lansoprazole (CAPSULE DELAYED RELEASE) PREVACID 30 MG Take 1 Cap by mouth every day.        Metoprolol Succinate (TABLET SR 24 HR) TOPROL XL 25 MG Take 1 Tab by mouth every evening.        Metoprolol Succinate (TABLET SR 24 HR) TOPROL XL 25 MG Take 1 Tab by mouth every day.        Nitroglycerin (SL Tab) NITROSTAT 0.4 MG Place 1 Tab under tongue as needed for Chest Pain (up to 3 doses (if SBP greater than 90 mmHg)).        RaNITidine HCl (Tab) ZANTAC 150 MG Take 1 Tab by mouth 2 times a day.        .                 Medicines prescribed today were sent to:     [facility] [place], [place] - [address] [facility]    [address] [facility] [address]    Phone: [phone] Fax: [phone]    Open 24 Hours?: No      Medication refill  instructions:       If your prescription bottle indicates you have medication refills left, it is not necessary to call your provider’s office. Please contact your pharmacy and they will refill your medication.    If your prescription bottle indicates you do not have any refills left, you may request refills at any time through one of the following ways: The online [employer] system (except Urgent Care), by calling your provider’s office, or by asking your pharmacy to contact your provider’s office with a refill request. Medication refills are processed only during regular business hours and may not be available until the next business day. Your provider may request additional information or to have a follow-up visit with you prior to refilling your medication.   *Please Note: Medication refills are assigned a new Rx number when refilled electronically. Your pharmacy may indicate that no refills were authorized even though a new prescription for the same medication is available at the pharmacy. Please request the medicine by name with the pharmacy before contacting your provider for a refill.        Other Notes About Your Plan     Cards June 2017.            [employer] Access Code: Activation code not generated  Current [employer] Status: Active

## 2017-08-15 NOTE — ASSESSMENT & PLAN NOTE
She had 2 stents placed Jan 1st 2017  She continues on metoprolol and plavix, and aspirin and lipitor.   Stress test 7/11/17 was negative for ischemia.

## 2017-08-15 NOTE — ASSESSMENT & PLAN NOTE
Her heart rate has stabilized. She used to have bradycardia down to 40s before stenting done in Jan 2017, Since then HR in 70s to 80s.   She has no heart racing or palpitations, no chest pain with activity.

## 2017-08-15 NOTE — ASSESSMENT & PLAN NOTE
Burning epigastric pain especially when lying down. Relieved by tums.   She takes prevacid 30 mg which does not control the night time symptoms.   Will add ranitidine to use at night.  Sometimes needing to sleep in her recliner due to symptoms of reflux. Unable to sleep with head of bed raised.   Will refer also to GI for EGD.

## 2017-08-16 ENCOUNTER — TELEPHONE (OUTPATIENT)
Dept: MEDICAL GROUP | Facility: PHYSICIAN GROUP | Age: 72
End: 2017-08-16

## 2017-08-16 LAB
APPEARANCE UR: CLEAR
BILIRUB UR STRIP-MCNC: NORMAL MG/DL
COLOR UR AUTO: YELLOW
GLUCOSE UR STRIP.AUTO-MCNC: NORMAL MG/DL
KETONES UR STRIP.AUTO-MCNC: NORMAL MG/DL
LEUKOCYTE ESTERASE UR QL STRIP.AUTO: NORMAL
NITRITE UR QL STRIP.AUTO: NORMAL
PH UR STRIP.AUTO: 5 [PH] (ref 5–8)
PROT UR QL STRIP: NORMAL MG/DL
RBC UR QL AUTO: NORMAL
SP GR UR STRIP.AUTO: 1.01
UROBILINOGEN UR STRIP-MCNC: NORMAL MG/DL

## 2017-08-16 NOTE — TELEPHONE ENCOUNTER
Griselda brought in urine sample. POCT within normal limits. No culture needed. Contacted Griselda with results

## 2017-08-17 NOTE — PROGRESS NOTES
Subjective:   Griselda Farmer is a 72 y.o. female here today for evaluation and management of:     S/P coronary artery stent placement  She had 2 stents placed Jan 1st 2017  She continues on metoprolol and plavix, and aspirin and lipitor.   Stress test 7/11/17 was negative for ischemia.     GERD (gastroesophageal reflux disease)  Burning epigastric pain especially when lying down. Relieved by tums.   She takes prevacid 30 mg which does not control the night time symptoms.   Will add ranitidine to use at night.  Sometimes needing to sleep in her recliner due to symptoms of reflux. Unable to sleep with head of bed raised.   Will refer also to GI for EGD.     Sick sinus syndrome (CMS-HCC)  Her heart rate has stabilized. She used to have bradycardia down to 40s before stenting done in Jan 2017, Since then HR in 70s to 80s.   She has no heart racing or palpitations, no chest pain with activity.       FRIDA (obstructive sleep apnea)  This is a chronic condition. She puts her CPAP mask on but when she wakes up she finds it off her face.   Last 2-3 weeks her daytime energy is better. Before that she would even have to rest when making her bed.     Increased urinary frequency  She was on lasix in January after hospital admission for stenting.   She has similar frequency of needing to urinate every 5 minutes.   Now for last 10 days she has increasing burning with urination. She has less pain on cystex (methenamine) OTC antibiotic but symptoms have not resolved completely.          Current medicines (including changes today)  Current Outpatient Prescriptions   Medication Sig Dispense Refill   • ranitidine (ZANTAC) 150 MG Tab Take 1 Tab by mouth 2 times a day. 60 Tab 11   • metoprolol SR (TOPROL XL) 25 MG TABLET SR 24 HR Take 1 Tab by mouth every evening. 90 Tab 3   • atorvastatin (LIPITOR) 40 MG Tab Take 40 mg by mouth every day.     • lansoprazole (PREVACID) 30 MG CAPSULE DELAYED RELEASE Take 1 Cap by mouth every day. 90  "Cap 2   • clopidogrel (PLAVIX) 75 MG Tab Take 1 Tab by mouth every day. 90 Tab 3   • aspirin EC (ECOTRIN) 81 MG Tablet Delayed Response Take 81 mg by mouth every evening. 30 Tab 0   • Cholecalciferol (VITAMIN D) 2000 UNIT TABS Take 1 Tab by mouth every day.     • metoprolol SR (TOPROL XL) 25 MG TABLET SR 24 HR Take 1 Tab by mouth every day. 90 Tab 3   • nitroglycerin (NITROSTAT) 0.4 MG SL Tab Place 1 Tab under tongue as needed for Chest Pain (up to 3 doses (if SBP greater than 90 mmHg)). 25 Tab 3     No current facility-administered medications for this visit.     She  has a past medical history of Insomnia; Flushing reaction; Hyperlipidemia (10/20/2010); GERD (gastroesophageal reflux disease); Liver cyst; Need for Tdap vaccination; S/P colonoscopy (11/9/2012); Indigestion; Urinary bladder disorder; Unspecified urinary incontinence; Dental disorder; FRIDA (obstructive sleep apnea); Heart burn (2014); Arthritis; Myocardial infarct (CMS-[facility]) (1984); Angina (2014); Hypertension (2014); Sick sinus syndrome (CMS-HCC) (1/1/2017); and Symptomatic sinus bradycardia (1/1/2017).    ROS  No chest pain, no shortness of breath, no abdominal pain       Objective:     Blood pressure 130/82, pulse 76, temperature 37.1 °C (98.8 °F), resp. rate 16, height 1.651 m (5' 5\"), weight 94.167 kg (207 lb 9.6 oz), SpO2 97 %. Body mass index is 34.55 kg/(m^2).   Physical Exam:  Constitutional: Alert, no distress.  Skin: Warm, dry, good turgor, no rashes in visible areas.  Eye: Equal, round and reactive, conjunctiva clear, lids normal.  ENMT: Lips without lesions, good dentition, oropharynx clear.  Neck: Trachea midline, no masses, no thyromegaly. No cervical or supraclavicular lymphadenopathy  Respiratory: Unlabored respiratory effort, lungs clear to auscultation, no wheezes, no ronchi.  Cardiovascular: Normal S1, S2, no murmur, no edema.  Abdomen: Soft, non-tender, no masses, no hepatosplenomegaly.  Psych: Alert and oriented x3, normal affect " and mood.        Assessment and Plan:   The following treatment plan was discussed    1. S/P coronary artery stent placement  Stable with negative stress test this year. Continue with medical management.     2. Gastroesophageal reflux disease with esophagitis  Advised to use ranitidine at night.   - ranitidine (ZANTAC) 150 MG Tab; Take 1 Tab by mouth 2 times a day.  Dispense: 60 Tab; Refill: 11    3. Sick sinus syndrome (CMS-HCC)  Controlled on current medication, stable.     4. FRIDA (obstructive sleep apnea)  Chronic condition. Continues on CPAP as tolerated.     5. Increased urinary frequency  UA was checked and negative  She can continue OTC medication for symptomatic relief.   Advised on adequate hydration.   - URINALYSIS CX IF IND  - POCT Urinalysis      Followup: Return in about 6 months (around 2/15/2018) for CAD, HTN, urinary urgency.

## 2017-08-21 ENCOUNTER — PATIENT OUTREACH (OUTPATIENT)
Dept: HEALTH INFORMATION MANAGEMENT | Facility: OTHER | Age: 72
End: 2017-08-21

## 2017-08-21 NOTE — PROGRESS NOTES
Griselda Farmer was admitted to [facility] on 7/10/17 for shortness of breath, and burning chest pain. Patient was discharged home on 7/11/17 with a lace score of 72. [facility] patient advocate was able to successfully engage with patient on 7/12/17. Patient confirmed she picked up her new medications of, hygroton and Toprol. Patient was not discharged with any DME or home health services. Per discharge orders, patient was instructed to follow up with her PCP, Joleen Sherman within a week. Patient was also instructed to follow up with her cardiologist, Saud Saunders on 7/17/17. Patient kept her appointment with  on 7/17/17. However, patient has not followed up with her PCP. Patient does have an appointment with Alyssa Boyd on 8/15/17.

## 2017-08-26 NOTE — PROGRESS NOTES
"Heart Failure New Appointment     Per MD, HF ruled out, HF education not indicated.    6MWT- 344 meters  MLWHF- 13    OP Heart Failure  Vitals  Appointment Type: Heart Failure New  Weight: 94.3 kg (208 lb)  How Weight Obtained: Stand Up Scale  Height: 165.1 cm (5' 5\")  BMI (Calculated): 34.61  Blood Pressure : 116/72  Pulse: 75    System Assessment  NYHA Functional Class Assessment:  (HF r/o)     Smoking Hx  Have you Ever Smoked: Yes  Have you Smoked in the Last 12 Mos: No  Confirm Quit Date: 75     Alcohol Hx  Do you Drink?: No     Illicit Drug Hx  Illicit Drug History: No    [place] Living with Heart Failure  Swelling in Ankles or Legs: 0  Having to Sit or Lie Down During the Day: 2  Walking About or Climbing Stairs Difficult: 2  Working Around the House or Yard Difficult: 2  Difficulty Going Away from Home: 1  Difficulty Sleeping Well at Night: 1  Difficulty Socializing with Family or Friends: 1  Difficulty Working to Earn a Livin  Difficulty with Recreational Pastimes, Sports, Hobbies: 0  Difficulty with Sexual Activities: 0  Eating Less Foods You Like: 0  Making you Short of Breath: 1  Tired, Fatigued or Low on Energy: 2  Making you Stay in a Hospital: 0  Costing you Money for Medical Care?: 0  Giving you Side Effects from Treatments: 0  Feeling like a [place] to Family and Friends: 0  Loss of Self Control in your Life: 0  Making You Worry: 0  Difficulty to Concentrate or Remembering Things: 1  Making you Feel Depressed: 0  MLHF Total Score : 13    6 Minute Walk Test  Baseline to end of test: 6:00  Total meters walked: 344       ELAINE Mann RN  [phone]  "

## 2017-08-31 NOTE — ADDENDUM NOTE
Encounter addended by: Jet Breen M.D. on: 8/30/2017  8:16 PM<BR>    Actions taken: Edit attestation on clinical note

## 2018-01-08 ENCOUNTER — OFFICE VISIT (OUTPATIENT)
Dept: MEDICAL GROUP | Age: 73
End: 2018-01-08
Payer: MEDICARE

## 2018-01-08 VITALS
TEMPERATURE: 97.9 F | HEIGHT: 65 IN | DIASTOLIC BLOOD PRESSURE: 80 MMHG | BODY MASS INDEX: 33.52 KG/M2 | HEART RATE: 92 BPM | OXYGEN SATURATION: 94 % | SYSTOLIC BLOOD PRESSURE: 138 MMHG | WEIGHT: 201.2 LBS

## 2018-01-08 DIAGNOSIS — E66.9 OBESITY (BMI 30.0-34.9): ICD-10-CM

## 2018-01-08 DIAGNOSIS — E55.9 VITAMIN D DEFICIENCY DISEASE: ICD-10-CM

## 2018-01-08 DIAGNOSIS — Z78.0 POSTMENOPAUSAL ESTROGEN DEFICIENCY: ICD-10-CM

## 2018-01-08 DIAGNOSIS — I25.10 CORONARY ARTERY DISEASE INVOLVING NATIVE CORONARY ARTERY OF NATIVE HEART WITHOUT ANGINA PECTORIS: ICD-10-CM

## 2018-01-08 DIAGNOSIS — C80.1 SPINDLE CELL CARCINOMA (HCC): ICD-10-CM

## 2018-01-08 DIAGNOSIS — Z12.31 VISIT FOR SCREENING MAMMOGRAM: ICD-10-CM

## 2018-01-08 DIAGNOSIS — K21.00 GASTROESOPHAGEAL REFLUX DISEASE WITH ESOPHAGITIS: ICD-10-CM

## 2018-01-08 DIAGNOSIS — I10 ESSENTIAL HYPERTENSION, BENIGN: ICD-10-CM

## 2018-01-08 DIAGNOSIS — E78.2 MIXED HYPERLIPIDEMIA: ICD-10-CM

## 2018-01-08 PROBLEM — I50.9 CHF EXACERBATION (HCC): Status: RESOLVED | Noted: 2017-07-10 | Resolved: 2018-01-08

## 2018-01-08 PROBLEM — R07.9 CHEST PAIN: Status: RESOLVED | Noted: 2017-07-10 | Resolved: 2018-01-08

## 2018-01-08 PROCEDURE — 99204 OFFICE O/P NEW MOD 45 MIN: CPT | Performed by: INTERNAL MEDICINE

## 2018-01-08 ASSESSMENT — PAIN SCALES - GENERAL: PAINLEVEL: 5=MODERATE PAIN

## 2018-01-08 NOTE — ASSESSMENT & PLAN NOTE
Patient is taking Metoprolol XL 25 mg daily. Her BP is stable with current regimens. She denied side effects from taking medication.

## 2018-01-08 NOTE — ASSESSMENT & PLAN NOTE
Patient is taking Atorvastatin 40 mg every evening. She tolerates atorvastatin without side effects. According to previous lab results, LDL at goal. Reviewed previous lab results with patient in clinic.    Results for ONESIMO DURAN (MRN [id number]) as of 1/7/2018 18:15   Ref. Range 6/1/2017 06:11   Cholesterol,Tot Latest Ref Range: 100 - 199 mg/dL 139   Triglycerides Latest Ref Range: 0 - 149 mg/dL 135   HDL Latest Ref Range: >=40 mg/dL 63   LDL Latest Ref Range: <100 mg/dL 49

## 2018-01-08 NOTE — ASSESSMENT & PLAN NOTE
Patient has history of CAD s/p 2 stents in left circumference artery in 1/2017. She is taking plavix 75 mg daily and aspirin 81 mg daily. LDL is well-controlled with atorvastatin. She is also on Metoprolol XL 25 mg daily. Peter had multiple no shows for cardiologist follow-up as she was moving. I advised and discussed with patient to follow with cardiologist regularly. Patient agreed to call to schedule with cardiologist for follow-up.

## 2018-01-09 NOTE — PROGRESS NOTES
Griselda Duran is a 72 y.o. female here to establish care and the evaluation and management of:      HPI:    Essential hypertension, benign  Patient is taking Metoprolol XL 25 mg daily. Her BP is stable with current regimens. She denied side effects from taking medication.     Mixed hyperlipidemia  Patient is taking Atorvastatin 40 mg every evening. She tolerates atorvastatin without side effects. According to previous lab results, LDL at goal. Reviewed previous lab results with patient in clinic.    Results for GRISELDA DURAN (MRN [id number]) as of 1/7/2018 18:15   Ref. Range 6/1/2017 06:11   Cholesterol,Tot Latest Ref Range: 100 - 199 mg/dL 139   Triglycerides Latest Ref Range: 0 - 149 mg/dL 135   HDL Latest Ref Range: >=40 mg/dL 63   LDL Latest Ref Range: <100 mg/dL 49       CAD (coronary artery disease)  Patient has history of CAD s/p 2 stents in left circumference artery in 1/2017. She is taking plavix 75 mg daily and aspirin 81 mg daily. LDL is well-controlled with atorvastatin. She is also on Metoprolol XL 25 mg daily. Peter had multiple no shows for cardiologist follow-up as she was moving. I advised and discussed with patient to follow with cardiologist regularly. Patient agreed to call to schedule with cardiologist for follow-up.    Vitamin D deficiency disease  Patient has low vitamin D in the past and was treated with supplements. She is currently taking vitamin D 2000 units daily. She needs to repeat vitamin D in 6 months for follow-up.    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 She follows with oncologist in June 2016. She needs to continue follow with [facility] oncologist once a year. Patient agrees to call to schedule with oncologist. She does not have any recurrent symptoms currently.    GERD (gastroesophageal reflux disease)  Patient is taking Prevacid 30 mg daily. Her symptom is well controlled with current regimen.  She tries to stop taking PPI and has recurrent symptoms right after that. We discussed the risks and benefits of chronic use of PPI and advised to take vitamin B12, magnesium and vitamin D supplement if she is taking PPI chronically. She understands and agrees with the plan.    Current medicines (including changes today)  Current Outpatient Prescriptions   Medication Sig Dispense Refill   • Pumpkin Seed-Soy Germ (AZO BLADDER CONTROL/GO-LESS PO) Take  by mouth.     • ranitidine (ZANTAC) 150 MG Tab Take 1 Tab by mouth 2 times a day. 60 Tab 11   • metoprolol SR (TOPROL XL) 25 MG TABLET SR 24 HR Take 1 Tab by mouth every evening. 90 Tab 3   • atorvastatin (LIPITOR) 40 MG Tab Take 40 mg by mouth every day.     • lansoprazole (PREVACID) 30 MG CAPSULE DELAYED RELEASE Take 1 Cap by mouth every day. 90 Cap 2   • clopidogrel (PLAVIX) 75 MG Tab Take 1 Tab by mouth every day. 90 Tab 3   • nitroglycerin (NITROSTAT) 0.4 MG SL Tab Place 1 Tab under tongue as needed for Chest Pain (up to 3 doses (if SBP greater than 90 mmHg)). 25 Tab 3   • aspirin EC (ECOTRIN) 81 MG Tablet Delayed Response Take 81 mg by mouth every evening. 30 Tab 0   • Cholecalciferol (VITAMIN D) 2000 UNIT TABS Take 1 Tab by mouth every day.       No current facility-administered medications for this visit.      She  has a past medical history of Angina (2014); Arthritis; Dental disorder; Flushing reaction; GERD (gastroesophageal reflux disease); Heart burn (2014); Hyperlipidemia (10/20/2010); Hypertension (2014); Indigestion; Insomnia; Liver cyst; Myocardial infarct (1984); Need for Tdap vaccination; FRIDA (obstructive sleep apnea); S/P colonoscopy (11/9/2012); Sick sinus syndrome (CMS-HCC) (1/1/2017); Symptomatic sinus bradycardia (1/1/2017); Unspecified urinary incontinence; and Urinary bladder disorder.  She  has a past surgical history that includes tonsillectomy; hysterectomy, vaginal; recovery (3/29/2013); cataract phaco with iol (9/19/2013); cataract phaco  "with iol (10/3/2013); knee arthroplasty total (2014); cardiac cath (17); cardiac cath (17); and other cardiac surgery (2017).  Social History   Substance Use Topics   • Smoking status: Former Smoker     Packs/day: 0.50     Years: 15.00     Types: Cigarettes     Quit date: 1965   • Smokeless tobacco: Never Used   • Alcohol use No     Social History     Social History Narrative   • No narrative on file     Family History   Problem Relation Age of Onset   • Diabetes Mother      mild   • Cancer Father      melanoma mild   • Heart Disease Neg Hx    • Hypertension Neg Hx      Family Status   Relation Status   • Mother  at age 88   • Father  at age 93   • Neg Hx      Health Maintenance Topics with due status: Overdue       Topic Date Due    BONE DENSITY 2016    Annual Wellness Visit 2017    MAMMOGRAM 2017         ROS    Gen.: Denied weight change, appetite change, fatigue.  ENT: Denied sinus tenderness, nasal congestion, runny nose, or sore throat  CVS: Denied chest pain, palpitations, legs swelling.  Respiratory: Denied cough, shortness of breath, wheezing.  GI: Denied abdominal pain, constipation or diarrhea.  Endocrine: Denied temperature intolerance, increased frequency of urination, polyphagia or polydipsia.  Musculoskeletal: Denied back pain or joint pain.    All other systems reviewed and are negative     Objective:     Blood pressure 138/80, pulse 92, temperature 36.6 °C (97.9 °F), height 1.651 m (5' 5\"), weight 91.3 kg (201 lb 3.2 oz), SpO2 94 %, not currently breastfeeding. Body mass index is 33.48 kg/m².  Physical Exam:    Constitutional: Well nourished and Well developed, Alert, no distress.  Skin: Warm, dry, good turgor, no rashes in visible areas.  Eye: Equal, round and reactive, conjunctiva clear, lids normal.  ENMT: Lips without lesions, good dentition, oropharynx clear.  Neck: Trachea midline, no masses, no thyromegaly. No cervical or " supraclavicular lymphadenopathy.  Respiratory: Unlabored respiratory effort, lungs clear to auscultation, no wheezes, no ronchi.  Cardiovascular: Normal S1, S2, no murmur, no edema.   Abdomen: Soft, non distended, non-tender, no masses, no hepatosplenomegaly. Bowel sound normal.  Extremities: No edema, no clubbing, no cyanosis.  Psych: Alert and oriented x3, normal affect and mood.      Assessment and Plan:   The following treatment plan was discussed       1. Essential hypertension, benign  - Well-controlled. Continue current regimens, metoprolol XL 25 mg daily. Recheck lab 1-2 weeks before next follow up visit.  - Recommend to monitor blood pressure and heart rate at home.  - CBC WITH DIFFERENTIAL; Future  - COMP METABOLIC PANEL; Future    2. Mixed hyperlipidemia  - Well-controlled. Continue current regimens, atorvastatin 40 mg every evening. Recheck lab 1-2 weeks before next follow up visit.  - Advised to eat low fat, low carbohydrate and high fiber diet as well as do cardio physical exercise regularly.   - COMP METABOLIC PANEL; Future  - LIPID PROFILE; Future    3. Coronary artery disease involving native coronary artery of native heart without angina pectoris  - Stable. Continue aspirin, Plavix, metoprolol and atorvastatin. Patient will follow with cardiologist to discuss the duration of Plavix treatment. Patient has drug eluting stent in January 2017. I advised patient to continue Plavix at least for 1 year after stent placement.    4. Gastroesophageal reflux disease with esophagitis  - Well-controlled. Continue current regimens. Recheck lab 1-2 weeks before next follow up visit.  - Advised to avoid eating late and avoid eating acidic food  - Advised to take magnesium, vitamin D and vitamin B12 if she needs to take PPI chronically.     5. Spindle cell carcinoma (CMS-HCC)  - No recurrent symptoms. Information of oncologist was provided to patient to call for scheduling follow-up.    6. Vitamin D deficiency  disease  - Continue vitamin D 2000 units daily. Recheck lab in 6 months.  - VITAMIN D,25 HYDROXY; Future  - DS-BONE DENSITY STUDY (DEXA); Future    7. Postmenopausal estrogen deficiency  - Last DEXA scan was done in 2011. We will repeat DEXA scan for follow-up.  - DS-BONE DENSITY STUDY (DEXA); Future    8. Visit for screening mammogram  - Patient is past due for mammogram. Counseling for importance of screening breast cancer with mammogram. Ordered screening mammogram today.  - MA-SCREEN MAMMO W/CAD-BILAT; Future    9. Obesity (BMI 30.0-34.9)  - Counseled for healthy diet and regular physical exercise to lose weight.   - Patient identified as having weight management issue.  Appropriate orders and counseling given.        Records requested.  Followup: Return in about 6 months (around 7/8/2018), or if symptoms worsen or fail to improve, for hypertension, hyperlipidemia, GERD, vitamin D insufficiency, lab review. sooner should new symptoms or problems arise.      Please note that this dictation was created using voice recognition software. I have made every reasonable attempt to correct obvious errors, but I expect that there may have unintended errors in text, spelling, punctuation, or grammar that I did not discover.

## 2018-01-09 NOTE — ASSESSMENT & PLAN NOTE
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 She follows with oncologist in June 2016. She needs to continue follow with [facility] oncologist once a year. Patient agrees to call to schedule with oncologist. She does not have any recurrent symptoms currently.

## 2018-01-09 NOTE — ASSESSMENT & PLAN NOTE
Patient is taking Prevacid 30 mg daily. Her symptom is well controlled with current regimen. She tries to stop taking PPI and has recurrent symptoms right after that. We discussed the risks and benefits of chronic use of PPI and advised to take vitamin B12, magnesium and vitamin D supplement if she is taking PPI chronically. She understands and agrees with the plan.

## 2018-01-09 NOTE — ASSESSMENT & PLAN NOTE
Patient has low vitamin D in the past and was treated with supplements. She is currently taking vitamin D 2000 units daily. She needs to repeat vitamin D in 6 months for follow-up.

## 2018-01-26 ENCOUNTER — HOSPITAL ENCOUNTER (OUTPATIENT)
Dept: RADIOLOGY | Facility: MEDICAL CENTER | Age: 73
End: 2018-01-26
Attending: INTERNAL MEDICINE
Payer: MEDICARE

## 2018-01-26 DIAGNOSIS — Z78.0 POSTMENOPAUSAL ESTROGEN DEFICIENCY: ICD-10-CM

## 2018-01-26 DIAGNOSIS — Z12.31 VISIT FOR SCREENING MAMMOGRAM: ICD-10-CM

## 2018-01-26 DIAGNOSIS — E55.9 VITAMIN D DEFICIENCY DISEASE: ICD-10-CM

## 2018-01-26 PROCEDURE — 77080 DXA BONE DENSITY AXIAL: CPT

## 2018-01-26 PROCEDURE — 77067 SCR MAMMO BI INCL CAD: CPT

## 2018-02-13 ENCOUNTER — PATIENT MESSAGE (OUTPATIENT)
Dept: MEDICAL GROUP | Age: 73
End: 2018-02-13

## 2018-02-13 DIAGNOSIS — I10 ESSENTIAL HYPERTENSION, BENIGN: ICD-10-CM

## 2018-02-13 RX ORDER — LOSARTAN POTASSIUM 25 MG/1
25 TABLET ORAL DAILY
Qty: 90 TAB | Refills: 3 | Status: SHIPPED | OUTPATIENT
Start: 2018-02-13 | End: 2018-04-03 | Stop reason: SDUPTHER

## 2018-04-03 DIAGNOSIS — E78.2 MIXED HYPERLIPIDEMIA: ICD-10-CM

## 2018-04-03 DIAGNOSIS — E66.9 OBESITY (BMI 30-39.9): ICD-10-CM

## 2018-04-03 DIAGNOSIS — I25.10 CORONARY ARTERY DISEASE INVOLVING NATIVE CORONARY ARTERY OF NATIVE HEART WITHOUT ANGINA PECTORIS: ICD-10-CM

## 2018-04-03 DIAGNOSIS — I10 ESSENTIAL HYPERTENSION, BENIGN: ICD-10-CM

## 2018-04-03 DIAGNOSIS — R06.09 DYSPNEA ON EXERTION: ICD-10-CM

## 2018-04-03 DIAGNOSIS — Z95.5 S/P CORONARY ARTERY STENT PLACEMENT: ICD-10-CM

## 2018-04-03 RX ORDER — NITROGLYCERIN 0.4 MG/1
0.4 TABLET SUBLINGUAL PRN
Qty: 25 TAB | Refills: 3 | Status: SHIPPED | OUTPATIENT
Start: 2018-04-03 | End: 2024-02-21 | Stop reason: SDUPTHER

## 2018-04-03 RX ORDER — METOPROLOL SUCCINATE 25 MG/1
25 TABLET, EXTENDED RELEASE ORAL EVERY EVENING
Qty: 90 TAB | Refills: 3 | Status: SHIPPED | OUTPATIENT
Start: 2018-04-03 | End: 2018-06-07

## 2018-04-03 RX ORDER — CLOPIDOGREL BISULFATE 75 MG/1
75 TABLET ORAL DAILY
Qty: 90 TAB | Refills: 3 | Status: SHIPPED | OUTPATIENT
Start: 2018-04-03 | End: 2018-12-10 | Stop reason: SDUPTHER

## 2018-04-03 RX ORDER — LOSARTAN POTASSIUM 25 MG/1
25 TABLET ORAL DAILY
Qty: 90 TAB | Refills: 3 | Status: SHIPPED | OUTPATIENT
Start: 2018-04-03 | End: 2018-04-23

## 2018-04-03 RX ORDER — ATORVASTATIN CALCIUM 40 MG/1
40 TABLET, FILM COATED ORAL DAILY
Qty: 90 TAB | Refills: 3 | Status: SHIPPED | OUTPATIENT
Start: 2018-04-03 | End: 2018-12-10 | Stop reason: SDUPTHER

## 2018-04-06 ENCOUNTER — HOSPITAL ENCOUNTER (OUTPATIENT)
Dept: LAB | Facility: MEDICAL CENTER | Age: 73
End: 2018-04-06
Attending: INTERNAL MEDICINE
Payer: MEDICARE

## 2018-04-06 DIAGNOSIS — E55.9 VITAMIN D DEFICIENCY DISEASE: ICD-10-CM

## 2018-04-06 DIAGNOSIS — I10 ESSENTIAL HYPERTENSION, BENIGN: ICD-10-CM

## 2018-04-06 DIAGNOSIS — E78.2 MIXED HYPERLIPIDEMIA: ICD-10-CM

## 2018-04-06 LAB
25(OH)D3 SERPL-MCNC: 30 NG/ML (ref 30–100)
ALBUMIN SERPL BCP-MCNC: 4.2 G/DL (ref 3.2–4.9)
ALBUMIN/GLOB SERPL: 1.3 G/DL
ALP SERPL-CCNC: 76 U/L (ref 30–99)
ALT SERPL-CCNC: 19 U/L (ref 2–50)
ANION GAP SERPL CALC-SCNC: 8 MMOL/L (ref 0–11.9)
AST SERPL-CCNC: 20 U/L (ref 12–45)
BASOPHILS # BLD AUTO: 1 % (ref 0–1.8)
BASOPHILS # BLD: 0.09 K/UL (ref 0–0.12)
BILIRUB SERPL-MCNC: 0.7 MG/DL (ref 0.1–1.5)
BUN SERPL-MCNC: 14 MG/DL (ref 8–22)
CALCIUM SERPL-MCNC: 10 MG/DL (ref 8.5–10.5)
CHLORIDE SERPL-SCNC: 103 MMOL/L (ref 96–112)
CHOLEST SERPL-MCNC: 134 MG/DL (ref 100–199)
CO2 SERPL-SCNC: 28 MMOL/L (ref 20–33)
CREAT SERPL-MCNC: 0.83 MG/DL (ref 0.5–1.4)
EOSINOPHIL # BLD AUTO: 0.18 K/UL (ref 0–0.51)
EOSINOPHIL NFR BLD: 1.9 % (ref 0–6.9)
ERYTHROCYTE [DISTWIDTH] IN BLOOD BY AUTOMATED COUNT: 50.8 FL (ref 35.9–50)
GLOBULIN SER CALC-MCNC: 3.2 G/DL (ref 1.9–3.5)
GLUCOSE SERPL-MCNC: 85 MG/DL (ref 65–99)
HCT VFR BLD AUTO: 47 % (ref 37–47)
HDLC SERPL-MCNC: 63 MG/DL
HGB BLD-MCNC: 14.9 G/DL (ref 12–16)
IMM GRANULOCYTES # BLD AUTO: 0.03 K/UL (ref 0–0.11)
IMM GRANULOCYTES NFR BLD AUTO: 0.3 % (ref 0–0.9)
LDLC SERPL CALC-MCNC: 48 MG/DL
LYMPHOCYTES # BLD AUTO: 1.96 K/UL (ref 1–4.8)
LYMPHOCYTES NFR BLD: 21.2 % (ref 22–41)
MCH RBC QN AUTO: 29.2 PG (ref 27–33)
MCHC RBC AUTO-ENTMCNC: 31.7 G/DL (ref 33.6–35)
MCV RBC AUTO: 92.2 FL (ref 81.4–97.8)
MONOCYTES # BLD AUTO: 0.61 K/UL (ref 0–0.85)
MONOCYTES NFR BLD AUTO: 6.6 % (ref 0–13.4)
NEUTROPHILS # BLD AUTO: 6.38 K/UL (ref 2–7.15)
NEUTROPHILS NFR BLD: 69 % (ref 44–72)
NRBC # BLD AUTO: 0 K/UL
NRBC BLD-RTO: 0 /100 WBC
PLATELET # BLD AUTO: 245 K/UL (ref 164–446)
PMV BLD AUTO: 12.4 FL (ref 9–12.9)
POTASSIUM SERPL-SCNC: 4 MMOL/L (ref 3.6–5.5)
PROT SERPL-MCNC: 7.4 G/DL (ref 6–8.2)
RBC # BLD AUTO: 5.1 M/UL (ref 4.2–5.4)
SODIUM SERPL-SCNC: 139 MMOL/L (ref 135–145)
TRIGL SERPL-MCNC: 117 MG/DL (ref 0–149)
WBC # BLD AUTO: 9.3 K/UL (ref 4.8–10.8)

## 2018-04-06 PROCEDURE — 85025 COMPLETE CBC W/AUTO DIFF WBC: CPT

## 2018-04-06 PROCEDURE — 80061 LIPID PANEL: CPT

## 2018-04-06 PROCEDURE — 36415 COLL VENOUS BLD VENIPUNCTURE: CPT

## 2018-04-06 PROCEDURE — 82306 VITAMIN D 25 HYDROXY: CPT

## 2018-04-06 PROCEDURE — 80053 COMPREHEN METABOLIC PANEL: CPT

## 2018-04-17 ENCOUNTER — OFFICE VISIT (OUTPATIENT)
Dept: HEMATOLOGY ONCOLOGY | Facility: MEDICAL CENTER | Age: 73
End: 2018-04-17
Payer: MEDICARE

## 2018-04-17 VITALS
RESPIRATION RATE: 16 BRPM | HEIGHT: 65 IN | HEART RATE: 51 BPM | OXYGEN SATURATION: 95 % | TEMPERATURE: 98.6 F | BODY MASS INDEX: 34.27 KG/M2 | SYSTOLIC BLOOD PRESSURE: 160 MMHG | DIASTOLIC BLOOD PRESSURE: 84 MMHG | WEIGHT: 205.69 LBS

## 2018-04-17 DIAGNOSIS — C44.319 BASAL CELL CARCINOMA OF SKIN OF OTHER PART OF FACE: ICD-10-CM

## 2018-04-17 PROCEDURE — 99213 OFFICE O/P EST LOW 20 MIN: CPT | Performed by: INTERNAL MEDICINE

## 2018-04-17 ASSESSMENT — PAIN SCALES - GENERAL: PAINLEVEL: NO PAIN

## 2018-04-17 NOTE — PROGRESS NOTES
Date of visit: 4/17/2018  8:33 AM      Chief Complaint- follow-up history of basal cell carcinoma with spindle cell features      Identification/Prior relevant history: Presented with persistent left jaw line skin lesion in 2/2016.  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     PET/CT was negative for lymph node involvement and metastatic disease.  She is s/p resection due to positive margin.  Pathology was positive for residual basal cell carcinoma and clear margin.   Pathology was reviewed by pathology and  again found to have nests of clear cut basal cell carcinoma flanking the biopsy site and immediately adjacent to the biopsy site are more spindle cell changes and reactive granulation tissue    Interim history-she was seen by Dr. Jo and after further discussion did not recommend any radiation therapy back in 2016. She was seen by Dr. Freeman a few times.. She is referred back to us for a follow-up. She has not had any recurrence.    Past Medical History:      Past Medical History:   Diagnosis Date   • Angina 2014    pt denies    • Arthritis     generalized + hands   • Dental disorder     upper and lower dentures   • Flushing reaction     has had full work up with cardiology, would awake with symptoms at night   • GERD (gastroesophageal reflux disease)    • Heart burn 2014    pt on prevacid   • Hyperlipidemia 10/20/2010   • Hypertension 2014    pt states well controlled on meds   • Indigestion    • Insomnia    • Liver cyst     has been seen by GI, ultrasound 9/12   • Myocardial infarct 1984    pt denies states sometimes irreg rhythm   • Need for Tdap vaccination     in 2012   • FRIDA (obstructive sleep apnea)     states no cpap   • S/P colonoscopy 11/9/2012   • Sick sinus syndrome (CMS-HCC) 1/1/2017   • Symptomatic sinus bradycardia 1/1/2017   • Unspecified urinary incontinence    • Urinary bladder disorder        Past surgical history:        Past Surgical History:   Procedure Laterality Date   • CARDIAC CATH  1/19/17    Stents patent.   • CARDIAC CATH  1/1/17    Overlapping Synergy stents to 99% Circ   • OTHER CARDIAC SURGERY  January 2017    NON STEMI with stent   • KNEE ARTHROPLASTY TOTAL  12/16/2014    Performed by Jose G Trotter M.D. at SURGERY [facility] ORS   • CATARACT PHACO WITH IOL  10/3/2013    Performed by Sylvester Raza M.D. at SURGERY [facility] ORS   • CATARACT PHACO WITH IOL  9/19/2013    Performed by Sylvester Raza M.D. at SURGERY [facility] ORS   • RECOVERY  3/29/2013    Performed by Cath-Recovery Surgery at SURGERY SAME DAY [facility] ORS   • HYSTERECTOMY, VAGINAL      ovaries were left   • TONSILLECTOMY         Allergies:       Codeine    Medications:         Current Outpatient Prescriptions   Medication Sig Dispense Refill   • losartan (COZAAR) 25 MG Tab Take 1 Tab by mouth every day. 90 Tab 3   • metoprolol SR (TOPROL XL) 25 MG TABLET SR 24 HR Take 1 Tab by mouth every evening. 90 Tab 3   • atorvastatin (LIPITOR) 40 MG Tab Take 1 Tab by mouth every day. 90 Tab 3   • clopidogrel (PLAVIX) 75 MG Tab Take 1 Tab by mouth every day. 90 Tab 3   • Pumpkin Seed-Soy Germ (AZO BLADDER CONTROL/GO-LESS PO) Take  by mouth.     • ranitidine (ZANTAC) 150 MG Tab Take 1 Tab by mouth 2 times a day. 60 Tab 11   • lansoprazole (PREVACID) 30 MG CAPSULE DELAYED RELEASE Take 1 Cap by mouth every day. 90 Cap 2   • aspirin EC (ECOTRIN) 81 MG Tablet Delayed Response Take 81 mg by mouth every evening. 30 Tab 0   • Cholecalciferol (VITAMIN D) 2000 UNIT TABS Take 1 Tab by mouth every day.     • nitroglycerin (NITROSTAT) 0.4 MG SL Tab Place 1 Tab under tongue as needed for Chest Pain (up to 3 doses (if SBP greater than 90 mmHg)). 25 Tab 3     No current facility-administered medications for this visit.          Social History:     Social History     Social History   • Marital status:      Spouse name: N/A   • Number of children: N/A   • Years  "of education: N/A     Occupational History   • retired- human resources  for [employer]     Social History Main Topics   • Smoking status: Former Smoker     Packs/day: 0.50     Years: 15.00     Types: Cigarettes     Quit date: 8/11/1965   • Smokeless tobacco: Never Used   • Alcohol use No   • Drug use: No   • Sexual activity: Not Currently     Partners: Male     Other Topics Concern   • Not on file     Social History Narrative   • No narrative on file       Family History:      Family History   Problem Relation Age of Onset   • Diabetes Mother      mild   • Cancer Father      melanoma mild   • Heart Disease Neg Hx    • Hypertension Neg Hx        Review of Systems:  All other review of systems are negative except what was mentioned above in the HPI.    Constitutional: Negative for fever, chills, weight loss and malaise/fatigue.    HEENT: No new auditory or visual complaints. No sore throat and neck pain.     Respiratory: Negative for cough, sputum production, shortness of breath and wheezing.    Cardiovascular: Negative for chest pain, palpitations, orthopnea and leg swelling.    Gastrointestinal: Negative for heartburn, nausea, vomiting and abdominal pain.    Genitourinary: Negative for dysuria, hematuria    Musculoskeletal: No new arthralgias or myalgias   Skin: Negative for rash and itching.    Neurological: Negative for focal weakness and headaches.    Endo/Heme/Allergies: No abnormal bleed/bruise.    Psychiatric/Behavioral: No new depression/anxiety.    Physical Exam:  Vitals: /84   Pulse (!) 51   Temp 37 °C (98.6 °F)   Resp 16   Ht 1.651 m (5' 5\")   Wt 93.3 kg (205 lb 11 oz)   SpO2 95%   BMI 34.23 kg/m²     General: Not in acute distress, alert and oriented x 3  HEENT: No pallor, icterus. Oropharynx clear.   Neck: Supple, no palpable masses.  Lymph nodes: No palpable cervical, supraclavicular, axillary or inguinal lymphadenopathy.    CVS: regular rate and rhythm, no rubs or " gallops  RESP: Clear to auscultate bilaterally, no wheezing or crackles.   ABD: Soft, non tender, non distended, positive bowel sounds, no palpable organomegaly  EXT: No edema or cyanosis  CNS: Alert and oriented x3, No focal deficits.  Skin- No rash      Labs:   No visits with results within 1 Week(s) from this visit.   Latest known visit with results is:   Hospital Outpatient Visit on 04/06/2018   Component Date Value Ref Range Status   • WBC 04/06/2018 9.3  4.8 - 10.8 K/uL Final   • RBC 04/06/2018 5.10  4.20 - 5.40 M/uL Final   • Hemoglobin 04/06/2018 14.9  12.0 - 16.0 g/dL Final   • Hematocrit 04/06/2018 47.0  37.0 - 47.0 % Final   • MCV 04/06/2018 92.2  81.4 - 97.8 fL Final   • MCH 04/06/2018 29.2  27.0 - 33.0 pg Final   • MCHC 04/06/2018 31.7* 33.6 - 35.0 g/dL Final   • RDW 04/06/2018 50.8* 35.9 - 50.0 fL Final   • Platelet Count 04/06/2018 245  164 - 446 K/uL Final   • MPV 04/06/2018 12.4  9.0 - 12.9 fL Final   • Neutrophils-Polys 04/06/2018 69.00  44.00 - 72.00 % Final   • Lymphocytes 04/06/2018 21.20* 22.00 - 41.00 % Final   • Monocytes 04/06/2018 6.60  0.00 - 13.40 % Final   • Eosinophils 04/06/2018 1.90  0.00 - 6.90 % Final   • Basophils 04/06/2018 1.00  0.00 - 1.80 % Final   • Immature Granulocytes 04/06/2018 0.30  0.00 - 0.90 % Final   • Nucleated RBC 04/06/2018 0.00  /100 WBC Final   • Neutrophils (Absolute) 04/06/2018 6.38  2.00 - 7.15 K/uL Final   • Lymphs (Absolute) 04/06/2018 1.96  1.00 - 4.80 K/uL Final   • Monos (Absolute) 04/06/2018 0.61  0.00 - 0.85 K/uL Final   • Eos (Absolute) 04/06/2018 0.18  0.00 - 0.51 K/uL Final   • Baso (Absolute) 04/06/2018 0.09  0.00 - 0.12 K/uL Final   • Immature Granulocytes (abs) 04/06/2018 0.03  0.00 - 0.11 K/uL Final   • NRBC (Absolute) 04/06/2018 0.00  K/uL Final   • Sodium 04/06/2018 139  135 - 145 mmol/L Final   • Potassium 04/06/2018 4.0  3.6 - 5.5 mmol/L Final   • Chloride 04/06/2018 103  96 - 112 mmol/L Final   • Co2 04/06/2018 28  20 - 33 mmol/L Final   •  Anion Gap 04/06/2018 8.0  0.0 - 11.9 Final   • Glucose 04/06/2018 85  65 - 99 mg/dL Final   • Bun 04/06/2018 14  8 - 22 mg/dL Final   • Creatinine 04/06/2018 0.83  0.50 - 1.40 mg/dL Final   • Calcium 04/06/2018 10.0  8.5 - 10.5 mg/dL Final   • AST(SGOT) 04/06/2018 20  12 - 45 U/L Final   • ALT(SGPT) 04/06/2018 19  2 - 50 U/L Final   • Alkaline Phosphatase 04/06/2018 76  30 - 99 U/L Final   • Total Bilirubin 04/06/2018 0.7  0.1 - 1.5 mg/dL Final   • Albumin 04/06/2018 4.2  3.2 - 4.9 g/dL Final   • Total Protein 04/06/2018 7.4  6.0 - 8.2 g/dL Final   • Globulin 04/06/2018 3.2  1.9 - 3.5 g/dL Final   • A-G Ratio 04/06/2018 1.3  g/dL Final   • Cholesterol,Tot 04/06/2018 134  100 - 199 mg/dL Final   • Triglycerides 04/06/2018 117  0 - 149 mg/dL Final   • HDL 04/06/2018 63  >=40 mg/dL Final   • LDL 04/06/2018 48  <100 mg/dL Final   • 25-Hydroxy   Vitamin D 25 04/06/2018 30  30 - 100 ng/mL Final    Comment: Adult Ranges:   <20 ng/mL - Deficiency  20-29 ng/mL - Insufficiency   ng/mL - Sufficiency  The [employer] Centaur Vitamin D Assay is standardized to the  [employer] reference measurement procedures, a  reference method for the Vitamin D Standardization Program  (VDSP).  The VDSP aligns patient results among 25 (OH)  Vitamin D methods.     • GFR If  04/06/2018 >60  >60 mL/min/1.73 m 2 Final   • GFR If Non  04/06/2018 >60  >60 mL/min/1.73 m 2 Final           Assessment and Plan:  basal cell carcinoma with spindle cell features- she had resection with a good cosmetic outcome. She is more than 2 years out of her diagnosis. There was some confusion about the exact pathology as it was initially reported as spindle cell carcinoma. However, it appears that this was probably a basal cell carcinoma with some reactive granulation tissue and spindle cell features. Radiation was not recommended for her.    , She is doing well otherwise from an oncology standpoint. Informed patient that basal  cell carcinoma usually do not metastasize. They can sometimes recur locally. She does not have any clinical evidence of recurrence. She is also self-monitoring. Informed patient that at this point, she does not need further oncology follow-up unless she develops any new issues.  Return to clinic when necessary    Please note that this dictation was created using voice recognition software. I have made every reasonable attempt to correct obvious errors, but I expect that there are errors of grammar and possibly content that I did not discover before finalizing the note.      SIGNATURES:  Akbar Vazquez    CC:  Brittney Hoskins M.D.  No ref. provider found

## 2018-04-21 PROBLEM — C44.310 BASAL CELL CARCINOMA OF SKIN OF FACE: Status: ACTIVE | Noted: 2018-04-21

## 2018-04-23 ENCOUNTER — OFFICE VISIT (OUTPATIENT)
Dept: CARDIOLOGY | Facility: MEDICAL CENTER | Age: 73
End: 2018-04-23
Payer: MEDICARE

## 2018-04-23 VITALS
WEIGHT: 207 LBS | DIASTOLIC BLOOD PRESSURE: 100 MMHG | SYSTOLIC BLOOD PRESSURE: 164 MMHG | HEART RATE: 48 BPM | HEIGHT: 65 IN | OXYGEN SATURATION: 93 % | BODY MASS INDEX: 34.49 KG/M2

## 2018-04-23 DIAGNOSIS — G47.33 OSA (OBSTRUCTIVE SLEEP APNEA): ICD-10-CM

## 2018-04-23 DIAGNOSIS — R07.89 ATYPICAL CHEST PAIN: ICD-10-CM

## 2018-04-23 DIAGNOSIS — I25.10 CORONARY ARTERY DISEASE INVOLVING NATIVE CORONARY ARTERY OF NATIVE HEART WITHOUT ANGINA PECTORIS: ICD-10-CM

## 2018-04-23 DIAGNOSIS — I10 HTN (HYPERTENSION), MALIGNANT: ICD-10-CM

## 2018-04-23 DIAGNOSIS — Z79.899 HIGH RISK MEDICATION USE: ICD-10-CM

## 2018-04-23 DIAGNOSIS — E66.9 OBESITY (BMI 30-39.9): ICD-10-CM

## 2018-04-23 DIAGNOSIS — Z95.5 S/P CORONARY ARTERY STENT PLACEMENT: ICD-10-CM

## 2018-04-23 DIAGNOSIS — E78.2 MIXED HYPERLIPIDEMIA: ICD-10-CM

## 2018-04-23 PROCEDURE — 99214 OFFICE O/P EST MOD 30 MIN: CPT | Performed by: INTERNAL MEDICINE

## 2018-04-23 RX ORDER — LOSARTAN POTASSIUM 100 MG/1
100 TABLET ORAL DAILY
Qty: 90 TAB | Refills: 3 | Status: SHIPPED | OUTPATIENT
Start: 2018-04-23 | End: 2018-12-10 | Stop reason: SDUPTHER

## 2018-04-23 ASSESSMENT — ENCOUNTER SYMPTOMS
LOSS OF CONSCIOUSNESS: 0
NAUSEA: 0
BLURRED VISION: 0
WEIGHT LOSS: 0
EYE DISCHARGE: 0
CHILLS: 0
DOUBLE VISION: 0
COUGH: 0
FEVER: 0
FALLS: 0
MYALGIAS: 0
SENSORY CHANGE: 0
ORTHOPNEA: 0
VOMITING: 0
PALPITATIONS: 0
PND: 0
SPEECH CHANGE: 0
HEADACHES: 0
BRUISES/BLEEDS EASILY: 0
ABDOMINAL PAIN: 0
CLAUDICATION: 0
DIZZINESS: 0
EYE PAIN: 0
HALLUCINATIONS: 0
SHORTNESS OF BREATH: 0
DEPRESSION: 0
BLOOD IN STOOL: 0

## 2018-04-23 NOTE — PROGRESS NOTES
Chief Complaint   Patient presents with   • Coronary Artery Disease       Subjective:   Griselda Farmer is a 73 y.o. female who presents today for cardiac care due to prior CAD with 2 stents placed in Lx artery, HTN, HLP. In the psdy patient had repeated ER visits because of chest pain. She even had a repeated cardiac catheterization which did not show any thing wrong with her stents. She came in to the hospital again in July 2017 because of chest pain. She was discharged under stable condition. Her left ventricular systolic function was found to be 50%.     At prior visit, patient was able to complete 344 m during his 6 minute walk test. her O2 saturation at baseline was 91% and at the end of the test, the O2 saturation was 94%. she reported 1 level of dyspnea on Chuckie scale.     +atypical chest pain at night when sleeping. She has FRIDA but not using CPAP due to malfunction machine.    Past Medical History:   Diagnosis Date   • Angina 2014    pt denies    • Arthritis     generalized + hands   • Dental disorder     upper and lower dentures   • Flushing reaction     has had full work up with cardiology, would awake with symptoms at night   • GERD (gastroesophageal reflux disease)    • Heart burn 2014    pt on prevacid   • Hyperlipidemia 10/20/2010   • Hypertension 2014    pt states well controlled on meds   • Indigestion    • Insomnia    • Liver cyst     has been seen by GI, ultrasound 9/12   • Myocardial infarct 1984    pt denies states sometimes irreg rhythm   • Need for Tdap vaccination     in 2012   • FRIDA (obstructive sleep apnea)     states no cpap   • S/P colonoscopy 11/9/2012   • Sick sinus syndrome (CMS-HCC) 1/1/2017   • Symptomatic sinus bradycardia 1/1/2017   • Unspecified urinary incontinence    • Urinary bladder disorder      Past Surgical History:   Procedure Laterality Date   • CARDIAC CATH  1/19/17    Stents patent.   • CARDIAC CATH  1/1/17    Overlapping Synergy stents to 99% Circ   • OTHER  CARDIAC SURGERY  January 2017    NON STEMI with stent   • KNEE ARTHROPLASTY TOTAL  12/16/2014    Performed by Jose G Trotter M.D. at SURGERY [facility] ORS   • CATARACT PHACO WITH IOL  10/3/2013    Performed by Sylvester Raza M.D. at SURGERY [facility] ORS   • CATARACT PHACO WITH IOL  9/19/2013    Performed by Sylvester Raza M.D. at SURGERY [facility] ORS   • RECOVERY  3/29/2013    Performed by Cath-Recovery Surgery at SURGERY SAME DAY 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     Social History Main Topics   • Smoking status: Former Smoker     Packs/day: 0.50     Years: 15.00     Types: Cigarettes     Quit date: 8/11/1965   • Smokeless tobacco: Never Used   • Alcohol use No   • Drug use: No   • Sexual activity: Not Currently     Partners: Male     Other Topics Concern   • Not on file     Social History Narrative   • No narrative on file     Allergies   Allergen Reactions   • Codeine Itching and Vomiting     Rxn = years ago        Outpatient Encounter Prescriptions as of 4/23/2018   Medication Sig Dispense Refill   • losartan (COZAAR) 100 MG Tab Take 1 Tab by mouth every day. 90 Tab 3   • metoprolol SR (TOPROL XL) 25 MG TABLET SR 24 HR Take 1 Tab by mouth every evening. 90 Tab 3   • atorvastatin (LIPITOR) 40 MG Tab Take 1 Tab by mouth every day. 90 Tab 3   • clopidogrel (PLAVIX) 75 MG Tab Take 1 Tab by mouth every day. 90 Tab 3   • nitroglycerin (NITROSTAT) 0.4 MG SL Tab Place 1 Tab under tongue as needed for Chest Pain (up to 3 doses (if SBP greater than 90 mmHg)). 25  "Tab 3   • Pumpkin Seed-Soy Germ (AZO BLADDER CONTROL/GO-LESS PO) Take  by mouth.     • ranitidine (ZANTAC) 150 MG Tab Take 1 Tab by mouth 2 times a day. 60 Tab 11   • lansoprazole (PREVACID) 30 MG CAPSULE DELAYED RELEASE Take 1 Cap by mouth every day. 90 Cap 2   • aspirin EC (ECOTRIN) 81 MG Tablet Delayed Response Take 81 mg by mouth every evening. 30 Tab 0   • Cholecalciferol (VITAMIN D) 2000 UNIT TABS Take 1 Tab by mouth every day.     • [DISCONTINUED] losartan (COZAAR) 25 MG Tab Take 1 Tab by mouth every day. 90 Tab 3     No facility-administered encounter medications on file as of 4/23/2018.      Review of Systems   Constitutional: Negative for chills, fever, malaise/fatigue and weight loss.   HENT: Negative for ear discharge, ear pain, hearing loss and nosebleeds.    Eyes: Negative for blurred vision, double vision, pain and discharge.   Respiratory: Negative for cough and shortness of breath.    Cardiovascular: Negative for chest pain, palpitations, orthopnea, claudication, leg swelling and PND.   Gastrointestinal: Negative for abdominal pain, blood in stool, melena, nausea and vomiting.   Genitourinary: Negative for dysuria and hematuria.   Musculoskeletal: Negative for falls, joint pain and myalgias.   Skin: Negative for itching and rash.   Neurological: Negative for dizziness, sensory change, speech change, loss of consciousness and headaches.   Endo/Heme/Allergies: Negative for environmental allergies. Does not bruise/bleed easily.   Psychiatric/Behavioral: Negative for depression, hallucinations and suicidal ideas.        Objective:   BP (!) 164/100   Pulse (!) 48   Ht 1.651 m (5' 5\")   Wt 93.9 kg (207 lb)   SpO2 93%   BMI 34.45 kg/m²     Physical Exam   Constitutional: She is oriented to person, place, and time. No distress.   HENT:   Head: Normocephalic and atraumatic.   Right Ear: External ear normal.   Left Ear: External ear normal.   Eyes: Right eye exhibits no discharge. Left eye exhibits no " discharge.   Neck: No JVD present. No thyromegaly present.   Cardiovascular: Normal rate, regular rhythm, normal heart sounds and intact distal pulses.  Exam reveals no gallop and no friction rub.    No murmur heard.  Pulmonary/Chest: Breath sounds normal. No respiratory distress.   Abdominal: Bowel sounds are normal. She exhibits no distension. There is no tenderness.   Musculoskeletal: She exhibits no edema or tenderness.   Neurological: She is alert and oriented to person, place, and time. No cranial nerve deficit.   Skin: Skin is warm and dry. She is not diaphoretic.   Psychiatric: She has a normal mood and affect. Her behavior is normal.   Nursing note and vitals reviewed.      Assessment:     1. Atypical chest pain  REFERRAL TO SLEEP STUDIES   2. S/P coronary artery stent placement  losartan (COZAAR) 100 MG Tab    REFERRAL TO SLEEP STUDIES   3. Obesity (BMI 30-39.9)     4. Coronary artery disease involving native coronary artery of native heart without angina pectoris  losartan (COZAAR) 100 MG Tab   5. FRIDA (obstructive sleep apnea)  REFERRAL TO SLEEP STUDIES   6. Mixed hyperlipidemia     7. HTN (hypertension), malignant  losartan (COZAAR) 100 MG Tab    REFERRAL TO SLEEP STUDIES    BASIC METABOLIC PANEL   8. High risk medication use  BASIC METABOLIC PANEL       Medical Decision Making:  Today's Assessment / Status / Plan:   LDL is within goal.   Renal function and Liver function are adequate.  Blood pressure is high.  Will increase Losartan to 100 mg po daily.  Continue Atorvastatin 40 mg po daily, Toprol 25 mg po daily, ASA.    I will refer patient to have sleep studies for obstructive sleep apnea.    I will see patient back in clinic with lab tests and studies results in 2 months.    I thank you Dr. Hoskins for referring patient to our Cardiology Clinic today.

## 2018-04-23 NOTE — LETTER
[facility]   [address]  [place] [address]  Phone: [phone]  Fax: [phone]              Griselda Farmer  1945    Encounter Date: 4/23/2018    Anastacia Saunders M.D.          PROGRESS NOTE:  Chief Complaint   Patient presents with   • Coronary Artery Disease       Subjective:   Griselda Farmer is a 73 y.o. female who presents today for cardiac care due to prior CAD with 2 stents placed in Lx artery, HTN, HLP. In the psdy patient had repeated ER visits because of chest pain. She even had a repeated cardiac catheterization which did not show any thing wrong with her stents. She came in to the hospital again in July 2017 because of chest pain. She was discharged under stable condition. Her left ventricular systolic function was found to be 50%.     At prior visit, patient was able to complete 344 m during his 6 minute walk test. her O2 saturation at baseline was 91% and at the end of the test, the O2 saturation was 94%. she reported 1 level of dyspnea on Chuckie scale.     +atypical chest pain at night when sleeping. She has FRIDA but not using CPAP due to malfunction machine.    Past Medical History:   Diagnosis Date   • Angina 2014    pt denies    • Arthritis     generalized + hands   • Dental disorder     upper and lower dentures   • Flushing reaction     has had full work up with cardiology, would awake with symptoms at night   • GERD (gastroesophageal reflux disease)    • Heart burn 2014    pt on prevacid   • Hyperlipidemia 10/20/2010   • Hypertension 2014    pt states well controlled on meds   • Indigestion    • Insomnia    • Liver cyst     has been seen by GI, ultrasound 9/12   • Myocardial infarct 1984    pt denies states sometimes irreg rhythm   • Need for Tdap vaccination     in 2012   • FRIDA (obstructive sleep apnea)     states no cpap   • S/P colonoscopy 11/9/2012   • Sick sinus syndrome (CMS-HCC) 1/1/2017   • Symptomatic  sinus bradycardia 1/1/2017   • Unspecified urinary incontinence    • Urinary bladder disorder      Past Surgical History:   Procedure Laterality Date   • CARDIAC CATH  1/19/17    Stents patent.   • CARDIAC CATH  1/1/17    Overlapping Synergy stents to 99% Circ   • OTHER CARDIAC SURGERY  January 2017    NON STEMI with stent   • KNEE ARTHROPLASTY TOTAL  12/16/2014    Performed by Jose G Trotter M.D. at SURGERY [facility] ORS   • CATARACT PHACO WITH IOL  10/3/2013    Performed by Sylvester Raza M.D. at SURGERY [facility] ORS   • CATARACT PHACO WITH IOL  9/19/2013    Performed by Sylvester Raza M.D. at SURGERY [facility] ORS   • RECOVERY  3/29/2013    Performed by Cath-Recovery Surgery at SURGERY SAME DAY [facility]S   • HYSTERECTOMY, VAGINAL      ovaries were left   • TONSILLECTOMY       Family History   Problem Relation Age of Onset   • Diabetes Mother      mild   • Cancer Father      melanoma mild   • Heart Disease Neg Hx    • Hypertension Neg Hx      Social History     Social History   • Marital status:      Spouse name: N/A   • Number of children: N/A   • Years of education: N/A     Occupational History   • retired- human resources  for [employer]     Social History Main Topics   • Smoking status: Former Smoker     Packs/day: 0.50     Years: 15.00     Types: Cigarettes     Quit date: 8/11/1965   • Smokeless tobacco: Never Used   • Alcohol use No   • Drug use: No   • Sexual activity: Not Currently     Partners: Male     Other Topics Concern   • Not on file     Social History Narrative   • No narrative on file     Allergies   Allergen Reactions   • Codeine Itching and Vomiting     Rxn = years ago        Outpatient Encounter Prescriptions as of 4/23/2018   Medication Sig Dispense Refill   • losartan (COZAAR) 100 MG Tab Take 1 Tab by mouth every day. 90 Tab 3   • metoprolol SR (TOPROL XL) 25 MG TABLET SR 24 HR Take 1 Tab by mouth every evening. 90 Tab 3   • atorvastatin  "(LIPITOR) 40 MG Tab Take 1 Tab by mouth every day. 90 Tab 3   • clopidogrel (PLAVIX) 75 MG Tab Take 1 Tab by mouth every day. 90 Tab 3   • nitroglycerin (NITROSTAT) 0.4 MG SL Tab Place 1 Tab under tongue as needed for Chest Pain (up to 3 doses (if SBP greater than 90 mmHg)). 25 Tab 3   • Pumpkin Seed-Soy Germ (AZO BLADDER CONTROL/GO-LESS PO) Take  by mouth.     • ranitidine (ZANTAC) 150 MG Tab Take 1 Tab by mouth 2 times a day. 60 Tab 11   • lansoprazole (PREVACID) 30 MG CAPSULE DELAYED RELEASE Take 1 Cap by mouth every day. 90 Cap 2   • aspirin EC (ECOTRIN) 81 MG Tablet Delayed Response Take 81 mg by mouth every evening. 30 Tab 0   • Cholecalciferol (VITAMIN D) 2000 UNIT TABS Take 1 Tab by mouth every day.     • [DISCONTINUED] losartan (COZAAR) 25 MG Tab Take 1 Tab by mouth every day. 90 Tab 3     No facility-administered encounter medications on file as of 4/23/2018.      Review of Systems   Constitutional: Negative for chills, fever, malaise/fatigue and weight loss.   HENT: Negative for ear discharge, ear pain, hearing loss and nosebleeds.    Eyes: Negative for blurred vision, double vision, pain and discharge.   Respiratory: Negative for cough and shortness of breath.    Cardiovascular: Negative for chest pain, palpitations, orthopnea, claudication, leg swelling and PND.   Gastrointestinal: Negative for abdominal pain, blood in stool, melena, nausea and vomiting.   Genitourinary: Negative for dysuria and hematuria.   Musculoskeletal: Negative for falls, joint pain and myalgias.   Skin: Negative for itching and rash.   Neurological: Negative for dizziness, sensory change, speech change, loss of consciousness and headaches.   Endo/Heme/Allergies: Negative for environmental allergies. Does not bruise/bleed easily.   Psychiatric/Behavioral: Negative for depression, hallucinations and suicidal ideas.        Objective:   BP (!) 164/100   Pulse (!) 48   Ht 1.651 m (5' 5\")   Wt 93.9 kg (207 lb)   SpO2 93%   BMI " 34.45 kg/m²      Physical Exam   Constitutional: She is oriented to person, place, and time. No distress.   HENT:   Head: Normocephalic and atraumatic.   Right Ear: External ear normal.   Left Ear: External ear normal.   Eyes: Right eye exhibits no discharge. Left eye exhibits no discharge.   Neck: No JVD present. No thyromegaly present.   Cardiovascular: Normal rate, regular rhythm, normal heart sounds and intact distal pulses.  Exam reveals no gallop and no friction rub.    No murmur heard.  Pulmonary/Chest: Breath sounds normal. No respiratory distress.   Abdominal: Bowel sounds are normal. She exhibits no distension. There is no tenderness.   Musculoskeletal: She exhibits no edema or tenderness.   Neurological: She is alert and oriented to person, place, and time. No cranial nerve deficit.   Skin: Skin is warm and dry. She is not diaphoretic.   Psychiatric: She has a normal mood and affect. Her behavior is normal.   Nursing note and vitals reviewed.      Assessment:     1. Atypical chest pain  REFERRAL TO SLEEP STUDIES   2. S/P coronary artery stent placement  losartan (COZAAR) 100 MG Tab    REFERRAL TO SLEEP STUDIES   3. Obesity (BMI 30-39.9)     4. Coronary artery disease involving native coronary artery of native heart without angina pectoris  losartan (COZAAR) 100 MG Tab   5. FRIDA (obstructive sleep apnea)  REFERRAL TO SLEEP STUDIES   6. Mixed hyperlipidemia     7. HTN (hypertension), malignant  losartan (COZAAR) 100 MG Tab    REFERRAL TO SLEEP STUDIES    BASIC METABOLIC PANEL   8. High risk medication use  BASIC METABOLIC PANEL       Medical Decision Making:  Today's Assessment / Status / Plan:   LDL is within goal.   Renal function and Liver function are adequate.  Blood pressure is high.  Will increase Losartan to 100 mg po daily.  Continue Atorvastatin 40 mg po daily, Toprol 25 mg po daily, ASA.    I will refer patient to have sleep studies for obstructive sleep apnea.    I will see patient back in clinic  with lab tests and studies results in 2 months.    I thank you Dr. Hoskins for referring patient to our Cardiology Clinic today.        Brittney Hoskins M.D.  [address]  [place] [id number]  VIA In Basket

## 2018-04-25 ENCOUNTER — TELEPHONE (OUTPATIENT)
Dept: HEMATOLOGY ONCOLOGY | Facility: MEDICAL CENTER | Age: 73
End: 2018-04-25

## 2018-04-25 NOTE — TELEPHONE ENCOUNTER
New Oncology Patient 48 hour follow up:    Called to welcome patient to [facility] and to follow up on initial consult with Dr. Vazquez on April 17, 2018. Per Dr. Vazquez patient only needs to follow up with us as needed. No appointments are made at this time. She asked about a sleep apnea referral which I will follow up with Dr. Vazquez about. Patient agreed.

## 2018-04-30 ENCOUNTER — TELEPHONE (OUTPATIENT)
Dept: CARDIOLOGY | Facility: MEDICAL CENTER | Age: 73
End: 2018-04-30

## 2018-04-30 NOTE — TELEPHONE ENCOUNTER
"Erick Norton - Referral to sleep studies << Less Detail',event)\" href=\"javascript:;\">More Detail >>      Referral to sleep studies   Erick Norton   Sent: Mon April 23, 2018 11:58 AM   To: Neela Cheng R.N.                  Message     The referral to sleep studies has been forwarded to the patient contact center.   The contact number is [phone]     Pt inquiry.  My Chart message sent with contact center information.  "

## 2018-05-15 ENCOUNTER — OFFICE VISIT (OUTPATIENT)
Dept: MEDICAL GROUP | Age: 73
End: 2018-05-15
Payer: MEDICARE

## 2018-05-15 VITALS
HEIGHT: 65 IN | BODY MASS INDEX: 34.45 KG/M2 | WEIGHT: 206.8 LBS | OXYGEN SATURATION: 94 % | TEMPERATURE: 98.2 F | SYSTOLIC BLOOD PRESSURE: 156 MMHG | DIASTOLIC BLOOD PRESSURE: 98 MMHG | HEART RATE: 80 BPM

## 2018-05-15 DIAGNOSIS — I70.0 ATHEROSCLEROSIS OF AORTA (HCC): ICD-10-CM

## 2018-05-15 DIAGNOSIS — R19.5 LOOSE STOOLS: ICD-10-CM

## 2018-05-15 DIAGNOSIS — I49.5 SICK SINUS SYNDROME (HCC): ICD-10-CM

## 2018-05-15 DIAGNOSIS — K21.9 GASTROESOPHAGEAL REFLUX DISEASE, ESOPHAGITIS PRESENCE NOT SPECIFIED: ICD-10-CM

## 2018-05-15 PROCEDURE — 99214 OFFICE O/P EST MOD 30 MIN: CPT | Performed by: INTERNAL MEDICINE

## 2018-05-15 RX ORDER — LANSOPRAZOLE 30 MG/1
30 CAPSULE, DELAYED RELEASE ORAL DAILY
Qty: 90 CAP | Refills: 3 | Status: SHIPPED | OUTPATIENT
Start: 2018-05-15 | End: 2019-05-28 | Stop reason: SDUPTHER

## 2018-05-15 ASSESSMENT — PATIENT HEALTH QUESTIONNAIRE - PHQ9: CLINICAL INTERPRETATION OF PHQ2 SCORE: 0

## 2018-05-15 NOTE — ASSESSMENT & PLAN NOTE
Patient stated that she has constant acid reflux. She tried ranitidine and omeprazole in the past without improvement. She stated that on the Prevacid helped to control her symptoms. She requests to refill Prevacid today. She stated that she needs to take Prevacid 30 mg daily to control her symptoms. We have discussed the risks and benefits of chronic use of PPI. Patient is advised to take vitamin D, vitamin B12 and magnesium supplements. She agreed with the plan.

## 2018-05-15 NOTE — ASSESSMENT & PLAN NOTE
Patient had slow heartbeat and tachycardia in the past and heartbeat improved after 2 stent placed in coronary artery in January 2017. She follows with cardiologist regularly. She denied palpitation or skipped heart beat at rest or on physical exertion.

## 2018-05-15 NOTE — ASSESSMENT & PLAN NOTE
This is a new problem. Patient is having loose stools for 3 weeks. She stated that she has frequent soft stool followed by watery stools, sometimes right after eating. She does not notice any blood or mucous in stool or floating stool. She reported associated with abdominal gassy and pain on left lower abdomen for short period of time last week. She denied abdominal pain at this time. She denied fever, chills, nausea or vomiting. She did not take any antibiotic recently in past 4 weeks. She did take PPI, Prevacid chronically for GERD. She has colonoscopy around 2010 or 2011, but she did not remember where she had her colonoscopy. She did not know the actual report of colonoscopy.

## 2018-05-15 NOTE — ASSESSMENT & PLAN NOTE
This is chronic and stable. Patient is taking atorvastatin 40 mg every evening, baby aspirin daily and Plavix 75 mg daily. She also takes metoprolol XL 25 mg daily and losartan 100 mg daily.

## 2018-05-15 NOTE — PATIENT INSTRUCTIONS
Diet for Irritable Bowel Syndrome  When you have irritable bowel syndrome (IBS), the foods you eat and your eating habits are very important. IBS may cause various symptoms, such as abdominal pain, constipation, or diarrhea. Choosing the right foods can help ease discomfort caused by these symptoms. Work with your health care provider and dietitian to find the best eating plan to help control your symptoms.    WHAT GENERAL GUIDELINES DO I NEED TO FOLLOW?  · Keep a food diary. This will help you identify foods that cause symptoms. Write down:  ¨ What you eat and when.  ¨ What symptoms you have.  ¨ When symptoms occur in relation to your meals.  · Avoid foods that cause symptoms. Talk with your dietitian about other ways to get the same nutrients that are in these foods.  · Eat more foods that contain fiber. Take a fiber supplement if directed by your dietitian.  · Eat your meals slowly, in a relaxed setting.  · Aim to eat 5-6 small meals per day. Do not skip meals.  · Drink enough fluids to keep your urine clear or pale yellow.  · Ask your health care provider if you should take an over-the-counter probiotic during flare-ups to help restore healthy gut bacteria.  · If you have cramping or diarrhea, try making your meals low in fat and high in carbohydrates. Examples of carbohydrates are pasta, rice, whole grain breads and cereals, fruits, and vegetables.  · If dairy products cause your symptoms to flare up, try eating less of them. You might be able to handle yogurt better than other dairy products because it contains bacteria that help with digestion.  ·   WHAT FOODS ARE NOT RECOMMENDED?  The following are some foods and drinks that may worsen your symptoms:  · Fatty foods, such as French fries.  · Milk products, such as cheese or ice cream.  · Chocolate.  · Alcohol.  · Products with caffeine, such as coffee.  · Carbonated drinks, such as soda.  The items listed above may not be a complete list of foods and  beverages to avoid. Contact your dietitian for more information.  WHAT FOODS ARE GOOD SOURCES OF FIBER?  Your health care provider or dietitian may recommend that you eat more foods that contain fiber. Fiber can help reduce constipation and other IBS symptoms. Add foods with fiber to your diet a little at a time so that your body can get used to them. Too much fiber at once might cause gas and swelling of your abdomen. The following are some foods that are good sources of fiber:  · Apples.  · Peaches.  · Pears.  · Berries.  · Figs.  · Broccoli (raw).  · Cabbage.  · Carrots.  · Raw peas.  · Kidney beans.  · Lima beans.  · Whole grain bread.  · Whole grain cereal.  ·   FOR MORE INFORMATION   International Foundation for Functional Gastrointestinal Disorders: www.iffgd.org  National [place] of Diabetes and Digestive and Kidney Diseases: www.niddk.nih.gov/health-information/health-topics/digestive-diseases/ibs/Pages/facts.aspx     This information is not intended to replace advice given to you by your health care provider. Make sure you discuss any questions you have with your health care provider.     Document Released: 03/09/2005 Document Revised: 01/08/2016 Document Reviewed: 03/20/2015  Elsevier Interactive Patient Education ©2016 Elsevier Inc.

## 2018-05-16 ENCOUNTER — HOSPITAL ENCOUNTER (OUTPATIENT)
Facility: MEDICAL CENTER | Age: 73
End: 2018-05-16
Attending: INTERNAL MEDICINE
Payer: MEDICARE

## 2018-05-16 DIAGNOSIS — R19.5 LOOSE STOOLS: ICD-10-CM

## 2018-05-16 LAB
C DIFF DNA SPEC QL NAA+PROBE: NEGATIVE
C DIFF TOX GENS STL QL NAA+PROBE: NEGATIVE

## 2018-05-16 PROCEDURE — 87493 C DIFF AMPLIFIED PROBE: CPT

## 2018-05-16 PROCEDURE — 87046 STOOL CULTR AEROBIC BACT EA: CPT

## 2018-05-16 PROCEDURE — 89055 LEUKOCYTE ASSESSMENT FECAL: CPT

## 2018-05-16 PROCEDURE — 87899 AGENT NOS ASSAY W/OPTIC: CPT

## 2018-05-16 PROCEDURE — 87045 FECES CULTURE AEROBIC BACT: CPT

## 2018-05-16 NOTE — PROGRESS NOTES
Subjective:   Griselda Farmer is a 73 y.o. female here today for evaluation and management of:      Atherosclerosis of aorta (HCC)  This is chronic and stable. Patient is taking atorvastatin 40 mg every evening, baby aspirin daily and Plavix 75 mg daily. She also takes metoprolol XL 25 mg daily and losartan 100 mg daily.    GERD (gastroesophageal reflux disease)  Patient stated that she has constant acid reflux. She tried ranitidine and omeprazole in the past without improvement. She stated that on the Prevacid helped to control her symptoms. She requests to refill Prevacid today. She stated that she needs to take Prevacid 30 mg daily to control her symptoms. We have discussed the risks and benefits of chronic use of PPI. Patient is advised to take vitamin D, vitamin B12 and magnesium supplements. She agreed with the plan.    Sick sinus syndrome (CMS-HCC)  Patient had slow heartbeat and tachycardia in the past and heartbeat improved after 2 stent placed in coronary artery in January 2017. She follows with cardiologist regularly. She denied palpitation or skipped heart beat at rest or on physical exertion.     Loose stools  This is a new problem. Patient is having loose stools for 3 weeks. She stated that she has frequent soft stool followed by watery stools, sometimes right after eating. She does not notice any blood or mucous in stool or floating stool. She reported associated with abdominal gassy and pain on left lower abdomen for short period of time last week. She denied abdominal pain at this time. She denied fever, chills, nausea or vomiting. She did not take any antibiotic recently in past 4 weeks. She did take PPI, Prevacid chronically for GERD. She has colonoscopy around 2010 or 2011, but she did not remember where she had her colonoscopy. She did not know the actual report of colonoscopy.            Current medicines (including changes today)  Current Outpatient Prescriptions   Medication Sig  "Dispense Refill   • lansoprazole (PREVACID) 30 MG CAPSULE DELAYED RELEASE Take 1 Cap by mouth every day. 90 Cap 3   • losartan (COZAAR) 100 MG Tab Take 1 Tab by mouth every day. 90 Tab 3   • metoprolol SR (TOPROL XL) 25 MG TABLET SR 24 HR Take 1 Tab by mouth every evening. 90 Tab 3   • atorvastatin (LIPITOR) 40 MG Tab Take 1 Tab by mouth every day. 90 Tab 3   • clopidogrel (PLAVIX) 75 MG Tab Take 1 Tab by mouth every day. 90 Tab 3   • Pumpkin Seed-Soy Germ (AZO BLADDER CONTROL/GO-LESS PO) Take  by mouth.     • aspirin EC (ECOTRIN) 81 MG Tablet Delayed Response Take 81 mg by mouth every evening. 30 Tab 0   • Cholecalciferol (VITAMIN D) 2000 UNIT TABS Take 1 Tab by mouth every day.     • nitroglycerin (NITROSTAT) 0.4 MG SL Tab Place 1 Tab under tongue as needed for Chest Pain (up to 3 doses (if SBP greater than 90 mmHg)). 25 Tab 3     No current facility-administered medications for this visit.      She  has a past medical history of Angina (2014); Arthritis; Dental disorder; Flushing reaction; GERD (gastroesophageal reflux disease); Heart burn (2014); Hyperlipidemia (10/20/2010); Hypertension (2014); Indigestion; Insomnia; Liver cyst; Myocardial infarct (HCC) (1984); Need for Tdap vaccination; FRIDA (obstructive sleep apnea); S/P colonoscopy (11/9/2012); Sick sinus syndrome (HCC) (1/1/2017); Symptomatic sinus bradycardia (1/1/2017); Unspecified urinary incontinence; and Urinary bladder disorder.    ROS   No chest pain, no shortness of breath, no abdominal pain       Objective:     Blood pressure 156/98, pulse 80, temperature 36.8 °C (98.2 °F), height 1.651 m (5' 5\"), weight 93.8 kg (206 lb 12.8 oz), SpO2 94 %. Body mass index is 34.41 kg/m².   Physical Exam:  General: Alert, oriented and no acute distress.  Eye contact is good, speech goal directed, affect calm  HEENT: conjunctiva non-injected, sclera non-icteric.  Oral mucous membranes pink and moist with no lesions.  Pinna normal.   Lungs: Normal respiratory effort, " clear to auscultation bilaterally with good excursion.  CV: regular rate and rhythm. No murmurs.   Abdomen: soft, non distended, nontender, Bowel sound normal.  Ext: no edema, color normal, vascularity normal, temperature normal        Assessment and Plan:   The following treatment plan was discussed     1. Loose stools  - Discussed the possible causes with patient. No signs of acute abdomen on exam today. Patient reported pain on left lower abdomen last week and denied pain currently. She is afebrile and denies blood in stool or nausea or vomiting.  - We discuss to do CT abdomen and pelvic to evaluate for diverticulitis or colitis, but patient declined. Patient wants to discuss with GI first before doing CT abdomen abdomen.   - Will do stool study for infection work up. Will advise for results.   - Discussed to eat BRAT diet and keep well hydration.  - Patient is referred to GI for further evaluation and possible colonoscopy as she did one about 2010 or 2011 and did not know where she did colonoscopy and did not know there report of colonoscopy.  - Advised to go to ER or seek urgent medical attention if she has worsening abdominal pain, blood in stool or fever or chill or nausea or vomiting.   - CDIFF BY PCR; Future  - CULTURE STOOL; Future  - STOOL WBC'S; Future  - REFERRAL TO GASTROENTEROLOGY    2. Gastroesophageal reflux disease, esophagitis presence not specified  - Patient stated that she has acid reflux constantly if she stopped taking Prevacid. She tried ranitidine and omeprazole without improvement. She only feels benefits from taking Prevacid.   - Reviewed the risks and benefits as well as potential side effects from taking Prevacid with pt.  - She is advised to take vitamin D, B12 and Magnesium daily.   - Discuss to avoid eating acidic food or excessive caffeine or alcohol and elevate the head of bed.  - lansoprazole (PREVACID) 30 MG CAPSULE DELAYED RELEASE; Take 1 Cap by mouth every day.  Dispense: 90  Cap; Refill: 3  - REFERRAL TO GASTROENTEROLOGY    3. Atherosclerosis of aorta (HCC)  - stable. Continue Metoprolol XL 25 mg daily, losartan 100 mg daily, atorvastatin 40 h every evening, Plavix 75 mg daily and Aspirin 81 mg daily.    4. Sick sinus syndrome (HCC)  - Improved after left circumference stents placed in Jan 2018. No recurrent symptoms.  - Continue to monitor.  - Patient has appointment with Dr. Saunders, Cardiologist on 6/13/18.        Followup: Return in about 8 weeks (around 7/10/2018), or if symptoms worsen or fail to improve, for Loose stool, HLD, IFG, GERD, HTN, lab review.      Please note that this dictation was created using voice recognition software. I have made every reasonable attempt to correct obvious errors, but I expect that there may have unintended errors in text, spelling, punctuation, or grammar that I did not discover.

## 2018-05-17 LAB
E COLI SXT1+2 STL IA: NORMAL
SIGNIFICANT IND 70042: NORMAL
SITE SITE: NORMAL
SOURCE SOURCE: NORMAL
WBC STL QL MICRO: NORMAL

## 2018-05-19 LAB
BACTERIA STL CULT: NORMAL
E COLI SXT1+2 STL IA: NORMAL
SIGNIFICANT IND 70042: NORMAL
SITE SITE: NORMAL
SOURCE SOURCE: NORMAL

## 2018-05-21 ENCOUNTER — HOSPITAL ENCOUNTER (OUTPATIENT)
Dept: LAB | Facility: MEDICAL CENTER | Age: 73
End: 2018-05-21
Attending: INTERNAL MEDICINE
Payer: MEDICARE

## 2018-05-21 DIAGNOSIS — Z79.899 HIGH RISK MEDICATION USE: ICD-10-CM

## 2018-05-21 DIAGNOSIS — I10 HTN (HYPERTENSION), MALIGNANT: ICD-10-CM

## 2018-05-21 LAB
ANION GAP SERPL CALC-SCNC: 9 MMOL/L (ref 0–11.9)
BUN SERPL-MCNC: 8 MG/DL (ref 8–22)
CALCIUM SERPL-MCNC: 9.6 MG/DL (ref 8.5–10.5)
CHLORIDE SERPL-SCNC: 105 MMOL/L (ref 96–112)
CO2 SERPL-SCNC: 25 MMOL/L (ref 20–33)
CREAT SERPL-MCNC: 0.83 MG/DL (ref 0.5–1.4)
GLUCOSE SERPL-MCNC: 101 MG/DL (ref 65–99)
POTASSIUM SERPL-SCNC: 3.9 MMOL/L (ref 3.6–5.5)
SODIUM SERPL-SCNC: 139 MMOL/L (ref 135–145)

## 2018-05-21 PROCEDURE — 80048 BASIC METABOLIC PNL TOTAL CA: CPT

## 2018-05-21 PROCEDURE — 36415 COLL VENOUS BLD VENIPUNCTURE: CPT

## 2018-05-23 ENCOUNTER — PATIENT OUTREACH (OUTPATIENT)
Dept: HEALTH INFORMATION MANAGEMENT | Facility: OTHER | Age: 73
End: 2018-05-23

## 2018-05-23 NOTE — PROGRESS NOTES
1. Attempt #: 1    2. [facility]Connect Verified: yes    3. Verify PCP: yes    4. Care Team Updated:       •   DME Company (gait device, O2, CPAP, etc.): YES       •   Other Specialists (eye doctor, derm, GYN, cardiology, endo, etc): YES    5.  Reviewed/Updated the following with patient:       •   Communication Preference Obtained? YES       •   Preferred Pharmacy? YES       •   Preferred Lab? YES       •   Family History (document living status of immediate family members and if + hx of cancer, diabetes, hypertension, hyperlipidemia, heart attack, stroke) YES. Was Abstract Encounter opened and chart updated? YES    6. SignNow Activation: already active    7. SignNow [place]: no    8. Annual Wellness Visit Scheduling  Scheduling Status:Scheduled      9. Care Gap Scheduling (Attempt to Schedule EACH Overdue Care Gap!)     Health Maintenance Due   Topic Date Due   • Annual Wellness Visit  01/28/2017        Scheduled patient for Annual Wellness Visit    10. Patient was advised: “This is a free wellness visit. The provider will screen for medical conditions to help you stay healthy. If you have other concerns to address you may be asked to discuss these at a separate visit or there may be an additional fee.”     11. Patient was informed to arrive 15 min prior to their scheduled appointment and bring in their medication bottles.

## 2018-06-07 ENCOUNTER — OFFICE VISIT (OUTPATIENT)
Dept: CARDIOLOGY | Facility: MEDICAL CENTER | Age: 73
End: 2018-06-07
Payer: MEDICARE

## 2018-06-07 VITALS
HEART RATE: 68 BPM | WEIGHT: 204 LBS | OXYGEN SATURATION: 93 % | BODY MASS INDEX: 33.99 KG/M2 | HEIGHT: 65 IN | SYSTOLIC BLOOD PRESSURE: 154 MMHG | DIASTOLIC BLOOD PRESSURE: 84 MMHG

## 2018-06-07 DIAGNOSIS — Z95.5 S/P CORONARY ARTERY STENT PLACEMENT: ICD-10-CM

## 2018-06-07 DIAGNOSIS — Z79.899 HIGH RISK MEDICATION USE: ICD-10-CM

## 2018-06-07 DIAGNOSIS — E66.9 OBESITY (BMI 30-39.9): ICD-10-CM

## 2018-06-07 DIAGNOSIS — E78.2 MIXED HYPERLIPIDEMIA: ICD-10-CM

## 2018-06-07 DIAGNOSIS — R07.89 ATYPICAL CHEST PAIN: ICD-10-CM

## 2018-06-07 DIAGNOSIS — I25.10 CORONARY ARTERY DISEASE INVOLVING NATIVE CORONARY ARTERY OF NATIVE HEART WITHOUT ANGINA PECTORIS: ICD-10-CM

## 2018-06-07 DIAGNOSIS — I10 HTN (HYPERTENSION), MALIGNANT: ICD-10-CM

## 2018-06-07 PROCEDURE — 99215 OFFICE O/P EST HI 40 MIN: CPT | Performed by: INTERNAL MEDICINE

## 2018-06-07 RX ORDER — CARVEDILOL 12.5 MG/1
12.5 TABLET ORAL 2 TIMES DAILY WITH MEALS
Qty: 60 TAB | Refills: 11 | Status: SHIPPED | OUTPATIENT
Start: 2018-06-07 | End: 2018-11-05 | Stop reason: SDUPTHER

## 2018-06-07 ASSESSMENT — ENCOUNTER SYMPTOMS
BRUISES/BLEEDS EASILY: 0
DEPRESSION: 0
DOUBLE VISION: 0
EYE PAIN: 0
FALLS: 0
BLURRED VISION: 0
DIZZINESS: 0
ABDOMINAL PAIN: 0
SENSORY CHANGE: 0
NAUSEA: 0
EYE DISCHARGE: 0
HEADACHES: 0
BLOOD IN STOOL: 0
ORTHOPNEA: 0
COUGH: 0
SPEECH CHANGE: 0
VOMITING: 0
PND: 0
CLAUDICATION: 0
FEVER: 0
WEIGHT LOSS: 0
LOSS OF CONSCIOUSNESS: 0
SHORTNESS OF BREATH: 0
PALPITATIONS: 0
MYALGIAS: 0
CHILLS: 0
HALLUCINATIONS: 0

## 2018-06-07 NOTE — PROGRESS NOTES
Chief Complaint   Patient presents with   • Coronary Artery Disease   • HTN (Controlled)       Subjective:   Griselda Farmer is a 73 y.o. female who presents today for cardiac care due to prior CAD with 2 stents placed in Lx artery, HTN, HLP. In the past patient had repeated ER visits because of chest pain. She even had a repeated cardiac catheterization which did not show any thing wrong with her stents. She came in to the hospital again in July 2017 because of chest pain. She was discharged under stable condition. Her left ventricular systolic function was found to be 50%.     At prior visit, patient was able to complete 344 m during his 6 minute walk test. her O2 saturation at baseline was 91% and at the end of the test, the O2 saturation was 94%. she reported 1 level of dyspnea on Chuckie scale.     +atypical chest pain at night when sleeping. She has FRIDA but not using CPAP due to malfunction machine.     In the interim, patient has been doing well without having any symptoms. Patient denies having chest pain, dyspnea, palpitation, presyncope, syncope episodes. Able to climb up at least 2 flights of stairs.    I have independently reviewed blood tests results with patient in clinic which shows normal LDL level, triglycerides, renal and liver function.      Past Medical History:   Diagnosis Date   • Angina 2014    pt denies    • Arthritis     generalized + hands   • Dental disorder     upper and lower dentures   • Flushing reaction     has had full work up with cardiology, would awake with symptoms at night   • GERD (gastroesophageal reflux disease)    • Heart burn 2014    pt on prevacid   • Hyperlipidemia 10/20/2010   • Hypertension 2014    pt states well controlled on meds   • Indigestion    • Insomnia    • Liver cyst     has been seen by GI, ultrasound 9/12   • Myocardial infarct (HCC) 1984    pt denies states sometimes irreg rhythm   • Need for Tdap vaccination     in 2012   • FRIDA (obstructive sleep  apnea)     states no cpap   • S/P colonoscopy 11/9/2012   • Sick sinus syndrome (HCC) 1/1/2017   • Symptomatic sinus bradycardia 1/1/2017   • Unspecified urinary incontinence    • Urinary bladder disorder      Past Surgical History:   Procedure Laterality Date   • CARDIAC CATH  1/19/17    Stents patent.   • CARDIAC CATH  1/1/17    Overlapping Synergy stents to 99% Circ   • OTHER CARDIAC SURGERY  January 2017    NON STEMI with stent   • KNEE ARTHROPLASTY TOTAL  12/16/2014    Performed by Jose G Trotter M.D. at SURGERY [facility] ORS   • CATARACT PHACO WITH IOL  10/3/2013    Performed by Sylvester Raza M.D. at SURGERY [facility] ORS   • CATARACT PHACO WITH IOL  9/19/2013    Performed by Sylvester Raza M.D. at SURGERY [facility] ORS   • RECOVERY  3/29/2013    Performed by Cath-Recovery Surgery at [facility] SAME DAY [facility] ORS   • HYSTERECTOMY, VAGINAL      ovaries were left   • TONSILLECTOMY       Family History   Problem Relation Age of Onset   • Diabetes Mother      mild   • Cancer Father      melanoma mild   • Arthritis Sister    • Arthritis Brother    • Arthritis Sister    • Arthritis Brother    • Heart Disease Neg Hx    • Hypertension Neg Hx      Social History     Social History   • Marital status:      Spouse name: N/A   • Number of children: N/A   • Years of education: N/A     Occupational History   • retired- human resources  for [employer]     Social History Main Topics   • Smoking status: Former Smoker     Packs/day: 0.50     Years: 15.00     Types: Cigarettes     Quit date: 8/11/1965   • Smokeless tobacco: Never Used   • Alcohol use No   • Drug use: No   • Sexual activity: Not Currently     Partners: Male     Other Topics Concern   • Not on file     Social History Narrative   • No narrative on file     Allergies   Allergen Reactions   • Codeine Itching and Vomiting     Rxn = years ago        Outpatient Encounter Prescriptions as of 6/7/2018   Medication Sig Dispense  Refill   • carvedilol (COREG) 12.5 MG Tab Take 1 Tab by mouth 2 times a day, with meals. 60 Tab 11   • lansoprazole (PREVACID) 30 MG CAPSULE DELAYED RELEASE Take 1 Cap by mouth every day. 90 Cap 3   • losartan (COZAAR) 100 MG Tab Take 1 Tab by mouth every day. 90 Tab 3   • atorvastatin (LIPITOR) 40 MG Tab Take 1 Tab by mouth every day. 90 Tab 3   • clopidogrel (PLAVIX) 75 MG Tab Take 1 Tab by mouth every day. 90 Tab 3   • nitroglycerin (NITROSTAT) 0.4 MG SL Tab Place 1 Tab under tongue as needed for Chest Pain (up to 3 doses (if SBP greater than 90 mmHg)). 25 Tab 3   • Pumpkin Seed-Soy Germ (AZO BLADDER CONTROL/GO-LESS PO) Take  by mouth.     • aspirin EC (ECOTRIN) 81 MG Tablet Delayed Response Take 81 mg by mouth every evening. 30 Tab 0   • Cholecalciferol (VITAMIN D) 2000 UNIT TABS Take 1 Tab by mouth every day.     • [DISCONTINUED] metoprolol SR (TOPROL XL) 25 MG TABLET SR 24 HR Take 1 Tab by mouth every evening. 90 Tab 3     No facility-administered encounter medications on file as of 6/7/2018.      Review of Systems   Constitutional: Negative for chills, fever, malaise/fatigue and weight loss.   HENT: Negative for ear discharge, ear pain, hearing loss and nosebleeds.    Eyes: Negative for blurred vision, double vision, pain and discharge.   Respiratory: Negative for cough and shortness of breath.    Cardiovascular: Negative for chest pain, palpitations, orthopnea, claudication, leg swelling and PND.   Gastrointestinal: Negative for abdominal pain, blood in stool, melena, nausea and vomiting.   Genitourinary: Negative for dysuria and hematuria.   Musculoskeletal: Negative for falls, joint pain and myalgias.   Skin: Negative for itching and rash.   Neurological: Negative for dizziness, sensory change, speech change, loss of consciousness and headaches.   Endo/Heme/Allergies: Negative for environmental allergies. Does not bruise/bleed easily.   Psychiatric/Behavioral: Negative for depression, hallucinations and  "suicidal ideas.        Objective:   /84   Pulse 68   Ht 1.651 m (5' 5\")   Wt 92.5 kg (204 lb)   SpO2 93%   BMI 33.95 kg/m²     Physical Exam   Constitutional: She is oriented to person, place, and time. No distress.   HENT:   Head: Normocephalic and atraumatic.   Right Ear: External ear normal.   Left Ear: External ear normal.   Eyes: Right eye exhibits no discharge. Left eye exhibits no discharge.   Neck: No JVD present. No thyromegaly present.   Cardiovascular: Normal rate, regular rhythm, normal heart sounds and intact distal pulses.  Exam reveals no gallop and no friction rub.    No murmur heard.  Pulmonary/Chest: Breath sounds normal. No respiratory distress.   Abdominal: Bowel sounds are normal. She exhibits no distension. There is no tenderness.   Musculoskeletal: She exhibits no edema or tenderness.   Neurological: She is alert and oriented to person, place, and time. No cranial nerve deficit.   Skin: Skin is warm and dry. She is not diaphoretic.   Psychiatric: She has a normal mood and affect. Her behavior is normal.   Nursing note and vitals reviewed.      Assessment:     1. HTN (hypertension), malignant  carvedilol (COREG) 12.5 MG Tab   2. S/P coronary artery stent placement  carvedilol (COREG) 12.5 MG Tab   3. Coronary artery disease involving native coronary artery of native heart without angina pectoris  carvedilol (COREG) 12.5 MG Tab   4. Mixed hyperlipidemia     5. Atypical chest pain     6. Obesity (BMI 30-39.9)     7. High risk medication use         Medical Decision Making:  Today's Assessment / Status / Plan:   Blood pressure is high. Very high risk for repeated heart attack.   Will optimize risk factor control especially malignant HTN.    Stop Toprol XL (Metoprolol SR) due to bradycardia.    Start Carvedilol 12.5 mg twice a day.    Continue Losartan 100 mg po daily, ASA, Plavix, Atorvastatin.    I will see patient back in clinic with lab tests and studies results in 2 months.    I " thank you Dr. Hoskins for referring patient to our Cardiology Clinic today.

## 2018-06-07 NOTE — LETTER
[facility]   [address]   [address]  [place] [address]  Phone: [phone]  Fax: [phone]              Griselda Farmer  1945    Encounter Date: 6/7/2018    Anastacia Saunders M.D.          PROGRESS NOTE:  Chief Complaint   Patient presents with   • Coronary Artery Disease   • HTN (Controlled)       Subjective:   Griselda Farmer is a 73 y.o. female who presents today for cardiac care due to prior CAD with 2 stents placed in Lx artery, HTN, HLP. In the past patient had repeated ER visits because of chest pain. She even had a repeated cardiac catheterization which did not show any thing wrong with her stents. She came in to the hospital again in July 2017 because of chest pain. She was discharged under stable condition. Her left ventricular systolic function was found to be 50%.     At prior visit, patient was able to complete 344 m during his 6 minute walk test. her O2 saturation at baseline was 91% and at the end of the test, the O2 saturation was 94%. she reported 1 level of dyspnea on Chuckie scale.     +atypical chest pain at night when sleeping. She has FRIDA but not using CPAP due to malfunction machine.     In the interim, patient has been doing well without having any symptoms. Patient denies having chest pain, dyspnea, palpitation, presyncope, syncope episodes. Able to climb up at least 2 flights of stairs.    I have independently reviewed blood tests results with patient in clinic which shows normal LDL level, triglycerides, renal and liver function.      Past Medical History:   Diagnosis Date   • Angina 2014    pt denies    • Arthritis     generalized + hands   • Dental disorder     upper and lower dentures   • Flushing reaction     has had full work up with cardiology, would awake with symptoms at night   • GERD (gastroesophageal reflux disease)    • Heart burn 2014    pt on prevacid   • Hyperlipidemia 10/20/2010     • Hypertension 2014    pt states well controlled on meds   • Indigestion    • Insomnia    • Liver cyst     has been seen by GI, ultrasound 9/12   • Myocardial infarct (HCC) 1984    pt denies states sometimes irreg rhythm   • Need for Tdap vaccination     in 2012   • FRIDA (obstructive sleep apnea)     states no cpap   • S/P colonoscopy 11/9/2012   • Sick sinus syndrome (HCC) 1/1/2017   • Symptomatic sinus bradycardia 1/1/2017   • Unspecified urinary incontinence    • Urinary bladder disorder      Past Surgical History:   Procedure Laterality Date   • CARDIAC CATH  1/19/17    Stents patent.   • CARDIAC CATH  1/1/17    Overlapping Synergy stents to 99% Circ   • OTHER CARDIAC SURGERY  January 2017    NON STEMI with stent   • KNEE ARTHROPLASTY TOTAL  12/16/2014    Performed by Jose G Trotter M.D. at SURGERY [facility] ORS   • CATARACT PHACO WITH IOL  10/3/2013    Performed by Sylvester Raza M.D. at SURGERY [facility] ORS   • CATARACT PHACO WITH IOL  9/19/2013    Performed by Sylvester Raza M.D. at [place]   • RECOVERY  3/29/2013    Performed by Cath-Recovery Surgery at [facility] SAME DAY [facility] ORS   • HYSTERECTOMY, VAGINAL      ovaries were left   • TONSILLECTOMY       Family History   Problem Relation Age of Onset   • Diabetes Mother      mild   • Cancer Father      melanoma mild   • Arthritis Sister    • Arthritis Brother    • Arthritis Sister    • Arthritis Brother    • Heart Disease Neg Hx    • Hypertension Neg Hx      Social History     Social History   • Marital status:      Spouse name: N/A   • Number of children: N/A   • Years of education: N/A     Occupational History   • retired- human resources  for [employer]     Social History Main Topics   • Smoking status: Former Smoker     Packs/day: 0.50     Years: 15.00     Types: Cigarettes     Quit date: 8/11/1965   • Smokeless tobacco: Never Used   • Alcohol use No   • Drug use: No   • Sexual activity: Not  Currently     Partners: Male     Other Topics Concern   • Not on file     Social History Narrative   • No narrative on file     Allergies   Allergen Reactions   • Codeine Itching and Vomiting     Rxn = years ago        Outpatient Encounter Prescriptions as of 6/7/2018   Medication Sig Dispense Refill   • carvedilol (COREG) 12.5 MG Tab Take 1 Tab by mouth 2 times a day, with meals. 60 Tab 11   • lansoprazole (PREVACID) 30 MG CAPSULE DELAYED RELEASE Take 1 Cap by mouth every day. 90 Cap 3   • losartan (COZAAR) 100 MG Tab Take 1 Tab by mouth every day. 90 Tab 3   • atorvastatin (LIPITOR) 40 MG Tab Take 1 Tab by mouth every day. 90 Tab 3   • clopidogrel (PLAVIX) 75 MG Tab Take 1 Tab by mouth every day. 90 Tab 3   • nitroglycerin (NITROSTAT) 0.4 MG SL Tab Place 1 Tab under tongue as needed for Chest Pain (up to 3 doses (if SBP greater than 90 mmHg)). 25 Tab 3   • Pumpkin Seed-Soy Germ (AZO BLADDER CONTROL/GO-LESS PO) Take  by mouth.     • aspirin EC (ECOTRIN) 81 MG Tablet Delayed Response Take 81 mg by mouth every evening. 30 Tab 0   • Cholecalciferol (VITAMIN D) 2000 UNIT TABS Take 1 Tab by mouth every day.     • [DISCONTINUED] metoprolol SR (TOPROL XL) 25 MG TABLET SR 24 HR Take 1 Tab by mouth every evening. 90 Tab 3     No facility-administered encounter medications on file as of 6/7/2018.      Review of Systems   Constitutional: Negative for chills, fever, malaise/fatigue and weight loss.   HENT: Negative for ear discharge, ear pain, hearing loss and nosebleeds.    Eyes: Negative for blurred vision, double vision, pain and discharge.   Respiratory: Negative for cough and shortness of breath.    Cardiovascular: Negative for chest pain, palpitations, orthopnea, claudication, leg swelling and PND.   Gastrointestinal: Negative for abdominal pain, blood in stool, melena, nausea and vomiting.   Genitourinary: Negative for dysuria and hematuria.   Musculoskeletal: Negative for falls, joint pain and myalgias.   Skin:  "Negative for itching and rash.   Neurological: Negative for dizziness, sensory change, speech change, loss of consciousness and headaches.   Endo/Heme/Allergies: Negative for environmental allergies. Does not bruise/bleed easily.   Psychiatric/Behavioral: Negative for depression, hallucinations and suicidal ideas.        Objective:   /84   Pulse 68   Ht 1.651 m (5' 5\")   Wt 92.5 kg (204 lb)   SpO2 93%   BMI 33.95 kg/m²      Physical Exam   Constitutional: She is oriented to person, place, and time. No distress.   HENT:   Head: Normocephalic and atraumatic.   Right Ear: External ear normal.   Left Ear: External ear normal.   Eyes: Right eye exhibits no discharge. Left eye exhibits no discharge.   Neck: No JVD present. No thyromegaly present.   Cardiovascular: Normal rate, regular rhythm, normal heart sounds and intact distal pulses.  Exam reveals no gallop and no friction rub.    No murmur heard.  Pulmonary/Chest: Breath sounds normal. No respiratory distress.   Abdominal: Bowel sounds are normal. She exhibits no distension. There is no tenderness.   Musculoskeletal: She exhibits no edema or tenderness.   Neurological: She is alert and oriented to person, place, and time. No cranial nerve deficit.   Skin: Skin is warm and dry. She is not diaphoretic.   Psychiatric: She has a normal mood and affect. Her behavior is normal.   Nursing note and vitals reviewed.      Assessment:     1. HTN (hypertension), malignant  carvedilol (COREG) 12.5 MG Tab   2. S/P coronary artery stent placement  carvedilol (COREG) 12.5 MG Tab   3. Coronary artery disease involving native coronary artery of native heart without angina pectoris  carvedilol (COREG) 12.5 MG Tab   4. Mixed hyperlipidemia     5. Atypical chest pain     6. Obesity (BMI 30-39.9)     7. High risk medication use         Medical Decision Making:  Today's Assessment / Status / Plan:   Blood pressure is high.  Stop Toprol XL (Metoprolol SR) due to " bradycardia.    Start Carvedilol 12.5 mg twice a day.    Continue Losartan 100 mg po daily, ASA, Plavix, Atorvastatin.    I will see patient back in clinic with lab tests and studies results in 2 months.    I thank you Dr. Hoskins for referring patient to our Cardiology Clinic today.        Brittney Hoskins M.D.  [address] [place] [address] [address] [id number]  VIA In Basket

## 2018-06-19 ENCOUNTER — TELEPHONE (OUTPATIENT)
Dept: MEDICAL GROUP | Age: 73
End: 2018-06-19

## 2018-06-19 NOTE — TELEPHONE ENCOUNTER
Future Appointments       Provider Department [place]    6/26/2018 2:20 PM Britt[facility] Hoskins M.D.; [facility] [place] [address] [address]    7/10/2018 9:00 AM Brittney Hoskins M.D. [place] [address] [address]    7/30/2018 11:00 AM Shirley Kothari M.D. [facility]     7/31/2018 10:30 AM Anastacia Saunders M.D. [facility]         ANNUAL WELLNESS VISIT PRE-VISIT PLANNING WITH OUTREACH    1.  Immunizations were updated in Epic using WebIZ?:Epic matches WebIZ       •  WebIZ Recommendations: Patient is up to date on all vaccines       •  Is patient due for Tdap? NO       •  Is patient due for Shingles?NO    2.  MDX printed for Provider? NO

## 2018-06-26 ENCOUNTER — OFFICE VISIT (OUTPATIENT)
Dept: MEDICAL GROUP | Age: 73
End: 2018-06-26
Payer: MEDICARE

## 2018-06-26 VITALS
OXYGEN SATURATION: 92 % | HEART RATE: 50 BPM | DIASTOLIC BLOOD PRESSURE: 76 MMHG | TEMPERATURE: 97.8 F | HEIGHT: 65 IN | SYSTOLIC BLOOD PRESSURE: 146 MMHG | WEIGHT: 203 LBS | BODY MASS INDEX: 33.82 KG/M2

## 2018-06-26 DIAGNOSIS — R73.01 ELEVATED FASTING GLUCOSE: ICD-10-CM

## 2018-06-26 DIAGNOSIS — K21.00 GASTROESOPHAGEAL REFLUX DISEASE WITH ESOPHAGITIS: ICD-10-CM

## 2018-06-26 DIAGNOSIS — Z00.00 MEDICARE ANNUAL WELLNESS VISIT, SUBSEQUENT: ICD-10-CM

## 2018-06-26 DIAGNOSIS — M20.5X2 OVERRIDING TOE OF LEFT FOOT: ICD-10-CM

## 2018-06-26 DIAGNOSIS — R35.0 INCREASED URINARY FREQUENCY: ICD-10-CM

## 2018-06-26 DIAGNOSIS — M17.10 PRIMARY LOCALIZED OSTEOARTHROSIS OF LOWER LEG, UNSPECIFIED LATERALITY: ICD-10-CM

## 2018-06-26 DIAGNOSIS — M75.111 INCOMPLETE TEAR OF RIGHT ROTATOR CUFF: ICD-10-CM

## 2018-06-26 DIAGNOSIS — E66.9 OBESITY (BMI 30.0-34.9): ICD-10-CM

## 2018-06-26 DIAGNOSIS — G47.33 OSA (OBSTRUCTIVE SLEEP APNEA): ICD-10-CM

## 2018-06-26 DIAGNOSIS — I49.5 SICK SINUS SYNDROME (HCC): ICD-10-CM

## 2018-06-26 DIAGNOSIS — I70.0 ATHEROSCLEROSIS OF AORTA (HCC): ICD-10-CM

## 2018-06-26 DIAGNOSIS — K76.89 LIVER CYST: ICD-10-CM

## 2018-06-26 DIAGNOSIS — E78.2 MIXED HYPERLIPIDEMIA: ICD-10-CM

## 2018-06-26 DIAGNOSIS — B07.8 VERRUCA PLANA: ICD-10-CM

## 2018-06-26 DIAGNOSIS — I10 ESSENTIAL HYPERTENSION, BENIGN: ICD-10-CM

## 2018-06-26 DIAGNOSIS — I25.10 CORONARY ARTERY DISEASE INVOLVING NATIVE CORONARY ARTERY OF NATIVE HEART WITHOUT ANGINA PECTORIS: ICD-10-CM

## 2018-06-26 DIAGNOSIS — E55.9 VITAMIN D DEFICIENCY DISEASE: ICD-10-CM

## 2018-06-26 PROBLEM — L60.3 ONYCHORRHEXIS: Status: RESOLVED | Noted: 2017-06-08 | Resolved: 2018-06-26

## 2018-06-26 PROBLEM — C44.310 BASAL CELL CARCINOMA OF SKIN OF FACE: Status: RESOLVED | Noted: 2018-04-21 | Resolved: 2018-06-26

## 2018-06-26 PROBLEM — R19.5 LOOSE STOOLS: Status: RESOLVED | Noted: 2018-05-15 | Resolved: 2018-06-26

## 2018-06-26 PROCEDURE — G0439 PPPS, SUBSEQ VISIT: HCPCS | Performed by: INTERNAL MEDICINE

## 2018-06-26 ASSESSMENT — ACTIVITIES OF DAILY LIVING (ADL): BATHING_REQUIRES_ASSISTANCE: 0

## 2018-06-26 ASSESSMENT — PATIENT HEALTH QUESTIONNAIRE - PHQ9: CLINICAL INTERPRETATION OF PHQ2 SCORE: 0

## 2018-06-26 ASSESSMENT — ENCOUNTER SYMPTOMS: GENERAL WELL-BEING: GOOD

## 2018-06-26 ASSESSMENT — PAIN SCALES - GENERAL: PAINLEVEL: 5=MODERATE PAIN

## 2018-06-26 NOTE — PROGRESS NOTES
Chief Complaint   Patient presents with   • Annual Wellness Visit     SCP         HPI:  Griselda is a 73 y.o. here for Medicare Annual Wellness Visit        Patient Active Problem List    Diagnosis Date Noted   • Sick sinus syndrome (HCC) 01/01/2017     Priority: High   • Atherosclerosis of aorta (HCC) 05/15/2018   • Increased urinary frequency 08/15/2017   • S/P coronary artery stent placement 06/08/2017   • Obesity (BMI 30.0-34.9) 12/07/2016   • Incomplete tear of right rotator cuff 09/19/2016   • Verruca plana 06/23/2015   • Overriding toe of left foot 01/20/2015   • Primary localized osteoarthrosis, lower leg 12/16/2014   • Elevated fasting glucose 07/16/2014   • Vitamin D deficiency disease 07/15/2014   • CAD (coronary artery disease) 03/29/2013   • GERD (gastroesophageal reflux disease) 03/06/2013   • Cough due to ACE inhibitor 06/26/2012   • Liver cyst 10/17/2011   • Mixed hyperlipidemia 10/20/2010   • Essential hypertension, benign 08/11/2010   • FRIDA (obstructive sleep apnea) 08/11/2010       Current Outpatient Prescriptions   Medication Sig Dispense Refill   • Diclofenac Sodium 1 % Gel Apply 2 gram on affected shoulder twice a day as needed. 1 Tube 3   • carvedilol (COREG) 12.5 MG Tab Take 1 Tab by mouth 2 times a day, with meals. 60 Tab 11   • lansoprazole (PREVACID) 30 MG CAPSULE DELAYED RELEASE Take 1 Cap by mouth every day. 90 Cap 3   • losartan (COZAAR) 100 MG Tab Take 1 Tab by mouth every day. 90 Tab 3   • atorvastatin (LIPITOR) 40 MG Tab Take 1 Tab by mouth every day. 90 Tab 3   • clopidogrel (PLAVIX) 75 MG Tab Take 1 Tab by mouth every day. 90 Tab 3   • nitroglycerin (NITROSTAT) 0.4 MG SL Tab Place 1 Tab under tongue as needed for Chest Pain (up to 3 doses (if SBP greater than 90 mmHg)). 25 Tab 3   • Pumpkin Seed-Soy Germ (AZO BLADDER CONTROL/GO-LESS PO) Take  by mouth.     • aspirin EC (ECOTRIN) 81 MG Tablet Delayed Response Take 81 mg by mouth every evening. 30 Tab 0   • Cholecalciferol (VITAMIN D)  2000 UNIT TABS Take 1 Tab by mouth every day.       No current facility-administered medications for this visit.         Patient is taking medications as noted in medication list.  Current supplements as per medication list.     Allergies: Codeine    Current social contact/activities: craft fairs with friend     Is patient current with immunizations? Yes.    She  reports that she quit smoking about 42 years ago. Her smoking use included Cigarettes. She has a 7.50 pack-year smoking history. She has never used smokeless tobacco. She reports that she does not drink alcohol or use drugs.  Counseling given: Yes        DPA/Advanced directive: Patient does not have an Advanced Directive.  A packet and workshop information was given on Advanced Directives.    ROS:    Gait: Uses no assistive device   Ostomy: no   Other tubes: no   Amputations: no   Chronic oxygen use no   Last eye exam 2017   Wears hearing aids: no   : Reports urinary leakage during the last 6 months that has not interfered at all with their daily activities or sleep.      Screening:        Depression Screening    Little interest or pleasure in doing things?  0 - not at all  Feeling down, depressed, or hopeless? 0 - not at all  Patient Health Questionnaire Score: 0    If depressive symptoms identified deferred to follow up visit unless specifically addressed in assessment and plan.    Interpretation of PHQ-9 Total Score   Score Severity   1-4 No Depression   5-9 Mild Depression   10-14 Moderate Depression   15-19 Moderately Severe Depression   20-27 Severe Depression    Screening for Cognitive Impairment    Three Minute Recall (leader, season, table)  1/3    Manuel clock face with all 12 numbers and set the hands to show 10 past 11.  Yes    If cognitive concerns identified, deferred for follow up unless specifically addressed in assessment and plan.    Fall Risk Assessment    Has the patient had two or more falls in the last year or any fall with injury in  the last year?  Yes  If fall risk identified, deferred for follow up unless specifically addressed in assessment and plan.    Safety Assessment    Throw rugs on floor.  Yes  Handrails on all stairs.  Yes  Good lighting in all hallways.  Yes  Difficulty hearing.  No  Patient counseled about all safety risks that were identified.    Functional Assessment ADLs    Are there any barriers preventing you from cooking for yourself or meeting nutritional needs?  No.    Are there any barriers preventing you from driving safely or obtaining transportation?  No.    Are there any barriers preventing you from using a telephone or calling for help?  No.    Are there any barriers preventing you from shopping?  No.    Are there any barriers preventing you from taking care of your own finances?  No.    Are there any barriers preventing you from managing your medications?  No.    Are there any barriers preventing you from showering, bathing or dressing yourself?  No.    Are you currently engaging in any exercise or physical activity?  Yes.  Walk twice weekly/chair yoga  What is your perception of your health?  Good.    Health Maintenance Summary                Annual Wellness Visit Overdue 1/28/2017      Done 1/28/2016 Visit Dx: Medicare annual wellness visit, subsequent    MAMMOGRAM Next Due 1/26/2020      Done 1/26/2018 MA-MAMMO SCREENING BILAT W/TOMOSYNTHESIS W/CAD     Patient has more history with this topic...    COLONOSCOPY Next Due 10/9/2021      Not specified 10/9/2011     BONE DENSITY Next Due 1/26/2023      Done 1/26/2018 DS-BONE DENSITY STUDY (DEXA)     Patient has more history with this topic...    IMM DTaP/Tdap/Td Vaccine Next Due 6/8/2027      Done 6/8/2017 Imm Admin: Tdap Vaccine          Patient Care Team:  Brittney Hoskins M.D. as PCP - General (Internal Medicine)  Cesilia Freeman M.D. as Consulting Physician (Oncology)  Anastacia Saunders M.D. as Consulting Physician (Cardiology)  Shirley Kothari M.D. as Consulting  "Physician (Sleep Medicine)    Social History   Substance Use Topics   • Smoking status: Former Smoker     Packs/day: 0.50     Years: 15.00     Types: Cigarettes     Quit date: 8/11/1975   • Smokeless tobacco: Never Used   • Alcohol use No     Family History   Problem Relation Age of Onset   • Diabetes Mother      mild   • Cancer Father      melanoma mild   • Arthritis Sister    • Arthritis Brother    • Arthritis Sister    • Arthritis Brother    • Heart Disease Neg Hx    • Hypertension Neg Hx      She  has a past medical history of Angina (2014); Arthritis; Dental disorder; Flushing reaction; GERD (gastroesophageal reflux disease); Heart burn (2014); Hyperlipidemia (10/20/2010); Hypertension (2014); Indigestion; Insomnia; Liver cyst; Myocardial infarct (HCC) (1984); Need for Tdap vaccination; FRIDA (obstructive sleep apnea); S/P colonoscopy (11/9/2012); Sick sinus syndrome (HCC) (1/1/2017); Symptomatic sinus bradycardia (1/1/2017); Unspecified urinary incontinence; and Urinary bladder disorder.   Past Surgical History:   Procedure Laterality Date   • CARDIAC CATH  1/19/17    Stents patent.   • CARDIAC CATH  1/1/17    Overlapping Synergy stents to 99% Circ   • OTHER CARDIAC SURGERY  January 2017    NON STEMI with stent   • KNEE ARTHROPLASTY TOTAL  12/16/2014    Performed by Jose G Trotter M.D. at SURGERY [facility] ORS   • CATARACT PHACO WITH IOL  10/3/2013    Performed by Sylvester Raza M.D. at SURGERY [facility] ORS   • CATARACT PHACO WITH IOL  9/19/2013    Performed by Sylvester Raza M.D. at SURGERY [facility] ORS   • RECOVERY  3/29/2013    Performed by Cath-Recovery Surgery at SURGERY SAME DAY [facility] ORS   • HYSTERECTOMY, VAGINAL      ovaries were left   • TONSILLECTOMY             Exam:     Blood pressure 146/76, pulse (!) 50, temperature 36.6 °C (97.8 °F), height 1.651 m (5' 5\"), weight 92.1 kg (203 lb), SpO2 92 %. Body mass index is 33.78 kg/m².    Hearing good.    Dentition good  Alert, oriented in " no acute distress.  Eye contact is good, speech goal directed, affect calm      Assessment and Plan. The following treatment and monitoring plan is recommended:    1. Medicare annual wellness visit, subsequent  Annual Wellness Visit - Includes PPPS Subsequent ()     2. Vitamin D deficiency disease  Annual Wellness Visit - Includes PPPS Subsequent ()    Stable with vitamin D 2000 units daily.  Continue vitamin D 2000 units daily.  Last vitamin D level was 30 on 4/6/18.     3. Mixed hyperlipidemia  Annual Wellness Visit - Includes PPPS Subsequent ()    Well-controlled. Continue atorvastatin 40 mg daily. Advised to eat low fat, low carbohydrate and high fiber diet as well as do cardio physical exercise regularly.     4. Gastroesophageal reflux disease with esophagitis  Annual Wellness Visit - Includes PPPS Subsequent ()    Well-controlled. Patient is taking lansoprazole 30 mg daily as needed. Advised to avoid eating acidic food, spicy food and fatty food and try to lose weight.  Reviewed the potential side effects of lansoprazole with patient as well.  Advised patient to take vitamin D vitamin B12 and magnesium supplement if she needs to take as a suppressor chronically and daily.     5. Verruca plana  Annual Wellness Visit - Includes PPPS Subsequent ()    Chronic and stable.  Symptomatic.  Continue to monitor.  No additional intervention is required.     6. Sick sinus syndrome (HCC)  Annual Wellness Visit - Includes PPPS Subsequent ()    Stable. Follow with cardiologist. Cardiologist switched metoprolol to carvedilol 12.5 mg twice daily.  Advised to monitor blood pressure and pulse at home.  Patient has low pulse in clinic today.  The first reading pulse was 48 and repeated reading was 50 bpm.  Patient is asymptomatic.  I advised patient to cut down carvedilol to half tablets twice a day if her heartbeat persistently below 50.  I also advised patient to follow with cardiologist to discuss  medication.  She has appointment with cardiologist, Dr. Saunders on 7/31/18.     7. Coronary artery disease involving native coronary artery of native heart without angina pectoris  Annual Wellness Visit - Includes PPPS Subsequent ()    Stable and well controlled. Continue aspirin 81 mg daily, Plavix 75 mg daily, Lipitor 40 mg daily, carvedilol 12.5 mg twice daily and losartan 100 mg daily as instructed by cardiologist.  Patient has follow up with cardiologist next month. Patient is advised to discuss with cardiologist if she still needs to continue to take both aspirin and Plavix together.     8. Essential hypertension, benign  Annual Wellness Visit - Includes PPPS Subsequent ()    Well-controlled. Continue carvedilol 12.5 mg twice daily and losartan 100 mg daily. Recommend to monitor blood pressure and heart rate at home.  Recommend to monitor blood pressure and heart rate at home.  Recommend to eat low-sodium diet.     9. Elevated fasting glucose  Annual Wellness Visit - Includes PPPS Subsequent ()    Not on medication. Continue to control with low carbohydrate diet and regular physical exercise.     10. Atherosclerosis of aorta (HCC)  Annual Wellness Visit - Includes PPPS Subsequent ()    Stable and well controlled. Continue aspirin, Plavix and Lipitor, carvedilol and losartan as instructed by cardiologist.  Advised healthy diet and physical exercise regularly.     11. Primary localized osteoarthrosis of lower leg, unspecified laterality  Annual Wellness Visit - Includes PPPS Subsequent ()    Asymptomatic currently.  Advised to do biking exercise and stretching exercise.  Advised to lose weight.     12. Overriding toe of left foot  Annual Wellness Visit - Includes PPPS Subsequent ()    Chronic. asymptomatic. Patient wears sock to separate the toes. Continue to monitor.     13. FRIDA (obstructive sleep apnea)  Annual Wellness Visit - Includes PPPS Subsequent ()    Chronic and stable.   Encouraged to compliance with CPAP machine. Patient has follow-up with sleep medicine next month.  Counseling for complication untreated sleep apnea.     14. Obesity (BMI 30.0-34.9)  Patient identified as having weight management issue.  Appropriate orders and counseling given.    Annual Wellness Visit - Includes PPPS Subsequent ()    Counseled for healthy diet and regular physical exercise to lose weight.        15. Liver cyst  Annual Wellness Visit - Includes PPPS Subsequent ()    Stable and asymptomatic. Patient denied pain or discomfort.  She does not want to do additional intervention or test.     16. Increased urinary frequency  Annual Wellness Visit - Includes PPPS Subsequent ()    Chronic and stable.  Patient is able to manage without pharmacological treatment.  Continue to monitor.     17. Incomplete tear of right rotator cuff  Annual Wellness Visit - Includes PPPS Subsequent ()    Diclofenac Sodium 1 % Gel    Chronic and has intermittent pain on right shoulder. She wants to try diclofenac gel. Prescribed diclofenac gel. Reviewed the use and potential side effects of diclofenac gel with patient.  Patient declined to refer to physical therapy.  She states that she is interested for acupuncture.  She wants to try diclofenac gel first.  If she does not improve with diclofenac gel, she will call to refer to acupuncture or physical therapy.  Patient was evaluated by orthopedic and had injection last year.  Patient is advised to follow with orthopedic if symptoms do not improve.  She agreed with the plan.         Services suggested: No services needed at this time  Health Care Screening recommendations as per orders if indicated.  Referrals offered: PT/OT/Nutrition counseling/Behavioral Health/Smoking cessation as per orders if indicated.    Discussion today about general wellness and lifestyle habits:    · Prevent falls and reduce trip hazards; Cautioned about securing or removing  rugs.  · Have a working fire alarm and carbon monoxide detector;   · Engage in regular physical activity and social activities       Follow-up: Return in about 6 months (around 12/26/2018), or if symptoms worsen or fail to improve, for Hypertension, Hyperlipidemia, Impaired fasting glucose, GERD, Vitamin D insufficiency, Lab review.

## 2018-07-30 ENCOUNTER — SLEEP CENTER VISIT (OUTPATIENT)
Dept: SLEEP MEDICINE | Facility: MEDICAL CENTER | Age: 73
End: 2018-07-30
Payer: MEDICARE

## 2018-07-30 VITALS
HEART RATE: 43 BPM | BODY MASS INDEX: 33.99 KG/M2 | WEIGHT: 204 LBS | SYSTOLIC BLOOD PRESSURE: 128 MMHG | OXYGEN SATURATION: 95 % | HEIGHT: 65 IN | RESPIRATION RATE: 15 BRPM | DIASTOLIC BLOOD PRESSURE: 82 MMHG

## 2018-07-30 DIAGNOSIS — G47.33 OSA (OBSTRUCTIVE SLEEP APNEA): ICD-10-CM

## 2018-07-30 DIAGNOSIS — I10 ESSENTIAL HYPERTENSION, BENIGN: ICD-10-CM

## 2018-07-30 DIAGNOSIS — E66.9 OBESITY (BMI 30.0-34.9): ICD-10-CM

## 2018-07-30 PROCEDURE — 99203 OFFICE O/P NEW LOW 30 MIN: CPT | Performed by: FAMILY MEDICINE

## 2018-07-30 NOTE — PROGRESS NOTES
"     [facility]  Consult Note     Date: 7/30/2018 / Time: 11:13 AM    Patient ID:   Name:             Griselda Farmer   YOB: 1945  Age:                 73 y.o.  female   MRN:               [id number]      Thank you for requesting a sleep medicine consultation on Griselda Farmer at the sleep center. She presents today with the chief complaints of snoring and daytime sleepiness. She is referred by  for evaluation and treatment of sleep disorder breathing . She was diagnosed with FRIDA long time ago and was on CPAP however not on PAP since the machine broke 2-3 years ago.    HISTORY OF PRESENT ILLNESS:       At night,  Griselda Farmer goes to bed around 9 pm on weekdays and on weekends. She gets out of bed at 4-5 am on weekdays and on weekends.She  averages 6 hrs of sleep on a good night and 3 hrs on a bad night. Pt has bad nights 1 nights per month. She falls asleep within 30 minutes. She awakens 2-3 times a night due to bathroom and no obvious reason. It takes her fe min to fall back asleep.      She is not aware of snoring/witnessed apneas/gasping or choking in sleep.  She  denies any symptoms of restless legs syndrome such as an \"urge to move\"  She  legs in the evening or bedtime. She  denies any symptoms of narcolepsy such as sleep paralysis or cataplexy, or any symptoms to suggest parasomnias such as sleep walking or acting out of dreams. She  has not used any medications for her sleep problem.    Overall,  She  doesnot finds her sleep refreshing. When She  wakes up in the morning, She does feel tired. In terms of  excessive daytime sleepiness,  She complains of sleepiness while  at work, while reading or watching TV . [place] sleepiness scale score is normal at  2/21.       REVIEW OF SYSTEMS:       Constitutional: Denies fevers, Denies weight changes  Eyes: Denies changes in vision, no eye pain  Ears/Nose/Throat/Mouth: Denies nasal congestion or sore throat "   Cardiovascular: Denies chest pain or palpitations   Respiratory: Denies shortness of breath , Denies cough  Gastrointestinal/Hepatic: Denies abdominal pain, nausea, vomiting, diarrhea, constipation or GI bleeding   Genitourinary: Denies bladder dysfunction, dysuria or frequency  Musculoskeletal/Rheum: Denies  joint pain and swelling   Skin/Breast: Denies rash, denies breast lumps or discharge  Neurological: Denies headache, confusion, memory loss or focal weakness/parasthesias  Psychiatric: denies mood disorder     Comprehensive review of systems form is reviewed with the patient and is attached in the EMR.     PMH:  has a past medical history of Angina (2014); Arthritis; Dental disorder; Flushing reaction; GERD (gastroesophageal reflux disease); Heart burn (2014); Hyperlipidemia (10/20/2010); Hypertension (2014); Indigestion; Influenza; Insomnia; Liver cyst; Myocardial infarct (HCC) (1984); Need for Tdap vaccination; FRIDA (obstructive sleep apnea); S/P colonoscopy (11/9/2012); Sick sinus syndrome (HCC) (1/1/2017); Sleep apnea; Symptomatic sinus bradycardia (1/1/2017); Unspecified urinary incontinence; and Urinary bladder disorder.  MEDS:   Current Outpatient Prescriptions:   •  carvedilol (COREG) 12.5 MG Tab, Take 1 Tab by mouth 2 times a day, with meals., Disp: 60 Tab, Rfl: 11  •  lansoprazole (PREVACID) 30 MG CAPSULE DELAYED RELEASE, Take 1 Cap by mouth every day., Disp: 90 Cap, Rfl: 3  •  losartan (COZAAR) 100 MG Tab, Take 1 Tab by mouth every day., Disp: 90 Tab, Rfl: 3  •  atorvastatin (LIPITOR) 40 MG Tab, Take 1 Tab by mouth every day., Disp: 90 Tab, Rfl: 3  •  clopidogrel (PLAVIX) 75 MG Tab, Take 1 Tab by mouth every day., Disp: 90 Tab, Rfl: 3  •  nitroglycerin (NITROSTAT) 0.4 MG SL Tab, Place 1 Tab under tongue as needed for Chest Pain (up to 3 doses (if SBP greater than 90 mmHg))., Disp: 25 Tab, Rfl: 3  •  Pumpkin Seed-Soy Germ (AZO BLADDER CONTROL/GO-LESS PO), Take  by mouth., Disp: , Rfl:   •  aspirin EC  "(ECOTRIN) 81 MG Tablet Delayed Response, Take 81 mg by mouth every evening., Disp: 30 Tab, Rfl: 0  •  Cholecalciferol (VITAMIN D) 2000 UNIT TABS, Take 1 Tab by mouth every day., Disp: , Rfl:   •  Diclofenac Sodium 1 % Gel, Apply 2 gram on affected shoulder twice a day as needed., Disp: 1 Tube, Rfl: 3  ALLERGIES:   Allergies   Allergen Reactions   • Codeine Itching and Vomiting     Rxn = years ago        SURGHX:   Past Surgical History:   Procedure Laterality Date   • CARDIAC CATH  1/19/17    Stents patent.   • CARDIAC CATH  1/1/17    Overlapping Synergy stents to 99% Circ   • OTHER CARDIAC SURGERY  January 2017    NON STEMI with stent   • KNEE ARTHROPLASTY TOTAL  12/16/2014    Performed by Jose G Trotter M.D. at SURGERY [facility] ORS   • CATARACT PHACO WITH IOL  10/3/2013    Performed by Sylvester Raza M.D. at SURGERY [facility] ORS   • CATARACT PHACO WITH IOL  9/19/2013    Performed by Sylvester Raza M.D. at SURGERY [facility] ORS   • RECOVERY  3/29/2013    Performed by Cath-Recovery Surgery at SURGERY SAME DAY [facility] ORS   • HYSTERECTOMY, VAGINAL      ovaries were left   • TONSILLECTOMY       SOCHX:  reports that she quit smoking about 42 years ago. Her smoking use included Cigarettes. She has a 7.50 pack-year smoking history. She has never used smokeless tobacco. She reports that she does not drink alcohol or use drugs..   FH:   Family History   Problem Relation Age of Onset   • Diabetes Mother         mild   • Cancer Father         melanoma mild   • Arthritis Sister    • Arthritis Brother    • Arthritis Sister    • Arthritis Brother    • Heart Disease Neg Hx    • Hypertension Neg Hx    • Sleep Apnea Neg Hx            Physical Exam:  Vitals/ General Appearance:   Weight/BMI: Body mass index is 33.95 kg/m².  Blood pressure 128/82, pulse (!) 43, resp. rate 15, height 1.651 m (5' 5\"), weight 92.5 kg (204 lb), SpO2 95 %.  Vitals:    07/30/18 1036   BP: 128/82   Pulse: (!) 43   Resp: 15   SpO2: 95% " "  Weight: 92.5 kg (204 lb)   Height: 1.651 m (5' 5\")       Pt. is alert and oriented to time, place and person. Cooperative and in no apparent distress.       1. Head: Atraumatic, normocephalic.   2. Ears: Normal tympanic membrane and no discharge  3. Nose: No inferior turbinate hypertophy  4. Throat: Oropharynx appears adequate in that the palate is overhanging (Malam Vonnie scale 2. Tonsils are not enlarged, uvula is not large, pharynx not inflamed. Tongue is not large. has intact dentition and oral hygiene is fair.  5. Neck: Supple. No thyromegaly  6. Chest: Trachea central  7. Lungs auscultation: B/L good air entry, vesicular breath sounds, no adventitious sounds  8. Heart auscultation: 1st and 2nd heart sounds normal, regular rhythm. No appreciable murmur.  9. Abdomen: Soft, non tender, no organomegaly. Bowel sounds present  10. Extremities:  no clubbing, no pedal edema.   Peripheral pulses felt.  11. Skin: No rash  12. NEUROLOGICAL EXAMINATION: On neurological exam, the patient was alert and oriented x3. speech was clear and fluent without dysarthria.        INVESTIGATIONS:       ASSESSMENT AND PLAN     1. She  has symptoms of Obstructive Sleep Apnea (FRIDA). She  has excessive daytime sleepiness that interferes with activites of daily living. She  risk factors for FRIDA include obesity, thick neck.The pathophysiology of FRIDA and the increased risk of cardiovascular morbidity from untreated FRIDA is discussed in detail with the patient. She  also has HTN, heart disease which can be worsened by her FRIDA.     We have discussed diagnostic options including in-laboratory, attended polysomnography and home sleep testing. We have also discussed treatment options including airway pressurization, reconstructive otolaryngologic surgery, dental appliances and weight management.       Subsequently,treatment options will be discussed based on the diagnostic study. Meanwhile, She is urged to avoid supine sleep, weight gain and " alcoholic beverages since all of these can worsen FRIDA. She is cautioned against drowsy driving. If She feels sleepy while driving, She must pull over for a break/nap, rather than persist on the road, in the interest of She own safety and that of others on the road.    Plan  -  She  will be scheduled for an overnight PSG to assess sleep related  breathing disorder.    2. Patient's body mass index is 33.95 kg/m². Exercise and nutrition counseling were performed at this visit.    - recommended healthy diet with portion control , aerobic exercise 5x week  - obesity counseling done today: Drink more water. Reduce carb intake in drinks. Try to eat 3 meals a day with last meal 4-6 hours prior to bedtime. Limit caloric intake to 1600 - 1800 kcal per day.Restrict carbohydrate intake to 50 g per day to 100 g per da      3. Regarding treatment of other past medical problems and general health maintenance,  She is urged to follow up with PCP.

## 2018-08-09 ENCOUNTER — SLEEP STUDY (OUTPATIENT)
Dept: SLEEP MEDICINE | Facility: MEDICAL CENTER | Age: 73
End: 2018-08-09
Attending: FAMILY MEDICINE
Payer: MEDICARE

## 2018-08-09 DIAGNOSIS — E66.9 OBESITY (BMI 30.0-34.9): ICD-10-CM

## 2018-08-09 DIAGNOSIS — I10 ESSENTIAL HYPERTENSION, BENIGN: ICD-10-CM

## 2018-08-09 DIAGNOSIS — G47.33 OSA (OBSTRUCTIVE SLEEP APNEA): ICD-10-CM

## 2018-08-09 PROCEDURE — 95811 POLYSOM 6/>YRS CPAP 4/> PARM: CPT | Performed by: INTERNAL MEDICINE

## 2018-08-10 NOTE — PROCEDURES
CLINICAL COMMENTS:  The patient underwent a split night polysomnogram with a CPAP titration using the standard montage for measurement of parameters of sleep, respiratory events, movement abnormalities, heart rate and rhythm. A microphone was used to monitor snoring.    INTERPRETATION: Testing began at 8:24:58pm.  The diagnostic recording time was 267.4 minutes with a sleep period of 248.7 minutes.  Total sleep time was 199.0 minutes with a sleep efficiency of 74.4%.  The sleep latency was 18.7 minutes, and REM latency was 89.5 minutes.  The patient had 74 arousals in total, for an arousal index of 22.3.        RESPIRATORY: The patient had 16 apneas in total.  Of these, 16 were obstructive apneas, and 0 were central apneas.  This resulted in an apnea index (AI) of 4.8.  The patient had 81 hypopneas in total, which resulted in a hypopnea index of 24.4.  The overall AHI was 29.2, while the AHI during REM was 50.3.  The supine AHI = 40.2.    OXIMETRY: Oxygen saturation monitoring showed a mean SpO2 of 88.8% for the diagnostic part of the study, with a minimum oxygen saturation of 77.0%.  Oxygen saturations were below 89% for 95.4% of sleep time.    CARDIAC: The highest heart rate for the first part of the study was 68.0 beats per minute.  The average heart rate during sleep was 46.5 bpm, while the highest heart rate was 59.0 bpm.    LIMB MOVEMENTS: There were a total of 48 periodic limb movements during sleep, of which 21 were PLMS arousals.  This resulted in a PLMS index of 14.5 and a PLMS arousal index of 6.3.    TREATMENT    Treatment recording time was 268.6 minutes with a total sleep time of 43.0min.  The patient had an arousal index of 74.0.      RESPIRATORY: The patient had 0 obstructive apneas, 42 central apneas, and 1 hypopneas for an overall AHI was 60.0.    OXIMETRY: The mean SpO2 during treatment was 89.7%, with a minimum oxygen saturation of 73.0%.      CPAP was tried from 5 to 9cmH2O.    On the baseline  component of the study sleep efficiency was 74%.  Sleep architecture showed a lack of stage III sleep.  Sleep latency was normal at 19 minutes.  No significant periodic limb movements were noted.  Heart rate was 45 bpm.    Once asleep the patient had frequent obstructive respiratory events with overall AHI 29 events per hour.  The respiratory events were comprised of 84% hypopneas, 17% obstructive apneas.  There were associated oxygen desaturations noted for 82% of the night, to a carlos enrique of 77%.    CPAP therapy was initiated however the patient had significant difficulty tolerating CPAP and was awake for hours.  CPAP was titrated up to 9 cm of water with persistent elevated AHI and hypoxia.    Impression:  Severe obstructive sleep apnea, AHI 29 events per hour, associated with significant hypoxia.  Incomplete CPAP titration.  Bradycardia.    Recommendations:   CPAP mask desensitization may be helpful in improving the patient's tolerance of CPAP.  A designated CPAP titration polysomnogram can be considered in the sleep laboratory versus a trial on AutoPap: 5-15 cm of water.  Weight loss advised secondary to BMI of 33.  Avoidance of alcohol, sedatives and muscle relaxants as these can exacerbate sleep apnea.

## 2018-09-14 ENCOUNTER — TELEPHONE (OUTPATIENT)
Dept: MEDICAL GROUP | Age: 73
End: 2018-09-14

## 2018-09-14 DIAGNOSIS — Z23 NEED FOR VACCINATION: ICD-10-CM

## 2018-09-17 ENCOUNTER — NON-PROVIDER VISIT (OUTPATIENT)
Dept: MEDICAL GROUP | Age: 73
End: 2018-09-17
Payer: MEDICARE

## 2018-09-17 DIAGNOSIS — Z23 NEED FOR VACCINATION: ICD-10-CM

## 2018-09-17 PROCEDURE — G0008 ADMIN INFLUENZA VIRUS VAC: HCPCS | Performed by: INTERNAL MEDICINE

## 2018-09-17 PROCEDURE — 90662 IIV NO PRSV INCREASED AG IM: CPT | Performed by: INTERNAL MEDICINE

## 2018-11-02 ENCOUNTER — SLEEP CENTER VISIT (OUTPATIENT)
Dept: SLEEP MEDICINE | Facility: MEDICAL CENTER | Age: 73
End: 2018-11-02
Payer: MEDICARE

## 2018-11-02 VITALS
WEIGHT: 206 LBS | HEIGHT: 65 IN | BODY MASS INDEX: 34.32 KG/M2 | HEART RATE: 46 BPM | SYSTOLIC BLOOD PRESSURE: 138 MMHG | DIASTOLIC BLOOD PRESSURE: 88 MMHG | RESPIRATION RATE: 15 BRPM | OXYGEN SATURATION: 93 %

## 2018-11-02 DIAGNOSIS — G47.31 COMPLEX SLEEP APNEA SYNDROME: ICD-10-CM

## 2018-11-02 PROCEDURE — 99213 OFFICE O/P EST LOW 20 MIN: CPT | Performed by: FAMILY MEDICINE

## 2018-11-02 NOTE — PROGRESS NOTES
[facility] Follow Up Note     Date: 11/2/2018 / Time: 8:08 AM    Patient ID:   Name:             Griselda Farmer   YOB: 1945  Age:                 73 y.o.  female   MRN:               [id number]      Thank you for requesting a sleep medicine consultation on Griselda Farmer at the sleep center. She presents today with the chief complaints of FRIDA and SS follow up.     HISTORY OF PRESENT ILLNESS:       Pt is currently not on PAP therapy. She goes to sleep around 9 pm and wakes up around 4-5 am. She is getting about 6 hrs of sleep on a good night and about 4 hr of sleep on a bad night. The bad nights are about 1 per week.The symptoms of excessive daytime, snoring and gasping has .     Severe obstructive sleep apnea, AHI 29 events per hour, associated with significant hypoxia. CPAP was tried 5-9 cm with incomplete titration. 97% of apneas were central apneas during the titration.       REVIEW OF SYSTEMS:       Constitutional: Denies fevers, Denies weight changes  Eyes: Denies changes in vision, no eye pain  Ears/Nose/Throat/Mouth: Denies nasal congestion or sore throat   Cardiovascular: Denies chest pain or palpitations   Respiratory: Denies shortness of breath , Denies cough  Gastrointestinal/Hepatic: Denies abdominal pain, nausea, vomiting, diarrhea, constipation or GI bleeding   Genitourinary: Denies bladder dysfunction, dysuria or frequency  Musculoskeletal/Rheum: Denies  joint pain and swelling   Psychiatric: denies mood disorder     Comprehensive review of systems form is reviewed with the patient and is attached in the EMR.     PMH:  has a past medical history of Angina (2014); Arthritis; Dental disorder; Flushing reaction; GERD (gastroesophageal reflux disease); Heart burn (2014); Hyperlipidemia (10/20/2010); Hypertension (2014); Indigestion; Influenza; Insomnia; Liver cyst; Myocardial infarct (HCC) (1984); Need for Tdap vaccination; FRIDA (obstructive sleep apnea); S/P  colonoscopy (11/9/2012); Sick sinus syndrome (HCC) (1/1/2017); Sleep apnea; Symptomatic sinus bradycardia (1/1/2017); Unspecified urinary incontinence; and Urinary bladder disorder.  MEDS:   Current Outpatient Prescriptions:   •  Diclofenac Sodium 1 % Gel, Apply 2 gram on affected shoulder twice a day as needed., Disp: 1 Tube, Rfl: 3  •  carvedilol (COREG) 12.5 MG Tab, Take 1 Tab by mouth 2 times a day, with meals., Disp: 60 Tab, Rfl: 11  •  lansoprazole (PREVACID) 30 MG CAPSULE DELAYED RELEASE, Take 1 Cap by mouth every day., Disp: 90 Cap, Rfl: 3  •  losartan (COZAAR) 100 MG Tab, Take 1 Tab by mouth every day., Disp: 90 Tab, Rfl: 3  •  atorvastatin (LIPITOR) 40 MG Tab, Take 1 Tab by mouth every day., Disp: 90 Tab, Rfl: 3  •  clopidogrel (PLAVIX) 75 MG Tab, Take 1 Tab by mouth every day., Disp: 90 Tab, Rfl: 3  •  nitroglycerin (NITROSTAT) 0.4 MG SL Tab, Place 1 Tab under tongue as needed for Chest Pain (up to 3 doses (if SBP greater than 90 mmHg))., Disp: 25 Tab, Rfl: 3  •  Pumpkin Seed-Soy Germ (AZO BLADDER CONTROL/GO-LESS PO), Take  by mouth., Disp: , Rfl:   •  aspirin EC (ECOTRIN) 81 MG Tablet Delayed Response, Take 81 mg by mouth every evening., Disp: 30 Tab, Rfl: 0  •  Cholecalciferol (VITAMIN D) 2000 UNIT TABS, Take 1 Tab by mouth every day., Disp: , Rfl:   ALLERGIES:   Allergies   Allergen Reactions   • Codeine Itching and Vomiting     Rxn = years ago        SURGHX:   Past Surgical History:   Procedure Laterality Date   • CARDIAC CATH  1/19/17    Stents patent.   • CARDIAC CATH  1/1/17    Overlapping Synergy stents to 99% Circ   • OTHER CARDIAC SURGERY  January 2017    NON STEMI with stent   • KNEE ARTHROPLASTY TOTAL  12/16/2014    Performed by Jose G Trotter M.D. at SURGERY [facility] ORS   • CATARACT PHACO WITH IOL  10/3/2013    Performed by Sylvester Raza M.D. at SURGERY [facility] ORS   • CATARACT PHACO WITH IOL  9/19/2013    Performed by Sylvester H Raza, M.D. at SURGERY [facility]   •  "RECOVERY  3/29/2013    Performed by [facility]Rec[facility] Surgery at SURGERY SAME DAY [facility] ORS   • HYSTERECTOMY, VAGINAL      ovaries were left   • TONSILLECTOMY       SOCHX:  reports that she quit smoking about 43 years ago. Her smoking use included Cigarettes. She has a 7.50 pack-year smoking history. She has never used smokeless tobacco. She reports that she does not drink alcohol or use drugs..  FH:   Family History   Problem Relation Age of Onset   • Diabetes Mother         mild   • Cancer Father         melanoma mild   • Arthritis Sister    • Arthritis Brother    • Arthritis Sister    • Arthritis Brother    • Heart Disease Neg Hx    • Hypertension Neg Hx    • Sleep Apnea Neg Hx          Physical Exam:  Vitals/ General Appearance:   Weight/BMI: Body mass index is 34.28 kg/m².  Blood pressure 138/88, pulse (!) 46, resp. rate 15, height 1.651 m (5' 5\"), weight 93.4 kg (206 lb), SpO2 93 %, not currently breastfeeding.  Vitals:    11/02/18 0801   BP: 138/88   BP Location: Right arm   Patient Position: Sitting   BP Cuff Size: Adult   Pulse: (!) 46   Resp: 15   SpO2: 93%   Weight: 93.4 kg (206 lb)   Height: 1.651 m (5' 5\")       Pt. is alert and oriented to time, place and person. Cooperative and in no apparent distress.       1. Head: Atraumatic, normocephalic.   2. Ears: Normal tympanic membrane and no discharge  3. Nose: No inferior turbinate hypertophy, no septal deviation, no polyp.   4. Throat: Oropharynx appears crowded in that the palate is overhanging    5. Neck: Supple. No thyromegaly  6. Chest: Trachea central, no spine deformity   7. Lungs auscultation: B/L good air entry, vesicular breath sounds, no adventitious sounds  8. Heart auscultation: 1st and 2nd heart sounds normal, regular rhythm. No appreciable murmur.  9.  Extremities: no clubbing, no pedal edema.  10 Skin: No rash    INVESTIGATIONS:           ASSESSMENT AND PLAN     1. Sleep Apnea (FRIDA)    The pathophysiology of sleep anea and the increased risk " of cardiovascular morbidity from untreated sleep apnea is discussed in detail with the patient. She  also has HTN, heart disease which can be worsened by her FRIDA.     She is urged to avoid supine sleep, weight gain and alcoholic beverages since all of these can worsen sleep apnea. She is cautioned against drowsy driving. If She feels sleepy while driving, She must pull over for a break/nap, rather than persist on the road, in the interest of She own safety and that of others on the road.   Plan   - After informed discussion BiPAP/ASV titration is ordered due to complex sleep apnea    - SS was reviewed and discussed with the pt   - compliance was reinforced     2.Regarding treatment of other past medical problems and general health maintenance,  She is urged to follow up with PCP.

## 2018-11-05 ENCOUNTER — TELEPHONE (OUTPATIENT)
Dept: CARDIOLOGY | Facility: MEDICAL CENTER | Age: 73
End: 2018-11-05

## 2018-11-05 DIAGNOSIS — I25.10 CORONARY ARTERY DISEASE INVOLVING NATIVE CORONARY ARTERY OF NATIVE HEART WITHOUT ANGINA PECTORIS: ICD-10-CM

## 2018-11-05 DIAGNOSIS — I10 HTN (HYPERTENSION), MALIGNANT: ICD-10-CM

## 2018-11-05 DIAGNOSIS — Z95.5 S/P CORONARY ARTERY STENT PLACEMENT: ICD-10-CM

## 2018-11-05 RX ORDER — CARVEDILOL 6.25 MG/1
6.25 TABLET ORAL 2 TIMES DAILY WITH MEALS
Qty: 60 TAB | Refills: 2 | Status: SHIPPED | OUTPATIENT
Start: 2018-11-05 | End: 2018-11-06

## 2018-11-05 NOTE — TELEPHONE ENCOUNTER
patient's heart rate has dropped   Received: Today   Message Contents   Ina Cheng R.N.             TT/Neela       Patient is concerned because her heart rate has dropped, and her appt isn't until next month. She can be reached at [phone].      Discussed with Dr. Merrill    Returned patient call. Pt states she is asymptomatic, except at night. We discuss CPAP therapy. She states she has another upcoming evaluation but pulmonology wants to make sure she is healthy enough for that and there is concern about her heart rate. Pt informed that we will cut her Coreg in half. Pharmacy confirmed and pt aware I will send over a new Rx for her. 12/10 f/u appt discussed. Pt will call back if med change intolerable. Pt appreciative.

## 2018-11-06 ENCOUNTER — HOSPITAL ENCOUNTER (EMERGENCY)
Facility: MEDICAL CENTER | Age: 73
End: 2018-11-06
Attending: EMERGENCY MEDICINE
Payer: MEDICARE

## 2018-11-06 ENCOUNTER — APPOINTMENT (OUTPATIENT)
Dept: RADIOLOGY | Facility: MEDICAL CENTER | Age: 73
End: 2018-11-06
Attending: EMERGENCY MEDICINE
Payer: MEDICARE

## 2018-11-06 ENCOUNTER — OFFICE VISIT (OUTPATIENT)
Dept: URGENT CARE | Facility: CLINIC | Age: 73
End: 2018-11-06
Payer: MEDICARE

## 2018-11-06 VITALS
DIASTOLIC BLOOD PRESSURE: 94 MMHG | HEART RATE: 58 BPM | SYSTOLIC BLOOD PRESSURE: 169 MMHG | WEIGHT: 206.13 LBS | TEMPERATURE: 97.2 F | RESPIRATION RATE: 18 BRPM | HEIGHT: 64 IN | BODY MASS INDEX: 35.19 KG/M2 | OXYGEN SATURATION: 92 %

## 2018-11-06 VITALS
HEART RATE: 52 BPM | RESPIRATION RATE: 18 BRPM | DIASTOLIC BLOOD PRESSURE: 80 MMHG | BODY MASS INDEX: 34.16 KG/M2 | OXYGEN SATURATION: 95 % | HEIGHT: 65 IN | SYSTOLIC BLOOD PRESSURE: 142 MMHG | TEMPERATURE: 97.6 F | WEIGHT: 205 LBS

## 2018-11-06 DIAGNOSIS — G47.30 SLEEP APNEA, UNSPECIFIED TYPE: ICD-10-CM

## 2018-11-06 DIAGNOSIS — R07.9 CHEST PAIN, UNSPECIFIED TYPE: ICD-10-CM

## 2018-11-06 DIAGNOSIS — R06.02 SHORTNESS OF BREATH: ICD-10-CM

## 2018-11-06 LAB
ALBUMIN SERPL BCP-MCNC: 4.3 G/DL (ref 3.2–4.9)
ALBUMIN/GLOB SERPL: 1.3 G/DL
ALP SERPL-CCNC: 97 U/L (ref 30–99)
ALT SERPL-CCNC: 22 U/L (ref 2–50)
ANION GAP SERPL CALC-SCNC: 7 MMOL/L (ref 0–11.9)
AST SERPL-CCNC: 19 U/L (ref 12–45)
BASOPHILS # BLD AUTO: 1 % (ref 0–1.8)
BASOPHILS # BLD: 0.1 K/UL (ref 0–0.12)
BILIRUB SERPL-MCNC: 0.6 MG/DL (ref 0.1–1.5)
BNP SERPL-MCNC: 96 PG/ML (ref 0–100)
BUN SERPL-MCNC: 14 MG/DL (ref 8–22)
CALCIUM SERPL-MCNC: 9.7 MG/DL (ref 8.4–10.2)
CHLORIDE SERPL-SCNC: 104 MMOL/L (ref 96–112)
CO2 SERPL-SCNC: 26 MMOL/L (ref 20–33)
CREAT SERPL-MCNC: 0.88 MG/DL (ref 0.5–1.4)
EKG IMPRESSION: NORMAL
EOSINOPHIL # BLD AUTO: 0.38 K/UL (ref 0–0.51)
EOSINOPHIL NFR BLD: 3.7 % (ref 0–6.9)
ERYTHROCYTE [DISTWIDTH] IN BLOOD BY AUTOMATED COUNT: 47.3 FL (ref 35.9–50)
GLOBULIN SER CALC-MCNC: 3.2 G/DL (ref 1.9–3.5)
GLUCOSE SERPL-MCNC: 97 MG/DL (ref 65–99)
HCT VFR BLD AUTO: 43.4 % (ref 37–47)
HGB BLD-MCNC: 14.3 G/DL (ref 12–16)
IMM GRANULOCYTES # BLD AUTO: 0.02 K/UL (ref 0–0.11)
IMM GRANULOCYTES NFR BLD AUTO: 0.2 % (ref 0–0.9)
LYMPHOCYTES # BLD AUTO: 1.95 K/UL (ref 1–4.8)
LYMPHOCYTES NFR BLD: 18.9 % (ref 22–41)
MCH RBC QN AUTO: 31 PG (ref 27–33)
MCHC RBC AUTO-ENTMCNC: 32.9 G/DL (ref 33.6–35)
MCV RBC AUTO: 93.9 FL (ref 81.4–97.8)
MONOCYTES # BLD AUTO: 0.79 K/UL (ref 0–0.85)
MONOCYTES NFR BLD AUTO: 7.6 % (ref 0–13.4)
NEUTROPHILS # BLD AUTO: 7.1 K/UL (ref 2–7.15)
NEUTROPHILS NFR BLD: 68.6 % (ref 44–72)
NRBC # BLD AUTO: 0 K/UL
NRBC BLD-RTO: 0 /100 WBC
PLATELET # BLD AUTO: 248 K/UL (ref 164–446)
PMV BLD AUTO: 11.1 FL (ref 9–12.9)
POTASSIUM SERPL-SCNC: 3.9 MMOL/L (ref 3.6–5.5)
PROT SERPL-MCNC: 7.5 G/DL (ref 6–8.2)
RBC # BLD AUTO: 4.62 M/UL (ref 4.2–5.4)
SODIUM SERPL-SCNC: 137 MMOL/L (ref 135–145)
TROPONIN I SERPL-MCNC: <0.02 NG/ML (ref 0–0.04)
WBC # BLD AUTO: 10.3 K/UL (ref 4.8–10.8)

## 2018-11-06 PROCEDURE — 99215 OFFICE O/P EST HI 40 MIN: CPT | Performed by: PHYSICIAN ASSISTANT

## 2018-11-06 PROCEDURE — 83880 ASSAY OF NATRIURETIC PEPTIDE: CPT

## 2018-11-06 PROCEDURE — 80053 COMPREHEN METABOLIC PANEL: CPT

## 2018-11-06 PROCEDURE — 71046 X-RAY EXAM CHEST 2 VIEWS: CPT

## 2018-11-06 PROCEDURE — 84484 ASSAY OF TROPONIN QUANT: CPT

## 2018-11-06 PROCEDURE — 93005 ELECTROCARDIOGRAM TRACING: CPT | Performed by: EMERGENCY MEDICINE

## 2018-11-06 PROCEDURE — 99285 EMERGENCY DEPT VISIT HI MDM: CPT

## 2018-11-06 PROCEDURE — 36415 COLL VENOUS BLD VENIPUNCTURE: CPT

## 2018-11-06 PROCEDURE — 85025 COMPLETE CBC W/AUTO DIFF WBC: CPT

## 2018-11-06 RX ORDER — CARVEDILOL 3.12 MG/1
3.12 TABLET ORAL 2 TIMES DAILY WITH MEALS
Status: SHIPPED | COMMUNITY
End: 2018-12-10

## 2018-11-06 RX ORDER — SENNOSIDES 8.6 MG
650-1300 CAPSULE ORAL EVERY 6 HOURS PRN
COMMUNITY

## 2018-11-06 ASSESSMENT — ENCOUNTER SYMPTOMS
HEADACHES: 0
HEARTBURN: 0
PALPITATIONS: 0
EYE PAIN: 0
DIZZINESS: 0
CONSTIPATION: 0
SORE THROAT: 0
COUGH: 0
DIARRHEA: 0
NAUSEA: 0
CHILLS: 0
FEVER: 0
ABDOMINAL PAIN: 0
BLURRED VISION: 0
MYALGIAS: 0
TINGLING: 0
SHORTNESS OF BREATH: 1
VOMITING: 0

## 2018-11-06 ASSESSMENT — PAIN SCALES - GENERAL: PAINLEVEL_OUTOF10: 5

## 2018-11-06 NOTE — ED NOTES
Pt states her chest pain is mostly resolved at this time. She has been having this chest pressure on and off for a month, and the pain usually goes away but this am it did not go away until she was here in the ER.

## 2018-11-06 NOTE — PROGRESS NOTES
Subjective:      CC: Griselda Farmer is a 73 y.o. female who presents with Chest Pain (Over a week chest tightness comes and goes .)      HPI:  This is a new problem. Griselda Farmer is a 73 y.o. female who presents for evaluation of chest tightness.  Patient states that she has had this problem intermittently for the past 3-4 days when she wakes up in the morning.  She says it typically resolves within an hour without any intervention.  She says this morning, however, it has not resolved and so she came in for evaluation.  The pain does not radiate.  She feels the pain most near the center of her chest underneath her left breast.  She said she had 2 stents placed in December 2017 and that her pain feels similar to when she went in for this problem.  She has some mild shortness of breath in association with this.  She says she has not tried anything for her symptoms.  She also states that she also has a low heart rate and just yesterday her cardiologist sent in a new prescription for her Coreg, changing the dose.  She has not started this new dose yet.  She denies fever/chills, lightheadedness/dizziness, abdominal pain, or N/V/D/C.          Review of Systems   Constitutional: Negative for chills, fever and malaise/fatigue.   HENT: Negative for congestion, ear pain and sore throat.    Eyes: Negative for blurred vision and pain.   Respiratory: Positive for shortness of breath. Negative for cough.    Cardiovascular: Positive for chest pain. Negative for palpitations.   Gastrointestinal: Negative for abdominal pain, constipation, diarrhea, heartburn, nausea and vomiting.   Genitourinary: Negative for dysuria.   Musculoskeletal: Negative for myalgias.   Skin: Negative for rash.   Neurological: Negative for dizziness, tingling and headaches.       PMH:  has a past medical history of Angina (2014); Arthritis; Dental disorder; Flushing reaction; GERD (gastroesophageal reflux disease); Heart burn (2014);  Hyperlipidemia (10/20/2010); Hypertension (2014); Indigestion; Influenza; Insomnia; Liver cyst; Myocardial infarct (HCC) (1984); Need for Tdap vaccination; FRIDA (obstructive sleep apnea); S/P colonoscopy (11/9/2012); Sick sinus syndrome (HCC) (1/1/2017); Sleep apnea; Symptomatic sinus bradycardia (1/1/2017); Unspecified urinary incontinence; and Urinary bladder disorder.  MEDS:   Current Outpatient Prescriptions:   •  carvedilol (COREG) 6.25 MG Tab, Take 1 Tab by mouth 2 times a day, with meals., Disp: 60 Tab, Rfl: 2  •  Diclofenac Sodium 1 % Gel, Apply 2 gram on affected shoulder twice a day as needed., Disp: 1 Tube, Rfl: 3  •  lansoprazole (PREVACID) 30 MG CAPSULE DELAYED RELEASE, Take 1 Cap by mouth every day., Disp: 90 Cap, Rfl: 3  •  losartan (COZAAR) 100 MG Tab, Take 1 Tab by mouth every day., Disp: 90 Tab, Rfl: 3  •  atorvastatin (LIPITOR) 40 MG Tab, Take 1 Tab by mouth every day., Disp: 90 Tab, Rfl: 3  •  clopidogrel (PLAVIX) 75 MG Tab, Take 1 Tab by mouth every day., Disp: 90 Tab, Rfl: 3  •  nitroglycerin (NITROSTAT) 0.4 MG SL Tab, Place 1 Tab under tongue as needed for Chest Pain (up to 3 doses (if SBP greater than 90 mmHg))., Disp: 25 Tab, Rfl: 3  •  Pumpkin Seed-Soy Germ (AZO BLADDER CONTROL/GO-LESS PO), Take  by mouth., Disp: , Rfl:   •  aspirin EC (ECOTRIN) 81 MG Tablet Delayed Response, Take 81 mg by mouth every evening., Disp: 30 Tab, Rfl: 0  •  Cholecalciferol (VITAMIN D) 2000 UNIT TABS, Take 1 Tab by mouth every day., Disp: , Rfl:   ALLERGIES:   Allergies   Allergen Reactions   • Codeine Itching and Vomiting     Rxn = years ago        SURGHX:   Past Surgical History:   Procedure Laterality Date   • CARDIAC CATH  1/19/17    Stents patent.   • CARDIAC CATH  1/1/17    Overlapping Synergy stents to 99% Circ   • OTHER CARDIAC SURGERY  January 2017    NON STEMI with stent   • KNEE ARTHROPLASTY TOTAL  12/16/2014    Performed by Jose G Trotter M.D. at [facility]   • CATARACT PHACO WITH IOL   "10/3/2013    Performed by Sylvester Raza M.D. at SURGERY SUR[facility] ORS   • CATARACT PHACO WITH IOL  9/19/2013    Performed by Sylvester Raza M.D. at SURGERY SURGICAL [facility] ORS   • RECOVERY  3/29/2013    Performed by [facility] at [facility] SAME DAY [facility] ORS   • HYSTERECTOMY, VAGINAL      ovaries were left   • TONSILLECTOMY       SOCHX:  reports that she quit smoking about 43 years ago. Her smoking use included Cigarettes. She has a 7.50 pack-year smoking history. She has never used smokeless tobacco. She reports that she does not drink alcohol or use drugs.  FH: Family history was reviewed, no pertinent findings to report       Objective:     /80 (BP Location: Right arm, Patient Position: Sitting, BP Cuff Size: Large adult)   Pulse (!) 52   Temp 36.4 °C (97.6 °F) (Temporal)   Resp 18   Ht 1.651 m (5' 5\")   Wt 93 kg (205 lb)   SpO2 95%   BMI 34.11 kg/m²        Physical Exam   Constitutional: She is oriented to person, place, and time. She appears well-developed and well-nourished.   HENT:   Head: Normocephalic and atraumatic.   Right Ear: Tympanic membrane, external ear and ear canal normal.   Left Ear: Tympanic membrane, external ear and ear canal normal.   Nose: Nose normal.   Mouth/Throat: Uvula is midline, oropharynx is clear and moist and mucous membranes are normal.   Eyes: Pupils are equal, round, and reactive to light. Conjunctivae are normal.   Neck: Normal range of motion.   Cardiovascular: Regular rhythm and normal heart sounds.  Bradycardia present.    No murmur heard.  Pulmonary/Chest: Effort normal and breath sounds normal. No respiratory distress. She has no decreased breath sounds. She has no wheezes. She has no rhonchi. She has no rales.   Abdominal: Soft. Normal appearance. There is no tenderness.   Lymphadenopathy:     She has no cervical adenopathy.   Neurological: She is alert and oriented to person, place, and time. GCS eye subscore is 4. GCS verbal subscore is 5. " GCS motor subscore is 6.   Skin: Skin is warm and dry. Capillary refill takes less than 2 seconds.   Psychiatric: She has a normal mood and affect. Her behavior is normal. Judgment normal.   Vitals reviewed.      EKG Interpretation-HR is 50, nonspecific ST and T waves changes (anterior leads), sinus bradycardia, abnormal R-wave progression       Assessment/Plan:     1. Chest pain, unspecified type  Advised patient that due to her symptoms and medical history combined with her EKG changes but I think it be prudent for her to go to the emergency room for further evaluation.  She agrees.  Her son will drive her in his private vehicle.  She is stable at time of discharge from the urgent care.    *Report called into Dr. Manzano at [facility]

## 2018-11-06 NOTE — ED TRIAGE NOTES
Chest pain started yesterday  Worse with deep breath and when lying down feels sob  Hx of cardiac stents

## 2018-11-06 NOTE — ED PROVIDER NOTES
ED Provider Note  CHIEF COMPLAINT  Chief Complaint   Patient presents with   • Chest Pain     central chest pain  hx of cardiac stents and sleep apnea and gerd       HPI  Griselda Farmer is a 73 y.o. female who presents to the emergency department slight shortness of breath.  She states that she wakes up at night with slight shortness of breath and chest pressure but no chest pain.  Denies any pain with ambulation states it actually feels better when she is out walking only when she is lying down at night.  She was diagnosed with severe sleep apnea and is currently waiting for a CPAP machine and further evaluation.  REVIEW OF SYSTEMS  Positives as above. Pertinent negatives include fever, cough, nausea, vomiting, chest pain, abdominal pain, lightheaded, dizziness  All other review of systems are negative    PAST MEDICAL HISTORY  Past Medical History:   Diagnosis Date   • Angina 2014    pt denies    • Arthritis     generalized + hands   • Dental disorder     upper and lower dentures   • Flushing reaction     has had full work up with cardiology, would awake with symptoms at night   • GERD (gastroesophageal reflux disease)    • Heart burn 2014    pt on prevacid   • Hyperlipidemia 10/20/2010   • Hypertension 2014    pt states well controlled on meds   • Indigestion    • Influenza    • Insomnia    • Liver cyst     has been seen by GI, ultrasound 9/12   • Myocardial infarct (HCC) 1984    pt denies states sometimes irreg rhythm   • Need for Tdap vaccination     in 2012   • FRIDA (obstructive sleep apnea)     states no cpap   • S/P colonoscopy 11/9/2012   • Sick sinus syndrome (HCC) 1/1/2017   • Sleep apnea    • Symptomatic sinus bradycardia 1/1/2017   • Unspecified urinary incontinence    • Urinary bladder disorder        FAMILY HISTORY  Noncontributory    SOCIAL HISTORY  Social History     Social History   • Marital status:      Spouse name: N/A   • Number of children: N/A   • Years of education: N/A      Occupational History   • retired- human resources  for [employer]     Social History Main Topics   • Smoking status: Former Smoker     Packs/day: 0.50     Years: 15.00     Types: Cigarettes     Quit date: 8/11/1975   • Smokeless tobacco: Never Used   • Alcohol use No   • Drug use: No   • Sexual activity: Not Currently     Partners: Male     Other Topics Concern   • Not on file     Social History Narrative   • No narrative on file       SURGICAL HISTORY  Past Surgical History:   Procedure Laterality Date   • CARDIAC CATH  1/19/17    Stents patent.   • CARDIAC CATH  1/1/17    Overlapping Synergy stents to 99% Circ   • OTHER CARDIAC SURGERY  January 2017    NON STEMI with stent   • KNEE ARTHROPLASTY TOTAL  12/16/2014    Performed by Jose G Trotter M.D. at SURGERY [facility] ORS   • CATARACT PHACO WITH IOL  10/3/2013    Performed by Sylvester Raza M.D. at SURGERY [facility] ORS   • CATARACT PHACO WITH IOL  9/19/2013    Performed by Sylvester Raza M.D. at SURGERY [facility] ORS   • RECOVERY  3/29/2013    Performed by Cath-Recovery Surgery at SURGERY SAME DAY [facility] ORS   • HYSTERECTOMY, VAGINAL      ovaries were left   • TONSILLECTOMY         CURRENT MEDICATIONS  Home Medications     Reviewed by Nilesh Mobley (Pharmacy Tech) on 11/06/18 at 1202  Med List Status: Complete   Medication Last Dose Status   acetaminophen (TYLENOL 8 HOUR) 650 MG CR tablet 11/4/2018 Active   aspirin EC (ECOTRIN) 81 MG Tablet Delayed Response 11/5/2018 Active   atorvastatin (LIPITOR) 40 MG Tab 11/6/2018 Active   carvedilol (COREG) 3.125 MG Tab 11/6/2018 Active   Cholecalciferol (VITAMIN D) 2000 UNIT TABS 11/5/2018 Active   clopidogrel (PLAVIX) 75 MG Tab 11/6/2018 Active   Diclofenac Sodium 1 % Gel 11/5/2018 Active   lansoprazole (PREVACID) 30 MG CAPSULE DELAYED RELEASE 11/6/2018 Active   losartan (COZAAR) 100 MG Tab 11/6/2018 Active   nitroglycerin (NITROSTAT) 0.4 MG SL Tab 1/2017 Active   Pumpkin  "Seed-Soy Germ (AZO BLADDER CONTROL/GO-LESS PO) 11/5/2018 Active                ALLERGIES  Allergies   Allergen Reactions   • Codeine Itching and Vomiting     Rxn = years ago          PHYSICAL EXAM  VITAL SIGNS: BP (!) 169/94   Pulse (!) 54   Temp 36.2 °C (97.2 °F)   Resp 20   Ht 1.626 m (5' 4\")   Wt 93.5 kg (206 lb 2.1 oz)   SpO2 96%   BMI 35.38 kg/m²      Constitutional: Well developed, Well nourished, No acute distress, Non-toxic appearance.   Eyes: PERRLA, EOMI, Conjunctiva normal, No discharge.   Cardiovascular: Normal heart rate, Normal rhythm, No murmurs, No rubs, No gallops, and intact distal pulses.   Thorax & Lungs:  No respiratory distress and is speaking in full sentences, no rales, no rhonchi, No wheezing, No chest wall tenderness.   Abdomen: Bowel sounds normal, Soft, No tenderness, No guarding, No rebound, No pulsatile masses.   Skin: Warm, Dry, No erythema, No rash.   Extremities: Full range of motion, no deformity, no pedal edema.  Neurologic: Alert & oriented x 3, No focal deficits noted, acting appropriately on exam.  Psychiatric: Affect normal for clinical presentation.      RADIOLOGY/PROCEDURES  DX-CHEST-2 VIEWS   Final Result         1. No active cardiopulmonary abnormalities are identified.        Results for orders placed or performed during the hospital encounter of 11/06/18   CBC w/ Differential   Result Value Ref Range    WBC 10.3 4.8 - 10.8 K/uL    RBC 4.62 4.20 - 5.40 M/uL    Hemoglobin 14.3 12.0 - 16.0 g/dL    Hematocrit 43.4 37.0 - 47.0 %    MCV 93.9 81.4 - 97.8 fL    MCH 31.0 27.0 - 33.0 pg    MCHC 32.9 (L) 33.6 - 35.0 g/dL    RDW 47.3 35.9 - 50.0 fL    Platelet Count 248 164 - 446 K/uL    MPV 11.1 9.0 - 12.9 fL    Neutrophils-Polys 68.60 44.00 - 72.00 %    Lymphocytes 18.90 (L) 22.00 - 41.00 %    Monocytes 7.60 0.00 - 13.40 %    Eosinophils 3.70 0.00 - 6.90 %    Basophils 1.00 0.00 - 1.80 %    Immature Granulocytes 0.20 0.00 - 0.90 %    Nucleated RBC 0.00 /100 WBC    " Neutrophils (Absolute) 7.10 2.00 - 7.15 K/uL    Lymphs (Absolute) 1.95 1.00 - 4.80 K/uL    Monos (Absolute) 0.79 0.00 - 0.85 K/uL    Eos (Absolute) 0.38 0.00 - 0.51 K/uL    Baso (Absolute) 0.10 0.00 - 0.12 K/uL    Immature Granulocytes (abs) 0.02 0.00 - 0.11 K/uL    NRBC (Absolute) 0.00 K/uL   Complete Metabolic Panel (CMP)   Result Value Ref Range    Sodium 137 135 - 145 mmol/L    Potassium 3.9 3.6 - 5.5 mmol/L    Chloride 104 96 - 112 mmol/L    Co2 26 20 - 33 mmol/L    Anion Gap 7.0 0.0 - 11.9    Glucose 97 65 - 99 mg/dL    Bun 14 8 - 22 mg/dL    Creatinine 0.88 0.50 - 1.40 mg/dL    Calcium 9.7 8.4 - 10.2 mg/dL    AST(SGOT) 19 12 - 45 U/L    ALT(SGPT) 22 2 - 50 U/L    Alkaline Phosphatase 97 30 - 99 U/L    Total Bilirubin 0.6 0.1 - 1.5 mg/dL    Albumin 4.3 3.2 - 4.9 g/dL    Total Protein 7.5 6.0 - 8.2 g/dL    Globulin 3.2 1.9 - 3.5 g/dL    A-G Ratio 1.3 g/dL   Troponin STAT   Result Value Ref Range    Troponin I <0.02 0.00 - 0.04 ng/mL   Btype Natriuretic Peptide   Result Value Ref Range    B Natriuretic Peptide 96 0 - 100 pg/mL   ESTIMATED GFR   Result Value Ref Range    GFR If African American >60 >60 mL/min/1.73 m 2    GFR If Non African American >60 >60 mL/min/1.73 m 2   EKG   Result Value Ref Range    Report       [facility] Emergency Dept.    Test Date:  2018  Pt Name:    ONESMIO DURAN                Department: [facility]  MRN:        [id number]                      Room:       [facility]  Gender:     Female                       Technician: JARED  :        1945                   Requested By:FAIZAN PRATER  Order #:    [id number]                    Lisa MD: FAIZAN PRATER, DO    Measurements  Intervals                                Axis  Rate:       46                           P:          52  NE:         188                          QRS:        -42  QRSD:       90                           T:          29  QT:         424  QTc:        371    Interpretive  Statements  SINUS BRADYCARDIA  LEFT ANTERIOR FASCICULAR BLOCK  CONSIDER ANTERIOR INFARCT  BORDERLINE T ABNORMALITIES, ANTERIOR LEADS  Compared to ECG 07/10/2017 16:20:33  T-wave abnormality now present  Sinus rhythm no longer present  Myocardial infarct finding still present    Electronically Sue d On 11-6-2018 11:56:12 PST by ANSON VEGA DO           COURSE & MEDICAL DECISION MAKING  Pertinent Labs & Imaging studies reviewed. (See chart for details)  This is a pleasant 73-year-old female presents with shortness of breath at night while lying down sleep apnea and has not been given treatment options.  Here in the emergency department, she denies any chest pain, she states that she has had no pain throughout the day and has only the symptoms at night while lying down it wakes her up from sleep.  I do believe the patient will benefit from pulmonology consultation.  I do not believe she is experienced acute coronary syndrome with a normal EKG, negative troponin, negative BNP excluding heart failure.  I do not suspect pulmonary embolism, aortic dissection, aortic aneurysm.  Her saturation of oxygen is currently 96 percentile on room air.  The patient examined without difficulty no evidence of shortness of breath here.  She will be following with the primary care physician as well as pulmonology for further evaluation and management.    New Prescriptions    No medications on file       FINAL IMPRESSION  Shortness of breath  Sleep apnea exacerbation        DISPOSITION:  Patient will be discharged home in stable condition.    FOLLOW UP:  [facility], Emergency Dept  [address]  [place] [address]  [phone]    If symptoms worsen    [address] [address]  [phone]    Schedule an appointment as soon as possible for a visit       Electronically signed by: Anson Vega, 11/6/2018 11:50 AM

## 2018-11-06 NOTE — ED NOTES
Assisted with pt care. Pt D/C to home. IV removed. D/C instructions given. Pt v/u. Pt leaves ED with no acute changes, complaints or concerns.

## 2018-11-06 NOTE — ED NOTES
ERP at bedside. Aware pt HR has dropped down in to high 40's and BP is elevated. Ok to discharge.

## 2018-11-07 ENCOUNTER — PATIENT OUTREACH (OUTPATIENT)
Dept: HEALTH INFORMATION MANAGEMENT | Facility: OTHER | Age: 73
End: 2018-11-07

## 2018-12-10 ENCOUNTER — OFFICE VISIT (OUTPATIENT)
Dept: CARDIOLOGY | Facility: MEDICAL CENTER | Age: 73
End: 2018-12-10
Payer: MEDICARE

## 2018-12-10 VITALS
DIASTOLIC BLOOD PRESSURE: 92 MMHG | HEIGHT: 64 IN | WEIGHT: 206 LBS | OXYGEN SATURATION: 96 % | BODY MASS INDEX: 35.17 KG/M2 | HEART RATE: 68 BPM | SYSTOLIC BLOOD PRESSURE: 144 MMHG

## 2018-12-10 DIAGNOSIS — R06.09 DYSPNEA ON EXERTION: ICD-10-CM

## 2018-12-10 DIAGNOSIS — Z95.5 S/P CORONARY ARTERY STENT PLACEMENT: ICD-10-CM

## 2018-12-10 DIAGNOSIS — Z79.899 HIGH RISK MEDICATION USE: ICD-10-CM

## 2018-12-10 DIAGNOSIS — E66.9 OBESITY (BMI 30-39.9): ICD-10-CM

## 2018-12-10 DIAGNOSIS — I25.10 CORONARY ARTERY DISEASE INVOLVING NATIVE CORONARY ARTERY OF NATIVE HEART WITHOUT ANGINA PECTORIS: ICD-10-CM

## 2018-12-10 DIAGNOSIS — G47.33 OSA (OBSTRUCTIVE SLEEP APNEA): ICD-10-CM

## 2018-12-10 DIAGNOSIS — E78.2 MIXED HYPERLIPIDEMIA: ICD-10-CM

## 2018-12-10 DIAGNOSIS — I10 HTN (HYPERTENSION), MALIGNANT: ICD-10-CM

## 2018-12-10 PROCEDURE — 99215 OFFICE O/P EST HI 40 MIN: CPT | Performed by: INTERNAL MEDICINE

## 2018-12-10 RX ORDER — METOPROLOL SUCCINATE 50 MG/1
50 TABLET, EXTENDED RELEASE ORAL DAILY
Qty: 30 TAB | Refills: 11 | Status: SHIPPED | OUTPATIENT
Start: 2018-12-10 | End: 2019-07-08

## 2018-12-10 RX ORDER — ATORVASTATIN CALCIUM 40 MG/1
40 TABLET, FILM COATED ORAL DAILY
Qty: 90 TAB | Refills: 3 | Status: ON HOLD | OUTPATIENT
Start: 2018-12-10 | End: 2019-03-18

## 2018-12-10 RX ORDER — LOSARTAN POTASSIUM 100 MG/1
100 TABLET ORAL DAILY
Qty: 90 TAB | Refills: 3 | Status: SHIPPED | OUTPATIENT
Start: 2018-12-10 | End: 2019-05-08 | Stop reason: CLARIF

## 2018-12-10 RX ORDER — CLOPIDOGREL BISULFATE 75 MG/1
75 TABLET ORAL DAILY
Qty: 90 TAB | Refills: 3 | Status: SHIPPED | OUTPATIENT
Start: 2018-12-10 | End: 2019-09-23 | Stop reason: SDUPTHER

## 2018-12-10 ASSESSMENT — ENCOUNTER SYMPTOMS
DEPRESSION: 0
BRUISES/BLEEDS EASILY: 0
DIAPHORESIS: 0
FALLS: 0
ABDOMINAL PAIN: 0
HEADACHES: 0
BLURRED VISION: 0
MEMORY LOSS: 0
SENSORY CHANGE: 0
DIZZINESS: 0
FEVER: 0
PALPITATIONS: 0
SHORTNESS OF BREATH: 1
DOUBLE VISION: 0
COUGH: 0
MYALGIAS: 0

## 2018-12-10 NOTE — LETTER
[facility]   [address], [address]  [place] [address]  Phone: [phone]  Fax: [phone]              Griselda Farmer  1945    Encounter Date: 12/10/2018    Anastacia Saunders M.D.          PROGRESS NOTE:  Chief Complaint   Patient presents with   • HTN (Controlled)     FV       Subjective:   Griselda Farmer is a 73 y.o. female who presents today for cardiac care due to prior CAD with 2 stents placed in Lx artery, HTN, HLP. In the past patient had repeated ER visits because of chest pain. She even had a repeated cardiac catheterization which did not show any thing wrong with her stents. She came in to the hospital again in July 2017 because of chest pain. She was discharged under stable condition. Her left ventricular systolic function was found to be 50%.     At prior visit, patient was able to complete 344 m during his 6 minute walk test. her O2 saturation at baseline was 91% and at the end of the test, the O2 saturation was 94%. she reported 1 level of dyspnea on Chuckie scale.     +atypical chest pain at night when sleeping. She has FRIDA but not using CPAP, waiting for studies.      In the interim, patient has been doing well without having any symptoms. Patient denies having chest pain, dyspnea, palpitation, presyncope, syncope episodes. Able to climb up at least 2 flights of stairs.     I have independently reviewed blood tests results with patient in clinic which shows normal LDL level, triglycerides, renal and liver function.    Past Medical History:   Diagnosis Date   • Angina 2014    pt denies    • Arthritis     generalized + hands   • Dental disorder     upper and lower dentures   • Flushing reaction     has had full work up with cardiology, would awake with symptoms at night   • GERD (gastroesophageal reflux disease)    • Heart burn 2014    pt on prevacid   • Hyperlipidemia 10/20/2010   • Hypertension 2014    pt states well controlled  on meds   • Indigestion    • Influenza    • Insomnia    • Liver cyst     has been seen by GI, ultrasound 9/12   • Myocardial infarct (HCC) 1984    pt denies states sometimes irreg rhythm   • Need for Tdap vaccination     in 2012   • FRIDA (obstructive sleep apnea)     states no cpap   • S/P colonoscopy 11/9/2012   • Sick sinus syndrome (HCC) 1/1/2017   • Sleep apnea    • Symptomatic sinus bradycardia 1/1/2017   • Unspecified urinary incontinence    • Urinary bladder disorder      Past Surgical History:   Procedure Laterality Date   • CARDIAC CATH  1/19/17    Stents patent.   • CARDIAC CATH  1/1/17    Overlapping Synergy stents to 99% Circ   • OTHER CARDIAC SURGERY  January 2017    NON STEMI with stent   • KNEE ARTHROPLASTY TOTAL  12/16/2014    Performed by Jose G Trotter M.D. at SURGERY [facility] ORS   • CATARACT PHACO WITH IOL  10/3/2013    Performed by Sylvester Raza M.D. at SURGERY [facility] ORS   • CATARACT PHACO WITH IOL  9/19/2013    Performed by Sylvester Raza M.D. at SURGERY [facility] ORS   • RECOVERY  3/29/2013    Performed by Cath-Recovery Surgery at SURGERY SAME DAY [facility] ORS   • HYSTERECTOMY, VAGINAL      ovaries were left   • TONSILLECTOMY       Family History   Problem Relation Age of Onset   • Diabetes Mother         mild   • Cancer Father         melanoma mild   • Arthritis Sister    • Arthritis Brother    • Arthritis Sister    • Arthritis Brother    • Heart Disease Neg Hx    • Hypertension Neg Hx    • Sleep Apnea Neg Hx      Social History     Social History   • Marital status:      Spouse name: N/A   • Number of children: N/A   • Years of education: N/A     Occupational History   • 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     Social History Main Topics   • Smoking status: Former Smoker     Packs/day: 0.50     Years: 15.00     Types: Cigarettes     Quit date: 8/11/1975   • Smokeless tobacco: Never Used   • Alcohol use No   • Drug use: No   • Sexual  activity: Not Currently     Partners: Male     Other Topics Concern   • Not on file     Social History Narrative   • No narrative on file     Allergies   Allergen Reactions   • Codeine Itching and Vomiting     Rxn = years ago        Outpatient Encounter Prescriptions as of 12/10/2018   Medication Sig Dispense Refill   • metoprolol SR (TOPROL XL) 50 MG TABLET SR 24 HR Take 1 Tab by mouth every day. 30 Tab 11   • atorvastatin (LIPITOR) 40 MG Tab Take 1 Tab by mouth every day. 90 Tab 3   • clopidogrel (PLAVIX) 75 MG Tab Take 1 Tab by mouth every day. 90 Tab 3   • losartan (COZAAR) 100 MG Tab Take 1 Tab by mouth every day. 90 Tab 3   • lansoprazole (PREVACID) 30 MG CAPSULE DELAYED RELEASE Take 1 Cap by mouth every day. 90 Cap 3   • Pumpkin Seed-Soy Germ (AZO BLADDER CONTROL/GO-LESS PO) Take 1 Cap by mouth every bedtime.     • aspirin EC (ECOTRIN) 81 MG Tablet Delayed Response Take 81 mg by mouth every evening. 30 Tab 0   • Cholecalciferol (VITAMIN D) 2000 UNIT TABS Take 1 Tab by mouth every evening.     • Diclofenac Sodium 1 % Gel Apply 2 g to skin as directed 2 times a day as needed. Apply 2 gram on affected shoulder twice a day as needed.     • acetaminophen (TYLENOL 8 HOUR) 650 MG CR tablet Take 1,300 mg by mouth every 6 hours as needed for Moderate Pain.     • [DISCONTINUED] carvedilol (COREG) 3.125 MG Tab Take 3.125 mg by mouth 2 times a day, with meals.     • [DISCONTINUED] losartan (COZAAR) 100 MG Tab Take 1 Tab by mouth every day. 90 Tab 3   • nitroglycerin (NITROSTAT) 0.4 MG SL Tab Place 1 Tab under tongue as needed for Chest Pain (up to 3 doses (if SBP greater than 90 mmHg)). 25 Tab 3   • [DISCONTINUED] atorvastatin (LIPITOR) 40 MG Tab Take 1 Tab by mouth every day. 90 Tab 3   • [DISCONTINUED] clopidogrel (PLAVIX) 75 MG Tab Take 1 Tab by mouth every day. 90 Tab 3     No facility-administered encounter medications on file as of 12/10/2018.      Review of Systems   Constitutional: Negative for diaphoresis and  "fever.   HENT: Negative for nosebleeds.    Eyes: Negative for blurred vision and double vision.   Respiratory: Positive for shortness of breath. Negative for cough.    Cardiovascular: Negative for chest pain and palpitations.   Gastrointestinal: Negative for abdominal pain.   Genitourinary: Negative for dysuria and frequency.   Musculoskeletal: Negative for falls and myalgias.   Skin: Negative for rash.   Neurological: Negative for dizziness, sensory change and headaches.   Endo/Heme/Allergies: Does not bruise/bleed easily.   Psychiatric/Behavioral: Negative for depression and memory loss.        Objective:   /92 (BP Location: Left arm, Patient Position: Sitting, BP Cuff Size: Adult)   Pulse 68   Ht 1.626 m (5' 4\")   Wt 93.4 kg (206 lb)   SpO2 96%   BMI 35.36 kg/m²      Physical Exam   Constitutional: She is oriented to person, place, and time. No distress.   HENT:   Head: Normocephalic and atraumatic.   Right Ear: External ear normal.   Left Ear: External ear normal.   Eyes: Right eye exhibits no discharge. Left eye exhibits no discharge.   Neck: No JVD present. No thyromegaly present.   Cardiovascular: Normal rate, regular rhythm, normal heart sounds and intact distal pulses.  Exam reveals no gallop and no friction rub.    No murmur heard.  Pulmonary/Chest: Breath sounds normal. No respiratory distress.   Abdominal: Bowel sounds are normal. She exhibits no distension. There is no tenderness.   Musculoskeletal: She exhibits no edema or tenderness.   Neurological: She is alert and oriented to person, place, and time. No cranial nerve deficit.   Skin: Skin is warm and dry. She is not diaphoretic.   Psychiatric: She has a normal mood and affect. Her behavior is normal.   Nursing note and vitals reviewed.      Assessment:     1. S/P coronary artery stent placement  metoprolol SR (TOPROL XL) 50 MG TABLET SR 24 HR    losartan (COZAAR) 100 MG Tab    COMP METABOLIC PANEL    LIPID PANEL   2. Coronary artery " disease involving native coronary artery of native heart without angina pectoris  metoprolol SR (TOPROL XL) 50 MG TABLET SR 24 HR    clopidogrel (PLAVIX) 75 MG Tab    losartan (COZAAR) 100 MG Tab    COMP METABOLIC PANEL    LIPID PANEL   3. Mixed hyperlipidemia  atorvastatin (LIPITOR) 40 MG Tab    COMP METABOLIC PANEL    LIPID PANEL   4. HTN (hypertension), malignant  metoprolol SR (TOPROL XL) 50 MG TABLET SR 24 HR    losartan (COZAAR) 100 MG Tab    COMP METABOLIC PANEL    LIPID PANEL   5. Obesity (BMI 30-39.9)  COMP METABOLIC PANEL    LIPID PANEL   6. High risk medication use  COMP METABOLIC PANEL    LIPID PANEL   7. Dyspnea on exertion  COMP METABOLIC PANEL    LIPID PANEL   8. FRIDA (obstructive sleep apnea)  COMP METABOLIC PANEL    LIPID PANEL       Medical Decision Making:  Today's Assessment / Status / Plan:   Await FRIDA studies again for mask fitting.    Blood pressure is high. Patient does not like Coreg. Will stop and start Toprol 50 mg daily.    For CAD, continue DAP, Losartan and Atorvastatin.    For hyperlipidemia, continue Atorvastatin 40 mg daily.    I will see patient back in clinic with lab tests and studies results in 6 months.    I thank you Dr. Hoskins for referring patient to our Cardiology Clinic today.        Brittney Hoskins M.D.  [place] [id number]  VIA In Basket

## 2018-12-10 NOTE — PROGRESS NOTES
Chief Complaint   Patient presents with   • HTN (Controlled)     FV       Subjective:   Griselda Farmer is a 73 y.o. female who presents today for cardiac care due to prior CAD with 2 stents placed in Lx artery, HTN, HLP. In the past patient had repeated ER visits because of chest pain. She even had a repeated cardiac catheterization which did not show any thing wrong with her stents. She came in to the hospital again in July 2017 because of chest pain. She was discharged under stable condition. Her left ventricular systolic function was found to be 50%.     At prior visit, patient was able to complete 344 m during his 6 minute walk test. her O2 saturation at baseline was 91% and at the end of the test, the O2 saturation was 94%. she reported 1 level of dyspnea on Chuckie scale.     +atypical chest pain at night when sleeping. She has FRIDA but not using CPAP, waiting for studies.      In the interim, patient has been doing well without having any symptoms. Patient denies having chest pain, dyspnea, palpitation, presyncope, syncope episodes. Able to climb up at least 2 flights of stairs.     I have independently reviewed blood tests results with patient in clinic which shows normal LDL level, triglycerides, renal and liver function.    Past Medical History:   Diagnosis Date   • Angina 2014    pt denies    • Arthritis     generalized + hands   • Dental disorder     upper and lower dentures   • Flushing reaction     has had full work up with cardiology, would awake with symptoms at night   • GERD (gastroesophageal reflux disease)    • Heart burn 2014    pt on prevacid   • Hyperlipidemia 10/20/2010   • Hypertension 2014    pt states well controlled on meds   • Indigestion    • Influenza    • Insomnia    • Liver cyst     has been seen by GI, ultrasound 9/12   • Myocardial infarct (HCC) 1984    pt denies states sometimes irreg rhythm   • Need for Tdap vaccination     in 2012   • FRIDA (obstructive sleep apnea)     states  no cpap   • S/P colonoscopy 11/9/2012   • Sick sinus syndrome (HCC) 1/1/2017   • Sleep apnea    • Symptomatic sinus bradycardia 1/1/2017   • Unspecified urinary incontinence    • Urinary bladder disorder      Past Surgical History:   Procedure Laterality Date   • CARDIAC CATH  1/19/17    Stents patent.   • CARDIAC CATH  1/1/17    Overlapping Synergy stents to 99% Circ   • OTHER CARDIAC SURGERY  January 2017    NON STEMI with stent   • KNEE ARTHROPLASTY TOTAL  12/16/2014    Performed by Jose G Trotter M.D. at SURGERY [facility] ORS   • CATARACT PHACO WITH IOL  10/3/2013    Performed by Sylvester Raza M.D. at SURGERY [facility] ORS   • CATARACT PHACO WITH IOL  9/19/2013    Performed by Sylvester Raza M.D. at SURGERY [facility] ORS   • RECOVERY  3/29/2013    Performed by Cath-Recovery Surgery at [facility] SAME DAY [facility] ORS   • HYSTERECTOMY, VAGINAL      ovaries were left   • TONSILLECTOMY       Family History   Problem Relation Age of Onset   • Diabetes Mother         mild   • Cancer Father         melanoma mild   • Arthritis Sister    • Arthritis Brother    • Arthritis Sister    • Arthritis Brother    • Heart Disease Neg Hx    • Hypertension Neg Hx    • Sleep Apnea Neg Hx      Social History     Social History   • Marital status:      Spouse name: N/A   • Number of children: N/A   • Years of education: N/A     Occupational History   • 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     Social History Main Topics   • Smoking status: Former Smoker     Packs/day: 0.50     Years: 15.00     Types: Cigarettes     Quit date: 8/11/1975   • Smokeless tobacco: Never Used   • Alcohol use No   • Drug use: No   • Sexual activity: Not Currently     Partners: Male     Other Topics Concern   • Not on file     Social History Narrative   • No narrative on file     Allergies   Allergen Reactions   • Codeine Itching and Vomiting     Rxn = years ago        Outpatient Encounter Prescriptions as of  12/10/2018   Medication Sig Dispense Refill   • metoprolol SR (TOPROL XL) 50 MG TABLET SR 24 HR Take 1 Tab by mouth every day. 30 Tab 11   • atorvastatin (LIPITOR) 40 MG Tab Take 1 Tab by mouth every day. 90 Tab 3   • clopidogrel (PLAVIX) 75 MG Tab Take 1 Tab by mouth every day. 90 Tab 3   • losartan (COZAAR) 100 MG Tab Take 1 Tab by mouth every day. 90 Tab 3   • lansoprazole (PREVACID) 30 MG CAPSULE DELAYED RELEASE Take 1 Cap by mouth every day. 90 Cap 3   • Pumpkin Seed-Soy Germ (AZO BLADDER CONTROL/GO-LESS PO) Take 1 Cap by mouth every bedtime.     • aspirin EC (ECOTRIN) 81 MG Tablet Delayed Response Take 81 mg by mouth every evening. 30 Tab 0   • Cholecalciferol (VITAMIN D) 2000 UNIT TABS Take 1 Tab by mouth every evening.     • Diclofenac Sodium 1 % Gel Apply 2 g to skin as directed 2 times a day as needed. Apply 2 gram on affected shoulder twice a day as needed.     • acetaminophen (TYLENOL 8 HOUR) 650 MG CR tablet Take 1,300 mg by mouth every 6 hours as needed for Moderate Pain.     • [DISCONTINUED] carvedilol (COREG) 3.125 MG Tab Take 3.125 mg by mouth 2 times a day, with meals.     • [DISCONTINUED] losartan (COZAAR) 100 MG Tab Take 1 Tab by mouth every day. 90 Tab 3   • nitroglycerin (NITROSTAT) 0.4 MG SL Tab Place 1 Tab under tongue as needed for Chest Pain (up to 3 doses (if SBP greater than 90 mmHg)). 25 Tab 3   • [DISCONTINUED] atorvastatin (LIPITOR) 40 MG Tab Take 1 Tab by mouth every day. 90 Tab 3   • [DISCONTINUED] clopidogrel (PLAVIX) 75 MG Tab Take 1 Tab by mouth every day. 90 Tab 3     No facility-administered encounter medications on file as of 12/10/2018.      Review of Systems   Constitutional: Negative for diaphoresis and fever.   HENT: Negative for nosebleeds.    Eyes: Negative for blurred vision and double vision.   Respiratory: Positive for shortness of breath. Negative for cough.    Cardiovascular: Negative for chest pain and palpitations.   Gastrointestinal: Negative for abdominal pain.  "  Genitourinary: Negative for dysuria and frequency.   Musculoskeletal: Negative for falls and myalgias.   Skin: Negative for rash.   Neurological: Negative for dizziness, sensory change and headaches.   Endo/Heme/Allergies: Does not bruise/bleed easily.   Psychiatric/Behavioral: Negative for depression and memory loss.        Objective:   /92 (BP Location: Left arm, Patient Position: Sitting, BP Cuff Size: Adult)   Pulse 68   Ht 1.626 m (5' 4\")   Wt 93.4 kg (206 lb)   SpO2 96%   BMI 35.36 kg/m²     Physical Exam   Constitutional: She is oriented to person, place, and time. No distress.   HENT:   Head: Normocephalic and atraumatic.   Right Ear: External ear normal.   Left Ear: External ear normal.   Eyes: Right eye exhibits no discharge. Left eye exhibits no discharge.   Neck: No JVD present. No thyromegaly present.   Cardiovascular: Normal rate, regular rhythm, normal heart sounds and intact distal pulses.  Exam reveals no gallop and no friction rub.    No murmur heard.  Pulmonary/Chest: Breath sounds normal. No respiratory distress.   Abdominal: Bowel sounds are normal. She exhibits no distension. There is no tenderness.   Musculoskeletal: She exhibits no edema or tenderness.   Neurological: She is alert and oriented to person, place, and time. No cranial nerve deficit.   Skin: Skin is warm and dry. She is not diaphoretic.   Psychiatric: She has a normal mood and affect. Her behavior is normal.   Nursing note and vitals reviewed.      Assessment:     1. S/P coronary artery stent placement  metoprolol SR (TOPROL XL) 50 MG TABLET SR 24 HR    losartan (COZAAR) 100 MG Tab    COMP METABOLIC PANEL    LIPID PANEL   2. Coronary artery disease involving native coronary artery of native heart without angina pectoris  metoprolol SR (TOPROL XL) 50 MG TABLET SR 24 HR    clopidogrel (PLAVIX) 75 MG Tab    losartan (COZAAR) 100 MG Tab    COMP METABOLIC PANEL    LIPID PANEL   3. Mixed hyperlipidemia  atorvastatin " (LIPITOR) 40 MG Tab    COMP METABOLIC PANEL    LIPID PANEL   4. HTN (hypertension), malignant  metoprolol SR (TOPROL XL) 50 MG TABLET SR 24 HR    losartan (COZAAR) 100 MG Tab    COMP METABOLIC PANEL    LIPID PANEL   5. Obesity (BMI 30-39.9)  COMP METABOLIC PANEL    LIPID PANEL   6. High risk medication use  COMP METABOLIC PANEL    LIPID PANEL   7. Dyspnea on exertion  COMP METABOLIC PANEL    LIPID PANEL   8. FRIDA (obstructive sleep apnea)  COMP METABOLIC PANEL    LIPID PANEL       Medical Decision Making:  Today's Assessment / Status / Plan:   Await FRIDA studies again for mask fitting.    Blood pressure is high. Patient does not like Coreg. Will stop and start Toprol 50 mg daily.    For CAD, continue DAP, Losartan and Atorvastatin.    For hyperlipidemia, continue Atorvastatin 40 mg daily.    I will see patient back in clinic with lab tests and studies results in 6 months.    I thank you Dr. Hoskins for referring patient to our Cardiology Clinic today.

## 2018-12-15 ENCOUNTER — SLEEP STUDY (OUTPATIENT)
Dept: SLEEP MEDICINE | Facility: MEDICAL CENTER | Age: 73
End: 2018-12-15
Attending: FAMILY MEDICINE
Payer: MEDICARE

## 2018-12-15 DIAGNOSIS — G47.31 COMPLEX SLEEP APNEA SYNDROME: ICD-10-CM

## 2018-12-15 PROCEDURE — 95811 POLYSOM 6/>YRS CPAP 4/> PARM: CPT | Performed by: INTERNAL MEDICINE

## 2018-12-19 NOTE — PROCEDURES
Clinical Comments:  The patient underwent a comprehensive polysomnogram using the standard montage for measurement of parameters of sleep, respiratory events, movement abnormalities, heart rate and rhythm. A microphone was used to monitor snoring.      ANALYSIS:  Testing began at 8:25:02 PM.  The total recording time was 576.6 minutes with a sleep period of 551.0 minutes and the total sleep time was 200.0 minutes with a sleep efficiency of 34.7%.  The sleep latency was 25.6 minutes, and REM latency was 170.5 minutes.  The patient experienced 198 arousals in total, for an arousal index of 59.4    RESPIRATORY: The patient had 127 apneas in total.  Of these, 15 were obstructive apneas, and 112 were central apneas.  This resulted in an apnea index (AI) of 38.1.  The patient had 31 hypopneas, for a hypopnea index of 9.3.  The overall AHI was 47.4, while the AHI during Stage R sleep was 4.1.  AHI while supine was 46.3.    OXIMETRY: Oxygen saturation monitoring showed a mean SpO2 of 93.6%, with a minimum oxygen saturation of 85.0%.  Oxygen saturations were less than or = 89% for 5.9 minutes of sleep time.    CARDIAC: The highest heart rate during the recording was 71.0 beats per minute.  The average heart rate during sleep was 42.6 bpm.    LIMB MOVEMENTS: There were a total of 54 PLMs during sleep, of which 21 were PLMs arousals.  This resulted in a PLMS index of 16.2.    BIPAP was tried from 10/6 and titrated to 18/14cm H2O.  ASV was tried from EPAP Max/Min:15/7, PS Max/Min:15/2cm H2O and titrated as high as EPAP Max/Min:15/9, PS Max/Min:15/2cm H2O.      Interpretation:    Ms. Farmer has significant sleep apnea hypopnea.  Previous polysomnography suggested an apnea hypopnea index of 29 events per hour but no effective response to CPAP therapy.  This study was designed to further calibrate therapy.    There is very severe fragmentation of sleep with increased wake after sleep onset time totaling more than 6 hours,  increased sleep onset latency and a dramatic increase in the arousal and awakening index.  Fewer than 15 minutes of REM sleep time are included.  There are frequent periodic limb movements and the periodic limb movement with arousal index is 6.3 events per hour.    BiPAP was applied at a pressure range of 10-18/6-14 cm water with persistence of central apnea and some hypopnea events despite the addition of the BiPAP ST mode with a set respiratory rate of 10 breaths per minute.  During the study there were 112 episodes of central apnea, 15 obstructive apneas and 31 episodes of hypopnea.  Servo adaptive BiPAP ventilation was applied with minimum expiratory pressures of 7-9 cm water.  Hypopnea episodes were largely suppressed but central apnea episodes persisted.  At no stage in the ASV titration was the AHI less than 48 events per hour.  The mean arterial oxygen saturation was about 93% with a minimum of 89% in the final pressure stage.    Assessment:  This study demonstrates no effective response to BiPAP or BiPAP ST therapy in a patient with previously demonstrated severe sleep apnea hypopnea unresponsive to CPAP and including a predominance of central apnea episodes.  There is a partial response to ASV therapy but an optimally effective level of treatment was not established in this study.    Recommendations:  Options include further polysomnography to titrate ASV therapy.  Alternatively Respironics ASV with expiratory pressures of perhaps 7-15 cm water and pressure support at 3-15 cm water might be considered.  The patient's recent echocardiography suggested a preserved systolic ejection fraction of 60%.  Intolerance of the therapy pressures and significant mask leaks were a significant problem in this study but she seemed to do reasonably well with a small Dreamware nasal mask.

## 2018-12-21 ENCOUNTER — SLEEP CENTER VISIT (OUTPATIENT)
Dept: SLEEP MEDICINE | Facility: MEDICAL CENTER | Age: 73
End: 2018-12-21
Payer: MEDICARE

## 2018-12-21 VITALS
WEIGHT: 207 LBS | HEIGHT: 64 IN | RESPIRATION RATE: 15 BRPM | SYSTOLIC BLOOD PRESSURE: 132 MMHG | DIASTOLIC BLOOD PRESSURE: 80 MMHG | OXYGEN SATURATION: 94 % | BODY MASS INDEX: 35.34 KG/M2 | HEART RATE: 40 BPM

## 2018-12-21 DIAGNOSIS — R00.1 BRADYCARDIA: ICD-10-CM

## 2018-12-21 DIAGNOSIS — G47.31 COMPLEX SLEEP APNEA SYNDROME: ICD-10-CM

## 2018-12-21 PROCEDURE — 99213 OFFICE O/P EST LOW 20 MIN: CPT | Performed by: FAMILY MEDICINE

## 2018-12-21 NOTE — PROGRESS NOTES
[facility] Follow Up Note     Date: 12/21/2018 / Time: 9:36 AM    Patient ID:   Name:             Griselda Farmer   YOB: 1945  Age:                 73 y.o.  female   MRN:               [id number]      Thank you for requesting a sleep medicine consultation on Griselda Farmer at the sleep center. She presents today with the chief complaints of complex sleep apnea follow up.     HISTORY OF PRESENT ILLNESS:       Pt is currently not on PAP therapy. No change in sleep schedule. She goes to sleep around 9 pm and wakes up around 5 am. She is getting about 6 hrs of sleep on a good night and about 4 hr of sleep on a bad night. The bad nights are about 1-2 per month. The symptoms of excessive daytime and snoring has continues.     Since her last visit she had BiPAP/ASV titration. No definitive pressure can be extrapolated from the titration, BiPAP titrated between 10/6 cm to 18/4 cm and ASV was titrated between EPAP 7-9 cm PS 3/15 cm.     SLEEP HISTORY   Severe obstructive sleep apnea, AHI 29 events per hour, associated with significant hypoxia. CPAP was tried 5-9 cm with incomplete titration. 97% of apneas were central apneas during the titration.          REVIEW OF SYSTEMS:       Constitutional: Denies fevers, Denies weight changes  Eyes: Denies changes in vision, no eye pain  Ears/Nose/Throat/Mouth: Denies nasal congestion or sore throat   Cardiovascular: Denies chest pain or palpitations   Respiratory: Denies shortness of breath , Denies cough  Gastrointestinal/Hepatic: Denies abdominal pain, nausea, vomiting, diarrhea, constipation or GI bleeding   Genitourinary: Denies bladder dysfunction, dysuria or frequency  Musculoskeletal/Rheum: Denies  joint pain and swelling   Skin/Breast: Denies rash,   Neurological: Denies headache, confusion, memory loss or focal weakness/parasthesias  Psychiatric: denies mood disorder     Comprehensive review of systems form is reviewed with the  patient and is attached in the EMR.     PMH:  has a past medical history of Angina (2014); Arthritis; Dental disorder; Flushing reaction; GERD (gastroesophageal reflux disease); Heart burn (2014); Hyperlipidemia (10/20/2010); Hypertension (2014); Indigestion; Influenza; Insomnia; Liver cyst; Myocardial infarct (HCC) (1984); Need for Tdap vaccination; FRIDA (obstructive sleep apnea); S/P colonoscopy (11/9/2012); Sick sinus syndrome (HCC) (1/1/2017); Sleep apnea; Symptomatic sinus bradycardia (1/1/2017); Unspecified urinary incontinence; and Urinary bladder disorder.  MEDS:   Current Outpatient Prescriptions:   •  metoprolol SR (TOPROL XL) 50 MG TABLET SR 24 HR, Take 1 Tab by mouth every day., Disp: 30 Tab, Rfl: 11  •  atorvastatin (LIPITOR) 40 MG Tab, Take 1 Tab by mouth every day., Disp: 90 Tab, Rfl: 3  •  clopidogrel (PLAVIX) 75 MG Tab, Take 1 Tab by mouth every day., Disp: 90 Tab, Rfl: 3  •  losartan (COZAAR) 100 MG Tab, Take 1 Tab by mouth every day., Disp: 90 Tab, Rfl: 3  •  Diclofenac Sodium 1 % Gel, Apply 2 g to skin as directed 2 times a day as needed. Apply 2 gram on affected shoulder twice a day as needed., Disp: , Rfl:   •  acetaminophen (TYLENOL 8 HOUR) 650 MG CR tablet, Take 1,300 mg by mouth every 6 hours as needed for Moderate Pain., Disp: , Rfl:   •  lansoprazole (PREVACID) 30 MG CAPSULE DELAYED RELEASE, Take 1 Cap by mouth every day., Disp: 90 Cap, Rfl: 3  •  nitroglycerin (NITROSTAT) 0.4 MG SL Tab, Place 1 Tab under tongue as needed for Chest Pain (up to 3 doses (if SBP greater than 90 mmHg))., Disp: 25 Tab, Rfl: 3  •  Pumpkin Seed-Soy Germ (AZO BLADDER CONTROL/GO-LESS PO), Take 1 Cap by mouth every bedtime., Disp: , Rfl:   •  aspirin EC (ECOTRIN) 81 MG Tablet Delayed Response, Take 81 mg by mouth every evening., Disp: 30 Tab, Rfl: 0  •  Cholecalciferol (VITAMIN D) 2000 UNIT TABS, Take 1 Tab by mouth every evening., Disp: , Rfl:   ALLERGIES:   Allergies   Allergen Reactions   • Codeine Itching and  "Vomiting     Rxn = years ago        SURGHX:   Past Surgical History:   Procedure Laterality Date   • CARDIAC CATH  1/19/17    Stents patent.   • CARDIAC CATH  1/1/17    Overlapping Synergy stents to 99% Circ   • OTHER CARDIAC SURGERY  January 2017    NON STEMI with stent   • KNEE ARTHROPLASTY TOTAL  12/16/2014    Performed by Jose G Trotter M.D. at SURGERY [facility] ORS   • CATARACT PHACO WITH IOL  10/3/2013    Performed by Sylvester Raza M.D. at SURGERY [facility] ORS   • CATARACT PHACO WITH IOL  9/19/2013    Performed by Sylvester Raza M.D. at [facility] ORS   • RECOVERY  3/29/2013    Performed by Cath-Recovery Surgery at SURGERY SAME DAY [facility] ORS   • HYSTERECTOMY, VAGINAL      ovaries were left   • TONSILLECTOMY       SOCHX:  reports that she quit smoking about 43 years ago. Her smoking use included Cigarettes. She has a 7.50 pack-year smoking history. She has never used smokeless tobacco. She reports that she does not drink alcohol or use drugs..  FH:   Family History   Problem Relation Age of Onset   • Diabetes Mother         mild   • Cancer Father         melanoma mild   • Arthritis Sister    • Arthritis Brother    • Arthritis Sister    • Arthritis Brother    • Heart Disease Neg Hx    • Hypertension Neg Hx    • Sleep Apnea Neg Hx          Physical Exam:  Vitals/ General Appearance:   Weight/BMI: Body mass index is 35.53 kg/m².  Blood pressure 132/80, pulse (!) 40, resp. rate 15, height 1.626 m (5' 4\"), weight 93.9 kg (207 lb), SpO2 94 %, not currently breastfeeding.  Vitals:    12/21/18 0936   BP: 132/80   BP Location: Right arm   Patient Position: Sitting   BP Cuff Size: Adult   Pulse: (!) 40   Resp: 15   SpO2: 94%   Weight: 93.9 kg (207 lb)   Height: 1.626 m (5' 4\")       Pt. is alert and oriented to time, place and person. Cooperative and in no apparent distress.       1. Head: Atraumatic, normocephalic.   2. Ears: Normal tympanic membrane and no discharge  3. Nose: No inferior " turbinate hypertophy   4. Throat: Oropharynx appears crowded in that the palate is overhanging   5. Neck: Supple. No thyromegaly  6. Chest: Trachea central, no spine deformity   7. Lungs auscultation: B/L good air entry, vesicular breath sounds, no adventitious sounds  8. Heart auscultation: 1st and 2nd heart sounds normal, regular rhythm. No appreciable murmur.  9. Extremities: no clubbing, no pedal edema.  10. Skin: No rash  11. NEUROLOGICAL EXAMINATION: On neurological exam, the patient was alert and oriented x3. speech was clear and fluent without dysarthria.      INVESTIGATIONS:           ASSESSMENT AND PLAN     1.complex  Sleep Apnea   The pathophysiology of sleep anea and the increased risk of cardiovascular morbidity from untreated sleep apnea is discussed in detail with the patient.    She is urged to avoid supine sleep, weight gain and alcoholic beverages since all of these can worsen sleep apnea. She is cautioned against drowsy driving. If She feels sleepy while driving, She must pull over for a break/nap, rather than persist on the road, in the interest of She own safety and that of others on the road.   Plan   -  No definitive pressure can be extrapolated from the titration however ASV EPAP 7/15 cm PS 3/15 cm  with auto back up is ordered today    - last ECHO was 7/12/17 and the EF was 60%. ECHO is ordered today    - compliance download was reviewed and discussed with the pt   - compliance was reinforced     2.Bradycardia: she does have daytime sleepiness and fatiuge but denies CP, SOB, dizziness. She is on metoprolol and cozaar. Recommended to discuss with PCP and possible decrease in BP medications.      3Regarding treatment of other past medical problems and general health maintenance,  She is urged to follow up with PCP.

## 2019-01-04 ENCOUNTER — TELEPHONE (OUTPATIENT)
Dept: MEDICAL GROUP | Age: 74
End: 2019-01-04

## 2019-01-04 NOTE — TELEPHONE ENCOUNTER
ESTABLISHED PATIENT PRE-VISIT PLANNING     Patient was NOT contacted to complete PVP.     Note: Patient will not be contacted if there is no indication to call.     1.  Reviewed notes from the last few office visits within the medical group: Yes    2.  If any orders were placed at last visit or intended to be done for this visit (i.e. 6 mos follow-up), do we have Results/Consult Notes?        •  Labs - Labs were not ordered at last office visit.   Note: If patient appointment is for lab review and patient did not complete labs, check with provider if OK to reschedule patient until labs completed.       •  Imaging - Imaging was not ordered at last office visit.       •  Referrals - Referral ordered, patient was seen and consult notes are in chart. Care Teams updated  YES.    3. Is this appointment scheduled as a Hospital Follow-Up? No    4.  Immunizations were updated in Epic using WebIZ?: Epic matches WebIZ       •  Web Iz Recommendations: SHINGRIX (Shingles)    5.  Patient is due for the following Health Maintenance Topics:   Health Maintenance Due   Topic Date Due   • IMM ZOSTER VACCINES (2 of 3) 01/04/2013       - Patient is up-to-date on all Health Maintenance topics. No records have been requested at this time.    6. Orders for overdue Health Maintenance topics pended in Pre-Charting? NO    7.  AHA (MDX) form printed for Provider? YES    8.  Patient was NOT informed to arrive 15 min prior to their scheduled appointment and bring in their medication bottles.

## 2019-01-07 ENCOUNTER — OFFICE VISIT (OUTPATIENT)
Dept: MEDICAL GROUP | Age: 74
End: 2019-01-07
Payer: MEDICARE

## 2019-01-07 VITALS
SYSTOLIC BLOOD PRESSURE: 126 MMHG | HEART RATE: 60 BPM | HEIGHT: 64 IN | DIASTOLIC BLOOD PRESSURE: 80 MMHG | OXYGEN SATURATION: 95 % | TEMPERATURE: 97.2 F | BODY MASS INDEX: 35.51 KG/M2 | WEIGHT: 208 LBS

## 2019-01-07 DIAGNOSIS — E78.2 MIXED HYPERLIPIDEMIA: ICD-10-CM

## 2019-01-07 DIAGNOSIS — I10 ESSENTIAL HYPERTENSION, BENIGN: ICD-10-CM

## 2019-01-07 DIAGNOSIS — I70.0 ATHEROSCLEROSIS OF AORTA (HCC): ICD-10-CM

## 2019-01-07 DIAGNOSIS — K21.9 GASTROESOPHAGEAL REFLUX DISEASE WITHOUT ESOPHAGITIS: ICD-10-CM

## 2019-01-07 DIAGNOSIS — I49.5 SICK SINUS SYNDROME (HCC): ICD-10-CM

## 2019-01-07 DIAGNOSIS — I25.10 CORONARY ARTERY DISEASE INVOLVING NATIVE CORONARY ARTERY OF NATIVE HEART WITHOUT ANGINA PECTORIS: ICD-10-CM

## 2019-01-07 DIAGNOSIS — E55.9 VITAMIN D DEFICIENCY DISEASE: ICD-10-CM

## 2019-01-07 PROCEDURE — 8041 PR SCP AHA: Performed by: INTERNAL MEDICINE

## 2019-01-07 PROCEDURE — 99214 OFFICE O/P EST MOD 30 MIN: CPT | Performed by: INTERNAL MEDICINE

## 2019-01-07 ASSESSMENT — PATIENT HEALTH QUESTIONNAIRE - PHQ9: CLINICAL INTERPRETATION OF PHQ2 SCORE: 0

## 2019-01-07 NOTE — ASSESSMENT & PLAN NOTE
Patient does not have any symptoms currently.  Her heartbeat is stable and well controlled with metoprolol 50 mg daily.  Her cardiologist switched carvedilol to metoprolol on 12/10/18.

## 2019-01-07 NOTE — PROGRESS NOTES
Subjective:   Griselda Duran is a 73 y.o. female here today for evaluation and management of:      Vitamin D deficiency disease  Patient is taking vitamin D 2000 units daily.  Last vitamin D level was 30 on 4/6/18.    Sick sinus syndrome (CMS-HCC)  Patient does not have any symptoms currently.  Her heartbeat is stable and well controlled with metoprolol 50 mg daily.  Her cardiologist switched carvedilol to metoprolol on 12/10/18.    Mixed hyperlipidemia  Patient is taking atorvastatin 40 mg every evening.  She denies side effects from taking it.  Her cholesterol is stable and well controlled.  She has normal liver enzymes.  I reviewed her previous blood test result with her in clinic today.    Results for GRISELDA DURAN (MRN [id number]) as of 1/7/2019 10:06   Ref. Range 4/6/2018 09:47   Cholesterol,Tot Latest Ref Range: 100 - 199 mg/dL 134   Triglycerides Latest Ref Range: 0 - 149 mg/dL 117   HDL Latest Ref Range: >=40 mg/dL 63   LDL Latest Ref Range: <100 mg/dL 48       Gastroesophageal reflux disease without esophagitis  Patient stated that she is taking Prevacid 30 mg daily.  She tried to stop taking medication but she has recurrent acid reflux right away.  She states that she needs to take Prevacid almost every day.  She also try to control her diet and does not eat heavy meal in the late evening time.  She denies drinking alcohol.  She stated that she does not have side effects from taking Prevacid.  She wants to continue Prevacid with the same dose and she will try to cut down Prevacid slowly.    Essential hypertension, benign  Patient is taking metoprolol XL 50 mg daily and losartan 100 mg daily.  Her blood pressure is stable and well controlled.  She denies side effects from taking metoprolol and losartan.  Her heart rate is stable as well.    CAD (coronary artery disease)  Patient is taking Plavix 75 mg daily and aspirin 81 mg daily as managed by her cardiologist.  She also takes metoprolol XL  50 mg daily, losartan 100 mg daily and atorvastatin 40 mg every evening.  Patient denied chest pain or palpitation or shortness of breath.  She is generally doing well.  She saw her cardiologist on 12/10/18.  She has follow-up appointment with cardiologist in July 2019.    Atherosclerosis of aorta (HCC)  She is taking atorvastatin 40 mg every evening, aspirin 81 mg daily and Plavix 75 mg daily.  Patient does not have any side effects from taking current medications.  Her cholesterol was well controlled with current regimens.         Current medicines (including changes today)  Current Outpatient Prescriptions   Medication Sig Dispense Refill   • metoprolol SR (TOPROL XL) 50 MG TABLET SR 24 HR Take 1 Tab by mouth every day. 30 Tab 11   • atorvastatin (LIPITOR) 40 MG Tab Take 1 Tab by mouth every day. 90 Tab 3   • clopidogrel (PLAVIX) 75 MG Tab Take 1 Tab by mouth every day. 90 Tab 3   • losartan (COZAAR) 100 MG Tab Take 1 Tab by mouth every day. 90 Tab 3   • Diclofenac Sodium 1 % Gel Apply 2 g to skin as directed 2 times a day as needed. Apply 2 gram on affected shoulder twice a day as needed.     • acetaminophen (TYLENOL 8 HOUR) 650 MG CR tablet Take 1,300 mg by mouth every 6 hours as needed for Moderate Pain.     • lansoprazole (PREVACID) 30 MG CAPSULE DELAYED RELEASE Take 1 Cap by mouth every day. 90 Cap 3   • nitroglycerin (NITROSTAT) 0.4 MG SL Tab Place 1 Tab under tongue as needed for Chest Pain (up to 3 doses (if SBP greater than 90 mmHg)). 25 Tab 3   • Pumpkin Seed-Soy Germ (AZO BLADDER CONTROL/GO-LESS PO) Take 1 Cap by mouth every bedtime.     • aspirin EC (ECOTRIN) 81 MG Tablet Delayed Response Take 81 mg by mouth every evening. 30 Tab 0   • Cholecalciferol (VITAMIN D) 2000 UNIT TABS Take 1 Tab by mouth every evening.       No current facility-administered medications for this visit.      She  has a past medical history of Angina (2014); Arthritis; Dental disorder; Flushing reaction; GERD (gastroesophageal  "reflux disease); Heart burn (2014); Hyperlipidemia (10/20/2010); Hypertension (2014); Indigestion; Influenza; Insomnia; Liver cyst; Myocardial infarct (HCC) (1984); Need for Tdap vaccination; FRIDA (obstructive sleep apnea); S/P colonoscopy (11/9/2012); Sick sinus syndrome (HCC) (1/1/2017); Sleep apnea; Symptomatic sinus bradycardia (1/1/2017); Unspecified urinary incontinence; and Urinary bladder disorder.    ROS   No chest pain, no shortness of breath, no abdominal pain       Objective:     Blood pressure 126/80, pulse 60, temperature 36.2 °C (97.2 °F), temperature source Temporal, height 1.626 m (5' 4\"), weight 94.3 kg (208 lb), SpO2 95 %, not currently breastfeeding. Body mass index is 35.7 kg/m².   Physical Exam:  General: Alert, oriented and no acute distress.  Eye contact is good, speech goal directed, affect calm  HEENT: conjunctiva non-injected, sclera non-icteric.  Oral mucous membranes pink and moist with no lesions.  Pinna normal.   Lungs: Normal respiratory effort, clear to auscultation bilaterally with good excursion.  CV: regular rate and rhythm. No murmurs.   Abdomen: soft, non distended, nontender, Bowel sound normal.  Ext: no edema, color normal, vascularity normal, temperature normal        Assessment and Plan:   The following treatment plan was discussed     1. Sick sinus syndrome (HCC)  - Stable.  Well-controlled with current regimens.  Patient follows with cardiologist every 6 months.  She does not have side effects from taking metoprolol XL 50 mg daily and losartan 100 mg daily as managed by cardiologist.    2. Atherosclerosis of aorta (HCC)  - Stable.  Continue atorvastatin 40 mg every evening, Plavix 75 mg daily and aspirin 81 mg daily as managed by cardiologist.  Reviewed potential side effects of atorvastatin, Plavix and aspirin with patient.  Encouraged to do regular physical exercise as tolerated.  Discussed to eat heart healthy diet.    3. Vitamin D deficiency disease  - Stable.  Continue " vitamin D 2000 units daily.  Recheck lab in 6 months.  - VITAMIN D,25 HYDROXY; Future    4. Mixed hyperlipidemia  - Well-controlled. Continue current regimens, atorvastatin 40 mg every evening. Recheck lab 1-2 weeks before next follow up visit.  - Reviewed the risks and benefits of treatment and potential side effects of medication.  - Advised to eat low fat, low carbohydrate and high fiber diet as well as do cardio physical exercise regularly.    5. Essential hypertension, benign  - Well-controlled. Continue current regimens, metoprolol XL 50 mg daily and losartan 100 mg daily. Recheck lab 1-2 weeks before next follow up visit.  - Reviewed the risks and benefits as well as potential side effects of medications with patient.  - Discussed to eat low-sodium diet and encouraged to do regular physical exercise.  - Recommend to monitor blood pressure and heart rate at home.    6. Coronary artery disease involving native coronary artery of native heart without angina pectoris  - Stable.  Continue current regimens as prescribed by cardiologist.  Patient has follow-up appointment with cardiologist in July 2019.  She denies chest pain or shortness of breath.    7. Gastroesophageal reflux disease without esophagitis  - Well-controlled.  Continue Prevacid 30 mg daily.  Reviewed potential side effects of Prevacid with patient.  Patient is advised to cut down the dose of Prevacid or stop taking Prevacid if her symptoms improve.  Patient will try to eat low acidic, low-fat diet and avoid excessive caffeine.  She denies drinking alcohol.  She is advised to elevate the head of the bed.      Followup: Return in about 6 months (around 7/7/2019), or if symptoms worsen or fail to improve, for Hypertension, Hyperlipidemia, GERD, Vitamin D insufficiency, Lab review.      Please note that this dictation was created using voice recognition software. I have made every reasonable attempt to correct obvious errors, but I expect that there may  have unintended errors in text, spelling, punctuation, or grammar that I did not discover.

## 2019-01-07 NOTE — ASSESSMENT & PLAN NOTE
Patient stated that she is taking Prevacid 30 mg daily.  She tried to stop taking medication but she has recurrent acid reflux right away.  She states that she needs to take Prevacid almost every day.  She also try to control her diet and does not eat heavy meal in the late evening time.  She denies drinking alcohol.  She stated that she does not have side effects from taking Prevacid.  She wants to continue Prevacid with the same dose and she will try to cut down Prevacid slowly.

## 2019-01-07 NOTE — ASSESSMENT & PLAN NOTE
Patient is taking metoprolol XL 50 mg daily and losartan 100 mg daily.  Her blood pressure is stable and well controlled.  She denies side effects from taking metoprolol and losartan.  Her heart rate is stable as well.

## 2019-01-07 NOTE — ASSESSMENT & PLAN NOTE
Patient is taking atorvastatin 40 mg every evening.  She denies side effects from taking it.  Her cholesterol is stable and well controlled.  She has normal liver enzymes.  I reviewed her previous blood test result with her in clinic today.    Results for ONESIMO DURAN (MRN [id number]) as of 1/7/2019 10:06   Ref. Range 4/6/2018 09:47   Cholesterol,Tot Latest Ref Range: 100 - 199 mg/dL 134   Triglycerides Latest Ref Range: 0 - 149 mg/dL 117   HDL Latest Ref Range: >=40 mg/dL 63   LDL Latest Ref Range: <100 mg/dL 48

## 2019-01-07 NOTE — ASSESSMENT & PLAN NOTE
She is taking atorvastatin 40 mg every evening, aspirin 81 mg daily and Plavix 75 mg daily.  Patient does not have any side effects from taking current medications.  Her cholesterol was well controlled with current regimens.

## 2019-01-07 NOTE — ASSESSMENT & PLAN NOTE
Patient is taking Plavix 75 mg daily and aspirin 81 mg daily as managed by her cardiologist.  She also takes metoprolol XL 50 mg daily, losartan 100 mg daily and atorvastatin 40 mg every evening.  Patient denied chest pain or palpitation or shortness of breath.  She is generally doing well.  She saw her cardiologist on 12/10/18.  She has follow-up appointment with cardiologist in July 2019.

## 2019-03-17 ENCOUNTER — OFFICE VISIT (OUTPATIENT)
Dept: URGENT CARE | Facility: CLINIC | Age: 74
End: 2019-03-17
Payer: MEDICARE

## 2019-03-17 ENCOUNTER — APPOINTMENT (OUTPATIENT)
Dept: RADIOLOGY | Facility: MEDICAL CENTER | Age: 74
End: 2019-03-17
Attending: EMERGENCY MEDICINE
Payer: MEDICARE

## 2019-03-17 ENCOUNTER — HOSPITAL ENCOUNTER (OUTPATIENT)
Facility: MEDICAL CENTER | Age: 74
End: 2019-03-20
Attending: EMERGENCY MEDICINE | Admitting: HOSPITALIST
Payer: MEDICARE

## 2019-03-17 VITALS
HEIGHT: 64 IN | TEMPERATURE: 97.1 F | BODY MASS INDEX: 35.51 KG/M2 | SYSTOLIC BLOOD PRESSURE: 122 MMHG | HEART RATE: 86 BPM | RESPIRATION RATE: 16 BRPM | WEIGHT: 208 LBS | DIASTOLIC BLOOD PRESSURE: 70 MMHG | OXYGEN SATURATION: 94 %

## 2019-03-17 DIAGNOSIS — J10.1 INFLUENZA A: ICD-10-CM

## 2019-03-17 DIAGNOSIS — J44.0 CHRONIC OBSTRUCTIVE PULMONARY DISEASE WITH ACUTE LOWER RESPIRATORY INFECTION (HCC): ICD-10-CM

## 2019-03-17 DIAGNOSIS — R55 NEAR SYNCOPE: ICD-10-CM

## 2019-03-17 DIAGNOSIS — R68.89 FLU-LIKE SYMPTOMS: ICD-10-CM

## 2019-03-17 DIAGNOSIS — R09.02 HYPOXEMIA: ICD-10-CM

## 2019-03-17 PROBLEM — E87.1 HYPONATREMIA: Status: ACTIVE | Noted: 2019-03-17

## 2019-03-17 PROBLEM — R06.02 SHORTNESS OF BREATH: Status: ACTIVE | Noted: 2019-03-17

## 2019-03-17 PROBLEM — A41.9 SEPSIS (HCC): Status: ACTIVE | Noted: 2019-03-17

## 2019-03-17 LAB
ALBUMIN SERPL BCP-MCNC: 4.2 G/DL (ref 3.2–4.9)
ALBUMIN/GLOB SERPL: 1.4 G/DL
ALP SERPL-CCNC: 83 U/L (ref 30–99)
ALT SERPL-CCNC: 21 U/L (ref 2–50)
ANION GAP SERPL CALC-SCNC: 10 MMOL/L (ref 0–11.9)
AST SERPL-CCNC: 24 U/L (ref 12–45)
BASOPHILS # BLD AUTO: 0.4 % (ref 0–1.8)
BASOPHILS # BLD: 0.05 K/UL (ref 0–0.12)
BILIRUB SERPL-MCNC: 0.7 MG/DL (ref 0.1–1.5)
BUN SERPL-MCNC: 13 MG/DL (ref 8–22)
CALCIUM SERPL-MCNC: 9.2 MG/DL (ref 8.4–10.2)
CHLORIDE SERPL-SCNC: 99 MMOL/L (ref 96–112)
CO2 SERPL-SCNC: 23 MMOL/L (ref 20–33)
CREAT SERPL-MCNC: 0.86 MG/DL (ref 0.5–1.4)
EOSINOPHIL # BLD AUTO: 0.03 K/UL (ref 0–0.51)
EOSINOPHIL NFR BLD: 0.2 % (ref 0–6.9)
ERYTHROCYTE [DISTWIDTH] IN BLOOD BY AUTOMATED COUNT: 46.6 FL (ref 35.9–50)
FLUAV RNA SPEC QL NAA+PROBE: POSITIVE
FLUAV+FLUBV AG SPEC QL IA: NEGATIVE
FLUBV RNA SPEC QL NAA+PROBE: NEGATIVE
GLOBULIN SER CALC-MCNC: 3.1 G/DL (ref 1.9–3.5)
GLUCOSE SERPL-MCNC: 126 MG/DL (ref 65–99)
HCT VFR BLD AUTO: 42.2 % (ref 37–47)
HGB BLD-MCNC: 14 G/DL (ref 12–16)
IMM GRANULOCYTES # BLD AUTO: 0.05 K/UL (ref 0–0.11)
IMM GRANULOCYTES NFR BLD AUTO: 0.4 % (ref 0–0.9)
INT CON NEG: NORMAL
INT CON POS: NORMAL
LACTATE BLD-SCNC: 1.5 MMOL/L (ref 0.5–2)
LACTATE BLD-SCNC: 1.8 MMOL/L (ref 0.5–2)
LYMPHOCYTES # BLD AUTO: 0.44 K/UL (ref 1–4.8)
LYMPHOCYTES NFR BLD: 3.5 % (ref 22–41)
MCH RBC QN AUTO: 30 PG (ref 27–33)
MCHC RBC AUTO-ENTMCNC: 33.2 G/DL (ref 33.6–35)
MCV RBC AUTO: 90.6 FL (ref 81.4–97.8)
MONOCYTES # BLD AUTO: 0.95 K/UL (ref 0–0.85)
MONOCYTES NFR BLD AUTO: 7.5 % (ref 0–13.4)
NEUTROPHILS # BLD AUTO: 11.15 K/UL (ref 2–7.15)
NEUTROPHILS NFR BLD: 88 % (ref 44–72)
NRBC # BLD AUTO: 0 K/UL
NRBC BLD-RTO: 0 /100 WBC
PLATELET # BLD AUTO: 198 K/UL (ref 164–446)
PMV BLD AUTO: 11.3 FL (ref 9–12.9)
POTASSIUM SERPL-SCNC: 3.7 MMOL/L (ref 3.6–5.5)
PROT SERPL-MCNC: 7.3 G/DL (ref 6–8.2)
RBC # BLD AUTO: 4.66 M/UL (ref 4.2–5.4)
SODIUM SERPL-SCNC: 132 MMOL/L (ref 135–145)
TROPONIN I SERPL-MCNC: <0.02 NG/ML (ref 0–0.04)
WBC # BLD AUTO: 12.7 K/UL (ref 4.8–10.8)

## 2019-03-17 PROCEDURE — 700111 HCHG RX REV CODE 636 W/ 250 OVERRIDE (IP): Performed by: EMERGENCY MEDICINE

## 2019-03-17 PROCEDURE — 700102 HCHG RX REV CODE 250 W/ 637 OVERRIDE(OP): Performed by: EMERGENCY MEDICINE

## 2019-03-17 PROCEDURE — 85025 COMPLETE CBC W/AUTO DIFF WBC: CPT

## 2019-03-17 PROCEDURE — A9270 NON-COVERED ITEM OR SERVICE: HCPCS | Performed by: EMERGENCY MEDICINE

## 2019-03-17 PROCEDURE — 80053 COMPREHEN METABOLIC PANEL: CPT

## 2019-03-17 PROCEDURE — 87804 INFLUENZA ASSAY W/OPTIC: CPT | Performed by: FAMILY MEDICINE

## 2019-03-17 PROCEDURE — 84484 ASSAY OF TROPONIN QUANT: CPT

## 2019-03-17 PROCEDURE — 700102 HCHG RX REV CODE 250 W/ 637 OVERRIDE(OP): Performed by: HOSPITALIST

## 2019-03-17 PROCEDURE — 87040 BLOOD CULTURE FOR BACTERIA: CPT

## 2019-03-17 PROCEDURE — 700105 HCHG RX REV CODE 258: Performed by: EMERGENCY MEDICINE

## 2019-03-17 PROCEDURE — G0378 HOSPITAL OBSERVATION PER HR: HCPCS

## 2019-03-17 PROCEDURE — 700111 HCHG RX REV CODE 636 W/ 250 OVERRIDE (IP): Performed by: HOSPITALIST

## 2019-03-17 PROCEDURE — 304561 HCHG STAT O2

## 2019-03-17 PROCEDURE — 71045 X-RAY EXAM CHEST 1 VIEW: CPT | Performed by: RADIOLOGY

## 2019-03-17 PROCEDURE — 93005 ELECTROCARDIOGRAM TRACING: CPT

## 2019-03-17 PROCEDURE — 71045 X-RAY EXAM CHEST 1 VIEW: CPT

## 2019-03-17 PROCEDURE — 96374 THER/PROPH/DIAG INJ IV PUSH: CPT

## 2019-03-17 PROCEDURE — 99214 OFFICE O/P EST MOD 30 MIN: CPT | Performed by: FAMILY MEDICINE

## 2019-03-17 PROCEDURE — 83605 ASSAY OF LACTIC ACID: CPT

## 2019-03-17 PROCEDURE — 99220 PR INITIAL OBSERVATION CARE,LEVL III: CPT | Performed by: HOSPITALIST

## 2019-03-17 PROCEDURE — 36415 COLL VENOUS BLD VENIPUNCTURE: CPT

## 2019-03-17 PROCEDURE — A9270 NON-COVERED ITEM OR SERVICE: HCPCS | Performed by: HOSPITALIST

## 2019-03-17 PROCEDURE — 700105 HCHG RX REV CODE 258: Performed by: HOSPITALIST

## 2019-03-17 PROCEDURE — 87502 INFLUENZA DNA AMP PROBE: CPT

## 2019-03-17 PROCEDURE — 81003 URINALYSIS AUTO W/O SCOPE: CPT

## 2019-03-17 PROCEDURE — 84145 PROCALCITONIN (PCT): CPT

## 2019-03-17 PROCEDURE — 99285 EMERGENCY DEPT VISIT HI MDM: CPT

## 2019-03-17 RX ORDER — AMOXICILLIN 250 MG
2 CAPSULE ORAL 2 TIMES DAILY
Status: DISCONTINUED | OUTPATIENT
Start: 2019-03-17 | End: 2019-03-20 | Stop reason: HOSPADM

## 2019-03-17 RX ORDER — CLOPIDOGREL BISULFATE 75 MG/1
75 TABLET ORAL DAILY
Status: DISCONTINUED | OUTPATIENT
Start: 2019-03-18 | End: 2019-03-20 | Stop reason: HOSPADM

## 2019-03-17 RX ORDER — PREDNISONE 20 MG/1
20 TABLET ORAL DAILY
Status: DISCONTINUED | OUTPATIENT
Start: 2019-03-17 | End: 2019-03-19

## 2019-03-17 RX ORDER — ONDANSETRON 2 MG/ML
4 INJECTION INTRAMUSCULAR; INTRAVENOUS EVERY 4 HOURS PRN
Status: DISCONTINUED | OUTPATIENT
Start: 2019-03-17 | End: 2019-03-20 | Stop reason: HOSPADM

## 2019-03-17 RX ORDER — METOPROLOL SUCCINATE 25 MG/1
50 TABLET, EXTENDED RELEASE ORAL DAILY
Status: DISCONTINUED | OUTPATIENT
Start: 2019-03-18 | End: 2019-03-20 | Stop reason: HOSPADM

## 2019-03-17 RX ORDER — BISACODYL 10 MG
10 SUPPOSITORY, RECTAL RECTAL
Status: DISCONTINUED | OUTPATIENT
Start: 2019-03-17 | End: 2019-03-20 | Stop reason: HOSPADM

## 2019-03-17 RX ORDER — OSELTAMIVIR PHOSPHATE 75 MG/1
75 CAPSULE ORAL EVERY 12 HOURS
Qty: 10 CAP | Refills: 0 | Status: ON HOLD | OUTPATIENT
Start: 2019-03-17 | End: 2019-03-18

## 2019-03-17 RX ORDER — OSELTAMIVIR PHOSPHATE 75 MG/1
75 CAPSULE ORAL ONCE
Status: COMPLETED | OUTPATIENT
Start: 2019-03-17 | End: 2019-03-17

## 2019-03-17 RX ORDER — ATORVASTATIN CALCIUM 40 MG/1
40 TABLET, FILM COATED ORAL DAILY
Status: DISCONTINUED | OUTPATIENT
Start: 2019-03-18 | End: 2019-03-20 | Stop reason: HOSPADM

## 2019-03-17 RX ORDER — ONDANSETRON 2 MG/ML
4 INJECTION INTRAMUSCULAR; INTRAVENOUS ONCE
Status: COMPLETED | OUTPATIENT
Start: 2019-03-17 | End: 2019-03-17

## 2019-03-17 RX ORDER — ACETAMINOPHEN 325 MG/1
650 TABLET ORAL EVERY 6 HOURS PRN
Status: DISCONTINUED | OUTPATIENT
Start: 2019-03-17 | End: 2019-03-20 | Stop reason: HOSPADM

## 2019-03-17 RX ORDER — SODIUM CHLORIDE, SODIUM LACTATE, POTASSIUM CHLORIDE, CALCIUM CHLORIDE 600; 310; 30; 20 MG/100ML; MG/100ML; MG/100ML; MG/100ML
500 INJECTION, SOLUTION INTRAVENOUS ONCE
Status: COMPLETED | OUTPATIENT
Start: 2019-03-17 | End: 2019-03-17

## 2019-03-17 RX ORDER — OSELTAMIVIR PHOSPHATE 75 MG/1
75 CAPSULE ORAL EVERY 12 HOURS
Status: DISCONTINUED | OUTPATIENT
Start: 2019-03-18 | End: 2019-03-19

## 2019-03-17 RX ORDER — ONDANSETRON 4 MG/1
4 TABLET, ORALLY DISINTEGRATING ORAL EVERY 4 HOURS PRN
Status: DISCONTINUED | OUTPATIENT
Start: 2019-03-17 | End: 2019-03-20 | Stop reason: HOSPADM

## 2019-03-17 RX ORDER — POLYETHYLENE GLYCOL 3350 17 G/17G
1 POWDER, FOR SOLUTION ORAL
Status: DISCONTINUED | OUTPATIENT
Start: 2019-03-17 | End: 2019-03-20 | Stop reason: HOSPADM

## 2019-03-17 RX ORDER — SODIUM CHLORIDE 9 MG/ML
INJECTION, SOLUTION INTRAVENOUS CONTINUOUS
Status: DISCONTINUED | OUTPATIENT
Start: 2019-03-17 | End: 2019-03-19

## 2019-03-17 RX ORDER — OMEPRAZOLE 20 MG/1
20 CAPSULE, DELAYED RELEASE ORAL DAILY
Status: DISCONTINUED | OUTPATIENT
Start: 2019-03-18 | End: 2019-03-20 | Stop reason: HOSPADM

## 2019-03-17 RX ORDER — SODIUM CHLORIDE 9 MG/ML
1000 INJECTION, SOLUTION INTRAVENOUS
Status: DISCONTINUED | OUTPATIENT
Start: 2019-03-17 | End: 2019-03-20 | Stop reason: HOSPADM

## 2019-03-17 RX ORDER — LOSARTAN POTASSIUM 25 MG/1
100 TABLET ORAL DAILY
Status: DISCONTINUED | OUTPATIENT
Start: 2019-03-18 | End: 2019-03-20 | Stop reason: HOSPADM

## 2019-03-17 RX ADMIN — SODIUM CHLORIDE: 9 INJECTION, SOLUTION INTRAVENOUS at 23:21

## 2019-03-17 RX ADMIN — OSELTAMIVIR PHOSPHATE 75 MG: 75 CAPSULE ORAL at 22:52

## 2019-03-17 RX ADMIN — SODIUM CHLORIDE, POTASSIUM CHLORIDE, SODIUM LACTATE AND CALCIUM CHLORIDE 500 ML: 600; 310; 30; 20 INJECTION, SOLUTION INTRAVENOUS at 21:28

## 2019-03-17 RX ADMIN — ASPIRIN 81 MG: 81 TABLET, COATED ORAL at 23:21

## 2019-03-17 RX ADMIN — PREDNISONE 20 MG: 20 TABLET ORAL at 23:21

## 2019-03-17 RX ADMIN — ONDANSETRON 4 MG: 2 INJECTION INTRAMUSCULAR; INTRAVENOUS at 21:39

## 2019-03-17 ASSESSMENT — ENCOUNTER SYMPTOMS
PND: 0
DIZZINESS: 0
BLOOD IN STOOL: 0
HEADACHES: 0
SENSORY CHANGE: 0
DOUBLE VISION: 0
SHORTNESS OF BREATH: 0
TREMORS: 0
COUGH: 1
DIZZINESS: 0
COUGH: 1
BLURRED VISION: 0
MYALGIAS: 0
VOMITING: 0
SINUS PAIN: 1
HEMOPTYSIS: 0
STRIDOR: 0
HEARTBURN: 0
FEVER: 0
MEMORY LOSS: 0
CONSTIPATION: 0
HEADACHES: 1
SPUTUM PRODUCTION: 1
ORTHOPNEA: 0
CHILLS: 1
CHILLS: 1
WEAKNESS: 1
SPEECH CHANGE: 0
NERVOUS/ANXIOUS: 0
SHORTNESS OF BREATH: 1
NECK PAIN: 0
CLAUDICATION: 0
FEVER: 0
PALPITATIONS: 0
FOCAL WEAKNESS: 0
HEMOPTYSIS: 0
MYALGIAS: 1
DEPRESSION: 0
BACK PAIN: 0
TINGLING: 0
ORTHOPNEA: 0
SORE THROAT: 0
PHOTOPHOBIA: 0
NAUSEA: 0
EYE PAIN: 0

## 2019-03-17 ASSESSMENT — COGNITIVE AND FUNCTIONAL STATUS - GENERAL
SUGGESTED CMS G CODE MODIFIER MOBILITY: CH
SUGGESTED CMS G CODE MODIFIER DAILY ACTIVITY: CH
DAILY ACTIVITIY SCORE: 24
MOBILITY SCORE: 24

## 2019-03-17 ASSESSMENT — PATIENT HEALTH QUESTIONNAIRE - PHQ9
1. LITTLE INTEREST OR PLEASURE IN DOING THINGS: NOT AT ALL
2. FEELING DOWN, DEPRESSED, IRRITABLE, OR HOPELESS: NOT AT ALL
SUM OF ALL RESPONSES TO PHQ9 QUESTIONS 1 AND 2: 0

## 2019-03-17 ASSESSMENT — LIFESTYLE VARIABLES
EVER_SMOKED: YES
DO YOU DRINK ALCOHOL: NO
ALCOHOL_USE: NO

## 2019-03-17 ASSESSMENT — COPD QUESTIONNAIRES
HAVE YOU SMOKED AT LEAST 100 CIGARETTES IN YOUR ENTIRE LIFE: NO/DON'T KNOW
COPD SCREENING SCORE: 3
DO YOU EVER COUGH UP ANY MUCUS OR PHLEGM?: YES, A FEW DAYS A WEEK OR MONTH
DURING THE PAST 4 WEEKS HOW MUCH DID YOU FEEL SHORT OF BREATH: NONE/LITTLE OF THE TIME
IN THE PAST 12 MONTHS DO YOU DO LESS THAN YOU USED TO BECAUSE OF YOUR BREATHING PROBLEMS: DISAGREE/UNSURE

## 2019-03-17 NOTE — PROGRESS NOTES
Subjective:      Griselda Farmer is a 74 y.o. female who presents with Cough (cough, chest tight, flu like symptoms all started yesterday.)      - This is a very pleasant, well and non-toxic appearing 74 y.o. female with complaints of woke up today w/ body aches, chest congestion and cough, stuffy dripping nose, malaise/fatigue.       - whilst in lobby waiting to come back patient starr she had a cpl minute spell where she felt lightheaded but did not pass out. Also had a second episode in exam room where she felt faint for a min or less. No associated cp/sob/palpitations           ALLERGIES:  Codeine     PMH:  Past Medical History:   Diagnosis Date   • Angina 2014    pt denies    • Arthritis     generalized + hands   • Dental disorder     upper and lower dentures   • Flushing reaction     has had full work up with cardiology, would awake with symptoms at night   • GERD (gastroesophageal reflux disease)    • Heart burn 2014    pt on prevacid   • Hyperlipidemia 10/20/2010   • Hypertension 2014    pt states well controlled on meds   • Indigestion    • Influenza    • Insomnia    • Liver cyst     has been seen by GI, ultrasound 9/12   • Myocardial infarct (HCC) 1984    pt denies states sometimes irreg rhythm   • Need for Tdap vaccination     in 2012   • FRIDA (obstructive sleep apnea)     states no cpap   • S/P colonoscopy 11/9/2012   • Sick sinus syndrome (HCC) 1/1/2017   • Sleep apnea    • Symptomatic sinus bradycardia 1/1/2017   • Unspecified urinary incontinence    • Urinary bladder disorder         PSH:  Past Surgical History:   Procedure Laterality Date   • [facility] CARDIAC CATH  1/19/17    Stents patent.   • Z CARDIAC CATH  1/1/17    Overlapping Synergy stents to 99% Circ   • OTHER CARDIAC SURGERY  January 2017    NON STEMI with stent   • KNEE ARTHROPLASTY TOTAL  12/16/2014    Performed by Jose G Trotter M.D. at [facility]   • CATARACT PHACO WITH IOL  10/3/2013    Performed by Sylvester Raza,  M.D. at SUR[facility]ERY SUR[facility]   • CATARACT PHACO WITH IOL  9/19/2013    Performed by Sylvester Raza M.D. at [facility] SUR[facility]   • RECOVERY  3/29/2013    Performed by [facility] at [facility] SAME DAY [facility]   • HYSTERECTOMY, VAGINAL      ovaries were left   • TONSILLECTOMY         MEDS:    Current Outpatient Prescriptions:   •  metoprolol SR (TOPROL XL) 50 MG TABLET SR 24 HR, Take 1 Tab by mouth every day., Disp: 30 Tab, Rfl: 11  •  atorvastatin (LIPITOR) 40 MG Tab, Take 1 Tab by mouth every day., Disp: 90 Tab, Rfl: 3  •  clopidogrel (PLAVIX) 75 MG Tab, Take 1 Tab by mouth every day., Disp: 90 Tab, Rfl: 3  •  losartan (COZAAR) 100 MG Tab, Take 1 Tab by mouth every day., Disp: 90 Tab, Rfl: 3  •  Diclofenac Sodium 1 % Gel, Apply 2 g to skin as directed 2 times a day as needed. Apply 2 gram on affected shoulder twice a day as needed., Disp: , Rfl:   •  acetaminophen (TYLENOL 8 HOUR) 650 MG CR tablet, Take 1,300 mg by mouth every 6 hours as needed for Moderate Pain., Disp: , Rfl:   •  lansoprazole (PREVACID) 30 MG CAPSULE DELAYED RELEASE, Take 1 Cap by mouth every day., Disp: 90 Cap, Rfl: 3  •  nitroglycerin (NITROSTAT) 0.4 MG SL Tab, Place 1 Tab under tongue as needed for Chest Pain (up to 3 doses (if SBP greater than 90 mmHg))., Disp: 25 Tab, Rfl: 3  •  Pumpkin Seed-Soy Germ (AZO BLADDER CONTROL/GO-LESS PO), Take 1 Cap by mouth every bedtime., Disp: , Rfl:   •  aspirin EC (ECOTRIN) 81 MG Tablet Delayed Response, Take 81 mg by mouth every evening., Disp: 30 Tab, Rfl: 0  •  Cholecalciferol (VITAMIN D) 2000 UNIT TABS, Take 1 Tab by mouth every evening., Disp: , Rfl:     ** I have documented what I find to be significant in regards to past medical, social, family and surgical history  in my HPI or under PMH/PSH/FH review section, otherwise it is contributory **           HPI    Review of Systems   Constitutional: Positive for chills and malaise/fatigue. Negative for fever ( ?no sure but felt like  "it).   HENT: Positive for congestion and sinus pain.    Respiratory: Positive for cough. Negative for hemoptysis and shortness of breath.    Cardiovascular: Negative for chest pain and orthopnea.   Musculoskeletal: Positive for myalgias.   Neurological: Positive for headaches. Negative for dizziness and focal weakness.          Objective:     /70   Pulse 86   Temp 36.2 °C (97.1 °F)   Resp 16   Ht 1.626 m (5' 4\")   Wt 94.3 kg (208 lb)   SpO2 94%   BMI 35.70 kg/m²      Physical Exam   Constitutional: She appears well-developed. No distress.   HENT:   Head: Normocephalic and atraumatic.   Mouth/Throat: Oropharynx is clear and moist.   Eyes: Conjunctivae are normal.   Neck: Neck supple.   Cardiovascular: Regular rhythm.    No murmur heard.  Pulmonary/Chest: Effort normal and breath sounds normal. No respiratory distress.   Neurological: She is alert. She exhibits normal muscle tone.   Skin: Skin is warm and dry.   Psychiatric: She has a normal mood and affect. Judgment normal.   Nursing note and vitals reviewed.              Assessment/Plan:         1. Flu-like symptoms  POCT Influenza A/B           I asked patient to go to ER. Discussed her EKG being abnormal and her having 2 dizzy spells requires further work up in ER to make sure there is nothing serious going on. She declined and says she just wants to take flu meds and go home and if she develops another dizzy spell she will have friend take her to ER. Her friend lives with her and can watch her                  "

## 2019-03-18 ENCOUNTER — APPOINTMENT (OUTPATIENT)
Dept: CARDIOLOGY | Facility: MEDICAL CENTER | Age: 74
End: 2019-03-18
Attending: INTERNAL MEDICINE
Payer: MEDICARE

## 2019-03-18 PROBLEM — J96.01 ACUTE RESPIRATORY FAILURE WITH HYPOXIA (HCC): Status: ACTIVE | Noted: 2019-03-17

## 2019-03-18 LAB
ALBUMIN SERPL BCP-MCNC: 3.4 G/DL (ref 3.2–4.9)
ALBUMIN/GLOB SERPL: 1.2 G/DL
ALP SERPL-CCNC: 74 U/L (ref 30–99)
ALT SERPL-CCNC: 20 U/L (ref 2–50)
ANION GAP SERPL CALC-SCNC: 9 MMOL/L (ref 0–11.9)
APPEARANCE UR: CLEAR
AST SERPL-CCNC: 23 U/L (ref 12–45)
BASOPHILS # BLD AUTO: 0.5 % (ref 0–1.8)
BASOPHILS # BLD: 0.05 K/UL (ref 0–0.12)
BILIRUB SERPL-MCNC: 0.7 MG/DL (ref 0.1–1.5)
BILIRUB UR QL STRIP.AUTO: NEGATIVE
BNP SERPL-MCNC: 160 PG/ML (ref 0–100)
BUN SERPL-MCNC: 10 MG/DL (ref 8–22)
CALCIUM SERPL-MCNC: 8.9 MG/DL (ref 8.4–10.2)
CHLORIDE SERPL-SCNC: 100 MMOL/L (ref 96–112)
CO2 SERPL-SCNC: 25 MMOL/L (ref 20–33)
COLOR UR: YELLOW
CREAT SERPL-MCNC: 0.97 MG/DL (ref 0.5–1.4)
EOSINOPHIL # BLD AUTO: 0 K/UL (ref 0–0.51)
EOSINOPHIL NFR BLD: 0 % (ref 0–6.9)
ERYTHROCYTE [DISTWIDTH] IN BLOOD BY AUTOMATED COUNT: 49.2 FL (ref 35.9–50)
GLOBULIN SER CALC-MCNC: 2.9 G/DL (ref 1.9–3.5)
GLUCOSE SERPL-MCNC: 127 MG/DL (ref 65–99)
GLUCOSE UR STRIP.AUTO-MCNC: NEGATIVE MG/DL
HCT VFR BLD AUTO: 39.5 % (ref 37–47)
HGB BLD-MCNC: 12.9 G/DL (ref 12–16)
IMM GRANULOCYTES # BLD AUTO: 0.04 K/UL (ref 0–0.11)
IMM GRANULOCYTES NFR BLD AUTO: 0.4 % (ref 0–0.9)
KETONES UR STRIP.AUTO-MCNC: NEGATIVE MG/DL
LEUKOCYTE ESTERASE UR QL STRIP.AUTO: NEGATIVE
LV EJECT FRACT  99904: 70
LV EJECT FRACT MOD 2C 99903: 78.52
LV EJECT FRACT MOD 4C 99902: 76.95
LV EJECT FRACT MOD BP 99901: 77.88
LYMPHOCYTES # BLD AUTO: 0.37 K/UL (ref 1–4.8)
LYMPHOCYTES NFR BLD: 3.8 % (ref 22–41)
MCH RBC QN AUTO: 30.5 PG (ref 27–33)
MCHC RBC AUTO-ENTMCNC: 32.7 G/DL (ref 33.6–35)
MCV RBC AUTO: 93.4 FL (ref 81.4–97.8)
MICRO URNS: NORMAL
MONOCYTES # BLD AUTO: 0.71 K/UL (ref 0–0.85)
MONOCYTES NFR BLD AUTO: 7.3 % (ref 0–13.4)
NEUTROPHILS # BLD AUTO: 8.58 K/UL (ref 2–7.15)
NEUTROPHILS NFR BLD: 88 % (ref 44–72)
NITRITE UR QL STRIP.AUTO: NEGATIVE
NRBC # BLD AUTO: 0 K/UL
NRBC BLD-RTO: 0 /100 WBC
PH UR STRIP.AUTO: 5.5 [PH]
PLATELET # BLD AUTO: 178 K/UL (ref 164–446)
PMV BLD AUTO: 12.3 FL (ref 9–12.9)
POTASSIUM SERPL-SCNC: 3.9 MMOL/L (ref 3.6–5.5)
PROCALCITONIN SERPL-MCNC: <0.05 NG/ML
PROT SERPL-MCNC: 6.3 G/DL (ref 6–8.2)
PROT UR QL STRIP: NEGATIVE MG/DL
RBC # BLD AUTO: 4.23 M/UL (ref 4.2–5.4)
RBC UR QL AUTO: NEGATIVE
SODIUM SERPL-SCNC: 134 MMOL/L (ref 135–145)
SP GR UR STRIP.AUTO: 1.02
WBC # BLD AUTO: 9.8 K/UL (ref 4.8–10.8)

## 2019-03-18 PROCEDURE — 700102 HCHG RX REV CODE 250 W/ 637 OVERRIDE(OP): Performed by: HOSPITALIST

## 2019-03-18 PROCEDURE — 700101 HCHG RX REV CODE 250: Performed by: INTERNAL MEDICINE

## 2019-03-18 PROCEDURE — 700111 HCHG RX REV CODE 636 W/ 250 OVERRIDE (IP): Performed by: HOSPITALIST

## 2019-03-18 PROCEDURE — 83880 ASSAY OF NATRIURETIC PEPTIDE: CPT

## 2019-03-18 PROCEDURE — G0378 HOSPITAL OBSERVATION PER HR: HCPCS

## 2019-03-18 PROCEDURE — A9270 NON-COVERED ITEM OR SERVICE: HCPCS | Performed by: HOSPITALIST

## 2019-03-18 PROCEDURE — 94760 N-INVAS EAR/PLS OXIMETRY 1: CPT

## 2019-03-18 PROCEDURE — 93306 TTE W/DOPPLER COMPLETE: CPT

## 2019-03-18 PROCEDURE — 93306 TTE W/DOPPLER COMPLETE: CPT | Mod: 26 | Performed by: INTERNAL MEDICINE

## 2019-03-18 PROCEDURE — 85025 COMPLETE CBC W/AUTO DIFF WBC: CPT

## 2019-03-18 PROCEDURE — 700105 HCHG RX REV CODE 258: Performed by: HOSPITALIST

## 2019-03-18 PROCEDURE — 99225 PR SUBSEQUENT OBSERVATION CARE,LEVEL II: CPT | Performed by: INTERNAL MEDICINE

## 2019-03-18 PROCEDURE — 80053 COMPREHEN METABOLIC PANEL: CPT

## 2019-03-18 PROCEDURE — 94640 AIRWAY INHALATION TREATMENT: CPT

## 2019-03-18 RX ORDER — PREDNISONE 20 MG/1
20 TABLET ORAL DAILY
Qty: 30 TAB | Refills: 0 | Status: SHIPPED | OUTPATIENT
Start: 2019-03-18 | End: 2019-03-21

## 2019-03-18 RX ORDER — IPRATROPIUM BROMIDE AND ALBUTEROL SULFATE 2.5; .5 MG/3ML; MG/3ML
3 SOLUTION RESPIRATORY (INHALATION)
Status: DISCONTINUED | OUTPATIENT
Start: 2019-03-18 | End: 2019-03-18

## 2019-03-18 RX ORDER — OSELTAMIVIR PHOSPHATE 75 MG/1
75 CAPSULE ORAL EVERY 12 HOURS
Status: ON HOLD | COMMUNITY
Start: 2019-03-17 | End: 2019-03-18

## 2019-03-18 RX ORDER — ALBUTEROL SULFATE 90 UG/1
2 AEROSOL, METERED RESPIRATORY (INHALATION) EVERY 6 HOURS PRN
Qty: 8.5 G | Refills: 1 | Status: SHIPPED | OUTPATIENT
Start: 2019-03-18 | End: 2019-07-08

## 2019-03-18 RX ORDER — ATORVASTATIN CALCIUM 40 MG/1
40 TABLET, FILM COATED ORAL NIGHTLY
COMMUNITY
End: 2019-04-03 | Stop reason: SDUPTHER

## 2019-03-18 RX ORDER — OSELTAMIVIR PHOSPHATE 75 MG/1
75 CAPSULE ORAL EVERY 12 HOURS
Qty: 9 CAP | Refills: 0 | Status: SHIPPED | OUTPATIENT
Start: 2019-03-18 | End: 2019-03-22

## 2019-03-18 RX ORDER — IPRATROPIUM BROMIDE AND ALBUTEROL SULFATE 2.5; .5 MG/3ML; MG/3ML
3 SOLUTION RESPIRATORY (INHALATION)
Status: DISCONTINUED | OUTPATIENT
Start: 2019-03-18 | End: 2019-03-20 | Stop reason: HOSPADM

## 2019-03-18 RX ADMIN — LOSARTAN POTASSIUM 100 MG: 25 TABLET, FILM COATED ORAL at 06:18

## 2019-03-18 RX ADMIN — ACETAMINOPHEN 650 MG: 325 TABLET, FILM COATED ORAL at 18:07

## 2019-03-18 RX ADMIN — SODIUM CHLORIDE: 9 INJECTION, SOLUTION INTRAVENOUS at 19:25

## 2019-03-18 RX ADMIN — IPRATROPIUM BROMIDE AND ALBUTEROL SULFATE 3 ML: .5; 3 SOLUTION RESPIRATORY (INHALATION) at 16:29

## 2019-03-18 RX ADMIN — ENOXAPARIN SODIUM 40 MG: 100 INJECTION SUBCUTANEOUS at 06:18

## 2019-03-18 RX ADMIN — CLOPIDOGREL BISULFATE 75 MG: 75 TABLET ORAL at 06:16

## 2019-03-18 RX ADMIN — OSELTAMIVIR PHOSPHATE 75 MG: 75 CAPSULE ORAL at 18:04

## 2019-03-18 RX ADMIN — ACETAMINOPHEN 650 MG: 325 TABLET, FILM COATED ORAL at 04:26

## 2019-03-18 RX ADMIN — OMEPRAZOLE 20 MG: 20 CAPSULE, DELAYED RELEASE ORAL at 06:17

## 2019-03-18 RX ADMIN — IPRATROPIUM BROMIDE AND ALBUTEROL SULFATE 3 ML: .5; 3 SOLUTION RESPIRATORY (INHALATION) at 19:22

## 2019-03-18 RX ADMIN — METOPROLOL SUCCINATE 50 MG: 25 TABLET, EXTENDED RELEASE ORAL at 06:17

## 2019-03-18 RX ADMIN — OSELTAMIVIR PHOSPHATE 75 MG: 75 CAPSULE ORAL at 06:17

## 2019-03-18 RX ADMIN — ATORVASTATIN CALCIUM 40 MG: 40 TABLET, FILM COATED ORAL at 06:17

## 2019-03-18 RX ADMIN — ASPIRIN 81 MG: 81 TABLET, COATED ORAL at 18:04

## 2019-03-18 ASSESSMENT — ENCOUNTER SYMPTOMS
BLOOD IN STOOL: 0
MYALGIAS: 1
VOMITING: 0
HEARTBURN: 0
ABDOMINAL PAIN: 0
DIARRHEA: 0
NAUSEA: 0
FOCAL WEAKNESS: 0
WEAKNESS: 0
HALLUCINATIONS: 0
CHILLS: 0
DIZZINESS: 0
PALPITATIONS: 0
COUGH: 0
SHORTNESS OF BREATH: 1
HEADACHES: 0
SORE THROAT: 0
WHEEZING: 1
FEVER: 0
DEPRESSION: 0
BACK PAIN: 0

## 2019-03-18 ASSESSMENT — COPD QUESTIONNAIRES
COPD SCREENING SCORE: 3
DURING THE PAST 4 WEEKS HOW MUCH DID YOU FEEL SHORT OF BREATH: NONE/LITTLE OF THE TIME
DO YOU EVER COUGH UP ANY MUCUS OR PHLEGM?: YES, A FEW DAYS A WEEK OR MONTH
HAVE YOU SMOKED AT LEAST 100 CIGARETTES IN YOUR ENTIRE LIFE: NO/DON'T KNOW

## 2019-03-18 ASSESSMENT — PATIENT HEALTH QUESTIONNAIRE - PHQ9
SUM OF ALL RESPONSES TO PHQ9 QUESTIONS 1 AND 2: 0
1. LITTLE INTEREST OR PLEASURE IN DOING THINGS: NOT AT ALL
2. FEELING DOWN, DEPRESSED, IRRITABLE, OR HOPELESS: NOT AT ALL

## 2019-03-18 ASSESSMENT — LIFESTYLE VARIABLES: EVER_SMOKED: YES

## 2019-03-18 NOTE — PROGRESS NOTES
Hospital Medicine Daily Progress Note    Date of Service  3/18/2019    Chief Complaint  SOB    Hospital Course    74 y.o. female history of coronary artery disease, hypertension, obesity, untreated sleep apnea admitted 3/17/2019 with shortness of breath, fatigue and myalgias found to have influenza A.  She also had acute respiratory failure with hypoxia      Interval Problem Update  3/18: She was weaned to room air at rest, desaturated to low 80s with ambulation.  Echocardiogram ordered, duo nebs scheduled.  She may need home O2 if her issue is uncontrolled FRIDA.  Cont prednisone/tamiflu     Consultants/Specialty  None    Code Status  Full    Disposition  F/U O2 saturations, may need home O2    Review of Systems  Review of Systems   Constitutional: Negative for chills, fever and malaise/fatigue.   HENT: Negative for sore throat.    Respiratory: Positive for shortness of breath and wheezing. Negative for cough.    Cardiovascular: Negative for chest pain and palpitations.   Gastrointestinal: Negative for abdominal pain, blood in stool, diarrhea, heartburn, nausea and vomiting.   Genitourinary: Negative for dysuria and frequency.   Musculoskeletal: Positive for myalgias. Negative for back pain.   Neurological: Negative for dizziness, focal weakness, weakness and headaches.   Psychiatric/Behavioral: Negative for depression and hallucinations.   All other systems reviewed and are negative.       Physical Exam  Temp:  [36.7 °C (98 °F)-37.3 °C (99.2 °F)] 36.7 °C (98 °F)  Pulse:  [49-88] 49  Resp:  [16-20] 16  BP: (108-137)/(45-97) 110/54  SpO2:  [90 %-97 %] 92 %    Physical Exam   Constitutional: She appears well-developed and well-nourished. No distress.   HENT:   Head: Normocephalic.   Mouth/Throat: No oropharyngeal exudate.   Eyes: Pupils are equal, round, and reactive to light. No scleral icterus.   Neck: No JVD present. No thyromegaly present.   Cardiovascular: Normal rate and regular rhythm.    No murmur  heard.  Pulmonary/Chest: No respiratory distress. She has wheezes. She exhibits no tenderness.   Abdominal: She exhibits no distension. There is no tenderness. There is no rebound.   Obese   Musculoskeletal: Normal range of motion. She exhibits no edema.   Neurological: She is alert. No cranial nerve deficit.   Skin: Skin is warm and dry. No rash noted. No erythema.   Psychiatric: Her behavior is normal.       Fluids    Intake/Output Summary (Last 24 hours) at 03/18/19 1539  Last data filed at 03/18/19 0400   Gross per 24 hour   Intake              100 ml   Output              200 ml   Net             -100 ml       Laboratory  Recent Labs      03/17/19 2115 03/18/19   0422   WBC  12.7*  9.8   RBC  4.66  4.23   HEMOGLOBIN  14.0  12.9   HEMATOCRIT  42.2  39.5   MCV  90.6  93.4   MCH  30.0  30.5   MCHC  33.2*  32.7*   RDW  46.6  49.2   PLATELETCT  198  178   MPV  11.3  12.3     Recent Labs      03/17/19 2115 03/18/19   0422   SODIUM  132*  134*   POTASSIUM  3.7  3.9   CHLORIDE  99  100   CO2  23  25   GLUCOSE  126*  127*   BUN  13  10   CREATININE  0.86  0.97   CALCIUM  9.2  8.9                   Imaging  DX-CHEST-PORTABLE (1 VIEW)   Final Result      No acute cardiopulmonary abnormality.      EC-ECHOCARDIOGRAM COMPLETE W/O CONT    (Results Pending)        Assessment/Plan  * Sepsis (HCC)   Assessment & Plan    This is sepsis (without associated acute organ dysfunction).   2/2 to Influenza  CXR:neg for acute findings  Pro-calcitonin negative  Continue RT protocol, duo nebs, Pep therapy if warranted, and incentive spirometry.        Hyponatremia   Assessment & Plan    2/2 to dehydration from sepsis.  IV fluids started  Recheck bmp in the am     Acute respiratory failure with hypoxia (HCC)   Assessment & Plan    2/2 to URI, influenza, was 88% on room air arrival, 2L provided.  Her wheezing improved but she still desaturated to low 80s on room air with ambulation.  Patient states history of untreated sleep  apnea  Continue RT protocol, duo nebs, Pep therapy if warranted, and incentive spirometry.   Prednisone 40mg x 3 doses.  I ordered an echo to reevaluate for possible pulmonary hypertension  Check BNP  Continue Tamiflu  Scheduled duo nebs     S/P coronary artery stent placement- (present on admission)   Assessment & Plan    As above, 2x stent placement  Denies chest pain  Resume metoprolol, lipitor and aspirin/plavix.     CAD (coronary artery disease)- (present on admission)   Assessment & Plan    Hx of cardiac stent placement  Resume home dose of aspirin,statin,b-blocker.        Gastroesophageal reflux disease without esophagitis- (present on admission)   Assessment & Plan    Continue with prevacid      Mixed hyperlipidemia- (present on admission)   Assessment & Plan    Continue with lipitor.      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  Her sleep apnea is poorly controlled and she does have hypoxia with ambulation which I suspect is partially related to her untreated sleep apnea.  -Monitor O2 saturations at night, she may need nightly rather than CPAP mask if she is not able to tolerate it  -Echo ordered     Essential hypertension, benign- (present on admission)   Assessment & Plan    Controlled  Resume losartan 100mg daily and metoprolol 50mg XL          VTE prophylaxis: Lovenox

## 2019-03-18 NOTE — H&P
Hospital Medicine History & Physical Note    Date of Service  3/17/2019    Primary Care Physician  Brittney Hoskins M.D.    Consultants  None    Code Status  Full Code    Chief Complaint  Chief Complaint   Patient presents with   • Chest Pressure     Pt c/o chest tightness with an episode of vomiting tonight   • Flu Like Symptoms     Pt c/o cough, fever/chills/, sore throat    • Shortness of Breath     Started initially last night; but has worsened over the day   • Sent from Urgent Care     Pt went to  with above symptoms; pt was encourged by the  team to go to ER via ambulance however pt states she was feeling good at that time and refused; pt states she feels worse now       History of Presenting Illness  Darian is a very pleasant 74 y.o. female with a past medical history of CAD hx of 2 stents, HTN,HLD,  presented to the emergency room on 3/17/2019 for evaluation of cough, chest congestion as well as chills which she started experiencing last night.  She initially felt that her throat was scratchy although was feeling well went to bed.  When she awoke she started having subjective fevers chills mild productive cough clear sputum.  Also describes having mild chest tightness when she takes a deep breath.  She says she just does not feel like when she had cardiac issues including a heart attack.  Patient was apparently given Tamiflu at the urgent care clinic however the influenza test was negative.  Unfortunately patient was unable to tolerate oral Tamiflu and had vomiting which she took this.  Nevertheless patient denies having any chest pain, abdominal pain, dysuria or diarrhea.    Review of Systems  Review of Systems   Constitutional: Positive for chills and malaise/fatigue. Negative for fever.   HENT: Negative for congestion, hearing loss, sore throat and tinnitus.    Eyes: Negative for blurred vision, double vision, photophobia and pain.   Respiratory: Positive for cough, sputum production and shortness of  breath. Negative for hemoptysis and stridor.    Cardiovascular: Negative for chest pain, palpitations, orthopnea, claudication and PND.   Gastrointestinal: Negative for blood in stool, constipation, heartburn, melena, nausea and vomiting.   Genitourinary: Negative for dysuria, frequency and urgency.   Musculoskeletal: Negative for back pain, myalgias and neck pain.   Neurological: Positive for weakness. Negative for dizziness, tingling, tremors, sensory change, speech change and headaches.   Psychiatric/Behavioral: Negative for depression, memory loss and suicidal ideas. The patient is not nervous/anxious.        Past Medical History  Past Medical History:   Diagnosis Date   • Sick sinus syndrome (HCC) 1/1/2017   • Symptomatic sinus bradycardia 1/1/2017   • Heart burn 2014    pt on prevacid   • Angina 2014    pt denies    • Hypertension 2014    pt states well controlled on meds   • S/P colonoscopy 11/9/2012   • Hyperlipidemia 10/20/2010   • Myocardial infarct (HCC) 1984    pt denies states sometimes irreg rhythm   • Arthritis     generalized + hands   • Dental disorder     upper and lower dentures   • Flushing reaction     has had full work up with cardiology, would awake with symptoms at night   • GERD (gastroesophageal reflux disease)    • Indigestion    • Influenza    • Insomnia    • Liver cyst     has been seen by GI, ultrasound 9/12   • Need for Tdap vaccination     in 2012   • FRIDA (obstructive sleep apnea)     states no cpap   • Sleep apnea    • Unspecified urinary incontinence    • Urinary bladder disorder        Surgical History   has a past surgical history that includes tonsillectomy; hysterectomy, vaginal; recovery (3/29/2013); cataract phaco with iol (9/19/2013); cataract phaco with iol (10/3/2013); knee arthroplasty total (12/16/2014); [facility] cardiac cath (1/1/17); [facility] cardiac cath (1/19/17); and other cardiac surgery (January 2017).    Family History  family history includes Arthritis in her brother,  brother, sister, and sister; Cancer in her father; Diabetes in her mother.    Social History   reports that she quit smoking about 43 years ago. Her smoking use included Cigarettes. She has a 7.50 pack-year smoking history. She has never used smokeless tobacco. She reports that she does not drink alcohol or use drugs.    Allergies  Allergies   Allergen Reactions   • Codeine Itching and Vomiting     Rxn = years ago          Medications  Prior to Admission medications    Medication Sig Start Date End Date Taking? Authorizing Provider   oseltamivir (TAMIFLU) 75 MG Cap Take 1 Cap by mouth every 12 hours for 5 days. 3/17/19 3/22/19  Ottoniel Corado M.D.   metoprolol SR (TOPROL XL) 50 MG TABLET SR 24 HR Take 1 Tab by mouth every day. 12/10/18   Anastacia Saunders M.D.   atorvastatin (LIPITOR) 40 MG Tab Take 1 Tab by mouth every day. 12/10/18   Anastacia Saunders M.D.   clopidogrel (PLAVIX) 75 MG Tab Take 1 Tab by mouth every day. 12/10/18   Anastacia Saunders M.D.   losartan (COZAAR) 100 MG Tab Take 1 Tab by mouth every day. 12/10/18   Anastacia Saunders M.D.   Diclofenac Sodium 1 % Gel Apply 2 g to skin as directed 2 times a day as needed. Apply 2 gram on affected shoulder twice a day as needed.    Physician Outpatient   acetaminophen (TYLENOL 8 HOUR) 650 MG CR tablet Take 1,300 mg by mouth every 6 hours as needed for Moderate Pain.    Physician Outpatient   lansoprazole (PREVACID) 30 MG CAPSULE DELAYED RELEASE Take 1 Cap by mouth every day. 5/15/18   Brittney Hoskins M.D.   nitroglycerin (NITROSTAT) 0.4 MG SL Tab Place 1 Tab under tongue as needed for Chest Pain (up to 3 doses (if SBP greater than 90 mmHg)). 4/3/18   Brittney Hoskins M.D.   Pumpkin Seed-Soy Germ (AZO BLADDER CONTROL/GO-LESS PO) Take 1 Cap by mouth every bedtime.    Physician Outpatient   aspirin EC (ECOTRIN) 81 MG Tablet Delayed Response Take 81 mg by mouth every evening. 1/3/17   Chavez Hernandez M.D.   Cholecalciferol (VITAMIN D) 2000 UNIT TABS Take 1  Tab by mouth every evening.    Physician Outpatient       Physical Exam  Temp:  [37.2 °C (99 °F)] 37.2 °C (99 °F)  Pulse:  [87-88] 87  Resp:  [20] 20  BP: (137)/(97) 137/97  SpO2:  [92 %-94 %] 92 %  Physical Exam   Constitutional: She is oriented to person, place, and time. She appears well-developed and well-nourished. No distress.   HENT:   Head: Normocephalic and atraumatic.   Mouth/Throat: No oropharyngeal exudate.   Mucous membrane dry     Eyes: Pupils are equal, round, and reactive to light. Conjunctivae are normal. Right eye exhibits no discharge. No scleral icterus.   Neck: Neck supple. No JVD present. No thyromegaly present.   Cardiovascular: Normal rate and intact distal pulses.    No murmur heard.  Pulmonary/Chest: Effort normal. No stridor. No respiratory distress. She has wheezes (mild wheezing expiratory ). She has no rales.   Abdominal: Soft. Bowel sounds are normal. She exhibits no distension. There is no tenderness. There is no rebound.   Musculoskeletal: Normal range of motion. She exhibits no edema.   Neurological: She is alert and oriented to person, place, and time. No cranial nerve deficit.   Skin: Skin is warm. She is not diaphoretic. No erythema.   Psychiatric: She has a normal mood and affect. Her behavior is normal. Thought content normal.   Nursing note and vitals reviewed.      Laboratory:  Recent Labs      03/17/19   2115   WBC  12.7*   RBC  4.66   HEMOGLOBIN  14.0   HEMATOCRIT  42.2   MCV  90.6   MCH  30.0   MCHC  33.2*   RDW  46.6   PLATELETCT  198   MPV  11.3         No results for input(s): ALTSGPT, ASTSGOT, ALKPHOSPHAT, TBILIRUBIN, DBILIRUBIN, GAMMAGT, AMYLASE, LIPASE, ALB, PREALBUMIN, GLUCOSE in the last 72 hours.            Urinalysis:          Imaging:  DX-CHEST-PORTABLE (1 VIEW)    (Results Pending)       Assessment/Plan:  I anticipate this patient will require at least two midnights for appropriate medical management, necessitating inpatient admission.    * Sepsis (HCC)    Assessment & Plan    This is sepsis (without associated acute organ dysfunction).   2/2 to Influenza  CXR:neg for acute findings  Pro-calcitonin ordered, if positive we will start antibiotics, otherwise tamiflu for influenza.  Continue RT protocol, duo nebs, Pep therapy if warranted, and incentive spirometry.        Hyponatremia   Assessment & Plan    2/2 to dehydration from sepsis.  IV fluids started  Recheck bmp in the am     Shortness of breath   Assessment & Plan    2/2 to URI, influenza, was 88% on room air arrival, 2L provided.  tamiflu started.  Continue RT protocol, duo nebs, Pep therapy if warranted, and incentive spirometry.   Prednisone 20mg x 3 doses.       S/P coronary artery stent placement- (present on admission)   Assessment & Plan    As above, 2x stent placement  Denies chest pain  Resume metoprolol, lipitor and aspirin/plavix.     CAD (coronary artery disease)- (present on admission)   Assessment & Plan    Hx of cardiac stent placement  Resume home dose of aspirin,statin,b-blocker.        Gastroesophageal reflux disease without esophagitis- (present on admission)   Assessment & Plan    Continue with prevacid      Mixed hyperlipidemia- (present on admission)   Assessment & Plan    Continue with lipitor.      Essential hypertension, benign- (present on admission)   Assessment & Plan    Controlled  Resume losartan 100mg daily and metoprolol 50mg XL         VTE prophylaxis: Prophylaxis: lovenox

## 2019-03-18 NOTE — ASSESSMENT & PLAN NOTE
2/2 to URI, influenza, was 88% on room air arrival, 2L provided.  She continues to have wheezing and hypoxia. Hypoxia worse with exertion.  Continue RT protocol, duo nebs, Pep therapy if warranted, and incentive spirometry.   Stop prednisone change to iv solumedrol  40mg iv every 8 hours  Echo shows no pulmonary hypertension  Finish oseltamivir.

## 2019-03-18 NOTE — CARE PLAN
Problem: Safety  Goal: Will remain free from injury  Outcome: PROGRESSING AS EXPECTED  Pt instructed to use call bell for assistance oob while hooked up to oxygen and iv fluids.    Problem: Bowel/Gastric:  Goal: Normal bowel function is maintained or improved  Outcome: PROGRESSING AS EXPECTED  Pt reports having 3 formed bowel movements this am. Senna ordered if needed, pt refusing at this time

## 2019-03-18 NOTE — FLOWSHEET NOTE
03/18/19 0301   Type of Assessment   Assessment Yes   Patient History   Pulmonary Diagnosis flu A   Home O2 Use Prior To Admission? No   Home Treatments/Frequency No   COPD Risk Screening   Do you have a history of COPD? No   COPD Population Screener   During the past 4 weeks, how much did you feel short of breath? 0   Do you ever cough up any mucus or phlegm? 1   In the past 12 months, you do less than you used to because of your breathing problems 0   Have you smoked at least 100 cigarettes in your entire life? 0   How old are you? 2   COPD Screening Score 3   Smoking History   Have you ever smoked Yes   Have you smoked in the last 12 months No   Confirm Quit Date 03/17/79   Level Of Consciousness   Level of Consciousness Alert   Respiratory WDL   Respiratory (WDL) WDL   Chest Exam   Respiration 16   Pulse 72   Secretions   Cough Dry   Protocol Pathways   Protocol Pathways Yes   Incentive Spirometer Instruct   Predicted (ml) 2000   60% (ml) 1200   40% (ml) 800   Actual (ml) 1000

## 2019-03-18 NOTE — DISCHARGE SUMMARY
Discharge Summary    CHIEF COMPLAINT ON ADMISSION  Chief Complaint   Patient presents with   • Chest Pressure     Pt c/o chest tightness with an episode of vomiting tonight   • Flu Like Symptoms     Pt c/o cough, fever/chills/, sore throat    • Shortness of Breath     Started initially last night; but has worsened over the day   • Sent from Urgent Care     Pt went to  with above symptoms; pt was encourged by the  team to go to ER via ambulance however pt states she was feeling good at that time and refused; pt states she feels worse now       Reason for Admission  Sent by urgent care/ flu like symp*     Admission Date  3/17/2019    CODE STATUS  Full Code    HPI & HOSPITAL COURSE  This is a 74 y.o. Female with a history of remote tobacco abuse, coronary artery disease status post stent x2, HTN, HLD here with SOB and wheezing associated with malaise and chills found to have positive influenza A.  She also had acute respiratory failure with hypoxia requiring 2 L and was noted to have wheezing on exam.  She was started on a low-dose of prednisone as well as DuoNeb therapy.  Chest x-ray was negative for any evidence of pneumonia.  She was admitted for supportive care, Tamiflu and monitoring overnight.  She was provided supplemental oxygen.  With steroids and breathing treatments as well as Tamiflu therapy her wheezing improved.  She was able to wean to room air but with ambulation her saturations dropped into low 80s.    She gave further history of sleep apnea however is not currently wearing a CPAP mask.  Echocardiogram was ordered, no concerning lesions seen. I placed her on high dose IV methylprednisolone with rapid improvement in her condition. She will be sent home on supplemental oxygen and a methylprednisolone oral taper.       Therefore, she is discharged in good and stable condition to home with close outpatient follow-up.        Discharge Date  3/20/2019    FOLLOW UP ITEMS POST DISCHARGE  Follow-up with  primary within 1-2 weeks    DISCHARGE DIAGNOSES  Principal Problem:    Sepsis (HCC) POA: Unknown  Active Problems:    Essential hypertension, benign POA: Yes    Mixed hyperlipidemia POA: Yes    Gastroesophageal reflux disease without esophagitis POA: Yes    CAD (coronary artery disease) POA: Yes      Overview: s/p 2 stents in left circumflex artery done in January 2017.    S/P coronary artery stent placement POA: Yes    Acute respiratory failure with hypoxia (HCC) POA: Unknown    Hyponatremia POA: Unknown  Resolved Problems:    * No resolved hospital problems. *      FOLLOW UP  Future Appointments  Date Time Provider Department Center   7/1/2019 10:00 AM Anastacia Saunders M.D. [facility] None   7/8/2019 9:00 AM Brittney Hoskins M.D. [address]     No follow-up provider specified.    MEDICATIONS ON DISCHARGE     Medication List      START taking these medications      Instructions   albuterol 108 (90 Base) MCG/ACT Aers inhalation aerosol   Inhale 2 Puffs by mouth every 6 hours as needed for Shortness of Breath.  Dose:  2 Puff     MethylPREDNISolone 4 MG Tbpk  Commonly known as:  MEDROL DOSEPAK   Take as directed per the package instructions.     predniSONE 20 MG Tabs  Commonly known as:  DELTASONE   Take 1 Tab by mouth every day for 3 days.  Dose:  20 mg        CHANGE how you take these medications      Instructions   * oseltamivir 75 MG Caps  What changed:  Another medication with the same name was added. Make sure you understand how and when to take each.  Commonly known as:  TAMIFLU   Take 1 Cap by mouth every 12 hours for 5 days. Pt started on 3/17/2019 for 5 day course.  Dose:  75 mg     * oseltamivir 30 MG Caps  What changed:  You were already taking a medication with the same name, and this prescription was added. Make sure you understand how and when to take each.  Commonly known as:  TAMIFLU   Take 1 Cap by mouth every 12 hours for 3 days.  Dose:  30 mg        * This list has 2 medication(s) that are the same  as other medications prescribed for you. Read the directions carefully, and ask your doctor or other care provider to review them with you.            CONTINUE taking these medications      Instructions   aspirin EC 81 MG Tbec  Commonly known as:  ECOTRIN   Take 81 mg by mouth every evening.  Dose:  81 mg     atorvastatin 40 MG Tabs  Commonly known as:  LIPITOR   Take 40 mg by mouth every evening.  Dose:  40 mg     AZO BLADDER CONTROL/GO-LESS PO   Take 1 Cap by mouth every bedtime.  Dose:  1 Cap     clopidogrel 75 MG Tabs  Commonly known as:  PLAVIX   Take 1 Tab by mouth every day.  Dose:  75 mg     Diclofenac Sodium 1 % Gel   Apply 2 g to skin as directed 2 times a day as needed. Apply 2 gram on affected shoulder twice a day as needed.  Dose:  2 g     lansoprazole 30 MG Cpdr  Commonly known as:  PREVACID   Take 1 Cap by mouth every day.  Dose:  30 mg     losartan 100 MG Tabs  Commonly known as:  COZAAR   Take 1 Tab by mouth every day.  Dose:  100 mg     metoprolol SR 50 MG Tb24  Commonly known as:  TOPROL XL   Take 1 Tab by mouth every day.  Dose:  50 mg     nitroglycerin 0.4 MG Subl  Commonly known as:  NITROSTAT   Place 1 Tab under tongue as needed for Chest Pain (up to 3 doses (if SBP greater than 90 mmHg)).  Dose:  0.4 mg     TYLENOL 8 HOUR 650 MG CR tablet  Generic drug:  acetaminophen   Take 650-1,300 mg by mouth every 6 hours as needed for Moderate Pain.  Dose:  650-1300 mg     vitamin D 2000 UNIT Tabs   Take 1 Tab by mouth every evening.  Dose:  1 Tab            Allergies  Allergies   Allergen Reactions   • Codeine Itching and Vomiting     Rxn = years ago          DIET  Orders Placed This Encounter   Procedures   • Diet Order Cardiac, 2 Gram Sodium     Standing Status:   Standing     Number of Occurrences:   1     Order Specific Question:   Diet:     Answer:   Cardiac [6]     Order Specific Question:   Diet:     Answer:   2 Gram Sodium [7]       ACTIVITY  As tolerated.  Weight bearing as  tolerated    CONSULTATIONS  None    PROCEDURES  None    LABORATORY  Lab Results   Component Value Date    SODIUM 134 (L) 03/18/2019    POTASSIUM 3.9 03/18/2019    CHLORIDE 100 03/18/2019    CO2 25 03/18/2019    GLUCOSE 127 (H) 03/18/2019    BUN 10 03/18/2019    CREATININE 0.97 03/18/2019        Lab Results   Component Value Date    WBC 9.8 03/18/2019    HEMOGLOBIN 12.9 03/18/2019    HEMATOCRIT 39.5 03/18/2019    PLATELETCT 178 03/18/2019        Total time of the discharge process exceeds 35 minutes.

## 2019-03-18 NOTE — PROGRESS NOTES
Telemetry Shift Summary    Rhythm SR/SB  HR Range 51-61, lowest of 40  Ectopy none  Measurements 0.18/0.10/0.40        Normal Values  Rhythm SR  HR Range    Measurements 0.12-0.20 / 0.06-0.10  / 0.30-0.52

## 2019-03-18 NOTE — CARE PLAN
Problem: Infection  Goal: Will remain free from infection  Outcome: PROGRESSING AS EXPECTED  Educated patient on infection prevention and flu precautions.     Problem: Knowledge Deficit  Goal: Knowledge of disease process/condition, treatment plan, diagnostic tests, and medications will improve  Outcome: PROGRESSING AS EXPECTED  Explained to patient plan of care and educated patient on lab values and medications.

## 2019-03-18 NOTE — PROGRESS NOTES
· 2 RN skin check complete with JORGE Sarmiento.  · Devices in place Nasal Canula.  · Skin assessed under devices O2 tubing.  · Confirmed pressure ulcers found on -.  · New potential pressure ulcers noted on -.  · The following interventions in place: pillows used for positioning. Heels floated on pillows    Heels dry, skin otherwise clean, dry, intact.

## 2019-03-18 NOTE — PROGRESS NOTES
Telemetry Shift Summary    Rhythm SR  HR Range 70-80  Ectopy oPVC  Measurements 0.20/0.10/0.40        Normal Values  Rhythm SR  HR Range    Measurements 0.12-0.20 / 0.06-0.10  / 0.30-0.52

## 2019-03-18 NOTE — PROGRESS NOTES
Pt reports not eating anything since this am, pt ate half of a tuna fish sandwich and half of a yogurt. Pt denies any nausea after getting Zofran in the ED. Resting comfortably.

## 2019-03-18 NOTE — ED NOTES
Rounded on patient. Line and lab established.  Patient nose bleeding slightly from flu swab- patient reports its common. Does not feel the need to void at this time.

## 2019-03-18 NOTE — ASSESSMENT & PLAN NOTE
Patient states she has tried multiple different masks however is not able to make any of them fit at night.  Her sleep apnea is poorly controlled and she does have hypoxia with ambulation which I suspect is partially related to her untreated sleep apnea.  Needs follow up with sleep lab/pulmonary. High likelihood to need home oxygen; no pulmonary htn seen on echocardiogram.

## 2019-03-18 NOTE — ED TRIAGE NOTES
Chief Complaint   Patient presents with   • Chest Pressure     Pt c/o chest tightness with an episode of vomiting tonight   • Flu Like Symptoms     Pt c/o cough, fever/chills/, sore throat    • Shortness of Breath     Started initially last night; but has worsened over the day   • Sent from Urgent Care     Pt went to  with above symptoms; pt was encourged by the  team to go to ER via ambulance however pt states she was feeling good at that time and refused; pt states she feels worse now     Pt presents via wheelchair d/t sob, chest tightness and lightheaded when she ambulates.

## 2019-03-18 NOTE — ED PROVIDER NOTES
ED Provider Note    ER PROVIDER NOTE      CHIEF COMPLAINT  Chief Complaint   Patient presents with   • Chest Pressure     Pt c/o chest tightness with an episode of vomiting tonight   • Flu Like Symptoms     Pt c/o cough, fever/chills/, sore throat    • Shortness of Breath     Started initially last night; but has worsened over the day   • Sent from Urgent Care     Pt went to  with above symptoms; pt was encourged by the  team to go to ER via ambulance however pt states she was feeling good at that time and refused; pt states she feels worse now       HPI  Griselda Farmer is a 74 y.o. female who presents to the emergency department complaining of cough congestion and fever and chills.  Patient reports she had a slight scratchy throat last night when she went to bed but was otherwise in her normal state of health.  When she awoke this morning she was having chills, subjective fever and developed mildly productive cough.  She states with the cough she gets some chest tightness and shortness of breath.  She does report some body aches as well.  She has had some intermittent nausea with her symptoms, and did vomit up the Tamiflu she was given at urgent care otherwise no vomiting.  One episode of diarrhea.  She denies any abdominal pain.  Denies any dysuria hematuria frequency or other urinary symptoms    Patient was seen at urgent care earlier, had influenza testing that was negative but started on Tamiflu, when she was in the waiting room she had an episode of lightheadedness as well    REVIEW OF SYSTEMS  Pertinent positives include fever, cough. Pertinent negatives include no headache. See HPI for details. All other systems reviewed and are negative.    PAST MEDICAL HISTORY   has a past medical history of Angina (2014); Arthritis; Dental disorder; Flushing reaction; GERD (gastroesophageal reflux disease); Heart burn (2014); Hyperlipidemia (10/20/2010); Hypertension (2014); Indigestion; Influenza; Insomnia;  "Liver cyst; Myocardial infarct (HCC) (1984); Need for Tdap vaccination; FRIDA (obstructive sleep apnea); S/P colonoscopy (11/9/2012); Sick sinus syndrome (HCC) (1/1/2017); Sleep apnea; Symptomatic sinus bradycardia (1/1/2017); Unspecified urinary incontinence; and Urinary bladder disorder.    SURGICAL HISTORY   has a past surgical history that includes tonsillectomy; hysterectomy, vaginal; recovery (3/29/2013); cataract phaco with iol (9/19/2013); cataract phaco with iol (10/3/2013); knee arthroplasty total (12/16/2014); zzz cardiac cath (1/1/17); zzz cardiac cath (1/19/17); and other cardiac surgery (January 2017).    FAMILY HISTORY  Family History   Problem Relation Age of Onset   • Diabetes Mother         mild   • Cancer Father         melanoma mild   • Arthritis Sister    • Arthritis Brother    • Arthritis Sister    • Arthritis Brother    • Heart Disease Neg Hx    • Hypertension Neg Hx    • Sleep Apnea Neg Hx        SOCIAL HISTORY  Social History     Social History   • Marital status:      Spouse name: N/A   • Number of children: N/A   • Years of education: N/A     Occupational History   • retired- human resources  for [employer]     Social History Main Topics   • Smoking status: Former Smoker     Packs/day: 0.50     Years: 15.00     Types: Cigarettes     Quit date: 8/11/1975   • Smokeless tobacco: Never Used   • Alcohol use No   • Drug use: No   • Sexual activity: Not Currently     Partners: Male     Other Topics Concern   • Not on file     Social History Narrative   • No narrative on file      History   Drug Use No       CURRENT MEDICATIONS  Home Medications    **Home medications have not yet been reviewed for this encounter**         ALLERGIES  Allergies   Allergen Reactions   • Codeine Itching and Vomiting     Rxn = years ago          PHYSICAL EXAM  VITAL SIGNS: /97   Pulse 83   Temp 37.2 °C (99 °F) (Temporal)   Resp 20   Ht 1.651 m (5' 5\")   Wt 94.3 kg (207 lb 14.3 oz)  "  SpO2 97%   BMI 34.60 kg/m²   Pulse ox interpretation:I interpret this pulse ox as normal.    Constitutional: Alert in no apparent distress.  HENT: No signs of trauma, Bilateral external ears normal, Nose normal.   Eyes: Pupils are equal and reactive, Conjunctiva normal, Non-icteric.   Neck: Normal range of motion, No tenderness, Supple, No stridor.   Lymphatic: No lymphadenopathy noted.   Cardiovascular: Regular rate and rhythm, no murmurs.   Thorax & Lungs: Normal breath sounds, No respiratory distress, No wheezing, No chest tenderness.   Abdomen: Bowel sounds normal, Soft, No tenderness, No masses, No pulsatile masses. No peritoneal signs.  Skin: Warm, Dry, No erythema, No rash.   Back: No bony tenderness, No CVA tenderness.   Extremities: Intact distal pulses, No edema, No tenderness, No cyanosis, Negative Marco's sign.  Musculoskeletal: Good range of motion in all major joints. No tenderness to palpation or major deformities noted.   Neurologic: Alert , Normal motor function, Normal sensory function, No focal deficits noted.   Psychiatric: Affect normal, Judgment normal, Mood normal.     DIAGNOSTIC STUDIES / PROCEDURES    Results for orders placed or performed during the hospital encounter of 03/17/19   CBC WITH DIFFERENTIAL   Result Value Ref Range    WBC 12.7 (H) 4.8 - 10.8 K/uL    RBC 4.66 4.20 - 5.40 M/uL    Hemoglobin 14.0 12.0 - 16.0 g/dL    Hematocrit 42.2 37.0 - 47.0 %    MCV 90.6 81.4 - 97.8 fL    MCH 30.0 27.0 - 33.0 pg    MCHC 33.2 (L) 33.6 - 35.0 g/dL    RDW 46.6 35.9 - 50.0 fL    Platelet Count 198 164 - 446 K/uL    MPV 11.3 9.0 - 12.9 fL    Neutrophils-Polys 88.00 (H) 44.00 - 72.00 %    Lymphocytes 3.50 (L) 22.00 - 41.00 %    Monocytes 7.50 0.00 - 13.40 %    Eosinophils 0.20 0.00 - 6.90 %    Basophils 0.40 0.00 - 1.80 %    Immature Granulocytes 0.40 0.00 - 0.90 %    Nucleated RBC 0.00 /100 WBC    Neutrophils (Absolute) 11.15 (H) 2.00 - 7.15 K/uL    Lymphs (Absolute) 0.44 (L) 1.00 - 4.80 K/uL     Monos (Absolute) 0.95 (H) 0.00 - 0.85 K/uL    Eos (Absolute) 0.03 0.00 - 0.51 K/uL    Baso (Absolute) 0.05 0.00 - 0.12 K/uL    Immature Granulocytes (abs) 0.05 0.00 - 0.11 K/uL    NRBC (Absolute) 0.00 K/uL   COMP METABOLIC PANEL   Result Value Ref Range    Sodium 132 (L) 135 - 145 mmol/L    Potassium 3.7 3.6 - 5.5 mmol/L    Chloride 99 96 - 112 mmol/L    Co2 23 20 - 33 mmol/L    Anion Gap 10.0 0.0 - 11.9    Glucose 126 (H) 65 - 99 mg/dL    Bun 13 8 - 22 mg/dL    Creatinine 0.86 0.50 - 1.40 mg/dL    Calcium 9.2 8.4 - 10.2 mg/dL    AST(SGOT) 24 12 - 45 U/L    ALT(SGPT) 21 2 - 50 U/L    Alkaline Phosphatase 83 30 - 99 U/L    Total Bilirubin 0.7 0.1 - 1.5 mg/dL    Albumin 4.2 3.2 - 4.9 g/dL    Total Protein 7.3 6.0 - 8.2 g/dL    Globulin 3.1 1.9 - 3.5 g/dL    A-G Ratio 1.4 g/dL   LACTIC ACID   Result Value Ref Range    Lactic Acid 1.8 0.5 - 2.0 mmol/L   LACTIC ACID   Result Value Ref Range    Lactic Acid 1.5 0.5 - 2.0 mmol/L   TROPONIN   Result Value Ref Range    Troponin I <0.02 0.00 - 0.04 ng/mL   Influenza By PCR, A/B   Result Value Ref Range    Influenza virus A RNA POSITIVE (A) Negative    Influenza virus B, PCR Negative Negative   ESTIMATED GFR   Result Value Ref Range    GFR If African American >60 >60 mL/min/1.73 m 2    GFR If Non African American >60 >60 mL/min/1.73 m 2         RADIOLOGY  DX-CHEST-PORTABLE (1 VIEW)   Final Result      No acute cardiopulmonary abnormality.        The radiologist's interpretation of all radiological studies have been reviewed by me.    COURSE & MEDICAL DECISION MAKING  Nursing notes, VS, PMSFHx reviewed in chart.    8:42 PM Patient seen and examined at bedside. Patient will be treated with IV fluids. Ordered for sepsis bundle to evaluate her symptoms.     HYDRATION: Based on the patient's presentation of Acute Vomiting and Sepsis the patient was given IV fluids. IV Hydration was used because oral hydration was not as rapid as required. Upon recheck following hydration, the  patient was improved.       9:45 PM patient reevaluated, updated on results thus far, noted to have oxygen level of 88% on room air.  She is given supplemental oxygen.  Discussed case with hospitalist for admission    10:32 PM reevaluated her influenza has returned as positive, will start on Tamiflu      Decision Making:  This is a 74 y.o. female presenting with cough and fever as well as lightheadedness.  She is found to be hypoxemic here and positive for influenza.  Given these findings she will be admitted for further care.  No findings suggestive of pneumonia on her chest x-ray.  She has no symptoms suggestive of intra-abdominal infection either at this time or obvious findings of serious bacterial infection.  Patient does have a leukocytosis and with her hypoxemia and infection consistent with sepsis likely secondary to her influenza    Patient admitted in stable condition    FINAL IMPRESSION  1. Hypoxemia    2. Near syncope    3. Influenza A         The note accurately reflects work and decisions made by me.  Jet Mandujano  3/17/2019  10:37 PM

## 2019-03-18 NOTE — ASSESSMENT & PLAN NOTE
This is sepsis (without associated acute organ dysfunction).   2/2 to Influenza  Improving clinically, no hypotension  CXR:neg for acute findings  Pro-calcitonin negative  Continue RT protocol, duo nebs, Pep therapy if warranted, and incentive spirometry.

## 2019-03-18 NOTE — PROGRESS NOTES
Received report from Sandra PATEL. Discussed plan of care and safety measures in place. No further needs at this time.

## 2019-03-19 ENCOUNTER — PATIENT OUTREACH (OUTPATIENT)
Dept: HEALTH INFORMATION MANAGEMENT | Facility: OTHER | Age: 74
End: 2019-03-19

## 2019-03-19 LAB
ALBUMIN SERPL BCP-MCNC: 3 G/DL (ref 3.2–4.9)
BUN SERPL-MCNC: 13 MG/DL (ref 8–22)
CALCIUM SERPL-MCNC: 8.3 MG/DL (ref 8.4–10.2)
CHLORIDE SERPL-SCNC: 105 MMOL/L (ref 96–112)
CO2 SERPL-SCNC: 25 MMOL/L (ref 20–33)
CREAT SERPL-MCNC: 0.96 MG/DL (ref 0.5–1.4)
GLUCOSE SERPL-MCNC: 87 MG/DL (ref 65–99)
PHOSPHATE SERPL-MCNC: 3.7 MG/DL (ref 2.5–4.5)
POTASSIUM SERPL-SCNC: 3.5 MMOL/L (ref 3.6–5.5)
SODIUM SERPL-SCNC: 140 MMOL/L (ref 135–145)

## 2019-03-19 PROCEDURE — 700102 HCHG RX REV CODE 250 W/ 637 OVERRIDE(OP): Performed by: HOSPITALIST

## 2019-03-19 PROCEDURE — A9270 NON-COVERED ITEM OR SERVICE: HCPCS | Performed by: HOSPITALIST

## 2019-03-19 PROCEDURE — 700111 HCHG RX REV CODE 636 W/ 250 OVERRIDE (IP): Performed by: HOSPITALIST

## 2019-03-19 PROCEDURE — 99225 PR SUBSEQUENT OBSERVATION CARE,LEVEL II: CPT | Performed by: HOSPITALIST

## 2019-03-19 PROCEDURE — 700105 HCHG RX REV CODE 258: Performed by: HOSPITALIST

## 2019-03-19 PROCEDURE — 80069 RENAL FUNCTION PANEL: CPT

## 2019-03-19 PROCEDURE — 700101 HCHG RX REV CODE 250: Performed by: INTERNAL MEDICINE

## 2019-03-19 PROCEDURE — 94640 AIRWAY INHALATION TREATMENT: CPT

## 2019-03-19 PROCEDURE — 96375 TX/PRO/DX INJ NEW DRUG ADDON: CPT

## 2019-03-19 PROCEDURE — G0378 HOSPITAL OBSERVATION PER HR: HCPCS

## 2019-03-19 PROCEDURE — 97165 OT EVAL LOW COMPLEX 30 MIN: CPT

## 2019-03-19 PROCEDURE — 97161 PT EVAL LOW COMPLEX 20 MIN: CPT

## 2019-03-19 PROCEDURE — 94760 N-INVAS EAR/PLS OXIMETRY 1: CPT

## 2019-03-19 RX ORDER — OSELTAMIVIR PHOSPHATE 30 MG/1
30 CAPSULE ORAL EVERY 12 HOURS
Status: DISCONTINUED | OUTPATIENT
Start: 2019-03-19 | End: 2019-03-20 | Stop reason: HOSPADM

## 2019-03-19 RX ORDER — METHYLPREDNISOLONE SODIUM SUCCINATE 40 MG/ML
40 INJECTION, POWDER, LYOPHILIZED, FOR SOLUTION INTRAMUSCULAR; INTRAVENOUS EVERY 6 HOURS
Status: DISCONTINUED | OUTPATIENT
Start: 2019-03-19 | End: 2019-03-20 | Stop reason: HOSPADM

## 2019-03-19 RX ADMIN — IPRATROPIUM BROMIDE AND ALBUTEROL SULFATE 3 ML: .5; 3 SOLUTION RESPIRATORY (INHALATION) at 14:08

## 2019-03-19 RX ADMIN — PREDNISONE 20 MG: 20 TABLET ORAL at 06:14

## 2019-03-19 RX ADMIN — METHYLPREDNISOLONE SODIUM SUCCINATE 40 MG: 40 INJECTION, POWDER, FOR SOLUTION INTRAMUSCULAR; INTRAVENOUS at 18:00

## 2019-03-19 RX ADMIN — OSELTAMIVIR PHOSPHATE 75 MG: 75 CAPSULE ORAL at 06:09

## 2019-03-19 RX ADMIN — LOSARTAN POTASSIUM 100 MG: 25 TABLET, FILM COATED ORAL at 06:10

## 2019-03-19 RX ADMIN — OMEPRAZOLE 20 MG: 20 CAPSULE, DELAYED RELEASE ORAL at 06:11

## 2019-03-19 RX ADMIN — SODIUM CHLORIDE: 9 INJECTION, SOLUTION INTRAVENOUS at 06:16

## 2019-03-19 RX ADMIN — OSELTAMIVIR PHOSPHATE 30 MG: 30 CAPSULE ORAL at 17:30

## 2019-03-19 RX ADMIN — IPRATROPIUM BROMIDE AND ALBUTEROL SULFATE 3 ML: .5; 3 SOLUTION RESPIRATORY (INHALATION) at 01:51

## 2019-03-19 RX ADMIN — METOPROLOL SUCCINATE 50 MG: 25 TABLET, EXTENDED RELEASE ORAL at 06:09

## 2019-03-19 RX ADMIN — CLOPIDOGREL BISULFATE 75 MG: 75 TABLET ORAL at 06:10

## 2019-03-19 RX ADMIN — ACETAMINOPHEN 650 MG: 325 TABLET, FILM COATED ORAL at 07:33

## 2019-03-19 RX ADMIN — ENOXAPARIN SODIUM 40 MG: 100 INJECTION SUBCUTANEOUS at 06:08

## 2019-03-19 RX ADMIN — IPRATROPIUM BROMIDE AND ALBUTEROL SULFATE 3 ML: .5; 3 SOLUTION RESPIRATORY (INHALATION) at 07:23

## 2019-03-19 RX ADMIN — ASPIRIN 81 MG: 81 TABLET, COATED ORAL at 17:30

## 2019-03-19 RX ADMIN — ATORVASTATIN CALCIUM 40 MG: 40 TABLET, FILM COATED ORAL at 06:10

## 2019-03-19 RX ADMIN — IPRATROPIUM BROMIDE AND ALBUTEROL SULFATE 3 ML: .5; 3 SOLUTION RESPIRATORY (INHALATION) at 19:57

## 2019-03-19 ASSESSMENT — ENCOUNTER SYMPTOMS
NEUROLOGICAL NEGATIVE: 1
WEIGHT LOSS: 0
LOSS OF CONSCIOUSNESS: 0
DIZZINESS: 0
CHILLS: 0
WEAKNESS: 0
DEPRESSION: 0
FLANK PAIN: 0
BRUISES/BLEEDS EASILY: 0
FEVER: 0
PSYCHIATRIC NEGATIVE: 1
ABDOMINAL PAIN: 0
SHORTNESS OF BREATH: 1
GASTROINTESTINAL NEGATIVE: 1
WHEEZING: 1
MYALGIAS: 0
CARDIOVASCULAR NEGATIVE: 1
COUGH: 1
BACK PAIN: 0
VOMITING: 0
NAUSEA: 0
NERVOUS/ANXIOUS: 0
MUSCULOSKELETAL NEGATIVE: 1

## 2019-03-19 ASSESSMENT — GAIT ASSESSMENTS
DEVIATION: STEP TO
DISTANCE (FEET): 150
GAIT LEVEL OF ASSIST: INDEPENDENT

## 2019-03-19 ASSESSMENT — COGNITIVE AND FUNCTIONAL STATUS - GENERAL
SUGGESTED CMS G CODE MODIFIER DAILY ACTIVITY: CJ
TOILETING: A LITTLE
HELP NEEDED FOR BATHING: A LITTLE
DRESSING REGULAR LOWER BODY CLOTHING: A LITTLE
DAILY ACTIVITIY SCORE: 21

## 2019-03-19 ASSESSMENT — ACTIVITIES OF DAILY LIVING (ADL): TOILETING: INDEPENDENT

## 2019-03-19 NOTE — PROGRESS NOTES
Telemetry Shift Summary    Rhythm SB SR  HR Range 50-90  Ectopy rare pvcs  Measurements 0.16/0.08/0.40        Normal Values  Rhythm SR  HR Range    Measurements 0.12-0.20 / 0.06-0.10  / 0.30-0.52

## 2019-03-19 NOTE — THERAPY
"Physical Therapy Evaluation completed.   Bed Mobility:  Supine to Sit: Modified Independent  Transfers: Sit to Stand: Independent  Gait: Level Of Assist: Independent with No Equipment Needed    X 150 feet   Plan of Care: Patient with no further skilled PT needs in the acute care setting at this time  Discharge Recommendations: Equipment: No Equipment Needed. Post-acute therapy Currently anticipate no further skilled therapy needs once patient is discharged from the inpatient setting.  74 year old female admitted with flu like symptoms.Pt lives with unrelated adult and is active.Pt was safe with transfers and ambulation 02 sat 92% on room air with ambulation.Pt is safe for home  See \"Rehab Therapy-Acute\" Patient Summary Report for complete documentation.     "

## 2019-03-19 NOTE — FLOWSHEET NOTE
03/19/19 0152   Interdisciplinary Plan of Care-Goals (Indications)   Bronchodilator Indications Physical Exam / Hyperinflation / Wheezing (bronchospasm)   Interdisciplinary Plan of Care-Outcomes    Bronchodilator Outcome Patient at Stable Baseline   RT Assessment of Delivered Medications   Evaluation of Medication Delivery Daily Yes-- Pt /Family has been Instructed in use of Respiratory Medications and Adverse Reactions   SVN Group   #SVN Performed Yes   Given By: Mouthpiece   Chest Exam   Respiration 20   Pulse 62   Breath Sounds   Pre/Post Intervention Pre Intervention Assessment   RUL Breath Sounds Expiratory Wheezes   RML Breath Sounds Expiratory Wheezes   RLL Breath Sounds Diminished;Expiratory Wheezes   BRITANY Breath Sounds Expiratory Wheezes   LLL Breath Sounds Diminished;Expiratory Wheezes   Oximetry   #Pulse Oximetry (Single Determination) Yes   Oxygen   Pulse Oximetry 98 %   O2 (LPM) 2   O2 Daily Delivery Respiratory  Silicone Nasal Cannula

## 2019-03-19 NOTE — FLOWSHEET NOTE
03/19/19 0720   Events/Summary/Plan   Non-Invasive Resp Device Site Inspection Completed Intact   Interdisciplinary Plan of Care-Goals (Indications)   Bronchodilator Indications Physical Exam / Hyperinflation / Wheezing (bronchospasm)   Interdisciplinary Plan of Care-Outcomes    Bronchodilator Outcome Patient at Stable Baseline   Education   Education Yes - Pt. / Family has been Instructed in use of Respiratory Medications and Adverse Reactions   RT Assessment of Delivered Medications   Evaluation of Medication Delivery Daily Yes-- Pt /Family has been Instructed in use of Respiratory Medications and Adverse Reactions   SVN Group   #SVN Performed Yes   Given By: Mouthpiece   Date SVN Next Change Due (Q 7 Days) 03/25/19   Chest Exam   Respiration 20   Pulse 68   Breath Sounds   RUL Breath Sounds Expiratory Wheezes   RML Breath Sounds Expiratory Wheezes   RLL Breath Sounds Diminished   BRITANY Breath Sounds Expiratory Wheezes   LLL Breath Sounds Diminished   Secretions   Cough Congested;Productive   How Sputum Obtained Spontaneous   Sputum Amount Moderate   Sputum Color Yellow   Oximetry   #Pulse Oximetry (Single Determination) Yes   Oxygen   Pulse Oximetry 97 %   O2 (LPM) 2   O2 Daily Delivery Respiratory  Silicone Nasal Cannula

## 2019-03-19 NOTE — RESPIRATORY CARE
COPD EDUCATION by COPD CLINICAL EDUCATOR  3/19/2019 at 10:45 AM by Jessica Zamora     Patient reviewed by COPD education team. Patient does not qualify for COPD program. Hx untreated FRIDA.

## 2019-03-19 NOTE — CARE PLAN
Problem: Oxygenation:  Goal: Maintain adequate oxygenation dependent on patient condition  Outcome: PROGRESSING AS EXPECTED  Monitor oxygenation for SpO2 >90%  Intervention: Manage oxygen therapy by monitoring pulse oximetry and/or ABG values  Oxygen is currently required at 2 lpm nasal cannula for SpO2 >90%  Intervention: Levels of oxygenation will improve to baseline  Patient did not require oxygen at home prior to this admission.       Problem: Bronchoconstriction:  Goal: Improve in air movement and diminished wheezing  Outcome: PROGRESSING AS EXPECTED  Patient does claim a benefit from bronchodilator therapy. There was a increase in aeration and a decrease in wheezes heard  Intervention: Implement inhaled treatments  Patient is receiving Duoneb Q6.  Intervention: Evaluate and manage medication effects  Patient will be evaluated before and after medication delivery to assess effectiveness of therapy. Currently there seems to be both subjective and objective improvement.

## 2019-03-19 NOTE — THERAPY
"Occupational Therapy Evaluation completed.   Functional Status:  Pt is a 73 y/o female, admit with flu, weakness, decreased activity tolerance. Pt lives with her sister, who works during the day. PLOF- I with AMB ADLS , was active . Pt presents with minimal c/o fatigue with activity, is at the Supervised to SBA level for ADLS and functional transfers. Pt appears to be close to baseline, will be seen for initial evaluation and treatment only.  Plan of Care: Patient with no further skilled OT needs in the acute care setting at this time  Discharge Recommendations:  Equipment: No Equipment Needed. Post-acute therapy Currently anticipate no further skilled therapy needs once patient is discharged from the inpatient setting.    See \"Rehab Therapy-Acute\" Patient Summary Report for complete documentation.    "

## 2019-03-19 NOTE — PROGRESS NOTES
Report received from JORGE Segal. Pt denies needs at this time. Safety precautions in place. Call light within reach.

## 2019-03-19 NOTE — CARE PLAN
"Problem: Safety  Goal: Will remain free from falls  Outcome: PROGRESSING AS EXPECTED  Discussed safety measures, verbalized understanding. Fall precautions in place, bed alarm on, and call lights w/in reach.  Will continue to monitor.Discussed safety measures, verbalized understanding. Fall precautions in place, bed alarm on, and call lights w/in reach.  Will continue to monitor.    Problem: Respiratory:  Goal: Respiratory status will improve  Outcome: PROGRESSING AS EXPECTED  Pt on RT protocol, IS encouraged, demonstrates correct usage.  Pt reports \"breathing better.\"      "

## 2019-03-19 NOTE — CARE PLAN
Problem: Safety  Goal: Will remain free from falls  Outcome: PROGRESSING AS EXPECTED  Pt with good mobility, d/t o2 and connection to IV tubing, pt instructed to call in for assistance oob. Pt calls appropriately.    Problem: Bowel/Gastric:  Goal: Will not experience complications related to bowel motility  Outcome: PROGRESSING AS EXPECTED  Pt having regular bm's. Stool softner ordered if needed. Will continue to monitor

## 2019-03-19 NOTE — PROGRESS NOTES
Assumed pt care. AOx4. VSS.  Denies any pain at this time.  Denies any other distress.  Discussed POC.  Pt verbalized understanding.  Fall precautions in place and call lights w/in reach.  Will continue to monitor.

## 2019-03-19 NOTE — FLOWSHEET NOTE
03/18/19 1926   Interdisciplinary Plan of Care-Goals (Indications)   Bronchodilator Indications Physical Exam / Hyperinflation / Wheezing (bronchospasm)   Interdisciplinary Plan of Care-Outcomes    Bronchodilator Outcome Patient at Stable Baseline   RT Assessment of Delivered Medications   Evaluation of Medication Delivery Daily Yes-- Pt /Family has been Instructed in use of Respiratory Medications and Adverse Reactions   SVN Group   #SVN Performed Yes   Given By: Mouthpiece   Date SVN Last Changed 03/18/19   Date SVN Next Change Due (Q 7 Days) 03/25/19   Chest Exam   Respiration 16   Pulse 72   Breath Sounds   Pre/Post Intervention Post Intervention Assessment   RUL Breath Sounds Expiratory Wheezes   RML Breath Sounds Expiratory Wheezes   RLL Breath Sounds Diminished;Expiratory Wheezes   BRITANY Breath Sounds Expiratory Wheezes   LLL Breath Sounds Diminished;Expiratory Wheezes   Oximetry   #Pulse Oximetry (Single Determination) Yes   Oxygen   Pulse Oximetry 92 %   O2 (LPM) 0   O2 Daily Delivery Respiratory  Room Air with O2 Available

## 2019-03-20 VITALS
TEMPERATURE: 98.2 F | OXYGEN SATURATION: 94 % | HEIGHT: 65 IN | HEART RATE: 68 BPM | SYSTOLIC BLOOD PRESSURE: 113 MMHG | WEIGHT: 211.2 LBS | DIASTOLIC BLOOD PRESSURE: 57 MMHG | RESPIRATION RATE: 16 BRPM | BODY MASS INDEX: 35.19 KG/M2

## 2019-03-20 PROCEDURE — 99217 PR OBSERVATION CARE DISCHARGE: CPT | Performed by: HOSPITALIST

## 2019-03-20 PROCEDURE — 700111 HCHG RX REV CODE 636 W/ 250 OVERRIDE (IP): Performed by: HOSPITALIST

## 2019-03-20 PROCEDURE — 96376 TX/PRO/DX INJ SAME DRUG ADON: CPT

## 2019-03-20 PROCEDURE — 700101 HCHG RX REV CODE 250: Performed by: INTERNAL MEDICINE

## 2019-03-20 PROCEDURE — G0378 HOSPITAL OBSERVATION PER HR: HCPCS

## 2019-03-20 PROCEDURE — 700102 HCHG RX REV CODE 250 W/ 637 OVERRIDE(OP): Performed by: HOSPITALIST

## 2019-03-20 PROCEDURE — 94640 AIRWAY INHALATION TREATMENT: CPT

## 2019-03-20 PROCEDURE — 94760 N-INVAS EAR/PLS OXIMETRY 1: CPT

## 2019-03-20 PROCEDURE — A9270 NON-COVERED ITEM OR SERVICE: HCPCS | Performed by: HOSPITALIST

## 2019-03-20 RX ORDER — METHYLPREDNISOLONE 4 MG/1
TABLET ORAL
Qty: 1 KIT | Refills: 0 | Status: SHIPPED | OUTPATIENT
Start: 2019-03-20 | End: 2019-04-01

## 2019-03-20 RX ORDER — OSELTAMIVIR PHOSPHATE 30 MG/1
30 CAPSULE ORAL EVERY 12 HOURS
Qty: 6 CAP | Refills: 0 | Status: SHIPPED | OUTPATIENT
Start: 2019-03-20 | End: 2019-03-23

## 2019-03-20 RX ADMIN — IPRATROPIUM BROMIDE AND ALBUTEROL SULFATE 3 ML: .5; 3 SOLUTION RESPIRATORY (INHALATION) at 07:06

## 2019-03-20 RX ADMIN — METHYLPREDNISOLONE SODIUM SUCCINATE 40 MG: 40 INJECTION, POWDER, FOR SOLUTION INTRAMUSCULAR; INTRAVENOUS at 00:43

## 2019-03-20 RX ADMIN — METHYLPREDNISOLONE SODIUM SUCCINATE 40 MG: 40 INJECTION, POWDER, FOR SOLUTION INTRAMUSCULAR; INTRAVENOUS at 13:27

## 2019-03-20 RX ADMIN — DOCUSATE SODIUM AND SENNOSIDES 2 TABLET: 8.6; 5 TABLET, FILM COATED ORAL at 06:15

## 2019-03-20 RX ADMIN — METHYLPREDNISOLONE SODIUM SUCCINATE 40 MG: 40 INJECTION, POWDER, FOR SOLUTION INTRAMUSCULAR; INTRAVENOUS at 06:14

## 2019-03-20 RX ADMIN — ATORVASTATIN CALCIUM 40 MG: 40 TABLET, FILM COATED ORAL at 06:15

## 2019-03-20 RX ADMIN — CLOPIDOGREL BISULFATE 75 MG: 75 TABLET ORAL at 06:15

## 2019-03-20 RX ADMIN — OMEPRAZOLE 20 MG: 20 CAPSULE, DELAYED RELEASE ORAL at 06:15

## 2019-03-20 RX ADMIN — ENOXAPARIN SODIUM 40 MG: 100 INJECTION SUBCUTANEOUS at 06:15

## 2019-03-20 RX ADMIN — IPRATROPIUM BROMIDE AND ALBUTEROL SULFATE 3 ML: .5; 3 SOLUTION RESPIRATORY (INHALATION) at 14:08

## 2019-03-20 RX ADMIN — OSELTAMIVIR PHOSPHATE 30 MG: 30 CAPSULE ORAL at 06:15

## 2019-03-20 RX ADMIN — LOSARTAN POTASSIUM 100 MG: 25 TABLET, FILM COATED ORAL at 06:20

## 2019-03-20 NOTE — CARE PLAN
Problem: Oxygenation:  Goal: Maintain adequate oxygenation dependent on patient condition  Outcome: MET Date Met: 03/20/19      Problem: Bronchoconstriction:  Goal: Improve in air movement and diminished wheezing  Outcome: MET Date Met: 03/20/19

## 2019-03-20 NOTE — PROGRESS NOTES
Pt discharged to home. Discharge instructions provided to pt. Pt verbalizes understanding. Pt states all questions have been answered. Signed copy in chart. Prescriptions sent to [facility]. Pt states that all personal belongings are in possession. Pt off unit via wheelchair, escorted by CNA.

## 2019-03-20 NOTE — PROGRESS NOTES
"Pt sitting comfortably on cardiac chair.  Reports feeling \"so much better\" from this am.  Will continue to monitor.  "

## 2019-03-20 NOTE — CARE PLAN
Problem: Safety  Goal: Will remain free from injury  Outcome: PROGRESSING AS EXPECTED  Keep call light within reach. Educate patient to call for assistance. Maintain clutter free environment.      Problem: Respiratory:  Goal: Respiratory status will improve  Outcome: PROGRESSING AS EXPECTED  Assess pulmonary status and titrate O2 as needed. Educate on incentive spirometry use. Educate on deep breathing and coughing.

## 2019-03-20 NOTE — DISCHARGE PLANNING
Anticipated Discharge Disposition: Home, possible 02.     Action: Discussed pt's case with bedside RN and pt will likely require 02 for d/c. Met with pt at bedside, discussed possibility of home 02 and provided choice form. Per pt, she does not have a preference. LSW faxed choice form to [facility] for [facility].     Barriers to Discharge: N/A.    Plan: As above, pt may need home 02 for d/c. Choice form obtained for [facility]. Will await home 02 orders. Pt is not medically cleared at this time.

## 2019-03-20 NOTE — PROGRESS NOTES
Assessment complete, medicated per MAR. Discussed POC and medications, allowed time to ask questions. Pt resting in bed, RR even and unlabored. Pt educated to call for assistance. Declines needs at this time. Safety precautions in place.     0040 pt medicated per MAR. Up to bathroom and back to bed. Denies needs at this time.     0200 pt in bed, eyes closed, RR even and unlabored.     0600 pt medicated per MAR. Metoprolol held because HR was 43 and outside parameters.

## 2019-03-20 NOTE — DISCHARGE PLANNING
Received Choice form at 5554  Agency/Facility Name: [facility]  Referral sent per Choice form @ [phone]

## 2019-03-20 NOTE — CARE PLAN
Problem: Communication  Goal: The ability to communicate needs accurately and effectively will improve  Outcome: PROGRESSING AS EXPECTED  Discussed use of pain scale, call light, medications and nonpharmacologic ways to control pain. Whiteboard updated, POC discussed.    Problem: Infection  Goal: Will remain free from infection  Outcome: PROGRESSING AS EXPECTED  Pt taught signs and symptoms of infection. Instructed on proper hand washing techniques and oral care. Discussed avoiding crowds after discharge.

## 2019-03-20 NOTE — DISCHARGE PLANNING
Anticipated Discharge Disposition: Home with 02    Action: LSW discussed pt's case with bedside RN, awaiting 02 orders. Bedside RN will notify attending MD for orders.     02 orders obtained, [facility] to process 02 referral to [facility] as pt has [employer] insurance.    Barriers to Discharge: N/A.     Plan: As above, [facility] to process 02 referral to [facility]. Await approval of 02 and delivery to bedside.     Care Transition Team Assessment    Information Source  Orientation : Oriented x 4  Information Given By: Patient, Other (Comments) (IHD worker, chart review)  Informant's Name: pt, IHD worker, treatment team and medical records  Who is responsible for making decisions for patient? : Patient         Elopement Risk  Legal Hold: No  Ambulatory or Self Mobile in Wheelchair: No-Not an Elopement Risk  Disoriented: No  Psychiatric Symptoms: None  History of Wandering: No  Elopement this Admit: No  Vocalizing Wanting to Leave: No  Displays Behaviors, Body Language Wanting to Leave: No-Not at Risk for Elopement  Elopement Risk: Not at Risk for Elopement    Interdisciplinary Discharge Planning  Does Admitting Nurse Feel This Could be a Complex Discharge?: No  Lives with - Patient's Self Care Capacity:  (Sister)  Patient or legal guardian wants to designate a caregiver (see row info): No  Support Systems: Family Member(s)  Housing / Facility: 1 Story House  Do You Take your Prescribed Medications Regularly: Yes  Able to Return to Previous ADL's: Yes  Mobility Issues: No  Prior Services: None  Assistance Needed: No  Durable Medical Equipment: Home Oxygen, Walker, Other - Specify (cane, ordering home 02)  DME Provider / Phone: [facility] for home 02    Discharge Preparedness  What is your plan after discharge?: Home with help  What are your discharge supports?: Child, Other (comment) (family)  Prior Functional Level: Ambulatory, Independent with Activities of Daily Living, Independent with Medication Management,  Uses Walker  Difficulity with ADLs: None (wife assists as needed)  Difficulity with IADLs: None (wife assists as needed)    Functional Assesment  Prior Functional Level: Ambulatory, Independent with Activities of Daily Living, Independent with Medication Management, Uses Walker    Finances  Financial Barriers to Discharge: No  Prescription Coverage: Yes    Vision / Hearing Impairment  Vision Impairment : No  Hearing Impairment : No              Domestic Abuse  Have you ever been the victim of abuse or violence?: No  Physical Abuse or Sexual Abuse: No  Verbal Abuse or Emotional Abuse: No         Discharge Risks or Barriers  Discharge risks or barriers?: Complex medical needs  Patient risk factors: Complex medical needs    Anticipated Discharge Information  Anticipated discharge disposition: Home, DME  Discharge Address: [address] [place] [address]  Discharge Contact Phone Number: [phone]

## 2019-03-20 NOTE — FLOWSHEET NOTE
03/20/19 0231   Events/Summary/Plan   Events/Summary/Plan SVN    Therapy Not Performed   Type of Therapy Not Performed SVN   Reason Therapy Not Performed Not Available

## 2019-03-20 NOTE — DISCHARGE INSTRUCTIONS
Discharge Instructions    Discharged to home by car with friend. Discharged via wheelchair, hospital escort: Yes.  Special equipment needed: Oxygen    Be sure to schedule a follow-up appointment with your primary care doctor or any specialists as instructed.     Discharge Plan:   Influenza Vaccine Indication: Patient Refuses    I understand that a diet low in cholesterol, fat, and sodium is recommended for good health. Unless I have been given specific instructions below for another diet, I accept this instruction as my diet prescription.   Other diet: 2 gram Sodium Diet    Special Instructions: None    · Is patient discharged on Warfarin / Coumadin?   No     Depression / Suicide Risk    As you are discharged from this [facility] facility, it is important to learn how to keep safe from harming yourself.    Recognize the warning signs:  · Abrupt changes in personality, positive or negative- including increase in energy   · Giving away possessions  · Change in eating patterns- significant weight changes-  positive or negative  · Change in sleeping patterns- unable to sleep or sleeping all the time   · Unwillingness or inability to communicate  · Depression  · Unusual sadness, discouragement and loneliness  · Talk of wanting to die  · Neglect of personal appearance   · Rebelliousness- reckless behavior  · Withdrawal from people/activities they love  · Confusion- inability to concentrate     If you or a loved one observes any of these behaviors or has concerns about self-harm, here's what you can do:  · Talk about it- your feelings and reasons for harming yourself  · Remove any means that you might use to hurt yourself (examples: pills, rope, extension cords, firearm)  · Get professional help from the community (Mental Health, Substance Abuse, psychological counseling)  · Do not be alone:Call your Safe Contact- someone whom you trust who will be there for you.  · Call your local CRISIS HOTLINE 478-5354 or  "237.435.4005  · Call your local Children's Mobile Crisis Response Team [place] (331) 263-8393 or www.Dyyno  · Call the toll free National Suicide Prevention Hotlines   · National Suicide Prevention Lifeline 524-898-CSAO (1290)  · National Hope Line Network 800-SUICIDE (983-5637)    Influenza, Adult  Influenza (“the flu\") is an infection in the lungs, nose, and throat (respiratory tract). It is caused by a virus. The flu causes many common cold symptoms, as well as a high fever and body aches. It can make you feel very sick.  The flu spreads easily from person to person (is contagious). Getting a flu shot (influenza vaccination) every year is the best way to prevent the flu.  Follow these instructions at home:  · Take over-the-counter and prescription medicines only as told by your doctor.  · Use a cool mist humidifier to add moisture (humidity) to the air in your home. This can make it easier to breathe.  · Rest as needed.  · Drink enough fluid to keep your pee (urine) clear or pale yellow.  · Cover your mouth and nose when you cough or sneeze.  · Wash your hands with soap and water often, especially after you cough or sneeze. If you cannot use soap and water, use hand .  · Stay home from work or school as told by your doctor. Unless you are visiting your doctor, try to avoid leaving home until your fever has been gone for 24 hours without the use of medicine.  · Keep all follow-up visits as told by your doctor. This is important.  How is this prevented?  · Getting a yearly (annual) flu shot is the best way to avoid getting the flu. You may get the flu shot in late summer, fall, or winter. Ask your doctor when you should get your flu shot.  · Wash your hands often or use hand  often.  · Avoid contact with people who are sick during cold and flu season.  · Eat healthy foods.  · Drink plenty of fluids.  · Get enough sleep.  · Exercise regularly.  Contact a doctor if:  · You get new " symptoms.  · You have:  ¨ Chest pain.  ¨ Watery poop (diarrhea).  ¨ A fever.  · Your cough gets worse.  · You start to have more mucus.  · You feel sick to your stomach (nauseous).  · You throw up (vomit).  Get help right away if:  · You start to be short of breath or have trouble breathing.  · Your skin or nails turn a bluish color.  · You have very bad pain or stiffness in your neck.  Prednisone tablets  What is this medicine?  PREDNISONE (PRED ni sone) is a corticosteroid. It is commonly used to treat inflammation of the skin, joints, lungs, and other organs. Common conditions treated include asthma, allergies, and arthritis. It is also used for other conditions, such as blood disorders and diseases of the adrenal glands.  This medicine may be used for other purposes; ask your health care provider or pharmacist if you have questions.  COMMON BRAND NAME(S): Deltasone, Predone, Sterapred, Sterapred DS  What should I tell my health care provider before I take this medicine?  They need to know if you have any of these conditions:  -Cushing's syndrome  -diabetes  -glaucoma  -heart disease  -high blood pressure  -infection (especially a virus infection such as chickenpox, cold sores, or herpes)  -kidney disease  -liver disease  -mental illness  -myasthenia gravis  -osteoporosis  -seizures  -stomach or intestine problems  -thyroid disease  -an unusual or allergic reaction to lactose, prednisone, other medicines, foods, dyes, or preservatives  -pregnant or trying to get pregnant  -breast-feeding  How should I use this medicine?  Take this medicine by mouth with a glass of water. Follow the directions on the prescription label. Take this medicine with food. If you are taking this medicine once a day, take it in the morning. Do not take more medicine than you are told to take. Do not suddenly stop taking your medicine because you may develop a severe reaction. Your doctor will tell you how much medicine to take. If your  doctor wants you to stop the medicine, the dose may be slowly lowered over time to avoid any side effects.  Talk to your pediatrician regarding the use of this medicine in children. Special care may be needed.  Overdosage: If you think you have taken too much of this medicine contact a poison control center or emergency room at once.  NOTE: This medicine is only for you. Do not share this medicine with others.  What if I miss a dose?  If you miss a dose, take it as soon as you can. If it is almost time for your next dose, talk to your doctor or health care professional. You may need to miss a dose or take an extra dose. Do not take double or extra doses without advice.  What may interact with this medicine?  Do not take this medicine with any of the following medications:  -metyrapone  -mifepristone  This medicine may also interact with the following medications:  -aminoglutethimide  -amphotericin B  -aspirin and aspirin-like medicines  -barbiturates  -certain medicines for diabetes, like glipizide or glyburide  -cholestyramine  -cholinesterase inhibitors  -cyclosporine  -digoxin  -diuretics  -ephedrine  -female hormones, like estrogens and birth control pills  -isoniazid  -ketoconazole  -NSAIDS, medicines for pain and inflammation, like ibuprofen or naproxen  -phenytoin  -rifampin  -toxoids  -vaccines  -warfarin  This list may not describe all possible interactions. Give your health care provider a list of all the medicines, herbs, non-prescription drugs, or dietary supplements you use. Also tell them if you smoke, drink alcohol, or use illegal drugs. Some items may interact with your medicine.  What should I watch for while using this medicine?  Visit your doctor or health care professional for regular checks on your progress. If you are taking this medicine over a prolonged period, carry an identification card with your name and address, the type and dose of your medicine, and your doctor's name and  address.  This medicine may increase your risk of getting an infection. Tell your doctor or health care professional if you are around anyone with measles or chickenpox, or if you develop sores or blisters that do not heal properly.  If you are going to have surgery, tell your doctor or health care professional that you have taken this medicine within the last twelve months.  Ask your doctor or health care professional about your diet. You may need to lower the amount of salt you eat.  This medicine may affect blood sugar levels. If you have diabetes, check with your doctor or health care professional before you change your diet or the dose of your diabetic medicine.  What side effects may I notice from receiving this medicine?  Side effects that you should report to your doctor or health care professional as soon as possible:  -allergic reactions like skin rash, itching or hives, swelling of the face, lips, or tongue  -changes in emotions or moods  -changes in vision  -depressed mood  -eye pain  -fever or chills, cough, sore throat, pain or difficulty passing urine  -increased thirst  -swelling of ankles, feet  Side effects that usually do not require medical attention (report to your doctor or health care professional if they continue or are bothersome):  -confusion, excitement, restlessness  -headache  -nausea, vomiting  -skin problems, acne, thin and shiny skin  -trouble sleeping  -weight gain  This list may not describe all possible side effects. Call your doctor for medical advice about side effects. You may report side effects to FDA at 4-674-FDA-2941.  Where should I keep my medicine?  Keep out of the reach of children.  Store at room temperature between 15 and 30 degrees C (59 and 86 degrees F). Protect from light. Keep container tightly closed. Throw away any unused medicine after the expiration date.  NOTE: This sheet is a summary. It may not cover all possible information. If you have questions about  this medicine, talk to your doctor, pharmacist, or health care provider.  © 2018 [employer] Standard (2012-08-02 10:57:14)  · You get a sudden headache.  · You get sudden pain in your face or ear.  · You cannot stop throwing up.  This information is not intended to replace advice given to you by your health care provider. Make sure you discuss any questions you have with your health care provider.  Document Released: 09/26/2009 Document Revised: 05/25/2017 Document Reviewed: 10/11/2016  [employer] Interactive Patient Education © 2017 [employer].  Albuterol inhalation aerosol  What is this medicine?  ALBUTEROL (al BYOO ter ole) is a bronchodilator. It helps open up the airways in your lungs to make it easier to breathe. This medicine is used to treat and to prevent bronchospasm.  This medicine may be used for other purposes; ask your health care provider or pharmacist if you have questions.  COMMON BRAND NAME(S): Proair HFA, Proventil, Proventil HFA, Respirol, Ventolin, Ventolin HFA  What should I tell my health care provider before I take this medicine?  They need to know if you have any of the following conditions:  -diabetes  -heart disease or irregular heartbeat  -high blood pressure  -pheochromocytoma  -seizures  -thyroid disease  -an unusual or allergic reaction to albuterol, levalbuterol, sulfites, other medicines, foods, dyes, or preservatives  -pregnant or trying to get pregnant  -breast-feeding  How should I use this medicine?  This medicine is for inhalation through the mouth. Follow the directions on your prescription label. Take your medicine at regular intervals. Do not use more often than directed. Make sure that you are using your inhaler correctly. Ask you doctor or health care provider if you have any questions.  Talk to your pediatrician regarding the use of this medicine in children. Special care may be needed.  Overdosage: If you think you have taken too much of this medicine contact a poison  control center or emergency room at once.  NOTE: This medicine is only for you. Do not share this medicine with others.  What if I miss a dose?  If you miss a dose, use it as soon as you can. If it is almost time for your next dose, use only that dose. Do not use double or extra doses.  What may interact with this medicine?  -anti-infectives like chloroquine and pentamidine  -caffeine  -cisapride  -diuretics  -medicines for colds  -medicines for depression or for emotional or psychotic conditions  -medicines for weight loss including some herbal products  -methadone  -some antibiotics like clarithromycin, erythromycin, levofloxacin, and linezolid  -some heart medicines  -steroid hormones like dexamethasone, cortisone, hydrocortisone  -theophylline  -thyroid hormones  This list may not describe all possible interactions. Give your health care provider a list of all the medicines, herbs, non-prescription drugs, or dietary supplements you use. Also tell them if you smoke, drink alcohol, or use illegal drugs. Some items may interact with your medicine.  What should I watch for while using this medicine?  Tell your doctor or health care professional if your symptoms do not improve. Do not use extra albuterol. If your asthma or bronchitis gets worse while you are using this medicine, call your doctor right away.  If your mouth gets dry try chewing sugarless gum or sucking hard candy. Drink water as directed.  What side effects may I notice from receiving this medicine?  Side effects that you should report to your doctor or health care professional as soon as possible:  -allergic reactions like skin rash, itching or hives, swelling of the face, lips, or tongue  -breathing problems  -chest pain  -feeling faint or lightheaded, falls  -high blood pressure  -irregular heartbeat  -fever  -muscle cramps or weakness  -pain, tingling, numbness in the hands or feet  -vomiting  Side effects that usually do not require medical  attention (report to your doctor or health care professional if they continue or are bothersome):  -cough  -difficulty sleeping  -headache  -nervousness or trembling  -stomach upset  -stuffy or runny nose  -throat irritation  -unusual taste  This list may not describe all possible side effects. Call your doctor for medical advice about side effects. You may report side effects to FDA at 7-648-FDA-2442.  Where should I keep my medicine?  Keep out of the reach of children.  Store at room temperature between 15 and 30 degrees C (59 and 86 degrees F). The contents are under pressure and may burst when exposed to heat or flame. Do not freeze. This medicine does not work as well if it is too cold. Throw away any unused medicine after the expiration date. Inhalers need to be thrown away after the labeled number of puffs have been used or by the expiration date; whichever comes first. Ventolin HFA should be thrown away 12 months after removing from foil pouch. Check the instructions that come with your medicine.  NOTE: This sheet is a summary. It may not cover all possible information. If you have questions about this medicine, talk to your doctor, pharmacist, or health care provider.  © 2018 [employer]/Gold Standard (2014-06-05 10:57:17)  Methylprednisolone tablets  What is this medicine?  METHYLPREDNISOLONE (meth ill pred NISS oh lone) is a corticosteroid. It is commonly used to treat inflammation of the skin, joints, lungs, and other organs. Common conditions treated include asthma, allergies, and arthritis. It is also used for other conditions, such as blood disorders and diseases of the adrenal glands.  This medicine may be used for other purposes; ask your health care provider or pharmacist if you have questions.  COMMON BRAND NAME(S): Medrol, Medrol Dosepak  What should I tell my health care provider before I take this medicine?  They need to know if you have any of these conditions:  -Cushing's syndrome  -eye disease,  vision problems  -diabetes  -glaucoma  -heart disease  -high blood pressure  -infection (especially a virus infection such as chickenpox, cold sores, or herpes)  -liver disease  -mental illness  -myasthenia gravis  -osteoporosis  -recently received or scheduled to receive a vaccine  -seizures  -stomach or intestine problems  -thyroid disease  -an unusual or allergic reaction to lactose, methylprednisolone, other medicines, foods, dyes, or preservatives  -pregnant or trying to get pregnant  -breast-feeding  How should I use this medicine?  Take this medicine by mouth with a glass of water. Follow the directions on the prescription label. Take this medicine with food. If you are taking this medicine once a day, take it in the morning. Do not take it more often than directed. Do not suddenly stop taking your medicine because you may develop a severe reaction. Your doctor will tell you how much medicine to take. If your doctor wants you to stop the medicine, the dose may be slowly lowered over time to avoid any side effects.  Talk to your pediatrician regarding the use of this medicine in children. Special care may be needed.  Overdosage: If you think you have taken too much of this medicine contact a poison control center or emergency room at once.  NOTE: This medicine is only for you. Do not share this medicine with others.  What if I miss a dose?  If you miss a dose, take it as soon as you can. If it is almost time for your next dose, talk to your doctor or health care professional. You may need to miss a dose or take an extra dose. Do not take double or extra doses without advice.  What may interact with this medicine?  Do not take this medicine with any of the following medications:  -alefacept  -echinacea  -live virus vaccines  -metyrapone  -mifepristone  This medicine may also interact with the following medications:  -amphotericin B  -aspirin and aspirin-like medicines  -certain antibiotics like erythromycin,  clarithromycin, troleandomycin  -certain medicines for diabetes  -certain medicines for fungal infections like ketoconazole  -certain medicines for seizures like carbamazepine, phenobarbital, phenytoin  -certain medicines that treat or prevent blood clots like warfarin  -cholestyramine  -cyclosporine  -digoxin  -diuretics  -female hormones, like estrogens and birth control pills  -isoniazid  -NSAIDs, medicines for pain inflammation, like ibuprofen or naproxen  -other medicines for myasthenia gravis  -rifampin  -vaccines  This list may not describe all possible interactions. Give your health care provider a list of all the medicines, herbs, non-prescription drugs, or dietary supplements you use. Also tell them if you smoke, drink alcohol, or use illegal drugs. Some items may interact with your medicine.  What should I watch for while using this medicine?  Tell your doctor or healthcare professional if your symptoms do not start to get better or if they get worse. Do not stop taking except on your doctor's advice. You may develop a severe reaction. Your doctor will tell you how much medicine to take.  This medicine may increase your risk of getting an infection. Tell your doctor or health care professional if you are around anyone with measles or chickenpox, or if you develop sores or blisters that do not heal properly.  This medicine may affect blood sugar levels. If you have diabetes, check with your doctor or health care professional before you change your diet or the dose of your diabetic medicine.  Tell your doctor or health care professional right away if you have any change in your eyesight.  Using this medicine for a long time may increase your risk of low bone mass. Talk to your doctor about bone health.  What side effects may I notice from receiving this medicine?  Side effects that you should report to your doctor or health care professional as soon as possible:  -allergic reactions like skin rash, itching  or hives, swelling of the face, lips, or tongue  -bloody or tarry stools  -changes in vision  -hallucination, loss of contact with reality  -muscle cramps  -muscle pain  -palpitations  -signs and symptoms of high blood sugar such as dizziness; dry mouth; dry skin; fruity breath; nausea; stomach pain; increased hunger or thirst; increased urination  -signs and symptoms of infection like fever or chills; cough; sore throat; pain or trouble passing urine  -trouble passing urine or change in the amount of urine  Side effects that usually do not require medical attention (report to your doctor or health care professional if they continue or are bothersome):  -changes in emotions or mood  -constipation  -diarrhea  -excessive hair growth on the face or body  -headache  -nausea, vomiting  -trouble sleeping  -weight gain  This list may not describe all possible side effects. Call your doctor for medical advice about side effects. You may report side effects to FDA at 6-493-QCK-3892.  Where should I keep my medicine?  Keep out of the reach of children.  Store at room temperature between 20 and 25 degrees C (68 and 77 degrees F). Throw away any unused medicine after the expiration date.  NOTE: This sheet is a summary. It may not cover all possible information. If you have questions about this medicine, talk to your doctor, pharmacist, or health care provider.  © 2018 [employer]/Gold Standard (2017-02-23 15:53:30)

## 2019-03-20 NOTE — PROGRESS NOTES
Bedside report received from MONSERRAT RN. Pt resting in cardiac chair, family at bedside, RR even and unlabored. Safety precautions in place, call light within reach. Pt belongings within reach. POC discussed, pain assessed. Pt belongings within reach.

## 2019-03-20 NOTE — FLOWSHEET NOTE
03/19/19 1959   Events/Summary/Plan   Events/Summary/Plan SVN    Interdisciplinary Plan of Care-Goals (Indications)   Bronchodilator Indications Physical Exam / Hyperinflation / Wheezing (bronchospasm)   Interdisciplinary Plan of Care-Outcomes    Bronchodilator Outcome Diminished Wheezing and Volume of Air Movement Increased   Education   Education Yes - Pt. / Family has been Instructed in use of Respiratory Equipment;Yes - Pt. / Family has been Instructed in use of Respiratory Medications and Adverse Reactions   RT Assessment of Delivered Medications   Evaluation of Medication Delivery Daily Yes-- Pt /Family has been Instructed in use of Respiratory Medications and Adverse Reactions   SVN Group   #SVN Performed Yes   Given By: Mouthpiece   Chest Exam   Respiration 18   Pulse (!) 50   Breath Sounds   Pre/Post Intervention Post Intervention Assessment   RUL Breath Sounds Expiratory Wheezes;Transmitted Upper Airway Sound   RML Breath Sounds Diminished   RLL Breath Sounds Diminished   BRITANY Breath Sounds Transmitted Upper Airway Sound;Expiratory Wheezes   LLL Breath Sounds Diminished   Oximetry   #Pulse Oximetry (Single Determination) Yes   Oxygen   Pulse Oximetry 98 %   O2 (LPM) 2   O2 Daily Delivery Respiratory  Silicone Nasal Cannula

## 2019-03-20 NOTE — DISCHARGE PLANNING
Agency/Facility Name: [facility]  Spoke To: John  Outcome: Patient accepted and o2 will be delivered to bedside.

## 2019-03-20 NOTE — PROGRESS NOTES
[facility] Medicine Daily Progress Note    Date of Service  3/19/2019    Chief Complaint  SOB    Hospital Course    74 y.o. female history of coronary artery disease, hypertension, obesity, untreated sleep apnea admitted 3/17/2019 with shortness of breath, fatigue and myalgias found to have influenza A.  She also had acute respiratory failure with hypoxia      Interval Problem Update  Still desaturating with little movement and wheezing; admits to feeling weak and short of breath. Agreeable to staying another night.     Consultants/Specialty  None    Code Status  Full    Disposition  F/U O2 saturations, may need home O2 check again in am  Did well with PT evaluation, appropriate to go home    Review of Systems  Review of Systems   Constitutional: Positive for malaise/fatigue. Negative for chills, fever and weight loss.   HENT: Negative.    Respiratory: Positive for cough, shortness of breath and wheezing.    Cardiovascular: Negative.  Negative for chest pain and leg swelling.   Gastrointestinal: Negative.  Negative for abdominal pain, nausea and vomiting.   Genitourinary: Negative.  Negative for dysuria and flank pain.   Musculoskeletal: Negative.  Negative for back pain and myalgias.   Neurological: Negative.  Negative for dizziness, loss of consciousness and weakness.   Endo/Heme/Allergies: Negative.  Does not bruise/bleed easily.   Psychiatric/Behavioral: Negative.  Negative for depression. The patient is not nervous/anxious.    All other systems reviewed and are negative.       Physical Exam  Temp:  [36.4 °C (97.5 °F)-37.9 °C (100.3 °F)] 37 °C (98.6 °F)  Pulse:  [49-72] 49  Resp:  [16-20] 18  BP: ()/(42-67) 106/49  SpO2:  [90 %-98 %] 97 %    Physical Exam   Constitutional: She appears well-developed and well-nourished. No distress.   Plan of care discussed with bedside RN.   HENT:   Nose: Nose normal.   Mouth/Throat: Oropharynx is clear and moist. No oropharyngeal exudate.   Eyes: Conjunctivae are normal.  Right eye exhibits no discharge. Left eye exhibits no discharge. No scleral icterus.   Neck: No JVD present. No tracheal deviation present.   Cardiovascular: Normal rate, regular rhythm and normal heart sounds.    Pulmonary/Chest: Effort normal. No stridor. No respiratory distress. She has wheezes (throughout both lung fields). She has no rales. She exhibits no tenderness.   Abdominal: Soft. Bowel sounds are normal. She exhibits no distension. There is no tenderness.   Musculoskeletal: She exhibits no edema or tenderness.   Neurological: She is alert. No cranial nerve deficit. She exhibits normal muscle tone.   Skin: Skin is warm and dry. She is not diaphoretic. No pallor.   Psychiatric: She has a normal mood and affect. Her behavior is normal.   Nursing note and vitals reviewed.      Fluids    Intake/Output Summary (Last 24 hours) at 03/19/19 1703  Last data filed at 03/19/19 1500   Gross per 24 hour   Intake              120 ml   Output                0 ml   Net              120 ml       Laboratory  Recent Labs      03/17/19 2115 03/18/19 0422   WBC  12.7*  9.8   RBC  4.66  4.23   HEMOGLOBIN  14.0  12.9   HEMATOCRIT  42.2  39.5   MCV  90.6  93.4   MCH  30.0  30.5   MCHC  33.2*  32.7*   RDW  46.6  49.2   PLATELETCT  198  178   MPV  11.3  12.3     Recent Labs      03/17/19 2115 03/18/19 0422  03/19/19   0420   SODIUM  132*  134*  140   POTASSIUM  3.7  3.9  3.5*   CHLORIDE  99  100  105   CO2  23  25  25   GLUCOSE  126*  127*  87   BUN  13  10  13   CREATININE  0.86  0.97  0.96   CALCIUM  9.2  8.9  8.3*         Recent Labs      03/18/19   0422   BNPBTYPENAT  160*           Imaging  EC-ECHOCARDIOGRAM COMPLETE W/O CONT   Final Result      DX-CHEST-PORTABLE (1 VIEW)   Final Result      No acute cardiopulmonary abnormality.           Assessment/Plan  * Sepsis (HCC)   Assessment & Plan    This is sepsis (without associated acute organ dysfunction).   2/2 to Influenza  Improving clinically, no  hypotension  CXR:neg for acute findings  Pro-calcitonin negative  Continue RT protocol, duo nebs, Pep therapy if warranted, and incentive spirometry.        Hyponatremia   Assessment & Plan    2/2 to dehydration from sepsis.  IV fluids started  Recheck bmp in the am     Acute respiratory failure with hypoxia (HCC)   Assessment & Plan    2/2 to URI, influenza, was 88% on room air arrival, 2L provided.  She continues to have wheezing and hypoxia. Hypoxia worse with exertion.  Continue RT protocol, duo nebs, Pep therapy if warranted, and incentive spirometry.   Stop prednisone change to iv solumedrol  40mg iv every 8 hours  Echo shows no pulmonary hypertension  Finish oseltamivir.     S/P coronary artery stent placement- (present on admission)   Assessment & Plan    As above, 2x stent placement  Denies chest pain  Resume metoprolol, lipitor and aspirin/plavix.     CAD (coronary artery disease)- (present on admission)   Assessment & Plan    Hx of cardiac stent placement  continue home dose of aspirin,statin,b-blocker.        Gastroesophageal reflux disease without esophagitis- (present on admission)   Assessment & Plan    Continue with prevacid      Mixed hyperlipidemia- (present on admission)   Assessment & Plan    Continue with lipitor.      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  Her sleep apnea is poorly controlled and she does have hypoxia with ambulation which I suspect is partially related to her untreated sleep apnea.  Needs follow up with sleep lab/pulmonary. High likelihood to need home oxygen; no pulmonary htn seen on echocardiogram.     Essential hypertension, benign- (present on admission)   Assessment & Plan    Controlled  continue losartan 100mg daily and metoprolol 50mg XL          VTE prophylaxis: Lovenox

## 2019-03-20 NOTE — FLOWSHEET NOTE
03/20/19 0711   Events/Summary/Plan   Non-Invasive Resp Device Site Inspection Completed Intact   Interdisciplinary Plan of Care-Goals (Indications)   Bronchodilator Indications Physical Exam / Hyperinflation / Wheezing (bronchospasm)   Interdisciplinary Plan of Care-Outcomes    Bronchodilator Outcome Diminished Wheezing and Volume of Air Movement Increased   Education   Education Yes - Pt. / Family has been Instructed in use of Respiratory Equipment;Yes - Pt. / Family has been Instructed in use of Respiratory Medications and Adverse Reactions   RT Assessment of Delivered Medications   Evaluation of Medication Delivery Daily Yes-- Pt /Family has been Instructed in use of Respiratory Medications and Adverse Reactions   SVN Group   #SVN Performed Yes   Given By: Mouthpiece   Date SVN Next Change Due (Q 7 Days) 03/25/19   Chest Exam   Respiration 20   Pulse 84   Breath Sounds   Pre/Post Intervention Pre Intervention Assessment   RUL Breath Sounds Expiratory Wheezes   RML Breath Sounds Diminished   RLL Breath Sounds Diminished   BRITANY Breath Sounds Expiratory Wheezes   LLL Breath Sounds Diminished   Secretions   Cough Moist;Non Productive   How Sputum Obtained Cough on Request   Oximetry   #Pulse Oximetry (Single Determination) Yes   Oxygen   Pulse Oximetry 96 %   O2 (LPM) 2   O2 Daily Delivery Respiratory  Silicone Nasal Cannula

## 2019-03-20 NOTE — DISCHARGE PLANNING
Agency/Facility Name: [facility]  Spoke To: Jeny  Outcome: They have received referral and are processing.

## 2019-03-20 NOTE — FACE TO FACE
"Face to Face Note  -  Durable Medical Equipment    Pura Arevalo M.D. - NPI: [id number]  I certify that this patient is under my care and that they had a durable medical equipment(DME)face to face encounter by myself that meets the physician DME face-to-face encounter requirements with this patient on:    Date of encounter:   Patient:                    MRN:                       YOB: 2021  Griselda Farmer  [id number]  1945     The encounter with the patient was in whole, or in part, for the following medical condition, which is the primary reason for durable medical equipment:  COPD    I certify that, based on my findings, the following durable medical equipment is medically necessary:  Oxygen.    HOME O2 Saturation Measurements:(Values must be present for Home Oxygen orders)  Room air sat at rest: 89  Room air sat with amb: 86  With liters of O2: 2, O2 sat at rest with O2: 94  With Liters of O2: 2, O2 sat with amb with O2 : 90  Is the patient mobile?: Yes    My Clinical findings support the need for the above equipment due to:  Hypoxia    Supporting Symptoms: The patient requires supplemental oxygen, as the following interventions have been tried with limited or no improvement: \"Bronchodilators and/or steroid inhalers, \"Oral and/or IV steroids, \"Ambulation with oximetry and \"Incentive spirometry    "

## 2019-03-21 ENCOUNTER — PATIENT OUTREACH (OUTPATIENT)
Dept: HEALTH INFORMATION MANAGEMENT | Facility: OTHER | Age: 74
End: 2019-03-21

## 2019-03-22 ENCOUNTER — PATIENT OUTREACH (OUTPATIENT)
Dept: HEALTH INFORMATION MANAGEMENT | Facility: OTHER | Age: 74
End: 2019-03-22

## 2019-03-22 NOTE — PROGRESS NOTES
Outbound call to Griselda for post discharge medication review. Patient has started medrol dosepak but is unsure if she was supposed to take prednisone in addition. She also states that she has 2 different doses of Tamiflu and believes she is supposed to take 30 mg capsules. Clarified with Dr. Pura Arevalo to have patient continue methylprednisolone dose pack. Should not be on prednisone in addition. Clarified with Dr. Pura Arevalo to have patient continue Tamiflu 30 mg BID, not the 75 mg. Reconciled med list in EPIC. Notified patient of above. Patient verbalized understanding. States her sister used to be home health nurse and is assisting in management of her medications. She reports utilizing weekly pill organizer and is tolerating medications well.     Reviewed indication, dose, and timing of each medication. No clinically significant interactions noted.   See completed medication review pharmacy follow-up R17 hyperlink for additional documentation.     Provided patient with my contact information for additional medication questions/concerns.     Carmel Ewing, ShannanD

## 2019-03-23 LAB
BACTERIA BLD CULT: NORMAL
BACTERIA BLD CULT: NORMAL
SIGNIFICANT IND 70042: NORMAL
SIGNIFICANT IND 70042: NORMAL
SITE SITE: NORMAL
SITE SITE: NORMAL
SOURCE SOURCE: NORMAL
SOURCE SOURCE: NORMAL

## 2019-04-01 ENCOUNTER — OFFICE VISIT (OUTPATIENT)
Dept: MEDICAL GROUP | Age: 74
End: 2019-04-01
Payer: MEDICARE

## 2019-04-01 VITALS
HEART RATE: 57 BPM | WEIGHT: 207.2 LBS | OXYGEN SATURATION: 98 % | HEIGHT: 65 IN | DIASTOLIC BLOOD PRESSURE: 68 MMHG | SYSTOLIC BLOOD PRESSURE: 104 MMHG | BODY MASS INDEX: 34.52 KG/M2 | TEMPERATURE: 97.3 F

## 2019-04-01 DIAGNOSIS — Z09 HOSPITAL DISCHARGE FOLLOW-UP: ICD-10-CM

## 2019-04-01 DIAGNOSIS — J96.01 ACUTE RESPIRATORY FAILURE WITH HYPOXIA (HCC): ICD-10-CM

## 2019-04-01 DIAGNOSIS — I10 ESSENTIAL HYPERTENSION, BENIGN: ICD-10-CM

## 2019-04-01 PROBLEM — E87.1 HYPONATREMIA: Status: RESOLVED | Noted: 2019-03-17 | Resolved: 2019-04-01

## 2019-04-01 PROCEDURE — 99214 OFFICE O/P EST MOD 30 MIN: CPT | Performed by: INTERNAL MEDICINE

## 2019-04-01 RX ORDER — ALBUTEROL SULFATE 90 UG/1
AEROSOL, METERED RESPIRATORY (INHALATION)
Refills: 1 | COMMUNITY
Start: 2019-03-18 | End: 2019-04-01

## 2019-04-01 ASSESSMENT — PAIN SCALES - GENERAL: PAINLEVEL: NO PAIN

## 2019-04-01 NOTE — LETTER
[facility]  Brittney Hoskins M.D.  [address]  [place] [address]  Fax: [phone]   Authorization for Release/Disclosure of   Protected Health Information   Name: ONESIMO FARMER : 1945 SSN: [id number]   Address: [address] [place] [address]  [place] [address] Phone:    [phone] (home)    I authorize the entity listed below to release/disclose the PHI below to:   [facility]/Brittney Hoskins M.D. and Brittney Hoskins M.D.   Provider or Entity Name:     Address   City, State, Zip   Phone:      Fax:     Reason for request: continuity of care   Information to be released:    [  ] LAST COLONOSCOPY,  including any PATH REPORT and follow-up  [  ] LAST FIT/COLOGUARD RESULT [  ] LAST DEXA  [  ] LAST MAMMOGRAM  [  ] LAST PAP  [  ] LAST LABS [  ] RETINA EXAM REPORT  [  ] IMMUNIZATION RECORDS  [  ] Release all info      [  ] Check here and initial the line next to each item to release ALL health information INCLUDING  _____ Care and treatment for drug and / or alcohol abuse  _____ HIV testing, infection status, or AIDS  _____ Genetic Testing    DATES OF SERVICE OR TIME PERIOD TO BE DISCLOSED: _____________  I understand and acknowledge that:  * This Authorization may be revoked at any time by you in writing, except if your health information has already been used or disclosed.  * Your health information that will be used or disclosed as a result of you signing this authorization could be re-disclosed by the recipient. If this occurs, your re-disclosed health information may no longer be protected by State or Federal laws.  * You may refuse to sign this Authorization. Your refusal will not affect your ability to obtain treatment.  * This Authorization becomes effective upon signing and will  on (date) __________.      If no date is indicated, this Authorization will  one (1) year from the signature date.    Name: Onesimo Farmer    Signature:   Date:     2019       PLEASE FAX  REQUESTED RECORDS BACK TO: [phone]

## 2019-04-01 NOTE — PROGRESS NOTES
Subjective:   Griselda Duran is a 74 y.o. female here today for evaluation and management of:    1. Hospital discharge follow-up/Acute respiratory failure with hypoxia (HCC)  She was admitted to the Hospital on 3/17/2019 for Influenza A and received Oseltamivir tablets for treatment. She received Medrol Dosepak and finished this already. She was placed on oxygen due to oxygen desaturation during examination and was also sent home on 2L oxygen temporarily. She has been using this, but did not use this last night as she felt improved with improving symptoms. She had a pulse oxymetry at home and states her oxygen has been 90-95% at home, even with exertion, after an hour off oxygen. She has an albuterol inhaler, but has not needed to use this. She denies any shortness of breath at this time as well as no congestion, chest pain, abdominal discomfort, or myalgia. She reports residual clear productive cough at night and has been taking her medication as needed. She is a former smoker approximately 50 years ago.     I discussed the blood test with the patient in clinic today.  Results for GRISELDA DURAN (MRN [id number]) as of 4/1/2019 08:27   Ref. Range 3/17/2019 21:15   Influenza virus A RNA Latest Ref Range: Negative  POSITIVE (A)     I discussed the blood test with the patient in clinic today.  Results for GRISELDA DURAN (MRN [id number]) as of 4/1/2019 08:27   Ref. Range 3/17/2019 21:15 3/18/2019 04:22   WBC Latest Ref Range: 4.8 - 10.8 K/uL 12.7 (H) 9.8     I discussed the blood test with the patient in clinic today.  Results for GRISELDA DURAN (MRN [id number]) as of 4/1/2019 08:27   Ref. Range 3/17/2019 21:15 3/18/2019 04:22   Sodium Latest Ref Range: 135 - 145 mmol/L 132 (L) 134 (L)     I discussed the blood test with the patient in clinic today.  Results for GRISELDA DURAN (MRN [id number]) as of 4/1/2019 08:27   Ref. Range 3/17/2019 21:15 3/17/2019 22:27 3/18/2019 04:22   Bun Latest  Ref Range: 8 - 22 mg/dL 13  10   Creatinine Latest Ref Range: 0.50 - 1.40 mg/dL 0.86  0.97   GFR If  Latest Ref Range: >60 mL/min/1.73 m 2 >60  >60   GFR If Non  Latest Ref Range: >60 mL/min/1.73 m 2 >60  56 (A)     2. Essential hypertension, benign  Stable. Monitoring BP at home. Currently taking Metoprolol 50mg once daily and Losartan 100mg once daily as directed. Also taking baby aspirin. She has an appointment with Dr. Saunders (Cardiology) in July 2019 for follow up. Her heart rate today is 57 and BP is 104/68, which are abnormal for her. Her BP at home has been 130-140/79-80.  Heart rate at home has been between 44-53.  Denies lightheadedness, vision changes, headache, palpitations or leg swelling.    Current medicines (including changes today)  Current Outpatient Prescriptions   Medication Sig Dispense Refill   • atorvastatin (LIPITOR) 40 MG Tab Take 40 mg by mouth every evening.     • albuterol 108 (90 Base) MCG/ACT Aero Soln inhalation aerosol Inhale 2 Puffs by mouth every 6 hours as needed for Shortness of Breath. 8.5 g 1   • metoprolol SR (TOPROL XL) 50 MG TABLET SR 24 HR Take 1 Tab by mouth every day. 30 Tab 11   • clopidogrel (PLAVIX) 75 MG Tab Take 1 Tab by mouth every day. 90 Tab 3   • losartan (COZAAR) 100 MG Tab Take 1 Tab by mouth every day. 90 Tab 3   • Diclofenac Sodium 1 % Gel Apply 2 g to skin as directed 2 times a day as needed. Apply 2 gram on affected shoulder twice a day as needed.     • acetaminophen (TYLENOL 8 HOUR) 650 MG CR tablet Take 650-1,300 mg by mouth every 6 hours as needed for Moderate Pain.     • lansoprazole (PREVACID) 30 MG CAPSULE DELAYED RELEASE Take 1 Cap by mouth every day. 90 Cap 3   • nitroglycerin (NITROSTAT) 0.4 MG SL Tab Place 1 Tab under tongue as needed for Chest Pain (up to 3 doses (if SBP greater than 90 mmHg)). 25 Tab 3   • Pumpkin Seed-Soy Germ (AZO BLADDER CONTROL/GO-LESS PO) Take 1 Cap by mouth every bedtime.     • aspirin EC  "(ECOTRIN) 81 MG Tablet Delayed Response Take 81 mg by mouth every evening. 30 Tab 0   • Cholecalciferol (VITAMIN D) 2000 UNIT TABS Take 1 Tab by mouth every evening.       No current facility-administered medications for this visit.      She  has a past medical history of Angina (2014); Arthritis; Dental disorder; Flushing reaction; GERD (gastroesophageal reflux disease); Heart burn (2014); Hyperlipidemia (10/20/2010); Hypertension (2014); Indigestion; Influenza; Insomnia; Liver cyst; Myocardial infarct (HCC) (1984); Need for Tdap vaccination; FRIDA (obstructive sleep apnea); S/P colonoscopy (11/9/2012); Sick sinus syndrome (HCC) (1/1/2017); Sleep apnea; Symptomatic sinus bradycardia (1/1/2017); Unspecified urinary incontinence; and Urinary bladder disorder.    ROS   No chest pain, no shortness of breath, no abdominal pain       Objective:     Blood pressure 104/68, pulse (!) 57, temperature 36.3 °C (97.3 °F), temperature source Temporal, height 1.651 m (5' 5\"), weight 94 kg (207 lb 3.2 oz), SpO2 98 %, not currently breastfeeding. Body mass index is 34.48 kg/m².   Physical Exam:  General: Alert, oriented and no acute distress.  Eye contact is good, speech goal directed, affect calm  HEENT: conjunctiva non-injected, sclera non-icteric.  Oral mucous membranes pink and moist with no lesions.  Pinna normal.   Lungs: Normal respiratory effort, clear to auscultation bilaterally with good excursion.  CV: regular rate and rhythm. No murmurs.   Abdomen: soft, non distended, nontender, Bowel sound normal.  Ext: no edema, color normal, vascularity normal, temperature normal    Assessment and Plan:   The following treatment plan was discussed     1. Hospital discharge follow-up  - Patient feeling improved overall after finishing prescribed medications.  - GFR level was found to be abnormal at 57. This could likely be due to dehydration from recent illness. Advised she continue to hydrate with plenty of fluids and avoid taking " NSAID medications.     2. Essential hypertension, benign  - Well-controlled. Her blood pressure today in office was low at 104/68 and heart rate was 57, which are abnormal for her.  Patient's heart rate reading at home was 44-53. I recommended she decrease Metoprolol dosage from 50mg to 25mg once daily and continue taking Losartan 100mg once daily. Recheck lab 1-2 weeks before next follow up visit.  Advised to continue monitor blood pressure and heart rate at home.  Advised patient to contact me if her blood pressure is higher than 150/90, then we are going to add on additional blood pressure medication.  I advised patient to keep her heart rate around 55-60.  She is advised to retake metoprolol to higher dose 50 mg daily if heart rate is higher than 70.  She understands and agrees with the plan.  - Advised she continue with Dr. Saunders (Cardiology) in July 2019 and inform her of the medication change.   - Reviewed the risks and benefits as well as potential side effects of medications with patient.  - Discussed to eat low-sodium diet and encouraged to do regular physical exercise.  - Recommend to monitor blood pressure and heart rate at home.     3. Acute respiratory failure with hypoxia (HCC)  - Admitted to Hospital on 3/17/2019 for Influenza A. She was placed on temporary oxygen due to desaturation on examination and placed on 2L oxygen at home. She did not use this yesterday due to improving symptoms without shortness of breath.   - We assessed her oxygen level without supplementation via road test in office today. SpO2 was 98% after 3 minutes of physical exertion.   - Acute respiratory failure with hypoxia resolved.  We will discontinue oxygen supplement.  Diagnosis will be removed from problem list.    4. Health Maintenance   - Shingrex vaccine recommended, but will not receive today due to shortage.     Face-to-face time spent 30 minutes with patient and more than 50% of that time spent for counseling, discussing  problems documented above, coordinating care for medical problems listed above, and answering patient's questions by provider.       Followup: Return in about 3 months (around 7/8/2019), or if symptoms worsen or fail to improve, for Hypertension, Hyperlipidemia, Vitamin D insufficiency, Lab review.      Please note that this dictation was created using voice recognition software. I have made every reasonable attempt to correct obvious errors, but I expect that there may have unintended errors in text, spelling, punctuation, or grammar that I did not discover.    I, Daren Weaver ([email or web address]), am scribing for, and in the presence of, Brittney Hoskins M.D..    Electronically signed by: Daren Weaver (Byron), 4/1/2019    I, Brittney Hoskins M.D., personally performed the services described in this documentation, as scribed by Daren Weaver in my presence, and it is both accurate and complete.

## 2019-04-03 DIAGNOSIS — E78.5 HYPERLIPIDEMIA, UNSPECIFIED HYPERLIPIDEMIA TYPE: Primary | ICD-10-CM

## 2019-04-03 RX ORDER — ATORVASTATIN CALCIUM 40 MG/1
40 TABLET, FILM COATED ORAL EVERY EVENING
Qty: 100 TAB | Refills: 0 | Status: SHIPPED | OUTPATIENT
Start: 2019-04-03 | End: 2019-09-23 | Stop reason: SDUPTHER

## 2019-04-04 LAB — EKG IMPRESSION: NORMAL

## 2019-04-19 ENCOUNTER — TELEPHONE (OUTPATIENT)
Dept: MEDICAL GROUP | Age: 74
End: 2019-04-19

## 2019-04-19 NOTE — TELEPHONE ENCOUNTER
1. Caller Name: Griselda Farmer                                           Call Back Number: [phone] (home)         Patient approves a detailed voicemail message: N\A    Pt needs a letter signed by provider stating Pt is discontinuing Oxygen.  Letter needs to be faxed over to [facility] so they can  equipment.

## 2019-04-19 NOTE — LETTER
April 19, 2019      Re:  Griselda Farmer        To whom it may concern,    Patient discontinued using oxygen since 4/1/19 as her oxygen saturation was back to normal. Acute respiratory failure with hypoxia resolved.                  Regards,          Britntey Hoskins M.D.

## 2019-04-20 NOTE — TELEPHONE ENCOUNTER
Please fax the letter to DME company to discontinue oxygen supplement. Please confirm with patient which DME company and the fax number.    Thanks!    Brittney Hoskins M.D.

## 2019-04-23 ENCOUNTER — TELEPHONE (OUTPATIENT)
Dept: MEDICAL GROUP | Age: 74
End: 2019-04-23

## 2019-04-23 ENCOUNTER — PATIENT OUTREACH (OUTPATIENT)
Dept: HEALTH INFORMATION MANAGEMENT | Facility: OTHER | Age: 74
End: 2019-04-23

## 2019-04-23 NOTE — TELEPHONE ENCOUNTER
Phone Number Called: [phone] (home)     Message: Called Pt. Per Pt request faxed Signed letter by Dr. Hoskins to discontinue Oxygen supplement to [facility] @ fax #: [phone].    Left Message for patient to call back: N\A

## 2019-04-23 NOTE — PROGRESS NOTES
Griselda Farmer was admitted to [facility] on 3/17/19 for shortness of breath and chest pain. Patient discharged home on 3/20/19. [facility] patient advocate was able to successfully engage with patient post-discharge and throughout the case. Per discharge orders, patient was instructed to see her PCP within one week. Patient saw hes PCP on 4/1/19.     Patient was discharged home with 02 through [facility] where the patient utilized the O2 and pulse oximeter for a period of time. [facility] coordinated patient request for PCP to fax and O2 discontinue letter to [facility] as patient stated her O2 saturation is back to normal. Patient lives with sister in law and is independent. Lastly, patient has a son who comes to visit.      Due to a high LACE + score a PPS screening was conducted where patient was scored above 50%.

## 2019-05-07 ENCOUNTER — PATIENT OUTREACH (OUTPATIENT)
Dept: HEALTH INFORMATION MANAGEMENT | Facility: OTHER | Age: 74
End: 2019-05-07

## 2019-05-07 NOTE — PROGRESS NOTES
1. [facility] Verified: yes    2. Verify PCP: yes    3. Review and add  to Care Team: yes    4. WebIZ Checked & Epic Updated: Yes    5. Reviewed/Updated the following with patient:       •   Communication Preference Obtained? YES  • MyChart Activation: already active       •   E-Mail Address Obtained? YES       •   Appointment Day and Time Preferences? YES       •   Preferred Pharmacy? YES       •   Preferred Lab? YES    6. Care Gap Scheduling (Attempt to Schedule EACH Overdue Care Gap!)

## 2019-05-08 ENCOUNTER — PATIENT MESSAGE (OUTPATIENT)
Dept: MEDICAL GROUP | Age: 74
End: 2019-05-08

## 2019-05-08 DIAGNOSIS — I10 ESSENTIAL HYPERTENSION: ICD-10-CM

## 2019-05-08 RX ORDER — LISINOPRIL 20 MG/1
20 TABLET ORAL DAILY
Qty: 90 TAB | Refills: 3 | Status: SHIPPED | OUTPATIENT
Start: 2019-05-08 | End: 2019-09-23 | Stop reason: SDUPTHER

## 2019-05-23 ENCOUNTER — HOSPITAL ENCOUNTER (OUTPATIENT)
Dept: LAB | Facility: MEDICAL CENTER | Age: 74
End: 2019-05-23
Attending: INTERNAL MEDICINE
Payer: MEDICARE

## 2019-05-23 DIAGNOSIS — Z79.899 HIGH RISK MEDICATION USE: ICD-10-CM

## 2019-05-23 DIAGNOSIS — I25.10 CORONARY ARTERY DISEASE INVOLVING NATIVE CORONARY ARTERY OF NATIVE HEART WITHOUT ANGINA PECTORIS: ICD-10-CM

## 2019-05-23 DIAGNOSIS — I10 HTN (HYPERTENSION), MALIGNANT: ICD-10-CM

## 2019-05-23 DIAGNOSIS — E55.9 VITAMIN D DEFICIENCY DISEASE: ICD-10-CM

## 2019-05-23 DIAGNOSIS — R06.09 DYSPNEA ON EXERTION: ICD-10-CM

## 2019-05-23 DIAGNOSIS — G47.33 OSA (OBSTRUCTIVE SLEEP APNEA): ICD-10-CM

## 2019-05-23 DIAGNOSIS — Z95.5 S/P CORONARY ARTERY STENT PLACEMENT: ICD-10-CM

## 2019-05-23 DIAGNOSIS — E66.9 OBESITY (BMI 30-39.9): ICD-10-CM

## 2019-05-23 DIAGNOSIS — E78.2 MIXED HYPERLIPIDEMIA: ICD-10-CM

## 2019-05-23 LAB
25(OH)D3 SERPL-MCNC: 41 NG/ML (ref 30–100)
ALBUMIN SERPL BCP-MCNC: 4.3 G/DL (ref 3.2–4.9)
ALBUMIN/GLOB SERPL: 1.7 G/DL
ALP SERPL-CCNC: 89 U/L (ref 30–99)
ALT SERPL-CCNC: 17 U/L (ref 2–50)
ANION GAP SERPL CALC-SCNC: 10 MMOL/L (ref 0–11.9)
AST SERPL-CCNC: 15 U/L (ref 12–45)
BILIRUB SERPL-MCNC: 0.7 MG/DL (ref 0.1–1.5)
BUN SERPL-MCNC: 16 MG/DL (ref 8–22)
CALCIUM SERPL-MCNC: 9.9 MG/DL (ref 8.5–10.5)
CHLORIDE SERPL-SCNC: 103 MMOL/L (ref 96–112)
CHOLEST SERPL-MCNC: 141 MG/DL (ref 100–199)
CO2 SERPL-SCNC: 29 MMOL/L (ref 20–33)
CREAT SERPL-MCNC: 0.97 MG/DL (ref 0.5–1.4)
FASTING STATUS PATIENT QL REPORTED: NORMAL
GLOBULIN SER CALC-MCNC: 2.6 G/DL (ref 1.9–3.5)
GLUCOSE SERPL-MCNC: 92 MG/DL (ref 65–99)
HDLC SERPL-MCNC: 56 MG/DL
LDLC SERPL CALC-MCNC: 58 MG/DL
POTASSIUM SERPL-SCNC: 3.9 MMOL/L (ref 3.6–5.5)
PROT SERPL-MCNC: 6.9 G/DL (ref 6–8.2)
SODIUM SERPL-SCNC: 142 MMOL/L (ref 135–145)
TRIGL SERPL-MCNC: 135 MG/DL (ref 0–149)

## 2019-05-23 PROCEDURE — 80061 LIPID PANEL: CPT

## 2019-05-23 PROCEDURE — 82306 VITAMIN D 25 HYDROXY: CPT

## 2019-05-23 PROCEDURE — 80053 COMPREHEN METABOLIC PANEL: CPT

## 2019-05-23 PROCEDURE — 36415 COLL VENOUS BLD VENIPUNCTURE: CPT

## 2019-05-25 NOTE — PROGRESS NOTES
Vitamin D level is normal at 41. I will discuss the result in upcoming appointment.       Brittney Hoskins MD

## 2019-05-28 ENCOUNTER — PATIENT MESSAGE (OUTPATIENT)
Dept: MEDICAL GROUP | Age: 74
End: 2019-05-28

## 2019-05-28 DIAGNOSIS — K21.9 GASTROESOPHAGEAL REFLUX DISEASE, ESOPHAGITIS PRESENCE NOT SPECIFIED: ICD-10-CM

## 2019-05-28 RX ORDER — LANSOPRAZOLE 30 MG/1
30 CAPSULE, DELAYED RELEASE ORAL DAILY
Qty: 90 CAP | Refills: 3 | Status: SHIPPED | OUTPATIENT
Start: 2019-05-28 | End: 2020-03-23

## 2019-06-27 ENCOUNTER — TELEPHONE (OUTPATIENT)
Dept: MEDICAL GROUP | Age: 74
End: 2019-06-27

## 2019-06-27 NOTE — TELEPHONE ENCOUNTER
ESTABLISHED PATIENT PRE-VISIT PLANNING     Patient was NOT contacted to complete PVP.     Note: Patient will not be contacted if there is no indication to call.     1.  Reviewed notes from the last few office visits within the medical group: Yes    2.  If any orders were placed at last visit or intended to be done for this visit (i.e. 6 mos follow-up), do we have Results/Consult Notes?        •  Labs - Labs ordered, completed on 5/23/19 and results are in chart.   Note: If patient appointment is for lab review and patient did not complete labs, check with provider if OK to reschedule patient until labs completed.       •  Imaging - Imaging was not ordered at last office visit.       •  Referrals - Referral ordered, patient was seen and consult notes are in chart. Care Teams updated  YES.    3. Is this appointment scheduled as a Hospital Follow-Up? No    4.  Immunizations were updated in Epic using WebIZ?: Epic matches WebIZ       •  Web Iz Recommendations: SHINGRIX (Shingles)    5.  Patient is due for the following Health Maintenance Topics:   Health Maintenance Due   Topic Date Due   • IMM ZOSTER VACCINES (2 of 3) 01/04/2013   • Annual Wellness Visit  06/27/2019           6. Orders for overdue Health Maintenance topics pended in Pre-Charting? N\A    7.  AHA (MDX) form printed for Provider? No, already completed    8.  Patient was NOT informed to arrive 15 min prior to their scheduled appointment and bring in their medication bottles.

## 2019-07-08 ENCOUNTER — OFFICE VISIT (OUTPATIENT)
Dept: MEDICAL GROUP | Age: 74
End: 2019-07-08
Payer: MEDICARE

## 2019-07-08 VITALS
TEMPERATURE: 96.9 F | HEART RATE: 44 BPM | SYSTOLIC BLOOD PRESSURE: 134 MMHG | WEIGHT: 204.6 LBS | OXYGEN SATURATION: 96 % | DIASTOLIC BLOOD PRESSURE: 76 MMHG | BODY MASS INDEX: 34.09 KG/M2 | HEIGHT: 65 IN

## 2019-07-08 DIAGNOSIS — I10 ESSENTIAL HYPERTENSION, BENIGN: ICD-10-CM

## 2019-07-08 DIAGNOSIS — N18.30 CKD (CHRONIC KIDNEY DISEASE) STAGE 3, GFR 30-59 ML/MIN (HCC): ICD-10-CM

## 2019-07-08 DIAGNOSIS — I49.5 SICK SINUS SYNDROME (HCC): ICD-10-CM

## 2019-07-08 DIAGNOSIS — E78.2 MIXED HYPERLIPIDEMIA: ICD-10-CM

## 2019-07-08 DIAGNOSIS — E55.9 VITAMIN D DEFICIENCY DISEASE: ICD-10-CM

## 2019-07-08 PROBLEM — A41.9 SEPSIS (HCC): Status: RESOLVED | Noted: 2019-03-17 | Resolved: 2019-07-08

## 2019-07-08 PROBLEM — Z09 HOSPITAL DISCHARGE FOLLOW-UP: Status: RESOLVED | Noted: 2019-04-01 | Resolved: 2019-07-08

## 2019-07-08 PROCEDURE — 99214 OFFICE O/P EST MOD 30 MIN: CPT | Performed by: INTERNAL MEDICINE

## 2019-07-08 RX ORDER — METOPROLOL SUCCINATE 50 MG/1
25 TABLET, EXTENDED RELEASE ORAL DAILY
Qty: 30 TAB | Refills: 6 | Status: SHIPPED | OUTPATIENT
Start: 2019-07-08 | End: 2019-09-23 | Stop reason: SDUPTHER

## 2019-07-08 ASSESSMENT — ENCOUNTER SYMPTOMS: GENERAL WELL-BEING: GOOD

## 2019-07-08 ASSESSMENT — PATIENT HEALTH QUESTIONNAIRE - PHQ9
5. POOR APPETITE OR OVEREATING: 2 - MORE THAN HALF THE DAYS
SUM OF ALL RESPONSES TO PHQ QUESTIONS 1-9: 7
CLINICAL INTERPRETATION OF PHQ2 SCORE: 2

## 2019-07-08 ASSESSMENT — ACTIVITIES OF DAILY LIVING (ADL): BATHING_REQUIRES_ASSISTANCE: 0

## 2019-07-08 ASSESSMENT — PAIN SCALES - GENERAL: PAINLEVEL: NO PAIN

## 2019-07-08 NOTE — PROGRESS NOTES
Subjective:   Griselda Duran is a 74 y.o. female here today for evaluation and management of:    Essential hypertension, benign  Patient has a history of chronic hypertension and is taking Lisinopril 20 mg once daily and Metoprolol SR 50 mg once daily. No medication side effects were reported. They reportedly monitor their blood pressure regularly at home. Blood pressure is stable today at 134/76. She presents bradycardic rate of 44 today. She states her heart rate ranges between 45-55 at home. She has felt fatigued for the past several months but denies acute chest pain, palpitations, dizziness or syncope. She is followed by Dr. Saunders, Cardiology, but has not discussed the bradycardic rate with him.  She reports following a low sodium diet and deny any related complaints including chronic cough, chest pain, headaches or dizziness.     Mixed hyperlipidemia  Chronic, stable history of hyperlipidemia. Currently taking Atorvastatin 40 mg once daily as directed. No medication side effects were reported including myalgias or abdominal pain. She follows a high protein, low carbohydrate diet and exercises regularly. She is taking a daily Aspirin. No acute complaints of dizziness, claudication or chest pain. Lipid panel was within normal limits on 05/23/19. Liver enzymes were normal.    I reviewed and discussed the following labs with the patient.    Results for GRISELDA DURAN (MRN [id number]) as of 7/8/2019 09:15   Ref. Range 4/6/2018 09:47 5/23/2019 09:33   Cholesterol,Tot Latest Ref Range: 100 - 199 mg/dL 134 141   Triglycerides Latest Ref Range: 0 - 149 mg/dL 117 135   HDL Latest Ref Range: >=40 mg/dL 63 56   LDL Latest Ref Range: <100 mg/dL 48 58     CKD (chronic kidney disease) stage 3, GFR 30-59 ml/min ([facility])  She has a history of chronic kidney disease stage III. Most recent labs indicate a stable kidney function. She has attempted to increase her water intake.    I reviewed and discussed the following  labs with the patient.    Results for ONESIOM DURAN (MRN [id number]) as of 7/8/2019 09:15   Ref. Range 3/19/2019 04:20 5/23/2019 09:33   Bun Latest Ref Range: 8 - 22 mg/dL 13 16   Creatinine Latest Ref Range: 0.50 - 1.40 mg/dL 0.96 0.97   GFR If  Latest Ref Range: >60 mL/min/1.73 m 2 >60 >60   GFR If Non  Latest Ref Range: >60 mL/min/1.73 m 2 56 (A) 56 (A)       Vitamin D deficiency disease  Patient has a history of vitamin D deficiency and is taking Cholecalciferol 2,000 units daily. Vitamin D level was last 41 on 05/23/19.    I reviewed and discussed the following labs with the patient.    Results for ONESIMO DURAN (MRN [id number]) as of 7/8/2019 09:15   Ref. Range 5/23/2019 09:34   25-Hydroxy   Vitamin D 25 Latest Ref Range: 30 - 100 ng/mL 41       Sick sinus syndrome (HCC)  Previously diagnosed with sick sinus syndrome and is followed by Dr. Saunders, Cardiology. She is asymptomatic. Heartbeat is bradycardic at 44 today and has ranged between 45-55. She was prescribed Metoprolol SR 50 mg once daily and will be decreased to 25 mg once daily. Plan to follow up with Cardiology.      Current medicines (including changes today)  Current Outpatient Prescriptions   Medication Sig Dispense Refill   • metoprolol SR (TOPROL XL) 50 MG TABLET SR 24 HR Take 0.5 Tabs by mouth every day. 30 Tab 6   • lansoprazole (PREVACID) 30 MG CAPSULE DELAYED RELEASE Take 1 Cap by mouth every day. 90 Cap 3   • lisinopril (PRINIVIL) 20 MG Tab Take 1 Tab by mouth every day. 90 Tab 3   • atorvastatin (LIPITOR) 40 MG Tab Take 1 Tab by mouth every evening. 100 Tab 0   • clopidogrel (PLAVIX) 75 MG Tab Take 1 Tab by mouth every day. 90 Tab 3   • Diclofenac Sodium 1 % Gel Apply 2 g to skin as directed 2 times a day as needed. Apply 2 gram on affected shoulder twice a day as needed.     • acetaminophen (TYLENOL 8 HOUR) 650 MG CR tablet Take 650-1,300 mg by mouth every 6 hours as needed for Moderate Pain.     •  "nitroglycerin (NITROSTAT) 0.4 MG SL Tab Place 1 Tab under tongue as needed for Chest Pain (up to 3 doses (if SBP greater than 90 mmHg)). 25 Tab 3   • Pumpkin Seed-Soy Germ (AZO BLADDER CONTROL/GO-LESS PO) Take 1 Cap by mouth every bedtime.     • aspirin EC (ECOTRIN) 81 MG Tablet Delayed Response Take 81 mg by mouth every evening. 30 Tab 0   • Cholecalciferol (VITAMIN D) 2000 UNIT TABS Take 1 Tab by mouth every evening.       No current facility-administered medications for this visit.      She  has a past medical history of Angina (2014); Arthritis; Dental disorder; Flushing reaction; GERD (gastroesophageal reflux disease); Heart burn (2014); Hyperlipidemia (10/20/2010); Hypertension (2014); Indigestion; Influenza; Insomnia; Liver cyst; Myocardial infarct (HCC) (1984); Need for Tdap vaccination; FRIDA (obstructive sleep apnea); S/P colonoscopy (11/9/2012); Sick sinus syndrome (HCC) (1/1/2017); Sleep apnea; Symptomatic sinus bradycardia (1/1/2017); Unspecified urinary incontinence; and Urinary bladder disorder.    ROS   Positive for slow heartbeat and fatigue.  Negative for chest pain, shortness of breath or abdominal pain. See HPI for further details.       Objective:     /76 (BP Location: Right arm, Patient Position: Sitting, BP Cuff Size: Adult)   Pulse (!) 44   Temp 36.1 °C (96.9 °F) (Temporal)   Ht 1.651 m (5' 5\")   Wt 92.8 kg (204 lb 9.6 oz)   SpO2 96%  Body mass index is 34.05 kg/m².     Physical Exam:  General: Alert, oriented and no acute distress.  Eye contact is good, speech goal directed, affect calm  HEENT: conjunctiva non-injected, sclera non-icteric.  Oral mucous membranes pink and moist with no lesions.  Pinna normal.   Lungs: Normal respiratory effort, clear to auscultation bilaterally with good excursion.  CV: regular rate and rhythm. No murmurs.   Abdomen: soft, non distended, nontender, Bowel sound normal.  Ext: no edema, color normal, vascularity normal, temperature " normal      Assessment and Plan:   The following treatment plan was discussed:    1. Essential hypertension, benign  Chronic history of hypertension. Plan to reduce Metoprolol SR dose to 25 mg from 50 mg once daily secondary to bradycardia. Continue Lisinopril 20 mg once daily. If blood pressure is greater than 150/90, she may increase the dose of Lisinopril from 20 mg once daily to twice a day. Continue to monitor heart rate.  - Seek medical attention for palpitations, chest discomfort or exertional chest pain or dyspnea.   - metoprolol SR (TOPROL XL) 50 MG TABLET SR 24 HR; Take 0.5 Tabs by mouth every day.  Dispense: 30 Tab; Refill: 6  - CBC WITH DIFFERENTIAL; Future  - Comp Metabolic Panel; Future    2. Mixed hyperlipidemia  - Well-controlled. Continue current regimen of Atorvastatin 40 mg once daily. Reviewed the risks and benefits of treatment and potential side effects of medication.  - Obtained and reviewed lipid panel dated 05/23/19 is within normal limits.  - Recommended they follow low fat, low carbohydrate and high fiber diet. Additionally, patient was asked to exercise regularly including frequent cardio.  - Recheck lab 1-2 weeks before next follow up visit.   - Comp Metabolic Panel; Future  - Lipid Profile; Future    3. CKD (chronic kidney disease) stage 3, GFR 30-59 ml/min ([facility])  Chronic history. Stable kidney function on most recent labs.   - She was asked to increase water intake to 60 ounces of water per day.   - Avoid NSAIDs. She may take Tylenol if needed.   - Repeat labs 1-2 weeks prior to her next appointment.  - Comp Metabolic Panel; Future    4. Vitamin D deficiency disease  Stable vitamin D level of 41. Plan to continue Cholecalciferol 2,000 units daily.  - VITAMIN D,25 HYDROXY; Future    5. Sick sinus syndrome (HCC)  Stable. Followed by Dr. Saunders, Cardiology. Plan to discuss bradycardia with him. Continue to monitor heart rate and blood pressure.   - Discussed ED precautions with patient: seek  emergency evaluation for symptoms including but not limited to: worsening symptoms or developing any new symptoms, crushing chest pain, chest pain associated with difficulty breathing, nausea, or sweats, heart rate irregular or too fast to count.   - Any change or worsening of signs or symptoms, patient encouraged to follow-up or report to emergency room for further evaluation. Patient understands and agrees      Health Maintenance   Due for zoster vaccine which is unavailable today.    Follow up: Return in about 6 months (around 1/8/2020), or if symptoms worsen or fail to improve, for Hypertension, Hyperlipidemia, CKD, Vitamin D insufficiency, Lab review.      Please note that this dictation was created using voice recognition software. I have made every reasonable attempt to correct obvious errors, but I expect that there may have unintended errors in text, spelling, punctuation, or grammar that I did not discover.    I, Gayle Manzanares (Scribe), am scribing for, and in the presence of, Brittney Hoskins M.D.    Electronically signed by: Gayle Manzanares ([email or web address]), 7/8/2019    I, Brittney Hoskins M.D., personally performed the services described in this documentation, as scribed by Gayle Manzanares in my presence, and it is both accurate and complete.

## 2019-07-08 NOTE — PROGRESS NOTES
Annual Health Assessment Questions:    1.  Are you currently engaging in any exercise or physical activity? Yes    2.  How would you describe your mood or emotional well-being today? good    3.  Have you had any falls in the last year? No    4.  Have you noticed any problems with your balance or had difficulty walking? No    5.  In the last six months have you experienced any leakage of urine? Yes    6. DPA/Advanced Directive: Patient has Living Will, but it is not on file. Instructed to bring in a copy to scan into their chart.

## 2019-07-25 ENCOUNTER — PATIENT MESSAGE (OUTPATIENT)
Dept: HEALTH INFORMATION MANAGEMENT | Facility: OTHER | Age: 74
End: 2019-07-25

## 2019-09-23 ENCOUNTER — OFFICE VISIT (OUTPATIENT)
Dept: CARDIOLOGY | Facility: MEDICAL CENTER | Age: 74
End: 2019-09-23
Payer: MEDICARE

## 2019-09-23 VITALS
WEIGHT: 204 LBS | OXYGEN SATURATION: 95 % | HEIGHT: 65 IN | BODY MASS INDEX: 33.99 KG/M2 | SYSTOLIC BLOOD PRESSURE: 132 MMHG | HEART RATE: 58 BPM | DIASTOLIC BLOOD PRESSURE: 80 MMHG

## 2019-09-23 DIAGNOSIS — I25.10 CORONARY ARTERY DISEASE INVOLVING NATIVE CORONARY ARTERY OF NATIVE HEART WITHOUT ANGINA PECTORIS: ICD-10-CM

## 2019-09-23 DIAGNOSIS — E78.2 MIXED HYPERLIPIDEMIA: ICD-10-CM

## 2019-09-23 DIAGNOSIS — Z79.899 HIGH RISK MEDICATION USE: ICD-10-CM

## 2019-09-23 DIAGNOSIS — E78.5 HYPERLIPIDEMIA, UNSPECIFIED HYPERLIPIDEMIA TYPE: ICD-10-CM

## 2019-09-23 DIAGNOSIS — I10 HTN (HYPERTENSION), MALIGNANT: ICD-10-CM

## 2019-09-23 DIAGNOSIS — I10 ESSENTIAL HYPERTENSION, BENIGN: ICD-10-CM

## 2019-09-23 DIAGNOSIS — Z95.5 S/P CORONARY ARTERY STENT PLACEMENT: ICD-10-CM

## 2019-09-23 DIAGNOSIS — G47.33 OSA (OBSTRUCTIVE SLEEP APNEA): ICD-10-CM

## 2019-09-23 DIAGNOSIS — I10 ESSENTIAL HYPERTENSION: ICD-10-CM

## 2019-09-23 DIAGNOSIS — E66.9 OBESITY (BMI 30-39.9): ICD-10-CM

## 2019-09-23 PROCEDURE — 99215 OFFICE O/P EST HI 40 MIN: CPT | Performed by: INTERNAL MEDICINE

## 2019-09-23 RX ORDER — ATORVASTATIN CALCIUM 40 MG/1
40 TABLET, FILM COATED ORAL EVERY EVENING
Qty: 100 TAB | Refills: 3 | Status: SHIPPED | OUTPATIENT
Start: 2019-09-23 | End: 2020-10-20

## 2019-09-23 RX ORDER — LISINOPRIL 20 MG/1
20 TABLET ORAL DAILY
Qty: 90 TAB | Refills: 3 | Status: SHIPPED | OUTPATIENT
Start: 2019-09-23 | End: 2020-10-21 | Stop reason: SDUPTHER

## 2019-09-23 RX ORDER — METOPROLOL SUCCINATE 50 MG/1
25 TABLET, EXTENDED RELEASE ORAL DAILY
Qty: 90 TAB | Refills: 3 | Status: SHIPPED | OUTPATIENT
Start: 2019-09-23 | End: 2020-10-21 | Stop reason: SDUPTHER

## 2019-09-23 RX ORDER — CLOPIDOGREL BISULFATE 75 MG/1
75 TABLET ORAL DAILY
Qty: 90 TAB | Refills: 3 | Status: SHIPPED | OUTPATIENT
Start: 2019-09-23 | End: 2020-09-01

## 2019-09-23 ASSESSMENT — ENCOUNTER SYMPTOMS
BLURRED VISION: 0
DOUBLE VISION: 0
HEADACHES: 0
DIZZINESS: 0
EYE DISCHARGE: 0
LOSS OF CONSCIOUSNESS: 0
WEIGHT LOSS: 0
NAUSEA: 0
CHILLS: 0
DEPRESSION: 0
SPEECH CHANGE: 0
FALLS: 0
SENSORY CHANGE: 0
FEVER: 0
COUGH: 0
BLOOD IN STOOL: 0
MYALGIAS: 0
HALLUCINATIONS: 0
PND: 0
EYE PAIN: 0
BRUISES/BLEEDS EASILY: 0
ABDOMINAL PAIN: 0
SHORTNESS OF BREATH: 0
ORTHOPNEA: 0
CLAUDICATION: 0
VOMITING: 0
PALPITATIONS: 0

## 2019-09-23 NOTE — PROGRESS NOTES
Chief Complaint   Patient presents with   • HTN (Controlled)       Subjective:   Griselda Farmer is a 74 y.o. female who presents today for cardiac care due to prior CAD with 2 stents placed in Lx artery, HTN, HLP. In the past patient had repeated ER visits because of chest pain. She even had a repeated cardiac catheterization which did not show any thing wrong with her stents. She came in to the hospital again in July 2017 because of chest pain. She was discharged under stable condition. Her left ventricular systolic function was found to be 50%.     At prior visit, patient was able to complete 344 m during his 6 minute walk test. her O2 saturation at baseline was 91% and at the end of the test, the O2 saturation was 94%. she reported 1 level of dyspnea on Chuckie scale.     +atypical chest pain at night when sleeping. She has FRIDA but not using CPAP.     In the interim, patient has been doing well without having any symptoms. Patient denies having chest pain, dyspnea, palpitation, presyncope, syncope episodes. Able to climb up at least 2 flights of stairs.     I have independently reviewed blood tests results with patient in clinic which shows normal LDL level 58, triglycerides 135, renal and liver function.    Past Medical History:   Diagnosis Date   • Angina 2014    pt denies    • Arthritis     generalized + hands   • Dental disorder     upper and lower dentures   • Flushing reaction     has had full work up with cardiology, would awake with symptoms at night   • GERD (gastroesophageal reflux disease)    • Heart burn 2014    pt on prevacid   • Hyperlipidemia 10/20/2010   • Hypertension 2014    pt states well controlled on meds   • Indigestion    • Influenza    • Insomnia    • Liver cyst     has been seen by GI, ultrasound 9/12   • Myocardial infarct (HCC) 1984    pt denies states sometimes irreg rhythm   • Need for Tdap vaccination     in 2012   • FRIDA (obstructive sleep apnea)     states no cpap   • S/P  colonoscopy 2012   • Sick sinus syndrome (HCC) 2017   • Sleep apnea    • Symptomatic sinus bradycardia 2017   • Unspecified urinary incontinence    • Urinary bladder disorder      Past Surgical History:   Procedure Laterality Date   • ZZZ CARDIAC CATH  17    Stents patent.   • ZZZ CARDIAC CATH  17    Overlapping Synergy stents to 99% Circ   • OTHER CARDIAC SURGERY  2017    NON STEMI with stent   • KNEE ARTHROPLASTY TOTAL  2014    Performed by Jose G Trotter M.D. at SURGERY [facility] ORS   • CATARACT PHACO WITH IOL  10/3/2013    Performed by Sylvester Raza M.D. at SURGERY [facility] ORS   • CATARACT PHACO WITH IOL  2013    Performed by Sylvester Raza M.D. at SURGERY [facility] ORS   • RECOVERY  3/29/2013    Performed by Cath-Recovery Surgery at [facility] SAME DAY [facility] ORS   • HYSTERECTOMY, VAGINAL      ovaries were left   • TONSILLECTOMY       Family History   Problem Relation Age of Onset   • Diabetes Mother         mild   • Cancer Father         melanoma mild   • Arthritis Sister    • Arthritis Brother    • Arthritis Sister    • Arthritis Brother    • Heart Disease Neg Hx    • Hypertension Neg Hx    • Sleep Apnea Neg Hx      Social History     Socioeconomic History   • Marital status:      Spouse name: Not on file   • Number of children: Not on file   • Years of education: Not on file   • Highest education level: Not on file   Occupational History   • Occupation: retired- human resources  for [employer]     Employer: OTHER   Social Needs   • Financial resource strain: Not on file   • Food insecurity:     Worry: Not on file     Inability: Not on file   • Transportation needs:     Medical: Not on file     Non-medical: Not on file   Tobacco Use   • Smoking status: Former Smoker     Packs/day: 0.50     Years: 15.00     Pack years: 7.50     Types: Cigarettes     Last attempt to quit: 1975     Years since quittin.1   • Smokeless  tobacco: Never Used   Substance and Sexual Activity   • Alcohol use: No     Alcohol/week: 0.0 oz   • Drug use: No   • Sexual activity: Not Currently     Partners: Male   Lifestyle   • Physical activity:     Days per week: Not on file     Minutes per session: Not on file   • Stress: Not on file   Relationships   • Social connections:     Talks on phone: Not on file     Gets together: Not on file     Attends Quaker service: Not on file     Active member of club or organization: Not on file     Attends meetings of clubs or organizations: Not on file     Relationship status: Not on file   • Intimate partner violence:     Fear of current or ex partner: Not on file     Emotionally abused: Not on file     Physically abused: Not on file     Forced sexual activity: Not on file   Other Topics Concern   • Not on file   Social History Narrative   • Not on file     Allergies   Allergen Reactions   • Codeine Itching and Vomiting     Rxn = years ago      • Seasonal      Outpatient Encounter Medications as of 9/23/2019   Medication Sig Dispense Refill   • clopidogrel (PLAVIX) 75 MG Tab Take 1 Tab by mouth every day. 90 Tab 3   • atorvastatin (LIPITOR) 40 MG Tab Take 1 Tab by mouth every evening. 100 Tab 3   • metoprolol SR (TOPROL XL) 50 MG TABLET SR 24 HR Take 0.5 Tabs by mouth every day. 90 Tab 3   • lisinopril (PRINIVIL) 20 MG Tab Take 1 Tab by mouth every day. 90 Tab 3   • lansoprazole (PREVACID) 30 MG CAPSULE DELAYED RELEASE Take 1 Cap by mouth every day. 90 Cap 3   • Diclofenac Sodium 1 % Gel Apply 2 g to skin as directed 2 times a day as needed. Apply 2 gram on affected shoulder twice a day as needed.     • acetaminophen (TYLENOL 8 HOUR) 650 MG CR tablet Take 650-1,300 mg by mouth every 6 hours as needed for Moderate Pain.     • nitroglycerin (NITROSTAT) 0.4 MG SL Tab Place 1 Tab under tongue as needed for Chest Pain (up to 3 doses (if SBP greater than 90 mmHg)). 25 Tab 3   • Pumpkin Seed-Soy Germ (AZO BLADDER  "CONTROL/GO-LESS PO) Take 1 Cap by mouth every bedtime.     • aspirin EC (ECOTRIN) 81 MG Tablet Delayed Response Take 81 mg by mouth every evening. 30 Tab 0   • Cholecalciferol (VITAMIN D) 2000 UNIT TABS Take 1 Tab by mouth every evening.     • [DISCONTINUED] metoprolol SR (TOPROL XL) 50 MG TABLET SR 24 HR Take 0.5 Tabs by mouth every day. 30 Tab 6   • [DISCONTINUED] lisinopril (PRINIVIL) 20 MG Tab Take 1 Tab by mouth every day. 90 Tab 3   • [DISCONTINUED] atorvastatin (LIPITOR) 40 MG Tab Take 1 Tab by mouth every evening. 100 Tab 0   • [DISCONTINUED] clopidogrel (PLAVIX) 75 MG Tab Take 1 Tab by mouth every day. 90 Tab 3     No facility-administered encounter medications on file as of 9/23/2019.      Review of Systems   Constitutional: Negative for chills, fever, malaise/fatigue and weight loss.   HENT: Negative for ear discharge, ear pain, hearing loss and nosebleeds.    Eyes: Negative for blurred vision, double vision, pain and discharge.   Respiratory: Negative for cough and shortness of breath.    Cardiovascular: Negative for chest pain, palpitations, orthopnea, claudication, leg swelling and PND.   Gastrointestinal: Negative for abdominal pain, blood in stool, melena, nausea and vomiting.   Genitourinary: Negative for dysuria and hematuria.   Musculoskeletal: Negative for falls, joint pain and myalgias.   Skin: Negative for itching and rash.   Neurological: Negative for dizziness, sensory change, speech change, loss of consciousness and headaches.   Endo/Heme/Allergies: Negative for environmental allergies. Does not bruise/bleed easily.   Psychiatric/Behavioral: Negative for depression, hallucinations and suicidal ideas.        Objective:   /80 (BP Location: Left arm, Patient Position: Sitting)   Pulse (!) 58   Ht 1.651 m (5' 5\")   Wt 92.5 kg (204 lb)   SpO2 95%   BMI 33.95 kg/m²     Physical Exam   Constitutional: She is oriented to person, place, and time. No distress.   HENT:   Head: Normocephalic " and atraumatic.   Right Ear: External ear normal.   Left Ear: External ear normal.   Eyes: Right eye exhibits no discharge. Left eye exhibits no discharge.   Neck: No JVD present. No thyromegaly present.   Cardiovascular: Normal rate, regular rhythm, normal heart sounds and intact distal pulses. Exam reveals no gallop and no friction rub.   No murmur heard.  Pulmonary/Chest: Breath sounds normal. No respiratory distress.   Abdominal: Bowel sounds are normal. She exhibits no distension. There is no tenderness.   Musculoskeletal: She exhibits no edema or tenderness.   Neurological: She is alert and oriented to person, place, and time. No cranial nerve deficit.   Skin: Skin is warm and dry. She is not diaphoretic.   Psychiatric: She has a normal mood and affect. Her behavior is normal.   Nursing note and vitals reviewed.      Assessment:     1. S/P coronary artery stent placement     2. Coronary artery disease involving native coronary artery of native heart without angina pectoris  clopidogrel (PLAVIX) 75 MG Tab    Comp Metabolic Panel    LIPID PANEL   3. Mixed hyperlipidemia     4. HTN (hypertension), malignant     5. Obesity (BMI 30-39.9)     6. High risk medication use     7. FRIDA (obstructive sleep apnea)     8. Hyperlipidemia, unspecified hyperlipidemia type  atorvastatin (LIPITOR) 40 MG Tab   9. Essential hypertension, benign  metoprolol SR (TOPROL XL) 50 MG TABLET SR 24 HR   10. Essential hypertension  lisinopril (PRINIVIL) 20 MG Tab       Medical Decision Making:  Today's Assessment / Status / Plan:   Await FRIDA studies again for mask fitting.     Continue Toprol 25 mg daily.     For CAD, continue DAP, Losartan and Atorvastatin.     For hyperlipidemia, continue Atorvastatin 40 mg daily.     I will see patient back in clinic with lab tests and studies results in 6 months.     I thank you Dr. Hoskins for referring patient to our Cardiology Clinic today.

## 2019-10-29 ENCOUNTER — HOSPITAL ENCOUNTER (OUTPATIENT)
Dept: LAB | Facility: MEDICAL CENTER | Age: 74
End: 2019-10-29
Attending: INTERNAL MEDICINE
Payer: MEDICARE

## 2019-10-29 DIAGNOSIS — I25.10 CORONARY ARTERY DISEASE INVOLVING NATIVE CORONARY ARTERY OF NATIVE HEART WITHOUT ANGINA PECTORIS: ICD-10-CM

## 2019-10-29 LAB
ALBUMIN SERPL BCP-MCNC: 4.2 G/DL (ref 3.2–4.9)
ALBUMIN/GLOB SERPL: 1.6 G/DL
ALP SERPL-CCNC: 98 U/L (ref 30–99)
ALT SERPL-CCNC: 15 U/L (ref 2–50)
ANION GAP SERPL CALC-SCNC: 6 MMOL/L (ref 0–11.9)
AST SERPL-CCNC: 14 U/L (ref 12–45)
BILIRUB SERPL-MCNC: 0.6 MG/DL (ref 0.1–1.5)
BUN SERPL-MCNC: 14 MG/DL (ref 8–22)
CALCIUM SERPL-MCNC: 9.5 MG/DL (ref 8.5–10.5)
CHLORIDE SERPL-SCNC: 107 MMOL/L (ref 96–112)
CHOLEST SERPL-MCNC: 137 MG/DL (ref 100–199)
CO2 SERPL-SCNC: 29 MMOL/L (ref 20–33)
CREAT SERPL-MCNC: 0.84 MG/DL (ref 0.5–1.4)
FASTING STATUS PATIENT QL REPORTED: NORMAL
GLOBULIN SER CALC-MCNC: 2.6 G/DL (ref 1.9–3.5)
GLUCOSE SERPL-MCNC: 92 MG/DL (ref 65–99)
HDLC SERPL-MCNC: 54 MG/DL
LDLC SERPL CALC-MCNC: 53 MG/DL
POTASSIUM SERPL-SCNC: 4.3 MMOL/L (ref 3.6–5.5)
PROT SERPL-MCNC: 6.8 G/DL (ref 6–8.2)
SODIUM SERPL-SCNC: 142 MMOL/L (ref 135–145)
TRIGL SERPL-MCNC: 148 MG/DL (ref 0–149)

## 2019-10-29 PROCEDURE — 80053 COMPREHEN METABOLIC PANEL: CPT

## 2019-10-29 PROCEDURE — 36415 COLL VENOUS BLD VENIPUNCTURE: CPT

## 2019-10-29 PROCEDURE — 80061 LIPID PANEL: CPT

## 2019-12-31 ENCOUNTER — OFFICE VISIT (OUTPATIENT)
Dept: MEDICAL GROUP | Facility: MEDICAL CENTER | Age: 74
End: 2019-12-31
Payer: MEDICARE

## 2019-12-31 VITALS
SYSTOLIC BLOOD PRESSURE: 138 MMHG | HEIGHT: 65 IN | DIASTOLIC BLOOD PRESSURE: 70 MMHG | WEIGHT: 205.25 LBS | OXYGEN SATURATION: 95 % | HEART RATE: 46 BPM | BODY MASS INDEX: 34.2 KG/M2 | TEMPERATURE: 97.9 F

## 2019-12-31 DIAGNOSIS — M19.042 PRIMARY OSTEOARTHRITIS OF BOTH HANDS: ICD-10-CM

## 2019-12-31 DIAGNOSIS — I10 ESSENTIAL HYPERTENSION, BENIGN: ICD-10-CM

## 2019-12-31 DIAGNOSIS — I70.0 ATHEROSCLEROSIS OF AORTA (HCC): ICD-10-CM

## 2019-12-31 DIAGNOSIS — E55.9 VITAMIN D DEFICIENCY DISEASE: ICD-10-CM

## 2019-12-31 DIAGNOSIS — G47.31 COMPLEX SLEEP APNEA SYNDROME: ICD-10-CM

## 2019-12-31 DIAGNOSIS — N18.30 CKD (CHRONIC KIDNEY DISEASE) STAGE 3, GFR 30-59 ML/MIN (HCC): ICD-10-CM

## 2019-12-31 DIAGNOSIS — E78.2 MIXED HYPERLIPIDEMIA: ICD-10-CM

## 2019-12-31 DIAGNOSIS — I25.10 CORONARY ARTERY DISEASE INVOLVING NATIVE CORONARY ARTERY OF NATIVE HEART WITHOUT ANGINA PECTORIS: ICD-10-CM

## 2019-12-31 DIAGNOSIS — K21.9 GASTROESOPHAGEAL REFLUX DISEASE WITHOUT ESOPHAGITIS: ICD-10-CM

## 2019-12-31 DIAGNOSIS — B07.8 VERRUCA PLANA: ICD-10-CM

## 2019-12-31 DIAGNOSIS — E66.9 OBESITY (BMI 30.0-34.9): ICD-10-CM

## 2019-12-31 DIAGNOSIS — Z23 NEED FOR SHINGLES VACCINE: ICD-10-CM

## 2019-12-31 DIAGNOSIS — N81.10 BLADDER PROLAPSE, FEMALE, ACQUIRED: ICD-10-CM

## 2019-12-31 DIAGNOSIS — H61.23 BILATERAL IMPACTED CERUMEN: ICD-10-CM

## 2019-12-31 DIAGNOSIS — G62.9 NEUROPATHY: ICD-10-CM

## 2019-12-31 DIAGNOSIS — M19.041 PRIMARY OSTEOARTHRITIS OF BOTH HANDS: ICD-10-CM

## 2019-12-31 PROCEDURE — 99215 OFFICE O/P EST HI 40 MIN: CPT | Mod: 25 | Performed by: INTERNAL MEDICINE

## 2019-12-31 PROCEDURE — 90750 HZV VACC RECOMBINANT IM: CPT | Performed by: INTERNAL MEDICINE

## 2019-12-31 PROCEDURE — 90471 IMMUNIZATION ADMIN: CPT | Performed by: INTERNAL MEDICINE

## 2019-12-31 NOTE — ASSESSMENT & PLAN NOTE
Chronic and ongoing problem.  She reports frustrations as any diet she is tried in the past does seem to result in weight loss.  Could consider referral to healthy improvements program in the future if patient would desire additional assistance.

## 2019-12-31 NOTE — ASSESSMENT & PLAN NOTE
Chronic and stable problem.  Continue recommended follow-up with cardiology.  Appropriate to continue on Plavix, aspirin, atorvastatin, lisinopril, and metoprolol succinate.  Blood pressure well controlled with an average less than 140/80.

## 2019-12-31 NOTE — ASSESSMENT & PLAN NOTE
Chronic and stable problem.  Her vitamin D stores have been sufficient replaced on her current regimen of vitamin D 2000-units daily.  Appropriate to continue.

## 2019-12-31 NOTE — ASSESSMENT & PLAN NOTE
Chronic and stable problem.  Appropriate to continue PPI medication while she is on dual antiplatelet therapy.  She denies any prior episodes of bleeding.  No prior EGD.  Continued observation at this time.

## 2019-12-31 NOTE — ASSESSMENT & PLAN NOTE
New problem of mild severity.  She declines urology evaluation at this time.  She plans to continue use of Azo bladder control/go less and try to avoid antihistamines due to the risk of urinary retention.  We will continue to observe at this time and if she develops bothersome symptoms then we can consider pessary device and/or urology referral.

## 2019-12-31 NOTE — ASSESSMENT & PLAN NOTE
New and decompensated problem. She would like to wait for an ear lavage until her annual wellness visit in February 2020. She will let us know if it becomes more bothersome in the mean time and we can always schedule her sooner.

## 2019-12-31 NOTE — ASSESSMENT & PLAN NOTE
Chronic and stable problem.  She is appropriate to continue on atorvastatin 40 mg daily.  Next lipid panel due in October 2020.

## 2019-12-31 NOTE — ASSESSMENT & PLAN NOTE
Chronic ongoing issue.  She does not tolerate any of the recommended settings or mask for his sleep apnea.  She does have a central component the majority of the time.  Clear to me how severe her hypoxia was.  Last echocardiogram did not demonstrate pulmonary hypertension.  She is using Breathe Right strips and elevating the head of bed.  May need to consider using nocturnal oxygen if she cannot tolerate any other treatment modalities for her sleep apnea.  No further follow-up scheduled with sleep medicine per the patient.

## 2019-12-31 NOTE — PATIENT INSTRUCTIONS
1. Let me know if you need anything in the mean time.    2. Get your labs done before our next visit.

## 2019-12-31 NOTE — PROGRESS NOTES
Subjective:     CC:  There were no encounter diagnoses.    HISTORY OF THE PRESENT ILLNESS: Patient is a 74 y.o. female. This pleasant patient is here today to establish care and discuss the below stated chronic medical conditions.     No problems updated.     Allergies: Codeine and Seasonal    Current Outpatient Medications Ordered in Epic   Medication Sig Dispense Refill   • clopidogrel (PLAVIX) 75 MG Tab Take 1 Tab by mouth every day. 90 Tab 3   • atorvastatin (LIPITOR) 40 MG Tab Take 1 Tab by mouth every evening. 100 Tab 3   • metoprolol SR (TOPROL XL) 50 MG TABLET SR 24 HR Take 0.5 Tabs by mouth every day. 90 Tab 3   • lisinopril (PRINIVIL) 20 MG Tab Take 1 Tab by mouth every day. 90 Tab 3   • lansoprazole (PREVACID) 30 MG CAPSULE DELAYED RELEASE Take 1 Cap by mouth every day. 90 Cap 3   • acetaminophen (TYLENOL 8 HOUR) 650 MG CR tablet Take 650-1,300 mg by mouth every 6 hours as needed for Moderate Pain.     • nitroglycerin (NITROSTAT) 0.4 MG SL Tab Place 1 Tab under tongue as needed for Chest Pain (up to 3 doses (if SBP greater than 90 mmHg)). 25 Tab 3   • Pumpkin Seed-Soy Germ (AZO BLADDER CONTROL/GO-LESS PO) Take 1 Cap by mouth every bedtime.     • aspirin EC (ECOTRIN) 81 MG Tablet Delayed Response Take 81 mg by mouth every evening. 30 Tab 0   • Cholecalciferol (VITAMIN D) 2000 UNIT TABS Take 1 Tab by mouth every evening.     • Diclofenac Sodium 1 % Gel Apply 2 g to skin as directed 2 times a day as needed. Apply 2 gram on affected shoulder twice a day as needed.       No current Epic-ordered facility-administered medications on file.        Past Medical History:   Diagnosis Date   • Angina 2014    pt denies    • Arthritis     generalized + hands   • Dental disorder     upper and lower dentures   • Flushing reaction     has had full work up with cardiology, would awake with symptoms at night   • GERD (gastroesophageal reflux disease)    • Heart burn 2014    pt on prevacid   • Hyperlipidemia 10/20/2010   •  Hypertension     pt states well controlled on meds   • Indigestion    • Influenza    • Insomnia    • Liver cyst     has been seen by GI, ultrasound    • Myocardial infarct (HCC)     pt denies states sometimes irreg rhythm   • Need for Tdap vaccination     in    • FRIDA (obstructive sleep apnea)     states no cpap   • S/P colonoscopy 2012   • Sick sinus syndrome (HCC) 2017   • Sleep apnea    • Symptomatic sinus bradycardia 2017   • Unspecified urinary incontinence    • Urinary bladder disorder        Past Surgical History:   Procedure Laterality Date   • ZZZ CARDIAC CATH  17    Stents patent.   • ZZZ CARDIAC CATH  17    Overlapping Synergy stents to 99% Circ   • OTHER CARDIAC SURGERY  2017    NON STEMI with stent   • KNEE ARTHROPLASTY TOTAL  2014    Performed by Jose G Trotter M.D. at SURGERY [facility]S   • CATARACT PHACO WITH IOL  10/3/2013    Performed by Sylvester Raza M.D. at SURGERY [facility] ORS   • CATARACT PHACO WITH IOL  2013    Performed by Sylvester Raza M.D. at SURGERY [facility] ORS   • RECOVERY  3/29/2013    Performed by [facility]Rec[facility] Surgery at SURGERY SAME DAY [facility] ORS   • HYSTERECTOMY, VAGINAL      ovaries were left   • TONSILLECTOMY         Social History     Tobacco Use   • Smoking status: Former Smoker     Packs/day: 0.50     Years: 15.00     Pack years: 7.50     Types: Cigarettes     Last attempt to quit: 1975     Years since quittin.4   • Smokeless tobacco: Never Used   Substance Use Topics   • Alcohol use: No     Alcohol/week: 0.0 oz   • Drug use: No       Social History     Patient does not qualify to have social determinant information on file (likely too young).   Social History Narrative   • Not on file       Family History   Problem Relation Age of Onset   • Diabetes Mother         mild   • Cancer Father         melanoma mild   • Arthritis Sister    • Arthritis Brother    • Arthritis Sister    • Arthritis  "Brother    • Heart Disease Neg Hx    • Hypertension Neg Hx    • Sleep Apnea Neg Hx        Health Maintenance: {COMPLETED:092731}    ROS:   As above in the HPI All Others Reviewed and Negative  Objective:     Exam: /70   Pulse (!) 46   Temp 36.6 °C (97.9 °F) (Temporal)   Ht 1.651 m (5' 5\")   Wt 93.1 kg (205 lb 4 oz)   SpO2 95%  Body mass index is 34.16 kg/m².    General: Normal appearing. No distress. Appears stated age.  EENT: Eyes conjunctiva clear lids without ptosis, extraocular movements intact, ears normal shape and contour, canals are clear bilaterally, tympanic membranes are benign, oropharynx is without erythema, edema or exudates. Fair dentition.   Neck: Supple without JVD. Thyroid is not enlarged.  Pulmonary: Clear to ausculation.  Normal effort. No rales, ronchi, or wheezing. No cough.  Cardiovascular: Regular rate and rhythm without murmur. No lower extremity edema bilaterally.  Abdomen: Soft, nontender, nondistended. Normal bowel sounds.   Neurologic: Grossly nonfocal  Lymph: No cervical or supraclavicular lymph nodes are palpable  Skin: Warm and dry.  No obvious lesions.  Musculoskeletal: Normal gait. No extremity cyanosis, clubbing, or edema. Patient ambulates ***  Psych: Normal mood and affect. Alert and oriented x3. Judgment and insight is normal.    Assessment & Plan:   74 y.o. female with the following -    Problem List Items Addressed This Visit     None           No follow-ups on file.    Please note that this dictation was created using voice recognition software. I have made every reasonable attempt to correct obvious errors, but I expect that there are errors of grammar and possibly content that I did not discover before finalizing the note.  "

## 2019-12-31 NOTE — PROGRESS NOTES
Subjective:     CC:  Diagnoses of Coronary artery disease involving native coronary artery of native heart without angina pectoris, CKD (chronic kidney disease) stage 3, GFR 30-59 ml/min ([facility]), Essential hypertension, benign, Gastroesophageal reflux disease without esophagitis, Bladder prolapse, female, acquired, Mixed hyperlipidemia, Vitamin D deficiency disease, Neuropathy, Need for shingles vaccine, Atherosclerosis of aorta ([facility]), Complex sleep apnea syndrome, Obesity (BMI 30.0-34.9), Verruca plana, Primary osteoarthritis of both hands, and Bilateral impacted cerumen were pertinent to this visit.    HISTORY OF THE PRESENT ILLNESS: Patient is a 74 y.o. female. This pleasant patient is here today to establish care and discuss the below stated chronic medical conditions.  She is unaccompanied for today's visit.    Problem   Neuropathy    She reports about 6 months of numbness and tingling in her feet.  This is mainly located at the balls of her feet and sometimes the toes.  Does not appear to be affecting the top of the feet or the heels.  It appears equal between both feet.  She is not tried anything at this point.  No prior injuries or traumas to the feet.  No significant alcohol use.  No known B12 deficiency.  No prior history of diabetes.  It is not debilitating at this point but she continues to notice it.     Primary Osteoarthritis of Both Hands    She admits to additional flareups of osteoarthritis on her bilateral hands and occasionally bilateral shoulders.  She has been prescribed voltaren gel in the past for her elbow but did not realize this could also help with arthritis. She has been taking tylenol PM but would be willing to transition to regular tylenol or tylenol-arthritis. Pain is aching and fluctuating in nature. It is not stopping her from completing ADL's at this time.      Ckd (Chronic Kidney Disease) Stage 3, Gfr 30-59 Ml/Min ([facility])       Ref. Range 3/18/2019 04:22 3/19/2019 04:20 5/23/2019 09:33  10/29/2019 08:06   GFR If Non  Latest Ref Range: >60 mL/min/1.73 m 2 56 (A) 56 (A) 56 (A) >60     She has had recent development of CKD stage III in the past year, likely due to hypertension and heart disease. No prior episodes of nephrolithiasis, urinary tract infections, or intrinsic kidney disease. Renally metabolized medications at this time includes lisinopril.     Atherosclerosis of Aorta (Hcc)    Noted on chest xray from March 2018: Aortic calcified atherosclerotic plaque.    She is on dual antiplatelet therapy due to her history of CAD as well as atorvastatin 40 mg daily.    Most recent cholesterol panel is at goal with all parameters.     Bladder Prolapse, Female, Acquired    She reports 1 year of sensation of bladder prolapse and increased frequency. No issues with hematuria, dysuria, or urinary tract infections. She is currently use AZO bladder control/go-less.  During our conversation she did admit to taking intermittent Benadryl at night as per the combination with Tylenol PM and was unaware that this can cause urinary retention.  The symptoms are not bothersome enough at this time that she wants to meet with urology or have a more thorough investigation.     Obesity (Bmi 30.0-34.9)    BMI today is 34.16.  On previous BMI she is been very similar with her weight stable between 204-205 pounds.  She reports stable weight over several years.  She has had significant challenges with weight loss even when she goes on diets with other people and they lose a lot of weight but she never seems to be able to.  She is slowly accumulated additional weight with aging.  Does not watch her diet too closely and is not any regular physical activity.     Verruca Plana    She reports longstanding history of a wart on her right hand at the distal aspect of her thumb.  He is tried wart removal products which have not been helpful.  This is been treated with cryotherapy via liquid nitrogen over the years  without full resolution.  It is not terribly bothersome but she is just concerned that it could lead to a bigger issue.     Vitamin D Deficiency Disease       Ref. Range 5/23/2019 09:34   25-Hydroxy   Vitamin D 25 Latest Ref Range: 30 - 100 ng/mL 41     She is taking Vitamin D 2,000 IU daily.  She has only mild chronic kidney disease, no prior evaluation for hyperparathyroidism.  No previous fragility fractures or diagnosis of osteoporosis.     Cad (Coronary Artery Disease)    Echo (March 2019):  Normal left ventricular systolic function. LVEF 70%. Mild to moderate concentric left ventricular hypertrophy. Aortic sclerosis without stenosis.    She is status post stent placement x2 to left circumflex artery in January 2017.  She continues on dual antiplatelet therapy per her cardiologist due to high risk of recurrent heart attack.  She is also on Lipitor 40 mg daily, lisinopril 20 mg daily, and metoprolol succinate 25 mg daily.    No recurrent or ongoing angina at this time.     Gastroesophageal Reflux Disease Without Esophagitis    Longstanding history of heart burn. She has been on DAPT for history of CAD with stents and denies prior melena, hematochezia, or known peptic ulcer disease/gastritis. She has been using PPI therapy which works well for her to control symptoms.  No known complications from her lansoprazole.     Mixed Hyperlipidemia       Ref. Range 10/29/2019 08:06   Cholesterol,Tot Latest Ref Range: 100 - 199 mg/dL 137   Triglycerides Latest Ref Range: 0 - 149 mg/dL 148   HDL Latest Ref Range: >=40 mg/dL 54   LDL Latest Ref Range: <100 mg/dL 53     On standing history of elevated cholesterol.  Her current regimen includes lipitor 40 mg daily.  No myalgias or GI upset on this medication.  She has prior history of coronary artery disease and aortic atherosclerosis.  No known history of cerebrovascular disease.     Essential Hypertension, Benign       Ref. Range 10/29/2019 08:06   Sodium Latest Ref Range:  135 - 145 mmol/L 142   Potassium Latest Ref Range: 3.6 - 5.5 mmol/L 4.3   Chloride Latest Ref Range: 96 - 112 mmol/L 107   Co2 Latest Ref Range: 20 - 33 mmol/L 29   Anion Gap Latest Ref Range: 0.0 - 11.9  6.0   Glucose Latest Ref Range: 65 - 99 mg/dL 92   Bun Latest Ref Range: 8 - 22 mg/dL 14   Creatinine Latest Ref Range: 0.50 - 1.40 mg/dL 0.84   GFR If Non  Latest Ref Range: >60 mL/min/1.73 m 2 >60       She was started on anti-hypertensives around 2004. She has been on a 2 drug regimen that has worked well. Normally well controlled with her highest known reading around 155 systolic. No signs or symptoms of orthostasis. She does admit fatigue but relates this to her heart rate.  Current regimen includes lisinopril 20 mg daily (previously losartan 100 mg daily) and metoprolol succinate 25 mg daily.     Complex Sleep Apnea Syndrome    Echo (3/2019): Estimated right ventricular systolic pressure    is 25 mmHg.    She has met with sleep medicine several times and unfortunately has not tolerated any of the masks/machine. Currently she is using what sounds to be breath right strips to open the nasal passages and an elevated head of bed.    Longstanding history of FRIDA but stopped using PAP therapy after her machine broke ~ 2016. Per Dr. Kothari of sleep medicine (11/2018): Severe obstructive sleep apnea, AHI 29 events per hour, associated with significant hypoxia. CPAP was tried 5-9 cm with incomplete titration. 97% of apneas were central apneas during the titration.     She reports intolerance to all the attempted devices and declines treatment of her sleep apnea at this time.          Allergies: Codeine and Seasonal    Current Outpatient Medications Ordered in Epic   Medication Sig Dispense Refill   • clopidogrel (PLAVIX) 75 MG Tab Take 1 Tab by mouth every day. 90 Tab 3   • atorvastatin (LIPITOR) 40 MG Tab Take 1 Tab by mouth every evening. 100 Tab 3   • metoprolol SR (TOPROL XL) 50 MG TABLET SR 24 HR  Take 0.5 Tabs by mouth every day. 90 Tab 3   • lisinopril (PRINIVIL) 20 MG Tab Take 1 Tab by mouth every day. 90 Tab 3   • lansoprazole (PREVACID) 30 MG CAPSULE DELAYED RELEASE Take 1 Cap by mouth every day. 90 Cap 3   • acetaminophen (TYLENOL 8 HOUR) 650 MG CR tablet Take 650-1,300 mg by mouth every 6 hours as needed for Moderate Pain.     • nitroglycerin (NITROSTAT) 0.4 MG SL Tab Place 1 Tab under tongue as needed for Chest Pain (up to 3 doses (if SBP greater than 90 mmHg)). 25 Tab 3   • Pumpkin Seed-Soy Germ (AZO BLADDER CONTROL/GO-LESS PO) Take 1 Cap by mouth every bedtime.     • aspirin EC (ECOTRIN) 81 MG Tablet Delayed Response Take 81 mg by mouth every evening. 30 Tab 0   • Cholecalciferol (VITAMIN D) 2000 UNIT TABS Take 1 Tab by mouth every evening.     • Diclofenac Sodium 1 % Gel Apply 2 g to skin as directed 2 times a day as needed. Apply 2 gram on affected shoulder twice a day as needed.       No current Epic-ordered facility-administered medications on file.        Past Medical History:   Diagnosis Date   • Angina 2014    pt denies    • Arthritis     generalized + hands   • Dental disorder     upper and lower dentures   • Elevated fasting glucose 7/16/2014   • Flushing reaction     has had full work up with cardiology, would awake with symptoms at night   • GERD (gastroesophageal reflux disease)    • Heart burn 2014    pt on prevacid   • Hyperlipidemia 10/20/2010   • Hypertension 2014    pt states well controlled on meds   • Incomplete tear of right rotator cuff 9/19/2016   • Indigestion    • Influenza    • Insomnia    • Liver cyst     has been seen by GI, ultrasound 9/12   • Myocardial infarct (HCC) 1984    pt denies states sometimes irreg rhythm   • Need for Tdap vaccination     in 2012   • FRIDA (obstructive sleep apnea)     states no cpap   • Primary localized osteoarthrosis, lower leg 12/16/2014   • S/P colonoscopy 11/9/2012   • Sick sinus syndrome (HCC) 1/1/2017   • Sleep apnea    • Symptomatic sinus  bradycardia 2017   • Unspecified urinary incontinence    • Urinary bladder disorder        Past Surgical History:   Procedure Laterality Date   • Z CARDIAC CATH  17    Stents patent.   • ZZZ CARDIAC CATH  17    Overlapping Synergy stents to 99% Circ   • OTHER CARDIAC SURGERY  2017    NON STEMI with stent   • KNEE ARTHROPLASTY TOTAL  2014    Performed by Jose G Trotter M.D. at SURGERY [facility] ORS   • CATARACT PHACO WITH IOL  10/3/2013    Performed by Sylvester Raza M.D. at SURGERY [facility] ORS   • CATARACT PHACO WITH IOL  2013    Performed by Sylvester Raza M.D. at SURGERY [facility] ORS   • RECOVERY  3/29/2013    Performed by Cath-Recovery Surgery at SURGERY SAME DAY [facility] ORS   • HYSTERECTOMY, VAGINAL      ovaries were left   • TONSILLECTOMY         Social History     Tobacco Use   • Smoking status: Former Smoker     Packs/day: 0.50     Years: 15.00     Pack years: 7.50     Types: Cigarettes     Last attempt to quit: 1975     Years since quittin.4   • Smokeless tobacco: Never Used   • Tobacco comment: continued abstinence   Substance Use Topics   • Alcohol use: No     Alcohol/week: 0.0 oz   • Drug use: No       Social History     Patient does not qualify to have social determinant information on file (likely too young).   Social History Narrative    She lives with her ex-MIRZA, Mis. She has a son Erick (53, [place]) and a daughter Edilia (49, [facility]). She previously worked in , and lived in [place] at that time. Previously lived and work in [place], [place]. She moved to [place] in 2017, previously living in [place] from [id number].       Family History   Problem Relation Age of Onset   • Diabetes Mother         mild   • Cancer Father         melanoma mild   • Arthritis Sister    • Arthritis Brother    • Arthritis Sister    • Arthritis Brother    • Heart Disease Neg Hx    • Hypertension Neg Hx    • Sleep Apnea Neg Hx        Health Maintenance:  "Completed    ROS:   As above in the HPI All Others Reviewed and Negative  Objective:     Exam: /70   Pulse (!) 46   Temp 36.6 °C (97.9 °F) (Temporal)   Ht 1.651 m (5' 5\")   Wt 93.1 kg (205 lb 4 oz)   SpO2 95%  Body mass index is 34.16 kg/m².    General: Normal appearing. No distress. Appears stated age.  EENT: Eyes conjunctiva clear lids without ptosis, extraocular movements intact, ears normal shape and contour, bilateral cerumen impaction, oropharynx is without erythema, edema or exudates.  Upper dentures in place.  Neck: Supple without JVD. Thyroid is not enlarged.  Pulmonary: Clear to ausculation.  Normal effort. No rales, ronchi, or wheezing. No cough.  Cardiovascular: Regular rate and rhythm without murmur.  Bilateral lower extremity lymphedema, nonpitting.    Abdomen: Protuberant abdomen limits exam, soft, nontender, nondistended. Normal bowel sounds.   Neurologic: No resting tremor, no increased tone or rigidity.  Lymph: No cervical or supraclavicular lymph nodes are palpable  Skin: Warm and dry.  No obvious lesions.  Small flat wart lesion on the right thumb.  Musculoskeletal: small, round, soft, and reducible nodule on the right wrist on the thumb line.  Normal gait. No extremity cyanosis or clubbing. Patient ambulates independently without a gait aid.  Psych: Normal mood and affect. Alert and oriented x3. Judgment and insight is normal.    Assessment & Plan:   74 y.o. female with the following -    Problem List Items Addressed This Visit     Essential hypertension, benign     Chronic and stable problem.  Recheck blood counts and metabolic panel before next visit in February 2020.  Appropriate to continue lisinopril 20 mg daily metoprolol succinate 25 mg daily.  She does note intermittent dry cough which could represent an ACE inhibitor cough, however she is also using diphenhydramine inappropriately and will first try to cut this out before we make any changes to her blood pressure regiment.   "       Relevant Orders    CBC WITH DIFFERENTIAL    Comp Metabolic Panel    Complex sleep apnea syndrome     Chronic ongoing issue.  She does not tolerate any of the recommended settings or mask for his sleep apnea.  She does have a central component the majority of the time.  Clear to me how severe her hypoxia was.  Last echocardiogram did not demonstrate pulmonary hypertension.  She is using Breathe Right strips and elevating the head of bed.  May need to consider using nocturnal oxygen if she cannot tolerate any other treatment modalities for her sleep apnea.  No further follow-up scheduled with sleep medicine per the patient.         Mixed hyperlipidemia     Chronic and stable problem.  She is appropriate to continue on atorvastatin 40 mg daily.  Next lipid panel due in October 2020.         Relevant Orders    Comp Metabolic Panel    Gastroesophageal reflux disease without esophagitis     Chronic and stable problem.  Appropriate to continue PPI medication while she is on dual antiplatelet therapy.  She denies any prior episodes of bleeding.  No prior EGD.  Continued observation at this time.         CAD (coronary artery disease)     Chronic and stable problem.  Continue recommended follow-up with cardiology.  Appropriate to continue on Plavix, aspirin, atorvastatin, lisinopril, and metoprolol succinate.  Blood pressure well controlled with an average less than 140/80.         Vitamin D deficiency disease     Chronic and stable problem.  Her vitamin D stores have been sufficient replaced on her current regimen of vitamin D 2000-units daily.  Appropriate to continue.         Verruca plana     Chronic and stable problem.  Continue observation at this time.  He continues to be very bothersome could refer to dermatology to see if they could excise it completely.         Obesity (BMI 30.0-34.9)     Chronic and ongoing problem.  She reports frustrations as any diet she is tried in the past does seem to result in weight  loss.  Could consider referral to healthy improvements program in the future if patient would desire additional assistance.         Relevant Orders    Patient identified as having weight management issue.  Appropriate orders and counseling given.    Bladder prolapse, female, acquired     New problem of mild severity.  She declines urology evaluation at this time.  She plans to continue use of Azo bladder control/go less and try to avoid antihistamines due to the risk of urinary retention.  We will continue to observe at this time and if she develops bothersome symptoms then we can consider pessary device and/or urology referral.         Atherosclerosis of aorta (HCC)     Chronic and stable problem. Appropriate to continue dual antiplatelet therapy and atorvastatin. Continue to follow with cardiology as recommended.         CKD (chronic kidney disease) stage 3, GFR 30-59 ml/min ([facility])     Additively new and stable problem.  Likely secondary to slow progression of coronary artery disease and hypertension.  On her most recent kidney function she is actually normalized back to stage II but has been fluctuating between stage II and stage III for many months now.  Recommend continued use of lisinopril 20 mg daily and follow-up on blood counts and kidney function every 3 to 4 months.  Recommend she stay well-hydrated and avoid nephrotoxic agents such as over-the-counter anti-inflammatories.         Relevant Orders    Comp Metabolic Panel    Neuropathy     New problem of mild severity. We will check a B12 level to look for reversible causes of her neuropathy. No other identifiable risk factors with her lifestyle. Could consider EMG testing in the future if it worsens. Patient would like to continue observation at this time since it is not very bothersome.         Relevant Orders    VITAMIN B12    Primary osteoarthritis of both hands     Chronic and stable problem. Encouraged her to start with topical voltaren gel as needed  and to stop tylenol PM and instead take tylenol-arthritis or plane tylenol. She voiced understanding.         Bilateral impacted cerumen     New and decompensated problem. She would like to wait for an ear lavage until her annual wellness visit in February 2020. She will let us know if it becomes more bothersome in the mean time and we can always schedule her sooner.           Other Visit Diagnoses     Need for shingles vaccine        Relevant Orders    Shingles Vaccine (Shingrix) (Completed)           Return if symptoms worsen or fail to improve. AWV scheduled in February 2020.    Please note that this dictation was created using voice recognition software. I have made every reasonable attempt to correct obvious errors, but I expect that there are errors of grammar and possibly content that I did not discover before finalizing the note.

## 2019-12-31 NOTE — ASSESSMENT & PLAN NOTE
Chronic and stable problem.  Continue observation at this time.  He continues to be very bothersome could refer to dermatology to see if they could excise it completely.

## 2019-12-31 NOTE — ASSESSMENT & PLAN NOTE
New problem of mild severity. We will check a B12 level to look for reversible causes of her neuropathy. No other identifiable risk factors with her lifestyle. Could consider EMG testing in the future if it worsens. Patient would like to continue observation at this time since it is not very bothersome.

## 2019-12-31 NOTE — ASSESSMENT & PLAN NOTE
Additively new and stable problem.  Likely secondary to slow progression of coronary artery disease and hypertension.  On her most recent kidney function she is actually normalized back to stage II but has been fluctuating between stage II and stage III for many months now.  Recommend continued use of lisinopril 20 mg daily and follow-up on blood counts and kidney function every 3 to 4 months.  Recommend she stay well-hydrated and avoid nephrotoxic agents such as over-the-counter anti-inflammatories.

## 2019-12-31 NOTE — ASSESSMENT & PLAN NOTE
Chronic and stable problem.  Recheck blood counts and metabolic panel before next visit in February 2020.  Appropriate to continue lisinopril 20 mg daily metoprolol succinate 25 mg daily.  She does note intermittent dry cough which could represent an ACE inhibitor cough, however she is also using diphenhydramine inappropriately and will first try to cut this out before we make any changes to her blood pressure regiment.

## 2019-12-31 NOTE — ASSESSMENT & PLAN NOTE
Chronic and stable problem. Appropriate to continue dual antiplatelet therapy and atorvastatin. Continue to follow with cardiology as recommended.

## 2019-12-31 NOTE — ASSESSMENT & PLAN NOTE
Chronic and stable problem. Encouraged her to start with topical voltaren gel as needed and to stop tylenol PM and instead take tylenol-arthritis or plane tylenol. She voiced understanding.

## 2020-01-27 ENCOUNTER — HOSPITAL ENCOUNTER (OUTPATIENT)
Dept: LAB | Facility: MEDICAL CENTER | Age: 75
End: 2020-01-27
Attending: INTERNAL MEDICINE
Payer: MEDICARE

## 2020-01-27 DIAGNOSIS — I10 ESSENTIAL HYPERTENSION, BENIGN: ICD-10-CM

## 2020-01-27 DIAGNOSIS — G62.9 NEUROPATHY: ICD-10-CM

## 2020-01-27 DIAGNOSIS — N18.30 CKD (CHRONIC KIDNEY DISEASE) STAGE 3, GFR 30-59 ML/MIN: ICD-10-CM

## 2020-01-27 DIAGNOSIS — E78.2 MIXED HYPERLIPIDEMIA: ICD-10-CM

## 2020-01-27 LAB
ALBUMIN SERPL BCP-MCNC: 4.4 G/DL (ref 3.2–4.9)
ALBUMIN/GLOB SERPL: 1.4 G/DL
ALP SERPL-CCNC: 99 U/L (ref 30–99)
ALT SERPL-CCNC: 21 U/L (ref 2–50)
ANION GAP SERPL CALC-SCNC: 8 MMOL/L (ref 0–11.9)
AST SERPL-CCNC: 19 U/L (ref 12–45)
BASOPHILS # BLD AUTO: 0.9 % (ref 0–1.8)
BASOPHILS # BLD: 0.08 K/UL (ref 0–0.12)
BILIRUB SERPL-MCNC: 0.6 MG/DL (ref 0.1–1.5)
BUN SERPL-MCNC: 17 MG/DL (ref 8–22)
CALCIUM SERPL-MCNC: 10 MG/DL (ref 8.5–10.5)
CHLORIDE SERPL-SCNC: 103 MMOL/L (ref 96–112)
CO2 SERPL-SCNC: 29 MMOL/L (ref 20–33)
CREAT SERPL-MCNC: 1.15 MG/DL (ref 0.5–1.4)
EOSINOPHIL # BLD AUTO: 0.26 K/UL (ref 0–0.51)
EOSINOPHIL NFR BLD: 2.8 % (ref 0–6.9)
ERYTHROCYTE [DISTWIDTH] IN BLOOD BY AUTOMATED COUNT: 50.6 FL (ref 35.9–50)
GLOBULIN SER CALC-MCNC: 3.1 G/DL (ref 1.9–3.5)
GLUCOSE SERPL-MCNC: 107 MG/DL (ref 65–99)
HCT VFR BLD AUTO: 48 % (ref 37–47)
HGB BLD-MCNC: 15.2 G/DL (ref 12–16)
IMM GRANULOCYTES # BLD AUTO: 0.02 K/UL (ref 0–0.11)
IMM GRANULOCYTES NFR BLD AUTO: 0.2 % (ref 0–0.9)
LYMPHOCYTES # BLD AUTO: 2.15 K/UL (ref 1–4.8)
LYMPHOCYTES NFR BLD: 23.4 % (ref 22–41)
MCH RBC QN AUTO: 31 PG (ref 27–33)
MCHC RBC AUTO-ENTMCNC: 31.7 G/DL (ref 33.6–35)
MCV RBC AUTO: 97.8 FL (ref 81.4–97.8)
MONOCYTES # BLD AUTO: 0.62 K/UL (ref 0–0.85)
MONOCYTES NFR BLD AUTO: 6.7 % (ref 0–13.4)
NEUTROPHILS # BLD AUTO: 6.06 K/UL (ref 2–7.15)
NEUTROPHILS NFR BLD: 66 % (ref 44–72)
NRBC # BLD AUTO: 0 K/UL
NRBC BLD-RTO: 0 /100 WBC
PLATELET # BLD AUTO: 253 K/UL (ref 164–446)
PMV BLD AUTO: 12.5 FL (ref 9–12.9)
POTASSIUM SERPL-SCNC: 4.1 MMOL/L (ref 3.6–5.5)
PROT SERPL-MCNC: 7.5 G/DL (ref 6–8.2)
RBC # BLD AUTO: 4.91 M/UL (ref 4.2–5.4)
SODIUM SERPL-SCNC: 140 MMOL/L (ref 135–145)
VIT B12 SERPL-MCNC: 604 PG/ML (ref 211–911)
WBC # BLD AUTO: 9.2 K/UL (ref 4.8–10.8)

## 2020-01-27 PROCEDURE — 85025 COMPLETE CBC W/AUTO DIFF WBC: CPT

## 2020-01-27 PROCEDURE — 82607 VITAMIN B-12: CPT

## 2020-01-27 PROCEDURE — 80053 COMPREHEN METABOLIC PANEL: CPT

## 2020-01-27 PROCEDURE — 36415 COLL VENOUS BLD VENIPUNCTURE: CPT

## 2020-02-04 NOTE — RESULT ENCOUNTER NOTE
Beka Chavez,    Your labs are stable overall except the kidney function has worsened slightly, still in the stage 3 realm. We will need to continue to follow your kidney function every few months and make sure you are avoiding any anti-inflammatory medications. We can talk in more detail at our follow up in a few weeks.    Thanks,  Dr. Mchugh

## 2020-02-18 ENCOUNTER — OFFICE VISIT (OUTPATIENT)
Dept: MEDICAL GROUP | Facility: MEDICAL CENTER | Age: 75
End: 2020-02-18
Payer: MEDICARE

## 2020-02-18 VITALS
HEIGHT: 65 IN | OXYGEN SATURATION: 93 % | BODY MASS INDEX: 33.82 KG/M2 | WEIGHT: 203 LBS | TEMPERATURE: 97.6 F | HEART RATE: 56 BPM | SYSTOLIC BLOOD PRESSURE: 132 MMHG | DIASTOLIC BLOOD PRESSURE: 80 MMHG

## 2020-02-18 DIAGNOSIS — N18.30 CKD (CHRONIC KIDNEY DISEASE) STAGE 3, GFR 30-59 ML/MIN: ICD-10-CM

## 2020-02-18 DIAGNOSIS — B07.8 VERRUCA PLANA: ICD-10-CM

## 2020-02-18 DIAGNOSIS — H61.23 BILATERAL IMPACTED CERUMEN: ICD-10-CM

## 2020-02-18 DIAGNOSIS — Z00.00 MEDICARE ANNUAL WELLNESS VISIT, SUBSEQUENT: Primary | ICD-10-CM

## 2020-02-18 DIAGNOSIS — I70.0 ATHEROSCLEROSIS OF AORTA (HCC): ICD-10-CM

## 2020-02-18 PROCEDURE — G0439 PPPS, SUBSEQ VISIT: HCPCS | Performed by: INTERNAL MEDICINE

## 2020-02-18 PROCEDURE — 8041 PR SCP AHA: Performed by: INTERNAL MEDICINE

## 2020-02-18 ASSESSMENT — ACTIVITIES OF DAILY LIVING (ADL): BATHING_REQUIRES_ASSISTANCE: 0

## 2020-02-18 ASSESSMENT — ENCOUNTER SYMPTOMS: GENERAL WELL-BEING: GOOD

## 2020-02-18 ASSESSMENT — PATIENT HEALTH QUESTIONNAIRE - PHQ9
SUM OF ALL RESPONSES TO PHQ QUESTIONS 1-9: 4
5. POOR APPETITE OR OVEREATING: 0 - NOT AT ALL
CLINICAL INTERPRETATION OF PHQ2 SCORE: 3

## 2020-02-18 NOTE — PROGRESS NOTES
Chief Complaint   Patient presents with   • Annual Wellness Visit     HPI:  Griselda Farmer is a 75 y.o. here for Medicare Annual Wellness Visit     Patient Active Problem List    Diagnosis Date Noted   • Neuropathy 12/31/2019   • Primary osteoarthritis of both hands 12/31/2019   • Bilateral impacted cerumen 12/31/2019   • CKD (chronic kidney disease) stage 3, GFR 30-59 ml/min ([facility]) 07/08/2019   • Atherosclerosis of aorta ([facility]) 05/15/2018   • Bladder prolapse, female, acquired 08/15/2017   • S/P coronary artery stent placement 06/08/2017   • Obesity (BMI 30.0-34.9) 12/07/2016   • Verruca plana 06/23/2015   • Overriding toe of left foot 01/20/2015   • Vitamin D deficiency disease 07/15/2014   • CAD (coronary artery disease) 03/29/2013   • Gastroesophageal reflux disease without esophagitis 03/06/2013   • Mixed hyperlipidemia 10/20/2010   • Essential hypertension, benign 08/11/2010   • Complex sleep apnea syndrome 08/11/2010     Problem   Bilateral Impacted Cerumen    She reports a history of cerumen impaction over the years requiring wax removal. She senses that it has re-accumulated again recently.     Ckd (Chronic Kidney Disease) Stage 3, Gfr 30-59 Ml/Min ([facility])       Ref. Range 3/19/2019 04:20 5/23/2019 09:33 10/29/2019 08:06 1/27/2020 07:32   Bun Latest Ref Range: 8 - 22 mg/dL 13 16 14 17   Creatinine Latest Ref Range: 0.50 - 1.40 mg/dL 0.96 0.97 0.84 1.15   GFR If Non  Latest Ref Range: >60 mL/min/1.73 m 2 56 (A) 56 (A) >60 46 (A)     She has had recent development of CKD stage III in the past year, likely due to hypertension and heart disease. No prior episodes of nephrolithiasis, urinary tract infections, or intrinsic kidney disease. Renally metabolized medications at this time includes lisinopril, lansoprazole, and statin.     Atherosclerosis of Aorta (Hcc)    Noted on chest xray from March 2018: Aortic calcified atherosclerotic plaque.    She is on dual antiplatelet therapy due to her  history of CAD as well as atorvastatin 40 mg daily.    Most recent cholesterol panel is at goal with all parameters.     Janey Hinton    She reports longstanding history of a wart on her right hand at the distal aspect of her thumb.  He is tried wart removal products which have not been helpful.  This is been treated with cryotherapy via liquid nitrogen over the years without full resolution.  It is not terribly bothersome but she is just concerned that it could lead to a bigger issue.       Current Outpatient Medications   Medication Sig Dispense Refill   • clopidogrel (PLAVIX) 75 MG Tab Take 1 Tab by mouth every day. 90 Tab 3   • atorvastatin (LIPITOR) 40 MG Tab Take 1 Tab by mouth every evening. 100 Tab 3   • metoprolol SR (TOPROL XL) 50 MG TABLET SR 24 HR Take 0.5 Tabs by mouth every day. 90 Tab 3   • lisinopril (PRINIVIL) 20 MG Tab Take 1 Tab by mouth every day. 90 Tab 3   • lansoprazole (PREVACID) 30 MG CAPSULE DELAYED RELEASE Take 1 Cap by mouth every day. 90 Cap 3   • Diclofenac Sodium 1 % Gel Apply 2 g to skin as directed 2 times a day as needed. Apply 2 gram on affected shoulder twice a day as needed.     • acetaminophen (TYLENOL 8 HOUR) 650 MG CR tablet Take 650-1,300 mg by mouth every 6 hours as needed for Moderate Pain.     • Pumpkin Seed-Soy Germ (AZO BLADDER CONTROL/GO-LESS PO) Take 1 Cap by mouth every bedtime.     • aspirin EC (ECOTRIN) 81 MG Tablet Delayed Response Take 81 mg by mouth every evening. 30 Tab 0   • Cholecalciferol (VITAMIN D) 2000 UNIT TABS Take 1 Tab by mouth every evening.     • nitroglycerin (NITROSTAT) 0.4 MG SL Tab Place 1 Tab under tongue as needed for Chest Pain (up to 3 doses (if SBP greater than 90 mmHg)). 25 Tab 3     No current facility-administered medications for this visit.             Current supplements as per medication list.       Allergies: Codeine and Seasonal    Current social contact/activities: patient reports going out with her friend for lunch, gardening, and  shopping at least once a week.    She  reports that she quit smoking about 44 years ago. Her smoking use included cigarettes. She has a 7.50 pack-year smoking history. She has never used smokeless tobacco. She reports that she does not drink alcohol or use drugs.  Counseling given: Not Answered  Comment: continued abstinence      DPA/Advanced Directive:  Patient has Advanced Directive, but it is not on file. Instructed to bring in a copy to scan into their chart.    ROS:    Gait: Uses no assistive device  Ostomy: No  Other tubes: No  Amputations: No  Chronic oxygen use: No  Last eye exam: patient reports 2016  Wears hearing aids: No   : Reports urinary leakage during the last 6 months that has not interfered at all with their daily activities or sleep.  He is using Azo which is helpful.  She would like to hold off on prescription right medications as long as this continues to work for her.    Depression Screening    Little interest or pleasure in doing things?  0 - not at all  Feeling down, depressed , or hopeless? 3 - nearly every day  Trouble falling or staying asleep, or sleeping too much?  0 - not at all  Feeling tired or having little energy?  1 - several days  Poor appetite or overeating?  0 - not at all  Feeling bad about yourself - or that you are a failure or have let yourself or your family down? 0 - not at all  Trouble concentrating on things, such as reading the newspaper or watching television? 0 - not at all  Moving or speaking so slowly that other people could have noticed.  Or the opposite - being so fidgety or restless that you have been moving around a lot more than usual?  0 - not at all  Thoughts that you would be better off dead, or of hurting yourself?  0 - not at all  Patient Health Questionnaire Score: 4    If depressive symptoms identified deferred to follow up visit unless specifically addressed in assessment and plan.    Interpretation of PHQ-9 Total Score   Score Severity   1-4 No  Depression   5-9 Mild Depression   10-14 Moderate Depression   15-19 Moderately Severe Depression   20-27 Severe Depression      Screening for Cognitive Impairment    Three Minute Recall (village, kitchen, baby) 3/3    Manuel clock face with all 12 numbers and set the hands to show 10 past 10.  Yes 5/5  Cognitive concerns identified deferred for follow up unless specifically addressed in assessment and plan.    Fall Risk Assessment    Has the patient had two or more falls in the last year or any fall with injury in the last year?  No    Safety Assessment    Throw rugs on floor.  No  Handrails on all stairs.  Yes  Good lighting in all hallways.  Yes  Difficulty hearing.  No  Patient counseled about all safety risks that were identified.    Functional Assessment ADLs    Are there any barriers preventing you from cooking for yourself or meeting nutritional needs?  No.    Are there any barriers preventing you from driving safely or obtaining transportation?  No.    Are there any barriers preventing you from using a telephone or calling for help?  No.    Are there any barriers preventing you from shopping?  No.    Are there any barriers preventing you from taking care of your own finances?  No.    Are there any barriers preventing you from managing your medications?  No.    Are there any barriers preventing you from showering, bathing or dressing yourself? No.    Are you currently engaging in any exercise or physical activity?  No.     What is your perception of your health?  Good.      Health Maintenance Summary                Annual Wellness Visit Overdue 6/27/2019      Done 6/26/2018 Visit Dx: Medicare annual wellness visit, subsequent     Patient has more history with this topic...    MAMMOGRAM Overdue 1/26/2020      Done 1/26/2018 MA-MAMMO SCREENING BILAT W/TOMOSYNTHESIS W/CAD     Patient has more history with this topic...    IMM ZOSTER VACCINES Next Due 2/25/2020      Done 12/31/2019 Imm Admin: Recombinant Zoster  Vaccine (RZV) (Shingrix)     Patient has more history with this topic...    COLONOSCOPY Next Due 10/9/2021      Not specified 10/9/2011     BONE DENSITY Next Due 2023      Done 2018 DS-BONE DENSITY STUDY (DEXA)     Patient has more history with this topic...    IMM DTaP/Tdap/Td Vaccine Next Due 2027      Done 2017 Imm Admin: Tdap Vaccine          Patient Care Team:  Asia Mchugh D.O. as PCP - General (Internal Medicine)  Cesilia Freeman M.D. as Consulting Physician (Oncology)  Anastacia Saunders M.D. as Consulting Physician (Cardiology)  Shirley Kothari M.D. as Consulting Physician (Sleep Medicine)  Christine Galicia as        Social History     Tobacco Use   • Smoking status: Former Smoker     Packs/day: 0.50     Years: 15.00     Pack years: 7.50     Types: Cigarettes     Last attempt to quit: 1975     Years since quittin.5   • Smokeless tobacco: Never Used   • Tobacco comment: continued abstinence   Substance Use Topics   • Alcohol use: No     Alcohol/week: 0.0 oz   • Drug use: No     Family History   Problem Relation Age of Onset   • Diabetes Mother         mild   • Cancer Father         melanoma mild   • Arthritis Sister    • Arthritis Brother    • Arthritis Sister    • Arthritis Brother    • Heart Disease Neg Hx    • Hypertension Neg Hx    • Sleep Apnea Neg Hx      She  has a past medical history of Angina (), Arthritis, Dental disorder, Elevated fasting glucose (2014), Flushing reaction, GERD (gastroesophageal reflux disease), Heart burn (), Hyperlipidemia (10/20/2010), Hypertension (), Incomplete tear of right rotator cuff (2016), Indigestion, Influenza, Insomnia, Liver cyst, Myocardial infarct (HCC) (), Need for Tdap vaccination, FRIDA (obstructive sleep apnea), Primary localized osteoarthrosis, lower leg (2014), S/P colonoscopy (2012), Sick sinus syndrome (HCC) (2017), Sleep apnea, 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 She also has no past medical history of Encounter for long-term (current) use of other medications.   Past Surgical History:   Procedure Laterality Date   • [facility] CARDIAC CATH  1/19/17    Stents patent.   • [facility] CARDIAC CATH  1/1/17    Overlapping Synergy stents to 99% Circ   • OTHER CARDIAC SURGERY  January 2017    NON STEMI with stent   • KNEE ARTHROPLASTY TOTAL  12/16/2014    Performed by Jose G Trotter M.D. at SURGERY [facility] ORS   • CATARACT PHACO WITH IOL  10/3/2013    Performed by Sylvester Raza M.D. at [facility] ORS   • CATARACT PHACO WITH IOL  9/19/2013    Performed by Sylvester Raza M.D. at [facility] ORS   • RECOVERY  3/29/2013    Performed by Cath-Recovery Surgery at SURGERY SAME DAY [facility] ORS   • HYSTERECTOMY, VAGINAL      ovaries were left   • TONSILLECTOMY         Exam:   /80 (BP Location: Left arm, Patient Position: Sitting, BP Cuff Size: Adult)   Pulse (!) 56   Temp 36.4 °C (97.6 °F) (Temporal)   Ht 1.651 m (5' 5\")   Wt 92.1 kg (203 lb)   SpO2 93%  Body mass index is 33.78 kg/m².    Hearing good.    Dentition good  Alert, oriented in no acute distress.  Eye contact is good, speech goal directed, affect calm    Assessment and Plan. The following treatment and monitoring plan is recommended:    1. CKD (chronic kidney disease) stage 3, GFR 30-59 ml/min ([facility])  Basic Metabolic Panel    PTH INTACT (PTH ONLY)    URINALYSIS   2. Atherosclerosis of aorta ([facility])     3. Bilateral impacted cerumen     4. Verruca plana         Problem List Items Addressed This Visit     Verruca plana     Chronic and ongoing problem.  Treated with cryotherapy in clinic today with 3 applications of liquid nitrogen with blanching for 3 seconds.  Patient tolerated without difficulty.  She will let me know if it continues to be an issue and we could consider referral to " dermatology in the future.         Atherosclerosis of aorta (HCC)     Chronic and stable problem.  Continue atorvastatin 40 mg daily and aspirin 81 mg daily.         CKD (chronic kidney disease) stage 3, GFR 30-59 ml/min (HCC)     Chronic problem, slightly worsened.  We will obtain urinalysis and repeat kidney function testing when she is well-hydrated (nonfasting) as well as a parathyroid hormone level to ensure this is not secondary to hyperparathyroidism.  If her lab work remains unremarkable then may need to consider screening for myeloma with SPEP and renal ultrasound.  She is agreeable.         Relevant Orders    Basic Metabolic Panel    PTH INTACT (PTH ONLY)    URINALYSIS    Bilateral impacted cerumen     Previous problem, decompensated.  Ear lavage performed in clinic today by medical assistant, Sneha Guan, with good results.  No complications.  Patient noted immediate improvement in her hearing and sense of fullness in the ears.  She tolerated the procedure without difficulty.               Services suggested: No services needed at this time  Health Care Screening: Age-appropriate preventive services recommended by USPTF and ACIP covered by Medicare were discussed today. Services ordered if indicated and agreed upon by the patient.  Referrals offered: Community-based lifestyle interventions to reduce health risks and promote self-management and wellness, fall prevention, nutrition, physical activity, tobacco-use cessation, weight loss, and mental health services as per orders if indicated.    Discussion today about general wellness and lifestyle habits:    · Prevent falls and reduce trip hazards; Cautioned about securing or removing rugs.  · Have a working fire alarm and carbon monoxide detector;   · Engage in regular physical activity and social activities     Follow-up: Return in about 6 months (around 8/18/2020).

## 2020-02-18 NOTE — ASSESSMENT & PLAN NOTE
Chronic problem, slightly worsened.  We will obtain urinalysis and repeat kidney function testing when she is well-hydrated (nonfasting) as well as a parathyroid hormone level to ensure this is not secondary to hyperparathyroidism.  If her lab work remains unremarkable then may need to consider screening for myeloma with SPEP and renal ultrasound.  She is agreeable.

## 2020-02-18 NOTE — ASSESSMENT & PLAN NOTE
Previous problem, decompensated.  Ear lavage performed in clinic today by medical assistant, Sneha Guan, with good results.  No complications.  Patient noted immediate improvement in her hearing and sense of fullness in the ears.  She tolerated the procedure without difficulty.

## 2020-02-18 NOTE — ASSESSMENT & PLAN NOTE
Chronic and ongoing problem.  Treated with cryotherapy in clinic today with 3 applications of liquid nitrogen with blanching for 3 seconds.  Patient tolerated without difficulty.  She will let me know if it continues to be an issue and we could consider referral to dermatology in the future.

## 2020-02-18 NOTE — PATIENT INSTRUCTIONS
We will recheck kidney function when you are hydrated and if still abnormal consider additional work up like a kidney ultrasound and more blood work.

## 2020-02-25 ENCOUNTER — PATIENT OUTREACH (OUTPATIENT)
Dept: HEALTH INFORMATION MANAGEMENT | Facility: OTHER | Age: 75
End: 2020-02-25

## 2020-02-25 NOTE — PROGRESS NOTES
1. HealthConnect Verified: yes    2. Verify PCP: yes    3. Review and add  to Care Team: yes    4. WebIZ Checked & Epic Updated: Yes  WebIZ Recommendations: TD and SHINGRIX (Shingles)  Is patient due for Tdap? YES. Patient was not notified of copay/out of pocket cost.  Is patient due for Shingles? YES. Patient was not notified of copay/out of pocket cost.    5. Reviewed/Updated the following with patient:       •   Communication Preference Obtained? YES  • MyChart Activation: already active       •   E-Mail Address Obtained? YES       •   Appointment Day and Time Preferences? YES       •   Preferred Pharmacy? YES       •   Preferred Lab? YES    6. Care Gap Scheduling (Attempt to Schedule EACH Overdue Care Gap!)    Scheduled patient for Lab work

## 2020-02-28 ENCOUNTER — HOSPITAL ENCOUNTER (OUTPATIENT)
Dept: LAB | Facility: MEDICAL CENTER | Age: 75
End: 2020-02-28
Attending: INTERNAL MEDICINE
Payer: MEDICARE

## 2020-02-28 DIAGNOSIS — N18.30 CKD (CHRONIC KIDNEY DISEASE) STAGE 3, GFR 30-59 ML/MIN: ICD-10-CM

## 2020-02-28 LAB
ANION GAP SERPL CALC-SCNC: 9 MMOL/L (ref 0–11.9)
BUN SERPL-MCNC: 13 MG/DL (ref 8–22)
CALCIUM SERPL-MCNC: 9.6 MG/DL (ref 8.5–10.5)
CHLORIDE SERPL-SCNC: 103 MMOL/L (ref 96–112)
CO2 SERPL-SCNC: 26 MMOL/L (ref 20–33)
CREAT SERPL-MCNC: 1 MG/DL (ref 0.5–1.4)
GLUCOSE SERPL-MCNC: 105 MG/DL (ref 65–99)
POTASSIUM SERPL-SCNC: 4 MMOL/L (ref 3.6–5.5)
SODIUM SERPL-SCNC: 138 MMOL/L (ref 135–145)

## 2020-02-28 PROCEDURE — 83970 ASSAY OF PARATHORMONE: CPT

## 2020-02-28 PROCEDURE — 80048 BASIC METABOLIC PNL TOTAL CA: CPT

## 2020-02-28 PROCEDURE — 36415 COLL VENOUS BLD VENIPUNCTURE: CPT

## 2020-02-29 ENCOUNTER — HOSPITAL ENCOUNTER (OUTPATIENT)
Facility: MEDICAL CENTER | Age: 75
End: 2020-02-29
Attending: INTERNAL MEDICINE
Payer: MEDICARE

## 2020-02-29 LAB
APPEARANCE UR: CLEAR
BILIRUB UR QL STRIP.AUTO: NEGATIVE
COLOR UR: YELLOW
GLUCOSE UR STRIP.AUTO-MCNC: NEGATIVE MG/DL
KETONES UR STRIP.AUTO-MCNC: NEGATIVE MG/DL
LEUKOCYTE ESTERASE UR QL STRIP.AUTO: NEGATIVE
MICRO URNS: NORMAL
NITRITE UR QL STRIP.AUTO: NEGATIVE
PH UR STRIP.AUTO: 6.5 [PH] (ref 5–8)
PROT UR QL STRIP: NEGATIVE MG/DL
PTH-INTACT SERPL-MCNC: 75.1 PG/ML (ref 14–72)
RBC UR QL AUTO: NEGATIVE
SP GR UR STRIP.AUTO: 1.01
UROBILINOGEN UR STRIP.AUTO-MCNC: 0.2 MG/DL

## 2020-02-29 PROCEDURE — 81003 URINALYSIS AUTO W/O SCOPE: CPT

## 2020-03-10 NOTE — RESULT ENCOUNTER NOTE
Beka Chavez,    Urinalysis looked good. No signs or blood, protein, or infection that would have been contributing to change in your kidney function.    Thanks,  Dr. Mchugh

## 2020-03-10 NOTE — RESULT ENCOUNTER NOTE
Beka Chavez,    Labwork shows that your kidney function improved in February when compared to January. Your parathyroid hormone is mildly elevated, are you aware of any history of parathyroid nodules in the past? Your calcium level is stable and we usually see an elevation in calcium in primary hyperparathyroidism, so I suspect your mild elevation in parathyroid hormone may be a result of the mild abnormality in the kidney function. Since your kidney value improved on recheck I think we are okay to continue to observe at this time with another recheck in 3-4 months. Let me know what you think or what questions you might have.    Thanks,  Dr. Mchugh

## 2020-03-23 DIAGNOSIS — K21.9 GASTROESOPHAGEAL REFLUX DISEASE, ESOPHAGITIS PRESENCE NOT SPECIFIED: ICD-10-CM

## 2020-03-23 RX ORDER — OMEPRAZOLE 20 MG/1
40 CAPSULE, DELAYED RELEASE ORAL DAILY
Qty: 90 CAP | Refills: 3 | Status: SHIPPED | OUTPATIENT
Start: 2020-03-23 | End: 2020-11-09

## 2020-04-22 ENCOUNTER — HOSPITAL ENCOUNTER (OUTPATIENT)
Dept: LAB | Facility: MEDICAL CENTER | Age: 75
End: 2020-04-22
Attending: INTERNAL MEDICINE
Payer: MEDICARE

## 2020-04-22 ENCOUNTER — TELEPHONE (OUTPATIENT)
Dept: MEDICAL GROUP | Facility: MEDICAL CENTER | Age: 75
End: 2020-04-22

## 2020-04-22 DIAGNOSIS — R30.0 DYSURIA: ICD-10-CM

## 2020-04-22 LAB
APPEARANCE UR: CLEAR
BACTERIA #/AREA URNS HPF: ABNORMAL /HPF
BILIRUB UR QL STRIP.AUTO: NEGATIVE
COLOR UR: YELLOW
EPI CELLS #/AREA URNS HPF: NEGATIVE /HPF
GLUCOSE UR STRIP.AUTO-MCNC: NEGATIVE MG/DL
HYALINE CASTS #/AREA URNS LPF: ABNORMAL /LPF
KETONES UR STRIP.AUTO-MCNC: NEGATIVE MG/DL
LEUKOCYTE ESTERASE UR QL STRIP.AUTO: ABNORMAL
MICRO URNS: ABNORMAL
NITRITE UR QL STRIP.AUTO: NEGATIVE
PH UR STRIP.AUTO: 6.5 [PH] (ref 5–8)
PROT UR QL STRIP: NEGATIVE MG/DL
RBC # URNS HPF: ABNORMAL /HPF
RBC UR QL AUTO: NEGATIVE
SP GR UR STRIP.AUTO: 1.01
UROBILINOGEN UR STRIP.AUTO-MCNC: 0.2 MG/DL
WBC #/AREA URNS HPF: ABNORMAL /HPF

## 2020-04-22 PROCEDURE — 87077 CULTURE AEROBIC IDENTIFY: CPT

## 2020-04-22 PROCEDURE — 87086 URINE CULTURE/COLONY COUNT: CPT

## 2020-04-22 PROCEDURE — 87186 SC STD MICRODIL/AGAR DIL: CPT

## 2020-04-22 PROCEDURE — 81001 URINALYSIS AUTO W/SCOPE: CPT

## 2020-04-22 NOTE — TELEPHONE ENCOUNTER
1. Caller Name: Griselda Farmer                          Call Back Number: [phone] (home)         How would the patient prefer to be contacted with a response: Phone call OK to leave a detailed message    I called and spoke with Kathy.  Burning sensation for 4 days was taking systex.. still very uncomfortable when urinating.  We will send lab order for urinaylsis  She will go to [facility] lab at [facility]

## 2020-04-23 ENCOUNTER — TELEPHONE (OUTPATIENT)
Dept: MEDICAL GROUP | Facility: MEDICAL CENTER | Age: 75
End: 2020-04-23

## 2020-04-23 DIAGNOSIS — N30.00 ACUTE CYSTITIS WITHOUT HEMATURIA: ICD-10-CM

## 2020-04-23 RX ORDER — NITROFURANTOIN 25; 75 MG/1; MG/1
100 CAPSULE ORAL 2 TIMES DAILY
Qty: 10 CAP | Refills: 0 | Status: SHIPPED | OUTPATIENT
Start: 2020-04-23 | End: 2020-09-27

## 2020-04-23 NOTE — TELEPHONE ENCOUNTER
----- Message from Asia Mchugh D.O. sent at 4/23/2020  8:08 AM PDT -----  Can you make sure she gets this message? Thanks, SHERRON Chavez,    Preliminary urinalysis shows some bacteria. Culture will take 1-2 days to return but I will send in a prescription for an antibiotic in the meantime to your pharmacy. It is called Macrobid, take 1 capsule twice daily for 5 days. Let me know how you are feeling after the weekend.    Thanks,    Dr. Mchugh

## 2020-04-23 NOTE — RESULT ENCOUNTER NOTE
Can you make sure she gets this message? Thanks, SHERRON Chavez,    Preliminary urinalysis shows some bacteria. Culture will take 1-2 days to return but I will send in a prescription for an antibiotic in the meantime to your pharmacy. It is called Macrobid, take 1 capsule twice daily for 5 days. Let me know how you are feeling after the weekend.    Thanks,    Dr. Mchugh

## 2020-04-24 LAB
BACTERIA UR CULT: ABNORMAL
BACTERIA UR CULT: ABNORMAL
SIGNIFICANT IND 70042: ABNORMAL
SITE SITE: ABNORMAL
SOURCE SOURCE: ABNORMAL

## 2020-04-27 DIAGNOSIS — B96.20 E-COLI UTI: ICD-10-CM

## 2020-04-27 DIAGNOSIS — N39.0 E-COLI UTI: ICD-10-CM

## 2020-04-27 RX ORDER — CEFDINIR 300 MG/1
300 CAPSULE ORAL 2 TIMES DAILY
Qty: 10 CAP | Refills: 0 | Status: SHIPPED | OUTPATIENT
Start: 2020-04-27 | End: 2020-09-27

## 2020-04-28 NOTE — RESULT ENCOUNTER NOTE
Macrobid intermediate. Discussed with patient and agreed to use omnicef. Discussed in mychart message.

## 2020-06-24 NOTE — ED NOTES
Med rec updated and complete  Allergies reviewed  Pt reports no antibiotics in the last 30 days.       negative...

## 2020-08-25 ENCOUNTER — OFFICE VISIT (OUTPATIENT)
Dept: MEDICAL GROUP | Facility: MEDICAL CENTER | Age: 75
End: 2020-08-25
Payer: MEDICARE

## 2020-08-25 VITALS
BODY MASS INDEX: 33.43 KG/M2 | WEIGHT: 200.62 LBS | SYSTOLIC BLOOD PRESSURE: 130 MMHG | DIASTOLIC BLOOD PRESSURE: 64 MMHG | HEIGHT: 65 IN | OXYGEN SATURATION: 94 % | HEART RATE: 64 BPM | TEMPERATURE: 97.6 F

## 2020-08-25 DIAGNOSIS — M67.431 GANGLION CYST OF WRIST, RIGHT: ICD-10-CM

## 2020-08-25 DIAGNOSIS — B07.8 VERRUCA PLANA: ICD-10-CM

## 2020-08-25 DIAGNOSIS — N18.30 CKD (CHRONIC KIDNEY DISEASE) STAGE 3, GFR 30-59 ML/MIN: ICD-10-CM

## 2020-08-25 DIAGNOSIS — F32.0 CURRENT MILD EPISODE OF MAJOR DEPRESSIVE DISORDER WITHOUT PRIOR EPISODE (HCC): ICD-10-CM

## 2020-08-25 DIAGNOSIS — G62.9 NEUROPATHY: ICD-10-CM

## 2020-08-25 DIAGNOSIS — R73.01 IMPAIRED FASTING GLUCOSE: ICD-10-CM

## 2020-08-25 PROCEDURE — 99214 OFFICE O/P EST MOD 30 MIN: CPT | Performed by: INTERNAL MEDICINE

## 2020-08-25 RX ORDER — ESCITALOPRAM OXALATE 10 MG/1
10 TABLET ORAL DAILY
Qty: 30 TAB | Refills: 1 | Status: SHIPPED | OUTPATIENT
Start: 2020-08-25 | End: 2020-10-20

## 2020-08-25 RX ORDER — GABAPENTIN 100 MG/1
100 CAPSULE ORAL NIGHTLY PRN
Qty: 30 CAP | Refills: 1 | Status: SHIPPED | OUTPATIENT
Start: 2020-08-25 | End: 2020-10-20

## 2020-08-25 ASSESSMENT — PATIENT HEALTH QUESTIONNAIRE - PHQ9
SUM OF ALL RESPONSES TO PHQ QUESTIONS 1-9: 9
CLINICAL INTERPRETATION OF PHQ2 SCORE: 2
5. POOR APPETITE OR OVEREATING: 3 - NEARLY EVERY DAY

## 2020-08-25 ASSESSMENT — FIBROSIS 4 INDEX: FIB4 SCORE: 1.26

## 2020-08-25 NOTE — ASSESSMENT & PLAN NOTE
New problem by my assessment.  We will obtain A1c to screen for type 2 diabetes due to elevated fasting blood sugar.

## 2020-08-25 NOTE — ASSESSMENT & PLAN NOTE
Chronic ongoing problem.  We will update kidney function to ensure stability.  We will also obtain screening SPEP due to coexisting neuropathy.  Also obtain A1c to make sure she does not develop type 2 diabetes.

## 2020-08-25 NOTE — ASSESSMENT & PLAN NOTE
New problem.  After discussion potential treatment options we have agreed to proceed with Lexapro 10 mg daily.  She will sent me an update in several weeks let me know how she is doing so we can perform dose titration.  Discussed that there may be side effects of GI upset for her to watch out for.

## 2020-08-25 NOTE — ASSESSMENT & PLAN NOTE
Chronic and worsening problem.  After discussion potential treatment option she would like to proceed with gabapentin.  We will start with 100 mg in the evening.  Discussed potential side effects of lightheadedness, gait instability, and worsening lower extremity edema.  Can go up to 300 mg at a time.  She will send me an update in a couple weeks for her next refill once we have her dose appropriately titrated.

## 2020-08-25 NOTE — ASSESSMENT & PLAN NOTE
Chronic ongoing problem.  Started become more bothersome and inhibiting her from completing ADLs due to pain.  Will refer to a hand surgeon to remove the wart on her thumb and also assess the cyst at her right wrist.

## 2020-08-25 NOTE — PROGRESS NOTES
Subjective:   Chief Complaint/History of Present Illness:  Griselda Farmer is a 75 y.o. female established patient who presents today to discuss medical problems as listed below. She is unaccompanied for today's visit.    Problem   Ganglion Cyst of Wrist, Right    She reports approximately 2 years of a cyst on her right wrist.  This is on the radial side medial aspect.  It flares up intermittently with significant pain which she relates to hitting it on something or overuse.  She is never had it evaluated before.  She also has a wart on her thumb which is become increasingly painful and would like to see a hand surgeon to see if she can have both of these problems addressed at the same time.     Impaired Fasting Glucose       Ref. Range 1/27/2020 07:32 2/28/2020 09:54   Glucose Latest Ref Range: 65 - 99 mg/dL 107 (H) 105 (H)     No known history of type 2 diabetes.  She has neuropathy and worsening kidney disease so we will plan to screen her for type 2 diabetes.  She has chronic incontinence due to bladder prolapse, no polydipsia or polyuria otherwise.     Current Mild Episode of Major Depressive Disorder Without Prior Episode (Hcc)    She reports progressive worsening of her mood.  She feels that she sleeps too much and her appetite is been off.  She has low energy.  She worries that she may be depressed especially related to the ongoing COVID-19 pandemic.  She has never had treatment with antidepressants but would be interested in trialing something today.  No SI or HI.    Depression Screening    Little interest or pleasure in doing things?  2 - more than half the days   Feeling down, depressed , or hopeless? 0 - not at all   Trouble falling or staying asleep, or sleeping too much?  1 - several days   Feeling tired or having little energy?  3 - nearly every day   Poor appetite or overeating?  3 - nearly every day   Feeling bad about yourself - or that you are a failure or have let yourself or your family  down? 0 - not at all   Trouble concentrating on things, such as reading the newspaper or watching television? 0 - not at all   Moving or speaking so slowly that other people could have noticed.  Or the opposite - being so fidgety or restless that you have been moving around a lot more than usual?  0 - not at all   Thoughts that you would be better off dead, or of hurting yourself?  0 - not at all   Patient Health Questionnaire Score: 9          Neuropathy    She reports numbness and tingling in her feet since June 2019.  This is mainly located at the balls of her feet and sometimes the toes.  Does not appear to be affecting the top of the feet or the heels.  It appears equal between both feet.  She is not tried anything at this point except an over-the-counter neuropathy salve which has been somewhat helpful.  No prior injuries or traumas to the feet.  No significant alcohol use.  Normal B12 level.  No prior history of diabetes.  She often notices it at the end of the day and when she is lying in bed at night with burning and tingling.  She has never tried any pharmacotherapy.     Ckd (Chronic Kidney Disease) Stage 3, Gfr 30-59 Ml/Min (Former[facility])       Ref. Range 3/19/2019 04:20 5/23/2019 09:33 10/29/2019 08:06 1/27/2020 07:32 2/28/2020 09:54   Bun Latest Ref Range: 8 - 22 mg/dL 13 16 14 17 13   Creatinine Latest Ref Range: 0.50 - 1.40 mg/dL 0.96 0.97 0.84 1.15 1.00   GFR If Non  Latest Ref Range: >60 mL/min/1.73 m 2 56 (A) 56 (A) >60 46 (A) 54 (A)     She has had recent development of CKD stage III in the past year, likely due to hypertension and heart disease. No prior episodes of nephrolithiasis or intrinsic kidney disease.  She had a urinary tract infection in April 2020 which respond antibiotic treatment.  PTH mildly elevated on last check.  No prior screening SPEP.  No recent hemoglobin A1c.  Renally metabolized medications at this time includes lisinopril, lansoprazole, and statin.     Verruca Plana     She reports a wart on her right hand at the distal aspect of her thumb since approximately 2018.  She has tried wart removal products which have not been helpful.  This has been treated with cryotherapy via liquid nitrogen over the years without full resolution.  It is starting to come more bothersome and inhibiting her from using the thumb because of associated pain.  At this point she would like to meet with hand surgeon to see if he can be fully excised and at the same time discuss a cyst on the right wrist which also can flareup with pain.          Additional History:   Allergies:    Codeine and Seasonal     Current Medications:     Current Outpatient Medications   Medication Sig Dispense Refill   • gabapentin (NEURONTIN) 100 MG Cap Take 1 Cap by mouth at bedtime as needed. 30 Cap 1   • escitalopram (LEXAPRO) 10 MG Tab Take 1 Tab by mouth every day. 30 Tab 1   • Diclofenac Sodium 1 % Gel Apply 2 g to skin as directed 2 times a day as needed. Apply 2 gram on affected shoulder twice a day as needed. 1 Tube 3   • omeprazole (PRILOSEC) 20 MG delayed-release capsule Take 2 Caps by mouth every day. 90 Cap 3   • clopidogrel (PLAVIX) 75 MG Tab Take 1 Tab by mouth every day. 90 Tab 3   • atorvastatin (LIPITOR) 40 MG Tab Take 1 Tab by mouth every evening. 100 Tab 3   • metoprolol SR (TOPROL XL) 50 MG TABLET SR 24 HR Take 0.5 Tabs by mouth every day. 90 Tab 3   • lisinopril (PRINIVIL) 20 MG Tab Take 1 Tab by mouth every day. 90 Tab 3   • acetaminophen (TYLENOL 8 HOUR) 650 MG CR tablet Take 650-1,300 mg by mouth every 6 hours as needed for Moderate Pain.     • nitroglycerin (NITROSTAT) 0.4 MG SL Tab Place 1 Tab under tongue as needed for Chest Pain (up to 3 doses (if SBP greater than 90 mmHg)). 25 Tab 3   • Pumpkin Seed-Soy Germ (AZO BLADDER CONTROL/GO-LESS PO) Take 1 Cap by mouth every bedtime.     • aspirin EC (ECOTRIN) 81 MG Tablet Delayed Response Take 81 mg by mouth every evening. 30 Tab 0   • Cholecalciferol  "(VITAMIN D) 2000 UNIT TABS Take 1 Tab by mouth every evening.     • cefdinir (OMNICEF) 300 MG Cap Take 1 Cap by mouth 2 times a day. (Patient not taking: Reported on 2020) 10 Cap 0   • nitrofurantoin (MACROBID) 100 MG Cap Take 1 Cap by mouth 2 times a day. (Patient not taking: Reported on 2020) 10 Cap 0     No current facility-administered medications for this visit.         Social History:     Social History     Tobacco Use   • Smoking status: Former Smoker     Packs/day: 0.50     Years: 15.00     Pack years: 7.50     Types: Cigarettes     Quit date: 1975     Years since quittin.0   • Smokeless tobacco: Never Used   • Tobacco comment: continued abstinence   Substance Use Topics   • Alcohol use: No     Alcohol/week: 0.0 oz   • Drug use: No       ROS: As above in the HPI      Objective:   Physical Exam:    Vitals: /64   Pulse 64   Temp 36.4 °C (97.6 °F) (Temporal)   Ht 1.651 m (5' 5\")   Wt 91 kg (200 lb 9.9 oz)   SpO2 94%    BMI: Body mass index is 33.38 kg/m².   General/Constitutional: Vitals as above, Well nourished, well developed female in no acute distress   Head/Eyes: Head is grossly normal & atraumatic, bilateral conjunctivae clear and not injected, bilateral EOMI, glasses in place   ENT: Bilateral external ears grossly normal in appearance, Hearing grossly intact, External nares normal in appearance and without discharge/bleeding   Respiratory: No respiratory distress, bilateral lungs are clear to ausculation in all lung fields, no wheezing/rhonchi/rales   Cardiovascular: Regular rate and rhythm without murmur/rubs, distal pulses are intact and equal bilaterally (radial), lateral malleoli with bilateral edema which is chronic and stable   MSK: Gait grossly normal & not antalgic. Pain on medial aspect of right wrist on radial side associated with an underlying cyst.   Integumentary: No apparent rashes. She has a wart on the right hand distal 1st digit.   Psych: Judgment grossly " appropriate, she admits mild depression    Health Maintenance:     - Completed      Assessment and Plan:     Problem List Items Addressed This Visit     Janey pelletier     Chronic ongoing problem.  Started become more bothersome and inhibiting her from completing ADLs due to pain.  Will refer to a hand surgeon to remove the wart on her thumb and also assess the cyst at her right wrist.         Relevant Orders    REFERRAL TO HAND SURGERY    CKD (chronic kidney disease) stage 3, GFR 30-59 ml/min (HCC)     Chronic ongoing problem.  We will update kidney function to ensure stability.  We will also obtain screening SPEP due to coexisting neuropathy.  Also obtain A1c to make sure she does not develop type 2 diabetes.         Relevant Orders    SPEP W/REFLEX TO MARILU, A, G, M    Basic Metabolic Panel    Neuropathy     Chronic and worsening problem.  After discussion potential treatment option she would like to proceed with gabapentin.  We will start with 100 mg in the evening.  Discussed potential side effects of lightheadedness, gait instability, and worsening lower extremity edema.  Can go up to 300 mg at a time.  She will send me an update in a couple weeks for her next refill once we have her dose appropriately titrated.         Relevant Medications    gabapentin (NEURONTIN) 100 MG Cap    escitalopram (LEXAPRO) 10 MG Tab    Ganglion cyst of wrist, right     New problem.  Started become more bothersome.  Will refer to a hand surgeon for both the cyst in the right wrist as well as the lingering wart on her right thumb to see both can be taken care of at the same time.         Relevant Orders    REFERRAL TO HAND SURGERY    Impaired fasting glucose     New problem by my assessment.  We will obtain A1c to screen for type 2 diabetes due to elevated fasting blood sugar.         Relevant Orders    HEMOGLOBIN A1C    Current mild episode of major depressive disorder without prior episode (HCC)     New problem.  After discussion  potential treatment options we have agreed to proceed with Lexapro 10 mg daily.  She will sent me an update in several weeks let me know how she is doing so we can perform dose titration.  Discussed that there may be side effects of GI upset for her to watch out for.         Relevant Medications    escitalopram (LEXAPRO) 10 MG Tab             RTC: 6 months.    PLEASE NOTE: This dictation was created using voice recognition software. I have made every reasonable attempt to correct obvious errors, but I expect that there are errors of grammar and possibly content that I did not discover before finalizing the note.

## 2020-08-25 NOTE — ASSESSMENT & PLAN NOTE
New problem.  Started become more bothersome.  Will refer to a hand surgeon for both the cyst in the right wrist as well as the lingering wart on her right thumb to see both can be taken care of at the same time.

## 2020-09-01 DIAGNOSIS — I25.10 CORONARY ARTERY DISEASE INVOLVING NATIVE CORONARY ARTERY OF NATIVE HEART WITHOUT ANGINA PECTORIS: ICD-10-CM

## 2020-09-02 RX ORDER — CLOPIDOGREL BISULFATE 75 MG/1
TABLET ORAL
Qty: 30 TAB | Refills: 0 | Status: SHIPPED | OUTPATIENT
Start: 2020-09-02 | End: 2020-10-21 | Stop reason: SDUPTHER

## 2020-09-14 ENCOUNTER — HOSPITAL ENCOUNTER (OUTPATIENT)
Dept: LAB | Facility: MEDICAL CENTER | Age: 75
End: 2020-09-14
Attending: INTERNAL MEDICINE
Payer: MEDICARE

## 2020-09-14 DIAGNOSIS — R73.01 IMPAIRED FASTING GLUCOSE: ICD-10-CM

## 2020-09-14 DIAGNOSIS — N18.30 CKD (CHRONIC KIDNEY DISEASE) STAGE 3, GFR 30-59 ML/MIN: ICD-10-CM

## 2020-09-14 LAB
ANION GAP SERPL CALC-SCNC: 14 MMOL/L (ref 7–16)
BUN SERPL-MCNC: 6 MG/DL (ref 8–22)
CALCIUM SERPL-MCNC: 9.6 MG/DL (ref 8.5–10.5)
CHLORIDE SERPL-SCNC: 99 MMOL/L (ref 96–112)
CO2 SERPL-SCNC: 25 MMOL/L (ref 20–33)
CREAT SERPL-MCNC: 0.93 MG/DL (ref 0.5–1.4)
EST. AVERAGE GLUCOSE BLD GHB EST-MCNC: 123 MG/DL
FASTING STATUS PATIENT QL REPORTED: NORMAL
GLUCOSE SERPL-MCNC: 106 MG/DL (ref 65–99)
HBA1C MFR BLD: 5.9 % (ref 0–5.6)
POTASSIUM SERPL-SCNC: 4.4 MMOL/L (ref 3.6–5.5)
SODIUM SERPL-SCNC: 138 MMOL/L (ref 135–145)

## 2020-09-14 PROCEDURE — 80048 BASIC METABOLIC PNL TOTAL CA: CPT

## 2020-09-14 PROCEDURE — 83036 HEMOGLOBIN GLYCOSYLATED A1C: CPT

## 2020-09-14 PROCEDURE — 84155 ASSAY OF PROTEIN SERUM: CPT

## 2020-09-14 PROCEDURE — 36415 COLL VENOUS BLD VENIPUNCTURE: CPT

## 2020-09-14 PROCEDURE — 84165 PROTEIN E-PHORESIS SERUM: CPT

## 2020-09-18 LAB
ALBUMIN SERPL ELPH-MCNC: 3.62 G/DL (ref 3.75–5.01)
ALPHA1 GLOB SERPL ELPH-MCNC: 0.35 G/DL (ref 0.19–0.46)
ALPHA2 GLOB SERPL ELPH-MCNC: 0.91 G/DL (ref 0.48–1.05)
B-GLOBULIN SERPL ELPH-MCNC: 0.74 G/DL (ref 0.48–1.1)
GAMMA GLOB SERPL ELPH-MCNC: 0.79 G/DL (ref 0.62–1.51)
INTERPRETATION SERPL IFE-IMP: ABNORMAL
MONOCLON BAND OBS SERPL: ABNORMAL
PATHOLOGY STUDY: ABNORMAL
PROT SERPL-MCNC: 6.4 G/DL (ref 6.3–8.2)

## 2020-09-20 NOTE — RESULT ENCOUNTER NOTE
Beka Chavez,    I have your lab results to review, mostly everything is stable.    Electrolytes are normal. Blood sugar mildly elevated with average of 123, technically in the pre-diabetes threshold. Kidney function improved but still abnormal, up to 58% function, previously 46% 7 months ago. I did a test to screen your bone marrow to make sure that was not contributing to the kidney impairment, and that returned as normal.    Let me know if you have follow up questions.    Thanks,  Dr. Mchugh

## 2020-10-19 DIAGNOSIS — E78.5 HYPERLIPIDEMIA, UNSPECIFIED HYPERLIPIDEMIA TYPE: ICD-10-CM

## 2020-10-20 DIAGNOSIS — G62.9 NEUROPATHY: ICD-10-CM

## 2020-10-20 DIAGNOSIS — F32.0 CURRENT MILD EPISODE OF MAJOR DEPRESSIVE DISORDER WITHOUT PRIOR EPISODE (HCC): ICD-10-CM

## 2020-10-20 RX ORDER — ATORVASTATIN CALCIUM 40 MG/1
TABLET, FILM COATED ORAL
Qty: 100 TAB | Refills: 0 | Status: SHIPPED | OUTPATIENT
Start: 2020-10-20 | End: 2020-10-21 | Stop reason: SDUPTHER

## 2020-10-20 RX ORDER — GABAPENTIN 100 MG/1
CAPSULE ORAL
Qty: 100 CAP | Refills: 3 | Status: SHIPPED | OUTPATIENT
Start: 2020-10-20 | End: 2021-12-07 | Stop reason: SDUPTHER

## 2020-10-20 RX ORDER — ESCITALOPRAM OXALATE 10 MG/1
TABLET ORAL
Qty: 100 TAB | Refills: 3 | Status: SHIPPED | OUTPATIENT
Start: 2020-10-20 | End: 2021-10-19

## 2020-10-20 NOTE — TELEPHONE ENCOUNTER
Received request via: Pharmacy    Was the patient seen in the last year in this department? Yes    Does the patient have an active prescription (recently filled or refills available) for medication(s) requested? No     Requested Prescriptions     Pending Prescriptions Disp Refills   • escitalopram (LEXAPRO) 10 MG Tab [Pharmacy Med Name: ESCITALOPRAM 10 MG TABLET] 30 Tab 1     Sig: TAKE 1 TABLET BY MOUTH EVERY DAY   • gabapentin (NEURONTIN) 100 MG Cap [Pharmacy Med Name: GABAPENTIN 100 MG CAPSULE] 30 Cap 1     Sig: TAKE 1 CAPSULE BY MOUTH AT BEDTIME AS NEEDED

## 2020-10-21 ENCOUNTER — OFFICE VISIT (OUTPATIENT)
Dept: CARDIOLOGY | Facility: MEDICAL CENTER | Age: 75
End: 2020-10-21
Payer: MEDICARE

## 2020-10-21 VITALS
DIASTOLIC BLOOD PRESSURE: 100 MMHG | BODY MASS INDEX: 33.45 KG/M2 | OXYGEN SATURATION: 94 % | RESPIRATION RATE: 17 BRPM | HEIGHT: 65 IN | HEART RATE: 92 BPM | SYSTOLIC BLOOD PRESSURE: 138 MMHG | WEIGHT: 200.8 LBS

## 2020-10-21 DIAGNOSIS — Z79.899 HIGH RISK MEDICATION USE: ICD-10-CM

## 2020-10-21 DIAGNOSIS — I10 ESSENTIAL HYPERTENSION, BENIGN: ICD-10-CM

## 2020-10-21 DIAGNOSIS — E78.5 HYPERLIPIDEMIA, UNSPECIFIED HYPERLIPIDEMIA TYPE: ICD-10-CM

## 2020-10-21 DIAGNOSIS — Z95.5 S/P CORONARY ARTERY STENT PLACEMENT: ICD-10-CM

## 2020-10-21 DIAGNOSIS — G47.33 OSA (OBSTRUCTIVE SLEEP APNEA): ICD-10-CM

## 2020-10-21 DIAGNOSIS — I25.10 CORONARY ARTERY DISEASE INVOLVING NATIVE CORONARY ARTERY OF NATIVE HEART WITHOUT ANGINA PECTORIS: ICD-10-CM

## 2020-10-21 DIAGNOSIS — I10 ESSENTIAL HYPERTENSION: ICD-10-CM

## 2020-10-21 DIAGNOSIS — E78.2 MIXED HYPERLIPIDEMIA: ICD-10-CM

## 2020-10-21 DIAGNOSIS — I10 HTN (HYPERTENSION), MALIGNANT: ICD-10-CM

## 2020-10-21 PROCEDURE — 99214 OFFICE O/P EST MOD 30 MIN: CPT | Performed by: INTERNAL MEDICINE

## 2020-10-21 RX ORDER — METOPROLOL SUCCINATE 50 MG/1
25 TABLET, EXTENDED RELEASE ORAL DAILY
Qty: 90 TAB | Refills: 3 | Status: SHIPPED | OUTPATIENT
Start: 2020-10-21 | End: 2022-01-17

## 2020-10-21 RX ORDER — LISINOPRIL 20 MG/1
20 TABLET ORAL DAILY
Qty: 90 TAB | Refills: 3 | Status: SHIPPED | OUTPATIENT
Start: 2020-10-21 | End: 2021-08-11

## 2020-10-21 RX ORDER — ATORVASTATIN CALCIUM 40 MG/1
40 TABLET, FILM COATED ORAL
Qty: 100 TAB | Refills: 3 | Status: SHIPPED | OUTPATIENT
Start: 2020-10-21 | End: 2021-11-02

## 2020-10-21 RX ORDER — CLOPIDOGREL BISULFATE 75 MG/1
75 TABLET ORAL
Qty: 90 TAB | Refills: 3 | Status: SHIPPED | OUTPATIENT
Start: 2020-10-21 | End: 2021-10-18

## 2020-10-21 ASSESSMENT — ENCOUNTER SYMPTOMS
ABDOMINAL PAIN: 0
DOUBLE VISION: 0
MYALGIAS: 0
EYE DISCHARGE: 0
DIZZINESS: 0
CHILLS: 0
ORTHOPNEA: 0
CLAUDICATION: 0
NAUSEA: 0
BLOOD IN STOOL: 0
LOSS OF CONSCIOUSNESS: 0
FEVER: 0
EYE PAIN: 0
COUGH: 0
PALPITATIONS: 0
SENSORY CHANGE: 0
DEPRESSION: 0
BRUISES/BLEEDS EASILY: 0
SHORTNESS OF BREATH: 0
HALLUCINATIONS: 0
HEADACHES: 0
PND: 0
FALLS: 0
WEIGHT LOSS: 0
BLURRED VISION: 0
VOMITING: 0
SPEECH CHANGE: 0

## 2020-10-21 ASSESSMENT — FIBROSIS 4 INDEX: FIB4 SCORE: 1.26

## 2020-10-21 NOTE — PROGRESS NOTES
Chief Complaint   Patient presents with   • Hypertension     F/V Dx: S/P coronary artery stent placement        Subjective:   Griselda Farmer is a 75 y.o. female who presents today for cardiac care due to prior CAD with 2 stents placed in Lx artery, HTN, HLP. In the past patient had repeated ER visits because of chest pain. She even had a repeated cardiac catheterization which did not show any thing wrong with her stents. She came in to the hospital again in July 2017 because of chest pain. She was discharged under stable condition. Her left ventricular systolic function was found to be 50%.     At prior visit, patient was able to complete 344 m during his 6 minute walk test. her O2 saturation at baseline was 91% and at the end of the test, the O2 saturation was 94%. she reported 1 level of dyspnea on Chuckie scale.     +atypical chest pain at night when sleeping. She has FRIDA but not using CPAP.     In the interim, patient has been doing well without having any symptoms. Patient denies having chest pain, dyspnea, palpitation, presyncope, syncope episodes. Able to climb up at least 1 flight of stairs.     I have independently reviewed blood tests results with patient in clinic which shows normal renal function.    Past Medical History:   Diagnosis Date   • Angina 2014    pt denies    • Arthritis     generalized + hands   • Dental disorder     upper and lower dentures   • Elevated fasting glucose 7/16/2014   • Flushing reaction     has had full work up with cardiology, would awake with symptoms at night   • GERD (gastroesophageal reflux disease)    • Heart burn 2014    pt on prevacid   • Hyperlipidemia 10/20/2010   • Hypertension 2014    pt states well controlled on meds   • Incomplete tear of right rotator cuff 9/19/2016   • Indigestion    • Influenza    • Insomnia    • Liver cyst     has been seen by GI, ultrasound 9/12   • Myocardial infarct (HCC) 1984    pt denies states sometimes irreg rhythm   • Need for  Tdap vaccination     in 2012   • FRIDA (obstructive sleep apnea)     states no cpap   • Primary localized osteoarthrosis, lower leg 12/16/2014   • S/P colonoscopy 11/9/2012   • Sick sinus syndrome (HCC) 1/1/2017   • Sleep apnea    • 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   • ZZZ CARDIAC CATH  1/1/17    Overlapping Synergy stents to 99% Circ   • OTHER CARDIAC SURGERY  January 2017    NON STEMI with stent   • KNEE ARTHROPLASTY TOTAL  12/16/2014    Performed by Jose G Trotter M.D. at SURGERY [facility] ORS   • CATARACT PHACO WITH IOL  10/3/2013    Performed by Sylvester Raza M.D. at SURGERY [facility] ORS   • CATARACT PHACO WITH IOL  9/19/2013    Performed by Sylvester Raza M.D. at SURGERY [facility] ORS   • RECOVERY  3/29/2013    Performed by Cath-Recovery Surgery at SURGERY SAME DAY [facility] ORS   • HYSTERECTOMY, VAGINAL      ovaries were left   • TONSILLECTOMY       Family History   Problem Relation Age of Onset   • Diabetes Mother         mild   • Cancer Father         melanoma mild   • Arthritis Sister    • Arthritis Brother    • Arthritis Sister    • Arthritis Brother    • Heart Disease Neg Hx    • Hypertension Neg Hx    • Sleep Apnea Neg Hx      Social History     Socioeconomic History   • Marital status:      Spouse name: Not on file   • Number of children: Not on file   • Years of education: Not on file   • Highest education level: Not on file   Occupational History   • Occupation: retired- human resources  for [employer]     Employer: OTHER   Social Needs   • Financial resource strain: Not on file   • Food insecurity     Worry: Not on file     Inability: Not on file   • Transportation needs     Medical: Not on file     Non-medical: Not on file    Tobacco Use   • Smoking status: Former Smoker     Packs/day: 0.50     Years: 15.00     Pack years: 7.50     Types: Cigarettes     Quit date: 1975     Years since quittin.2   • Smokeless tobacco: Never Used   • Tobacco comment: continued abstinence   Substance and Sexual Activity   • Alcohol use: No     Alcohol/week: 0.0 oz   • Drug use: No   • Sexual activity: Not Currently     Partners: Male   Lifestyle   • Physical activity     Days per week: Not on file     Minutes per session: Not on file   • Stress: Not on file   Relationships   • Social connections     Talks on phone: Not on file     Gets together: Not on file     Attends [employer] service: Not on file     Active member of club or organization: Not on file     Attends meetings of clubs or organizations: Not on file     Relationship status: Not on file   • Intimate partner violence     Fear of current or ex partner: Not on file     Emotionally abused: Not on file     Physically abused: Not on file     Forced sexual activity: Not on file   Other Topics Concern   • Not on file   Social History Narrative    She lives with her ex-MIRZA, Mis. She has a son Erick (53, [place]) and a daughter Edilia (49, [facility]). She previously worked in , and lived in [place] at that time. Previously lived and work in [place], [place]. She moved to [place] in 2017, previously living in [place] from [id number].     Allergies   Allergen Reactions   • Codeine Itching and Vomiting     Rxn = years ago      • Seasonal      Outpatient Encounter Medications as of 10/21/2020   Medication Sig Dispense Refill   • clopidogrel (PLAVIX) 75 MG Tab Take 1 Tab by mouth every day. 90 Tab 3   • lisinopril (PRINIVIL) 20 MG Tab Take 1 Tab by mouth every day. 90 Tab 3   • metoprolol SR (TOPROL XL) 50 MG TABLET SR 24 HR Take 0.5 Tabs by mouth every day. 90 Tab 3   • atorvastatin (LIPITOR) 40 MG Tab Take 1 Tab by mouth every day. N THE EVENING 100 Tab 3   • escitalopram (LEXAPRO) 10 MG Tab TAKE 1  TABLET BY MOUTH EVERY  Tab 3   • gabapentin (NEURONTIN) 100 MG Cap TAKE 1 CAPSULE BY MOUTH AT BEDTIME AS NEEDED 100 Cap 3   • omeprazole (PRILOSEC) 20 MG delayed-release capsule Take 2 Caps by mouth every day. 90 Cap 3   • acetaminophen (TYLENOL 8 HOUR) 650 MG CR tablet Take 650-1,300 mg by mouth every 6 hours as needed for Moderate Pain.     • nitroglycerin (NITROSTAT) 0.4 MG SL Tab Place 1 Tab under tongue as needed for Chest Pain (up to 3 doses (if SBP greater than 90 mmHg)). 25 Tab 3   • Pumpkin Seed-Soy Germ (AZO BLADDER CONTROL/GO-LESS PO) Take 1 Cap by mouth every bedtime.     • aspirin EC (ECOTRIN) 81 MG Tablet Delayed Response Take 81 mg by mouth every evening. 30 Tab 0   • Cholecalciferol (VITAMIN D) 2000 UNIT TABS Take 1 Tab by mouth every evening.     • [DISCONTINUED] atorvastatin (LIPITOR) 40 MG Tab TAKE 1 TABLET BY MOUTH EVERY DAY IN THE EVENING 100 Tab 0   • [DISCONTINUED] clopidogrel (PLAVIX) 75 MG Tab TAKE 1 TABLET BY MOUTH EVERY DAY 30 Tab 0   • [DISCONTINUED] metoprolol SR (TOPROL XL) 50 MG TABLET SR 24 HR Take 0.5 Tabs by mouth every day. 90 Tab 3   • [DISCONTINUED] lisinopril (PRINIVIL) 20 MG Tab Take 1 Tab by mouth every day. 90 Tab 3     No facility-administered encounter medications on file as of 10/21/2020.      Review of Systems   Constitutional: Negative for chills, fever, malaise/fatigue and weight loss.   HENT: Negative for ear discharge, ear pain, hearing loss and nosebleeds.    Eyes: Negative for blurred vision, double vision, pain and discharge.   Respiratory: Negative for cough and shortness of breath.    Cardiovascular: Negative for chest pain, palpitations, orthopnea, claudication, leg swelling and PND.   Gastrointestinal: Negative for abdominal pain, blood in stool, melena, nausea and vomiting.   Genitourinary: Negative for dysuria and hematuria.   Musculoskeletal: Negative for falls, joint pain and myalgias.   Skin: Negative for itching and rash.   Neurological: Negative  "for dizziness, sensory change, speech change, loss of consciousness and headaches.   Endo/Heme/Allergies: Negative for environmental allergies. Does not bruise/bleed easily.   Psychiatric/Behavioral: Negative for depression, hallucinations and suicidal ideas.        Objective:   /100 (BP Location: Left arm, Patient Position: Sitting, BP Cuff Size: Adult)   Pulse 92   Resp 17   Ht 1.651 m (5' 5\")   Wt 91.1 kg (200 lb 12.8 oz)   SpO2 94%   BMI 33.41 kg/m²     Physical Exam   Constitutional: She is oriented to person, place, and time. No distress.   HENT:   Head: Normocephalic and atraumatic.   Right Ear: External ear normal.   Left Ear: External ear normal.   Eyes: Right eye exhibits no discharge. Left eye exhibits no discharge.   Neck: No JVD present. No thyromegaly present.   Cardiovascular: Normal rate, regular rhythm, normal heart sounds and intact distal pulses. Exam reveals no gallop and no friction rub.   No murmur heard.  Pulmonary/Chest: Breath sounds normal. No respiratory distress.   Abdominal: Bowel sounds are normal. She exhibits no distension. There is no abdominal tenderness.   Musculoskeletal:         General: No tenderness or edema.   Neurological: She is alert and oriented to person, place, and time. No cranial nerve deficit.   Skin: Skin is warm and dry. She is not diaphoretic.   Psychiatric: She has a normal mood and affect. Her behavior is normal.   Nursing note and vitals reviewed.      Assessment:     1. Coronary artery disease involving native coronary artery of native heart without angina pectoris  clopidogrel (PLAVIX) 75 MG Tab   2. S/P coronary artery stent placement     3. Mixed hyperlipidemia     4. HTN (hypertension), malignant     5. FRIDA (obstructive sleep apnea)     6. High risk medication use     7. Essential hypertension  lisinopril (PRINIVIL) 20 MG Tab   8. Essential hypertension, benign  metoprolol SR (TOPROL XL) 50 MG TABLET SR 24 HR   9. Hyperlipidemia, unspecified " hyperlipidemia type  atorvastatin (LIPITOR) 40 MG Tab       Medical Decision Making:  Today's Assessment / Status / Plan:   Await FRIDA studies again for mask fitting.     Continue Toprol 25 mg daily.     For CAD, continue DAP, Losartan and Atorvastatin.     For hyperlipidemia, continue Atorvastatin 40 mg daily.     I will see patient back in clinic with lab tests and studies results in 12 months.     I thank you Dr. Hoskins for referring patient to our Cardiology Clinic today.

## 2021-01-11 DIAGNOSIS — Z23 NEED FOR VACCINATION: ICD-10-CM

## 2021-01-28 ENCOUNTER — IMMUNIZATION (OUTPATIENT)
Dept: FAMILY PLANNING/WOMEN'S HEALTH CLINIC | Facility: IMMUNIZATION CENTER | Age: 76
End: 2021-01-28
Attending: INTERNAL MEDICINE
Payer: MEDICARE

## 2021-01-28 DIAGNOSIS — Z23 NEED FOR VACCINATION: ICD-10-CM

## 2021-01-28 DIAGNOSIS — Z23 ENCOUNTER FOR VACCINATION: Primary | ICD-10-CM

## 2021-01-28 PROCEDURE — 91301 MODERNA SARS-COV-2 VACCINE: CPT | Performed by: PHYSICIAN ASSISTANT

## 2021-01-28 PROCEDURE — 0011A MODERNA SARS-COV-2 VACCINE: CPT | Performed by: PHYSICIAN ASSISTANT

## 2021-03-03 ENCOUNTER — HOSPITAL ENCOUNTER (OUTPATIENT)
Dept: LAB | Facility: MEDICAL CENTER | Age: 76
End: 2021-03-03
Attending: INTERNAL MEDICINE
Payer: MEDICARE

## 2021-03-03 DIAGNOSIS — E78.2 MIXED HYPERLIPIDEMIA: ICD-10-CM

## 2021-03-03 LAB
ALBUMIN SERPL BCP-MCNC: 4 G/DL (ref 3.2–4.9)
ALBUMIN/GLOB SERPL: 1.5 G/DL
ALP SERPL-CCNC: 112 U/L (ref 30–99)
ALT SERPL-CCNC: 12 U/L (ref 2–50)
ANION GAP SERPL CALC-SCNC: 6 MMOL/L (ref 7–16)
AST SERPL-CCNC: 11 U/L (ref 12–45)
BILIRUB SERPL-MCNC: 0.6 MG/DL (ref 0.1–1.5)
BUN SERPL-MCNC: 11 MG/DL (ref 8–22)
CALCIUM SERPL-MCNC: 9.7 MG/DL (ref 8.5–10.5)
CHLORIDE SERPL-SCNC: 101 MMOL/L (ref 96–112)
CHOLEST SERPL-MCNC: 126 MG/DL (ref 100–199)
CO2 SERPL-SCNC: 28 MMOL/L (ref 20–33)
CREAT SERPL-MCNC: 0.88 MG/DL (ref 0.5–1.4)
FASTING STATUS PATIENT QL REPORTED: NORMAL
GLOBULIN SER CALC-MCNC: 2.7 G/DL (ref 1.9–3.5)
GLUCOSE SERPL-MCNC: 103 MG/DL (ref 65–99)
HDLC SERPL-MCNC: 62 MG/DL
LDLC SERPL CALC-MCNC: 41 MG/DL
POTASSIUM SERPL-SCNC: 3.8 MMOL/L (ref 3.6–5.5)
PROT SERPL-MCNC: 6.7 G/DL (ref 6–8.2)
SODIUM SERPL-SCNC: 135 MMOL/L (ref 135–145)
TRIGL SERPL-MCNC: 114 MG/DL (ref 0–149)

## 2021-03-03 PROCEDURE — 36415 COLL VENOUS BLD VENIPUNCTURE: CPT

## 2021-03-03 PROCEDURE — 80053 COMPREHEN METABOLIC PANEL: CPT

## 2021-03-03 PROCEDURE — 80061 LIPID PANEL: CPT

## 2021-03-05 ENCOUNTER — IMMUNIZATION (OUTPATIENT)
Dept: FAMILY PLANNING/WOMEN'S HEALTH CLINIC | Facility: IMMUNIZATION CENTER | Age: 76
End: 2021-03-05
Attending: INTERNAL MEDICINE
Payer: MEDICARE

## 2021-03-05 DIAGNOSIS — Z23 ENCOUNTER FOR VACCINATION: Primary | ICD-10-CM

## 2021-03-05 PROCEDURE — 91301 MODERNA SARS-COV-2 VACCINE: CPT

## 2021-03-05 PROCEDURE — 0012A MODERNA SARS-COV-2 VACCINE: CPT

## 2021-05-20 ENCOUNTER — PATIENT MESSAGE (OUTPATIENT)
Dept: HEALTH INFORMATION MANAGEMENT | Facility: OTHER | Age: 76
End: 2021-05-20

## 2021-06-29 ENCOUNTER — OFFICE VISIT (OUTPATIENT)
Dept: MEDICAL GROUP | Facility: PHYSICIAN GROUP | Age: 76
End: 2021-06-29
Payer: MEDICARE

## 2021-06-29 VITALS
OXYGEN SATURATION: 95 % | BODY MASS INDEX: 33.49 KG/M2 | TEMPERATURE: 97.8 F | WEIGHT: 201 LBS | RESPIRATION RATE: 16 BRPM | HEIGHT: 65 IN | SYSTOLIC BLOOD PRESSURE: 132 MMHG | HEART RATE: 80 BPM | DIASTOLIC BLOOD PRESSURE: 78 MMHG

## 2021-06-29 DIAGNOSIS — R74.8 ELEVATED ALKALINE PHOSPHATASE LEVEL: ICD-10-CM

## 2021-06-29 DIAGNOSIS — I70.0 ATHEROSCLEROSIS OF AORTA (HCC): ICD-10-CM

## 2021-06-29 DIAGNOSIS — F32.0 CURRENT MILD EPISODE OF MAJOR DEPRESSIVE DISORDER WITHOUT PRIOR EPISODE (HCC): ICD-10-CM

## 2021-06-29 DIAGNOSIS — N18.31 STAGE 3A CHRONIC KIDNEY DISEASE: ICD-10-CM

## 2021-06-29 DIAGNOSIS — H61.23 BILATERAL IMPACTED CERUMEN: ICD-10-CM

## 2021-06-29 DIAGNOSIS — R73.03 PREDIABETES: ICD-10-CM

## 2021-06-29 PROBLEM — R73.01 IMPAIRED FASTING GLUCOSE: Status: RESOLVED | Noted: 2020-08-25 | Resolved: 2021-06-29

## 2021-06-29 PROCEDURE — G0439 PPPS, SUBSEQ VISIT: HCPCS | Performed by: INTERNAL MEDICINE

## 2021-06-29 ASSESSMENT — ACTIVITIES OF DAILY LIVING (ADL): BATHING_REQUIRES_ASSISTANCE: 0

## 2021-06-29 ASSESSMENT — PATIENT HEALTH QUESTIONNAIRE - PHQ9: CLINICAL INTERPRETATION OF PHQ2 SCORE: 0

## 2021-06-29 ASSESSMENT — FIBROSIS 4 INDEX: FIB4 SCORE: 0.98

## 2021-06-29 ASSESSMENT — ENCOUNTER SYMPTOMS: GENERAL WELL-BEING: EXCELLENT

## 2021-06-29 NOTE — PROGRESS NOTES
Chief Complaint   Patient presents with   • Annual Wellness Visit         HPI:  Griselda Farmer is a 76 y.o. female here for Medicare Annual Wellness Visit     Problem   Elevated Alkaline Phosphatase Level       Ref. Range 3/3/2021 07:34   Alkaline Phosphatase Latest Ref Range: 30 - 99 U/L 112 (H)     New finding, she denies known liver disease. She reports worsening of her arthritis.     Prediabetes       Ref. Range 9/14/2020 10:08   Glycohemoglobin Latest Ref Range: 0.0 - 5.6 % 5.9 (H)   Estim. Avg Glu Latest Units: mg/dL 123     She has prediabetes that is mild, no prior pharmacotherapy for her blood sugar.      Current Mild Episode of Major Depressive Disorder Without Prior Episode (Hcc)    She reports progressive worsening of her mood.  She feels that she sleeps too much and her appetite is been off.  She has low energy and poor motivation.  She worries that she may be depressed especially related to the ongoing COVID-19 pandemic. We started lexapro 10 mg daily in 8/2020 and she reports improvement in symptoms. PHQ9 at that time was 9.    Depression Screening    Little interest or pleasure in doing things?  0 - not at all  Feeling down, depressed , or hopeless? 0 - not at all  Patient Health Questionnaire Score: 0    Current regimen: lexapro 10 mg daily     Bilateral Impacted Cerumen    She reports a history of cerumen impaction over the years requiring wax removal. She senses that it has re-accumulated again recently and desires ear lavage in clinic today.     Ckd (Chronic Kidney Disease) Stage 3, Gfr 30-59 Ml/Min ([facility])       Ref. Range 9/14/2020 10:08 3/3/2021 07:34   Creatinine Latest Ref Range: 0.50 - 1.40 mg/dL 0.93 0.88   GFR If Non  Latest Ref Range: >60 mL/min/1.73 m 2 58 (A) >60     She has had recent development of CKD stage III in the past year, likely due to hypertension and heart disease. No prior episodes of nephrolithiasis or intrinsic kidney disease.  She had a urinary  tract infection in April 2020 which respond antibiotic treatment.  PTH mildly elevated on last check. SPEP screening normal. No DM2 on A1c.  Renally metabolized medications at this time includes lisinopril, lansoprazole, and statin.     Atherosclerosis of Aorta (Hcc)    Noted on chest xray from March 2018: Aortic calcified atherosclerotic plaque.    She is on dual antiplatelet therapy due to her history of CAD as well as atorvastatin 40 mg daily.    Most recent cholesterol panel is at goal with all parameters.         Current supplements as per medication list.       Allergies: Codeine and Seasonal    Current social contact/activities: friend and walk 2-3 times a week/ son lives in town/ helping neighbors      She  reports that she quit smoking about 45 years ago. Her smoking use included cigarettes. She has a 7.50 pack-year smoking history. She has never used smokeless tobacco. She reports that she does not drink alcohol and does not use drugs.  Counseling given: Not Answered  Comment: continued abstinence      DPA/Advanced Directive:  Patient has Advanced Directive, but it is not on file. Instructed to bring in a copy to scan into their chart.    ROS:    Gait: Uses no assistive device  Ostomy: No  Other tubes: No  Amputations: No  Chronic oxygen use: No  Last eye exam: 2019  Wears hearing aids: No   : Reports urinary leakage during the last 6 months that has not interfered at all with their daily activities or sleep.     Annual Health Assessment Questions:    1.  Are you currently engaging in any exercise or physical activity? Yes    2.  How would you describe your mood or emotional well-being today? good    3.  Have you had any falls in the last year? No    4.  Have you noticed any problems with your balance or had difficulty walking? No    5.  In the last six months have you experienced any leakage of urine? Yes    6. DPA/Advanced Directive: Patient has Advanced Directive, but it is not on file. Instructed to  bring in a copy to scan into their chart.    Screening:    Depression Screening    Little interest or pleasure in doing things?  0 - not at all  Feeling down, depressed , or hopeless? 0 - not at all  Patient Health Questionnaire Score: 0     If depressive symptoms identified deferred to follow up visit unless specifically addressed in assessment and plan.    Interpretation of PHQ-9 Total Score   Score Severity   1-4 No Depression   5-9 Mild Depression   10-14 Moderate Depression   15-19 Moderately Severe Depression   20-27 Severe Depression    Screening for Cognitive Impairment    Three Minute Recall (captain, garden, picture) 1/3 Garden   Manuel clock face with all 12 numbers and set the hands to show 5 past 8.  Yes    Cognitive concerns identified deferred for follow up unless specifically addressed in assessment and plan.    Fall Risk Assessment    Has the patient had two or more falls in the last year or any fall with injury in the last year?  No    Safety Assessment    Throw rugs on floor.  Yes  Handrails on all stairs.  Yes  Good lighting in all hallways.  Yes  Difficulty hearing.  No  Patient counseled about all safety risks that were identified.    Functional Assessment ADLs    Are there any barriers preventing you from cooking for yourself or meeting nutritional needs?  No.    Are there any barriers preventing you from driving safely or obtaining transportation?  No.    Are there any barriers preventing you from using a telephone or calling for help?  No.    Are there any barriers preventing you from shopping?  No.    Are there any barriers preventing you from taking care of your own finances?  No.    Are there any barriers preventing you from managing your medications?  No.    Are there any barriers preventing you from showering, bathing or dressing yourself?  No.    Are you currently engaging in any exercise or physical activity?  Yes.  Walking patch behind home 3 days a week   What is your perception of  your health?  Excellent.      Health Maintenance Summary                Annual Wellness Visit Overdue 2021      Done 2020 LOS: SD ANNUAL WELLNESS VISIT-INCLUDES PPPS SUBSEQUE*     Patient has more history with this topic...    COLONOSCOPY Next Due 10/9/2021      Not specified 10/9/2011     BONE DENSITY Next Due 2023      Done 2018 DS-BONE DENSITY STUDY (DEXA)     Patient has more history with this topic...    IMM DTaP/Tdap/Td Vaccine Next Due 2027      Done 2017 Imm Admin: Tdap Vaccine          Patient Care Team:  Asia Mchugh D.O. as PCP - General (Internal Medicine)  Cesilia Freeman M.D. as Consulting Physician (Oncology)  Anastacia Saunders M.D. as Consulting Physician (Cardiology)  Shirley Kothari M.D. as Consulting Physician (Sleep Medicine)  Garth Brock Ass't        Social History     Tobacco Use   • Smoking status: Former Smoker     Packs/day: 0.50     Years: 15.00     Pack years: 7.50     Types: Cigarettes     Quit date: 1975     Years since quittin.9   • Smokeless tobacco: Never Used   • Tobacco comment: continued abstinence   Vaping Use   • Vaping Use: Never used   Substance Use Topics   • Alcohol use: No     Alcohol/week: 0.0 oz   • Drug use: No     Family History   Problem Relation Age of Onset   • Diabetes Mother         mild   • Cancer Father         melanoma mild   • Arthritis Sister    • Arthritis Brother    • Arthritis Sister    • Arthritis Brother    • Heart Disease Neg Hx    • Hypertension Neg Hx    • Sleep Apnea Neg Hx      She  has a past medical history of Angina (), Arthritis, Dental disorder, Elevated fasting glucose (2014), Flushing reaction, GERD (gastroesophageal reflux disease), Heart burn (), Hyperlipidemia (10/20/2010), Hypertension (), Incomplete tear of right rotator cuff (2016), Indigestion, Influenza, Insomnia, Liver cyst, Myocardial infarct (HCC) (), Need for Tdap vaccination, FRIDA (obstructive sleep  "apnea), Primary localized osteoarthrosis, lower leg (12/16/2014), S/P colonoscopy (11/9/2012), Sick sinus syndrome (HCC) (1/1/2017), Sleep apnea, 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 She also has no past medical history of Encounter for long-term (current) use of other medications.   Past Surgical History:   Procedure Laterality Date   • Z CARDIAC CATH  1/19/17    Stents patent.   • Z CARDIAC CATH  1/1/17    Overlapping Synergy stents to 99% Circ   • OTHER CARDIAC SURGERY  January 2017    NON STEMI with stent   • KNEE ARTHROPLASTY TOTAL  12/16/2014    Performed by Jose G Trotter M.D. at SURGERY [facility] ORS   • CATARACT PHACO WITH IOL  10/3/2013    Performed by Sylvester Raza M.D. at SURGERY [facility] ORS   • CATARACT PHACO WITH IOL  9/19/2013    Performed by Sylvester Raza M.D. at SURGERY [facility] ORS   • RECOVERY  3/29/2013    Performed by Cath-Recovery Surgery at [facility] SAME DAY [facility] ORS   • HYSTERECTOMY, VAGINAL      ovaries were left   • TONSILLECTOMY         Exam:   /78 (BP Location: Left arm, Patient Position: Sitting, BP Cuff Size: Adult)   Pulse 80   Temp 36.6 °C (97.8 °F) (Temporal)   Resp 16   Ht 1.651 m (5' 5\")   Wt 91.2 kg (201 lb)   SpO2 95%  Body mass index is 33.45 kg/m².      Physical Exam  Constitutional:       General: She is not in acute distress.     Appearance: Normal appearance. She is not ill-appearing.   HENT:      Right Ear: There is impacted cerumen.      Left Ear: There is impacted cerumen.      Ears:      Comments: Exam following ear lavage shows stable ear canal and TM's bilaterally.  Eyes:      Conjunctiva/sclera: Conjunctivae normal.      Pupils: Pupils are equal, round, and reactive to light.   Cardiovascular:      Rate and Rhythm: Normal rate and regular rhythm.      Pulses: Normal pulses.   Pulmonary:      Effort: Pulmonary effort is " normal. No respiratory distress.      Breath sounds: Normal breath sounds. No wheezing or rhonchi.   Musculoskeletal:      Comments: Bilateral nonpitting lymphedema, pedal   Skin:     General: Skin is warm and dry.      Findings: No rash.   Psychiatric:         Mood and Affect: Mood normal.         Behavior: Behavior normal.         Thought Content: Thought content normal.         Judgment: Judgment normal.          Assessment and Plan. The following treatment and monitoring plan is recommended:      Griselda is a 76 y.o. female with the following:  Problem List Items Addressed This Visit     Atherosclerosis of aorta (HCC)     Chronic and ongoing problem. Continue atorvastatin, plavix, and aspirin.         CKD (chronic kidney disease) stage 3, GFR 30-59 ml/min (HCC)     Intermittent problem, GFR fluctuates from 40s to 60s. Avoid nephrotoxic agents and update GFR every 4-6 months to ensure stability. Recommend good oral hydration. Keep good control of blood pressure and heart rate.         Relevant Orders    CBC WITH DIFFERENTIAL    Comp Metabolic Panel    Bilateral impacted cerumen     Bilateral ear lavage performed in clinic today by medical assistant, Cristy. Large amount of cerumen removed bilaterally. Patient tolerated without difficulty and there were no immediate complications. She was appreciative of the visit.         Current mild episode of major depressive disorder without prior episode (HCC)     Chronic and ongoing problem, she thinks her mood has improved with the addition of Lexapro 10 mg daily, continue at this time.         Elevated alkaline phosphatase level     New problem, will check alk phos isoenzymes to evaluate underlying etiology and determine if additional evaluation if needed pending results.         Relevant Orders    ALKALINE PHOSPHATASE ISOENZYMES    Prediabetes     Chronic and ongoing problem, update A1c to ensure stability.         Relevant Orders    CBC WITH DIFFERENTIAL    HEMOGLOBIN A1C            Services suggested: No services needed at this time  Health Care Screening: Age-appropriate preventive services recommended by USPTF and ACIP covered by Medicare were discussed today. Services ordered if indicated and agreed upon by the patient.  Referrals offered: Community-based lifestyle interventions to reduce health risks and promote self-management and wellness, fall prevention, nutrition, physical activity, tobacco-use cessation, weight loss, and mental health services as per orders if indicated.    Discussion today about general wellness and lifestyle habits:    · Prevent falls and reduce trip hazards; Cautioned about securing or removing rugs.  · Have a working fire alarm and carbon monoxide detector;   · Engage in regular physical activity and social activities     Follow-up: Return in about 6 months (around 12/29/2021).    I spent a total of 35 minutes with record review, exam, communication with the patient, communication with other providers, and documentation of this encounter.       PLEASE NOTE: This dictation was created using voice recognition software. I have made every reasonable attempt to correct obvious errors, but I expect that there are errors of grammar and possibly content that I did not discover before finalizing the note.      Asia Mchugh, DO  Geriatric and Internal Medicine  [facility]

## 2021-06-29 NOTE — ASSESSMENT & PLAN NOTE
Intermittent problem, GFR fluctuates from 40s to 60s. Avoid nephrotoxic agents and update GFR every 4-6 months to ensure stability. Recommend good oral hydration. Keep good control of blood pressure and heart rate.

## 2021-06-29 NOTE — ASSESSMENT & PLAN NOTE
Bilateral ear lavage performed in clinic today by medical assistant, Cristy. Large amount of cerumen removed bilaterally. Patient tolerated without difficulty and there were no immediate complications. She was appreciative of the visit.

## 2021-06-29 NOTE — ASSESSMENT & PLAN NOTE
New problem, will check alk phos isoenzymes to evaluate underlying etiology and determine if additional evaluation if needed pending results.

## 2021-06-29 NOTE — ASSESSMENT & PLAN NOTE
Chronic and ongoing problem, she thinks her mood has improved with the addition of Lexapro 10 mg daily, continue at this time.

## 2021-07-21 ENCOUNTER — OFFICE VISIT (OUTPATIENT)
Dept: URGENT CARE | Facility: CLINIC | Age: 76
End: 2021-07-21
Payer: MEDICARE

## 2021-07-21 ENCOUNTER — APPOINTMENT (OUTPATIENT)
Dept: RADIOLOGY | Facility: IMAGING CENTER | Age: 76
End: 2021-07-21
Attending: NURSE PRACTITIONER
Payer: MEDICARE

## 2021-07-21 VITALS
TEMPERATURE: 97.2 F | BODY MASS INDEX: 33.49 KG/M2 | OXYGEN SATURATION: 94 % | WEIGHT: 201 LBS | HEIGHT: 65 IN | SYSTOLIC BLOOD PRESSURE: 118 MMHG | HEART RATE: 57 BPM | DIASTOLIC BLOOD PRESSURE: 88 MMHG | RESPIRATION RATE: 16 BRPM
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
## 2021-07-21 DIAGNOSIS — R07.81 RIB PAIN: ICD-10-CM

## 2021-07-21 DIAGNOSIS — S20.212A CONTUSION OF RIB ON LEFT SIDE, INITIAL ENCOUNTER: ICD-10-CM

## 2021-07-21 DIAGNOSIS — S59.909A ELBOW INJURY, INITIAL ENCOUNTER: ICD-10-CM

## 2021-07-21 DIAGNOSIS — M54.9 MUSCULOSKELETAL BACK PAIN: ICD-10-CM

## 2021-07-21 DIAGNOSIS — S50.01XA CONTUSION OF RIGHT ELBOW, INITIAL ENCOUNTER: ICD-10-CM

## 2021-07-21 PROCEDURE — 99214 OFFICE O/P EST MOD 30 MIN: CPT | Performed by: NURSE PRACTITIONER

## 2021-07-21 PROCEDURE — 71101 X-RAY EXAM UNILAT RIBS/CHEST: CPT | Mod: TC,FY,LT | Performed by: NURSE PRACTITIONER

## 2021-07-21 PROCEDURE — 73080 X-RAY EXAM OF ELBOW: CPT | Mod: TC,FY,RT | Performed by: NURSE PRACTITIONER

## 2021-07-21 ASSESSMENT — ENCOUNTER SYMPTOMS
SHORTNESS OF BREATH: 0
BACK PAIN: 1
FEVER: 0
COUGH: 0
PALPITATIONS: 0
NAUSEA: 0
HEADACHES: 0
FALLS: 1
LOSS OF CONSCIOUSNESS: 0
CHILLS: 0

## 2021-07-21 ASSESSMENT — LIFESTYLE VARIABLES: SUBSTANCE_ABUSE: 0

## 2021-07-21 ASSESSMENT — FIBROSIS 4 INDEX: FIB4 SCORE: 0.98

## 2021-07-21 NOTE — PROGRESS NOTES
Griselda Farmer is a 76 y.o. female who presents for Fall (occurred two days ago, fell onto back and right side, back pain, R elbow bruising)      HPI This is a new problem.  Griselda is a 75 y/o female.  2 days ago she sustained a fall from a step in her yard at home.  She has the fall on her security camera.  She was stepping up the step and lost her footing and fell backwards onto cement.  She had immediate pain in her right elbow and her back.  She is currently having continued pain in her right elbow with slightly limited range of motion.  She is also having pain in the low thoracic part of her back but particularly on the left side.  She has increased pain with deep inspiration.  Treatments tried: Over-the-counter Tylenol and ibuprofen, ice packs and rest.  Her son is encouraged her to come to urgent care today.    Review of Systems   Constitutional: Negative for chills and fever.   Respiratory: Negative for cough and shortness of breath.    Cardiovascular: Negative for chest pain and palpitations.   Gastrointestinal: Negative for nausea.   Musculoskeletal: Positive for back pain and falls.   Neurological: Negative for loss of consciousness and headaches.   Endo/Heme/Allergies: Negative for environmental allergies.   Psychiatric/Behavioral: Negative for substance abuse.       Allergies:       Allergies   Allergen Reactions   • Codeine Itching and Vomiting     Rxn = years ago      • Seasonal        PMSFS Hx:  Past Medical History:   Diagnosis Date   • Angina 2014    pt denies    • Arthritis     generalized + hands   • Dental disorder     upper and lower dentures   • Elevated fasting glucose 7/16/2014   • Flushing reaction     has had full work up with cardiology, would awake with symptoms at night   • GERD (gastroesophageal reflux disease)    • Heart burn 2014    pt on prevacid   • Hyperlipidemia 10/20/2010   • Hypertension 2014    pt states well controlled on meds   • Incomplete tear of right rotator cuff  2016   • Indigestion    • Influenza    • Insomnia    • Liver cyst     has been seen by GI, ultrasound    • Myocardial infarct (HCC) 1984    pt denies states sometimes irreg rhythm   • Need for Tdap vaccination     in    • FRIDA (obstructive sleep apnea)     states no cpap   • Primary localized osteoarthrosis, lower leg 2014   • S/P colonoscopy 2012   • Sick sinus syndrome (HCC) 2017   • Sleep apnea    • 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   • ZZZ CARDIAC CATH  17    Overlapping Synergy stents to 99% Circ   • OTHER CARDIAC SURGERY  2017    NON STEMI with stent   • KNEE ARTHROPLASTY TOTAL  2014    Performed by Jose G Trotter M.D. at SURGERY [facility] ORS   • CATARACT PHACO WITH IOL  10/3/2013    Performed by Sylvester Raza M.D. at SURGERY SURGICAL [facility] ORS   • CATARACT PHACO WITH IOL  2013    Performed by Sylvester Raza M.D. at SURGERY [facility] ORS   • RECOVERY  3/29/2013    Performed by Cath-Recovery Surgery at SURGERY SAME DAY [facility] ORS   • HYSTERECTOMY, VAGINAL      ovaries were left   • TONSILLECTOMY       Family History   Problem Relation Age of Onset   • Diabetes Mother         mild   • Cancer Father         melanoma mild   • Arthritis Sister    • Arthritis Brother    • Arthritis Sister    • Arthritis Brother    • Heart Disease Neg Hx    • Hypertension Neg Hx    • Sleep Apnea Neg Hx      Social History     Tobacco Use   • Smoking status: Former Smoker     Packs/day: 0.50     Years: 15.00     Pack years: 7.50     Types: Cigarettes     Quit date: 1975     Years since quittin.9   • Smokeless tobacco: Never Used   • Tobacco comment: continued abstinence   Substance Use Topics   • Alcohol use: No      Alcohol/week: 0.0 oz       Problems:   Patient Active Problem List   Diagnosis   • Essential hypertension, benign   • Complex sleep apnea syndrome   • Mixed hyperlipidemia   • Gastroesophageal reflux disease without esophagitis   • CAD (coronary artery disease)   • Vitamin D deficiency disease   • Overriding toe of left foot   • Verruca plana   • Obesity (BMI 30.0-34.9)   • S/P coronary artery stent placement   • Bladder prolapse, female, acquired   • Atherosclerosis of aorta ([facility])   • CKD (chronic kidney disease) stage 3, GFR 30-59 ml/min ([facility])   • Neuropathy   • Primary osteoarthritis of both hands   • Bilateral impacted cerumen   • Ganglion cyst of wrist, right   • Current mild episode of major depressive disorder without prior episode ([facility])   • Elevated alkaline phosphatase level   • Prediabetes       Medications:   Current Outpatient Medications on File Prior to Visit   Medication Sig Dispense Refill   • omeprazole (PRILOSEC) 20 MG delayed-release capsule TAKE 2 CAPSULES BY MOUTH EVERY  capsule 0   • clopidogrel (PLAVIX) 75 MG Tab Take 1 Tab by mouth every day. 90 Tab 3   • lisinopril (PRINIVIL) 20 MG Tab Take 1 Tab by mouth every day. 90 Tab 3   • metoprolol SR (TOPROL XL) 50 MG TABLET SR 24 HR Take 0.5 Tabs by mouth every day. 90 Tab 3   • atorvastatin (LIPITOR) 40 MG Tab Take 1 Tab by mouth every day. N THE EVENING 100 Tab 3   • escitalopram (LEXAPRO) 10 MG Tab TAKE 1 TABLET BY MOUTH EVERY  Tab 3   • gabapentin (NEURONTIN) 100 MG Cap TAKE 1 CAPSULE BY MOUTH AT BEDTIME AS NEEDED 100 Cap 3   • acetaminophen (TYLENOL 8 HOUR) 650 MG CR tablet Take 650-1,300 mg by mouth every 6 hours as needed for Moderate Pain.     • nitroglycerin (NITROSTAT) 0.4 MG SL Tab Place 1 Tab under tongue as needed for Chest Pain (up to 3 doses (if SBP greater than 90 mmHg)). 25 Tab 3   • Pumpkin Seed-Soy Germ (AZO BLADDER CONTROL/GO-LESS PO) Take 1 Cap by mouth every bedtime.     • aspirin EC (ECOTRIN) 81 MG Tablet  "Delayed Response Take 81 mg by mouth every evening. 30 Tab 0   • Cholecalciferol (VITAMIN D) 2000 UNIT TABS Take 1 Tab by mouth every evening.       No current facility-administered medications on file prior to visit.          Objective:     /88 (BP Location: Left arm, Patient Position: Sitting, BP Cuff Size: Adult)   Pulse (!) 57   Temp 36.2 °C (97.2 °F) (Temporal)   Resp 16   Ht 1.651 m (5' 5\")   Wt 91.2 kg (201 lb)   SpO2 94%   BMI 33.45 kg/m²     Physical Exam  Vitals and nursing note reviewed.   Constitutional:       General: She is not in acute distress.     Appearance: She is well-developed. She is not toxic-appearing.   HENT:      Head: Normocephalic.   Neck:      Trachea: Trachea and phonation normal.   Cardiovascular:      Rate and Rhythm: Normal rate and regular rhythm.      Pulses: Normal pulses.   Pulmonary:      Effort: Pulmonary effort is normal. No accessory muscle usage or respiratory distress.      Breath sounds: Normal breath sounds.   Abdominal:      Palpations: Abdomen is soft.   Musculoskeletal:      Right elbow: Swelling present. No deformity or effusion. Decreased range of motion. Tenderness present.        Arms:       Cervical back: Normal, full passive range of motion without pain, normal range of motion and neck supple. No edema, erythema or rigidity. No spinous process tenderness or muscular tenderness. Normal range of motion.      Thoracic back: Tenderness present.      Lumbar back: Normal.        Back:    Skin:     General: Skin is warm and dry.      Capillary Refill: Capillary refill takes less than 2 seconds.   Neurological:      Mental Status: She is alert and oriented to person, place, and time.      GCS: GCS eye subscore is 4. GCS verbal subscore is 5. GCS motor subscore is 6.      Cranial Nerves: No cranial nerve deficit.      Sensory: No sensory deficit.      Coordination: Coordination normal.      Gait: Gait normal.      Deep Tendon Reflexes:      Reflex Scores:     "   Tricep reflexes are 2+ on the right side and 2+ on the left side.       Bicep reflexes are 2+ on the right side and 2+ on the left side.       Patellar reflexes are 2+ on the right side and 2+ on the left side.  Psychiatric:         Mood and Affect: Mood normal.         Speech: Speech normal.         Behavior: Behavior normal. Behavior is cooperative.         Thought Content: Thought content normal.           RADIOLOGY RESULTS   DX-ELBOW-COMPLETE 3+ RIGHT    Result Date: 7/21/2021 7/21/2021 3:01 PM HISTORY/REASON FOR EXAM:  Pain/Deformity Following Trauma. Fall 2 days ago, posterior RIGHT elbow pain. TECHNIQUE/EXAM DESCRIPTION AND NUMBER OF VIEWS:  3 views of the RIGHT elbow. COMPARISON: None FINDINGS: Focal soft tissue swelling over the olecranon. No displaced fat pad. No fracture or dislocation.     1.  No fracture or dislocation of RIGHT elbow. 2.  Focal soft tissue swelling overlies the olecranon.  -------------------------------------------------------------------------------    [id number] (WITH 1-VIEW CXR) LEFT    Result Date: 7/21/2021 7/21/2021 3:01 PM HISTORY/REASON FOR EXAM:  Pain Following Trauma. TECHNIQUE/EXAM DESCRIPTION AND NUMBER OF VIEWS:  5 images of the left ribs and chest. COMPARISON: Chest x-ray March 17, 2019 FINDINGS: No fractures or acute bony changes are noted.  There is no evidence of a hemo or pneumothorax.     Normal rib series.         Xray: Reviewed and interpreted independently by me. I agree with the radiologist's findings.     Assessment /Associated Orders:      1. Fall down steps, initial encounter  DX-ELBOW-COMPLETE 3+ RIGHT    [id number] (WITH 1-VIEW CXR) LEFT   2. Elbow injury, initial encounter  DX-ELBOW-COMPLETE 3+ RIGHT   3. Rib pain  [id number] (WITH 1-VIEW CXR) LEFT   4. Contusion of right elbow, initial encounter     5. Contusion of rib on left side, initial encounter     6. Musculoskeletal back pain           Medical Decision Making:    Pt is  clinically stable at today's acute urgent care visit.  No acute distress noted. Appropriate for outpatient management at this time.   Acute problem today with uncertain prognosis.      Results of all diagnostic tests discussed with patient.     OTC acetaminophen for breakthrough pain. Dosage and directions per . Do not exceed 3000 mg in 24 hours.     Ice/ heat packs prn discomfort     OTC analgesic creams/ rubs prn pain.     Advised to follow-up with the primary care provider for recheck, reevaluation, and consideration of further management if necessary.   Discussed management options (risks,benefits, and alternatives to treatment). Expressed understanding and the treatment plan was agreed upon. Questions were encouraged and answered       Return to urgent care prn if new or worsening sx or if there is no improvement in condition prn.  Educated in Red flags and indications to immediately call 911 or present to the Emergency Department.     I personally reviewed prior external notes and test results pertinent to today's visit.  I have independently reviewed and interpreted all diagnostics ordered during this urgent care acute visit.   Time spent evaluating this patient was at least 30 minutes and includes preparing for visit, counseling/education, exam and evaluation, obtaining history, independent interpretation, ordering lab/test/procedures,medication management and documentation.Time does not include separately billable procedures noted .

## 2021-08-11 DIAGNOSIS — I10 ESSENTIAL HYPERTENSION: ICD-10-CM

## 2021-08-11 RX ORDER — LISINOPRIL 20 MG/1
TABLET ORAL
Qty: 100 TABLET | Refills: 0 | Status: SHIPPED | OUTPATIENT
Start: 2021-08-11 | End: 2021-11-15

## 2021-10-16 DIAGNOSIS — I25.10 CORONARY ARTERY DISEASE INVOLVING NATIVE CORONARY ARTERY OF NATIVE HEART WITHOUT ANGINA PECTORIS: ICD-10-CM

## 2021-10-18 RX ORDER — CLOPIDOGREL BISULFATE 75 MG/1
TABLET ORAL
Qty: 90 TABLET | Refills: 0 | Status: SHIPPED | OUTPATIENT
Start: 2021-10-18 | End: 2022-01-12

## 2021-10-30 DIAGNOSIS — E78.5 HYPERLIPIDEMIA, UNSPECIFIED HYPERLIPIDEMIA TYPE: ICD-10-CM

## 2021-11-02 RX ORDER — ATORVASTATIN CALCIUM 40 MG/1
40 TABLET, FILM COATED ORAL EVERY EVENING
Qty: 100 TABLET | Refills: 0 | Status: SHIPPED | OUTPATIENT
Start: 2021-11-02 | End: 2022-02-04

## 2021-11-14 DIAGNOSIS — I10 ESSENTIAL HYPERTENSION: ICD-10-CM

## 2021-11-15 DIAGNOSIS — I10 ESSENTIAL HYPERTENSION: ICD-10-CM

## 2021-11-15 RX ORDER — LISINOPRIL 20 MG/1
20 TABLET ORAL
Qty: 100 TABLET | Refills: 0 | OUTPATIENT
Start: 2021-11-15

## 2021-11-15 RX ORDER — LISINOPRIL 20 MG/1
TABLET ORAL
Qty: 100 TABLET | Refills: 0 | Status: SHIPPED | OUTPATIENT
Start: 2021-11-15 | End: 2022-03-16

## 2021-11-24 ENCOUNTER — HOSPITAL ENCOUNTER (OUTPATIENT)
Dept: LAB | Facility: MEDICAL CENTER | Age: 76
End: 2021-11-24
Attending: INTERNAL MEDICINE
Payer: MEDICARE

## 2021-11-24 DIAGNOSIS — N18.31 STAGE 3A CHRONIC KIDNEY DISEASE: ICD-10-CM

## 2021-11-24 DIAGNOSIS — R74.8 ELEVATED ALKALINE PHOSPHATASE LEVEL: ICD-10-CM

## 2021-11-24 DIAGNOSIS — R73.03 PREDIABETES: ICD-10-CM

## 2021-11-24 LAB
ALBUMIN SERPL BCP-MCNC: 4.1 G/DL (ref 3.2–4.9)
ALBUMIN/GLOB SERPL: 1.2 G/DL
ALP SERPL-CCNC: 129 U/L (ref 30–99)
ALT SERPL-CCNC: 10 U/L (ref 2–50)
ANION GAP SERPL CALC-SCNC: 9 MMOL/L (ref 7–16)
AST SERPL-CCNC: 16 U/L (ref 12–45)
BASOPHILS # BLD AUTO: 1.1 % (ref 0–1.8)
BASOPHILS # BLD: 0.09 K/UL (ref 0–0.12)
BILIRUB SERPL-MCNC: 0.6 MG/DL (ref 0.1–1.5)
BUN SERPL-MCNC: 13 MG/DL (ref 8–22)
CALCIUM SERPL-MCNC: 9.6 MG/DL (ref 8.4–10.2)
CHLORIDE SERPL-SCNC: 101 MMOL/L (ref 96–112)
CO2 SERPL-SCNC: 26 MMOL/L (ref 20–33)
CREAT SERPL-MCNC: 0.94 MG/DL (ref 0.5–1.4)
EOSINOPHIL # BLD AUTO: 0.36 K/UL (ref 0–0.51)
EOSINOPHIL NFR BLD: 4.4 % (ref 0–6.9)
ERYTHROCYTE [DISTWIDTH] IN BLOOD BY AUTOMATED COUNT: 47.8 FL (ref 35.9–50)
EST. AVERAGE GLUCOSE BLD GHB EST-MCNC: 117 MG/DL
FASTING STATUS PATIENT QL REPORTED: NORMAL
GLOBULIN SER CALC-MCNC: 3.3 G/DL (ref 1.9–3.5)
GLUCOSE SERPL-MCNC: 107 MG/DL (ref 65–99)
HBA1C MFR BLD: 5.7 % (ref 4–5.6)
HCT VFR BLD AUTO: 46.8 % (ref 37–47)
HGB BLD-MCNC: 14.8 G/DL (ref 12–16)
IMM GRANULOCYTES # BLD AUTO: 0.03 K/UL (ref 0–0.11)
IMM GRANULOCYTES NFR BLD AUTO: 0.4 % (ref 0–0.9)
LYMPHOCYTES # BLD AUTO: 2.29 K/UL (ref 1–4.8)
LYMPHOCYTES NFR BLD: 28 % (ref 22–41)
MCH RBC QN AUTO: 29.8 PG (ref 27–33)
MCHC RBC AUTO-ENTMCNC: 31.6 G/DL (ref 33.6–35)
MCV RBC AUTO: 94.4 FL (ref 81.4–97.8)
MONOCYTES # BLD AUTO: 0.5 K/UL (ref 0–0.85)
MONOCYTES NFR BLD AUTO: 6.1 % (ref 0–13.4)
NEUTROPHILS # BLD AUTO: 4.92 K/UL (ref 2–7.15)
NEUTROPHILS NFR BLD: 60 % (ref 44–72)
NRBC # BLD AUTO: 0 K/UL
NRBC BLD-RTO: 0 /100 WBC
PLATELET # BLD AUTO: 234 K/UL (ref 164–446)
PMV BLD AUTO: 11.4 FL (ref 9–12.9)
POTASSIUM SERPL-SCNC: 4.1 MMOL/L (ref 3.6–5.5)
PROT SERPL-MCNC: 7.4 G/DL (ref 6–8.2)
RBC # BLD AUTO: 4.96 M/UL (ref 4.2–5.4)
SODIUM SERPL-SCNC: 136 MMOL/L (ref 135–145)
WBC # BLD AUTO: 8.2 K/UL (ref 4.8–10.8)

## 2021-11-24 PROCEDURE — 85025 COMPLETE CBC W/AUTO DIFF WBC: CPT

## 2021-11-24 PROCEDURE — 80053 COMPREHEN METABOLIC PANEL: CPT

## 2021-11-24 PROCEDURE — 36415 COLL VENOUS BLD VENIPUNCTURE: CPT

## 2021-11-24 PROCEDURE — 83036 HEMOGLOBIN GLYCOSYLATED A1C: CPT

## 2021-11-24 PROCEDURE — 84080 ASSAY ALKALINE PHOSPHATASES: CPT

## 2021-11-24 PROCEDURE — 84075 ASSAY ALKALINE PHOSPHATASE: CPT | Mod: XU

## 2021-11-27 LAB
ALP BONE SERPL-CCNC: 46 U/L (ref 0–55)
ALP ISOS SERPL HS-CCNC: 0 U/L
ALP LIVER SERPL-CCNC: 85 U/L (ref 0–94)
ALP SERPL-CCNC: 130 U/L (ref 40–120)

## 2021-12-06 ENCOUNTER — TELEPHONE (OUTPATIENT)
Dept: MEDICAL GROUP | Facility: PHYSICIAN GROUP | Age: 76
End: 2021-12-06

## 2021-12-06 NOTE — TELEPHONE ENCOUNTER
ESTABLISHED PATIENT PRE-VISIT PLANNING     Patient was NOT contacted to complete PVP.     Note: Patient will not be contacted if there is no indication to call.     1.  Reviewed notes from the last few office visits within the medical group: Yes    2.  If any orders were placed at last visit or intended to be done for this visit (i.e. 6 mos follow-up), do we have Results/Consult Notes?         •  Labs - Labs ordered, completed on 11/24/2021 and results are in chart.  Note: If patient appointment is for lab review and patient did not complete labs, check with provider if OK to reschedule patient until labs completed.       •  Imaging - Imaging was not ordered at last office visit.       •  Referrals - No referrals were ordered at last office visit.    3. Is this appointment scheduled as a Hospital Follow-Up? No    4.  Immunizations were updated in Epic using Reconcile Outside Information activity? Yes    5.  Patient is due for the following Health Maintenance Topics:   Health Maintenance Due   Topic Date Due   • COVID-19 Vaccine (3 - Booster for Moderna series) 09/05/2021   • COLORECTAL CANCER SCREENING  10/09/2021     6.  AHA (Pulse[employer]) form printed for Provider? No, already completed

## 2021-12-07 ENCOUNTER — OFFICE VISIT (OUTPATIENT)
Dept: MEDICAL GROUP | Facility: PHYSICIAN GROUP | Age: 76
End: 2021-12-07
Payer: MEDICARE

## 2021-12-07 VITALS
WEIGHT: 200 LBS | HEIGHT: 65 IN | HEART RATE: 52 BPM | OXYGEN SATURATION: 100 % | TEMPERATURE: 97.6 F | SYSTOLIC BLOOD PRESSURE: 120 MMHG | BODY MASS INDEX: 33.32 KG/M2 | RESPIRATION RATE: 12 BRPM | DIASTOLIC BLOOD PRESSURE: 68 MMHG

## 2021-12-07 DIAGNOSIS — N18.31 STAGE 3A CHRONIC KIDNEY DISEASE: ICD-10-CM

## 2021-12-07 DIAGNOSIS — E78.2 MIXED HYPERLIPIDEMIA: ICD-10-CM

## 2021-12-07 DIAGNOSIS — R73.03 PREDIABETES: ICD-10-CM

## 2021-12-07 DIAGNOSIS — F32.0 CURRENT MILD EPISODE OF MAJOR DEPRESSIVE DISORDER WITHOUT PRIOR EPISODE (HCC): ICD-10-CM

## 2021-12-07 DIAGNOSIS — R74.8 ELEVATED ALKALINE PHOSPHATASE LEVEL: ICD-10-CM

## 2021-12-07 DIAGNOSIS — G62.9 NEUROPATHY: ICD-10-CM

## 2021-12-07 DIAGNOSIS — I10 ESSENTIAL HYPERTENSION, BENIGN: ICD-10-CM

## 2021-12-07 DIAGNOSIS — R07.81 RIB PAIN ON RIGHT SIDE: ICD-10-CM

## 2021-12-07 PROCEDURE — 99214 OFFICE O/P EST MOD 30 MIN: CPT | Performed by: INTERNAL MEDICINE

## 2021-12-07 RX ORDER — GABAPENTIN 100 MG/1
100 CAPSULE ORAL
Qty: 100 CAPSULE | Refills: 3 | Status: SHIPPED | OUTPATIENT
Start: 2021-12-07 | End: 2022-12-14

## 2021-12-07 ASSESSMENT — FIBROSIS 4 INDEX: FIB4 SCORE: 1.686548085423135644

## 2021-12-07 NOTE — ASSESSMENT & PLAN NOTE
New problem.  Does not appear to be cardiac as it is not exertional.  Could be related to standing and reaching.  She did have a fall in July but her left ribs were affected so that seems unlikely.  Advised her to keep monitoring closely and if there is increased frequency or severity to let me know and I can perform advanced imaging.  She voiced understanding will keep me updated.

## 2021-12-07 NOTE — ASSESSMENT & PLAN NOTE
Chronic and ongoing problem, likely multifactorial.  Continue with good oral hydration.  Will follow every 6 months.  Blood pressure and blood sugar well controlled.

## 2021-12-07 NOTE — ASSESSMENT & PLAN NOTE
Chronic and ongoing problem, lipid values are all at goal.  Continue atorvastatin 40 mg daily and follow-up with cardiology as recommended.  Will update lipid panel with next set of blood work in 6 months.

## 2021-12-07 NOTE — ASSESSMENT & PLAN NOTE
Chronic and ongoing problem.  Blood pressure remains at goal.  She has follow-up scheduled cardiology in about a month.  Continue current regimen of lisinopril and metoprolol.  No signs or symptoms of orthostasis.

## 2021-12-07 NOTE — ASSESSMENT & PLAN NOTE
Chronic and ongoing problem.  Continues to have neuropathy affecting her feet as well as progressive arthritis.  She continues to benefit gabapentin, she will continue 100 mg as needed.

## 2021-12-07 NOTE — ASSESSMENT & PLAN NOTE
Chronic and improved problem.  A1c nearly back to the normal range.  Continue with lifestyle habits, no pharmacotherapy indicated at this time.

## 2021-12-07 NOTE — PROGRESS NOTES
Subjective:   Chief Complaint/History of Present Illness:  Griselda Farmer is a 76 y.o. female established patient who presents today to discuss medical problems as listed below. Griselda is unaccompanied for today's visit.    Problem   Rib Pain On Right Side    She reports 2 weeks of pain under the right breast along her anterolateral aspect of her ribs.  It only occurs when she is standing for example in the kitchen when she is cooking or putting dishes away.  It does not happen with exertion or if she is sitting or lying down.  She still has her gallbladder.  No relationship with eating and no nausea or change in bowel movements.  No overlying skin changes.  It is a mild nuisance at this point.     Elevated Alkaline Phosphatase Level       Ref. Range 3/3/2021 07:34 11/24/2021 08:00   Alkaline Phosphatase Latest Ref Range: 30 - 99 U/L 112 (H) 129 (H)      Ref. 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 She reports worsening of her arthritis throughout.     Prediabetes       Ref. Range 11/24/2021 08:00   Glycohemoglobin Latest Ref Range: 4.0 - 5.6 % 5.7 (H)   Estim. Avg Glu Latest Units: mg/dL 117     She has prediabetes that is mild, no prior pharmacotherapy for her blood sugar.  This has improved on most recent check.     Current Mild Episode of Major Depressive Disorder Without Prior Episode (Hcc)    She reports progressive worsening of her mood.  She feels that she sleeps too much and her appetite is been off.  She has low energy and poor motivation.  She worries that she may be depressed especially related to the ongoing COVID-19 pandemic. We started  lexapro 10 mg daily in 8/2020 and she reports improvement in symptoms. PHQ9 at that time was 9.    Current regimen: lexapro 10 mg daily    Depression Screening    Little interest or pleasure in doing things?  0 - not at all  Feeling down, depressed , or hopeless? 0 - not at all  Patient Health Questionnaire Score: 0    Current regimen: lexapro 10 mg daily     Neuropathy    She reports numbness and tingling in her feet since June 2019.  This is mainly located at the balls of her feet and sometimes the toes.  Does not appear to be affecting the top of the feet or the heels.  It appears equal between both feet.  She is not tried anything at this point except an over-the-counter neuropathy salve which has been somewhat helpful.  No prior injuries or traumas to the feet.  No significant alcohol use.  Normal B12 level.  No prior history of diabetes.  She often notices it at the end of the day and when she is lying in bed at night with burning and tingling.  She is using low-dose gabapentin as needed with good results.    Current regimen: Gabapentin 100 mg as needed     Ckd (Chronic Kidney Disease) Stage 3, Gfr 30-59 Ml/Min ([facility])       Ref. Range 11/24/2021 08:00   Bun Latest Ref Range: 8 - 22 mg/dL 13   Creatinine Latest Ref Range: 0.50 - 1.40 mg/dL 0.94   GFR If Non  Latest Ref Range: >60 mL/min/1.73 m 2 57 (A)     She has had recent development of CKD stage III in the past year, likely due to hypertension and heart disease. No prior episodes of nephrolithiasis or intrinsic kidney disease.  She had a urinary tract infection in April 2020 which respond antibiotic treatment.  PTH mildly elevated on last check. SPEP screening normal. No DM2 on A1c.  Renally metabolized medications at this time includes lisinopril, lansoprazole, and statin.     Mixed Hyperlipidemia       Ref. Range 3/3/2021 07:34   Cholesterol,Tot Latest Ref Range: 100 - 199 mg/dL 126   Triglycerides Latest Ref Range: 0 - 149 mg/dL 114   HDL  Latest Ref Range: >=40 mg/dL 62   LDL Latest Ref Range: <100 mg/dL 41     On standing history of elevated cholesterol.  Her current regimen includes atorvastatin.  No myalgias or GI upset on this medication.  She has prior history of coronary artery disease and aortic atherosclerosis.  No known history of cerebrovascular disease.    Current regimen: Atorvastatin 40 mg daily     Essential Hypertension, Benign    She was started on anti-hypertensives around 2004. She has been on a 2 drug regimen that has worked well. Normally well controlled with her highest known reading around 155 systolic. No signs or symptoms of orthostasis. Denies fatigue, orthostasis or other signs of overtreatment.     Current regimen includes lisinopril 20 mg daily (previously losartan 100 mg daily) and metoprolol succinate 25 mg daily.          Current Medications:  Current Outpatient Medications Ordered in Epic   Medication Sig Dispense Refill   • gabapentin (NEURONTIN) 100 MG Cap Take 1 Capsule by mouth 1 time a day as needed (nerve pain). 100 Capsule 3   • lisinopril (PRINIVIL) 20 MG Tab TAKE 1 TABLET BY MOUTH EVERY  Tablet 0   • atorvastatin (LIPITOR) 40 MG Tab Take 1 Tablet by mouth every evening. Please call [phone] to schedule follow up visit for future refills. Thanks 100 Tablet 0   • escitalopram (LEXAPRO) 10 MG Tab TAKE 1 TABLET BY MOUTH EVERY  Tablet 3   • clopidogrel (PLAVIX) 75 MG Tab TAKE 1 TABLET BY MOUTH EVERY DAY 90 Tablet 0   • omeprazole (PRILOSEC) 20 MG delayed-release capsule TAKE 2 CAPSULES BY MOUTH EVERY  capsule 3   • metoprolol SR (TOPROL XL) 50 MG TABLET SR 24 HR Take 0.5 Tabs by mouth every day. 90 Tab 3   • acetaminophen (TYLENOL 8 HOUR) 650 MG CR tablet Take 650-1,300 mg by mouth every 6 hours as needed for Moderate Pain.     • nitroglycerin (NITROSTAT) 0.4 MG SL Tab Place 1 Tab under tongue as needed for Chest Pain (up to 3 doses (if SBP greater than 90 mmHg)). 25 Tab 3   • Pumpkin  "Seed-Soy Germ (AZO BLADDER CONTROL/GO-LESS PO) Take 1 Cap by mouth every bedtime.     • aspirin EC (ECOTRIN) 81 MG Tablet Delayed Response Take 81 mg by mouth every evening. 30 Tab 0   • Cholecalciferol (VITAMIN D) 2000 UNIT TABS Take 1 Tab by mouth every evening.       No current Epic-ordered facility-administered medications on file.          Objective:   Physical Exam:    Vitals: /68 (BP Location: Left arm, Patient Position: Sitting, BP Cuff Size: Adult)   Pulse (!) 52   Temp 36.4 °C (97.6 °F) (Temporal)   Resp 12   Ht 1.651 m (5' 5\")   Wt 90.7 kg (200 lb)   SpO2 100%    BMI: Body mass index is 33.28 kg/m².  Physical Exam  Constitutional:       General: She is not in acute distress.     Appearance: She is obese. She is not ill-appearing.   HENT:      Right Ear: Ear canal normal. There is no impacted cerumen.      Left Ear: Ear canal normal. There is no impacted cerumen.   Eyes:      General: No scleral icterus.     Conjunctiva/sclera: Conjunctivae normal.   Cardiovascular:      Rate and Rhythm: Regular rhythm. Bradycardia present.      Pulses: Normal pulses.      Heart sounds: No murmur heard.      Pulmonary:      Effort: Pulmonary effort is normal. No respiratory distress.      Breath sounds: Normal breath sounds. No wheezing or rhonchi.   Abdominal:      General: Bowel sounds are normal.      Palpations: Abdomen is soft.      Tenderness: There is no guarding or rebound.      Comments: Mild pain under right rib cage with direct palpation over anterior aspect   Musculoskeletal:      Comments: Nontender mobile mass consistent with cyst on right wrist. Mild bilateral lymphedema, pedal.   Skin:     General: Skin is warm and dry.      Findings: No bruising or rash.   Neurological:      Mental Status: She is alert.   Psychiatric:         Mood and Affect: Mood normal.         Behavior: Behavior normal.         Thought Content: Thought content normal.         Judgment: Judgment normal.          Assessment " and Plan:   Griselda is a 76 y.o. female with the following:  Problem List Items Addressed This Visit     Essential hypertension, benign     Chronic and ongoing problem.  Blood pressure remains at goal.  She has follow-up scheduled cardiology in about a month.  Continue current regimen of lisinopril and metoprolol.  No signs or symptoms of orthostasis.         Mixed hyperlipidemia     Chronic and ongoing problem, lipid values are all at goal.  Continue atorvastatin 40 mg daily and follow-up with cardiology as recommended.  Will update lipid panel with next set of blood work in 6 months.         Relevant Orders    Lipid Profile    CKD (chronic kidney disease) stage 3, GFR 30-59 ml/min ([facility])     Chronic and ongoing problem, likely multifactorial.  Continue with good oral hydration.  Will follow every 6 months.  Blood pressure and blood sugar well controlled.         Neuropathy     Chronic and ongoing problem.  Continues to have neuropathy affecting her feet as well as progressive arthritis.  She continues to benefit gabapentin, she will continue 100 mg as needed.         Relevant Medications    gabapentin (NEURONTIN) 100 MG Cap    Current mild episode of major depressive disorder without prior episode (HCC)     Chronic and ongoing problem, mood is well controlled on current regiment, continue Lexapro 10 mg daily.         Elevated alkaline phosphatase level     New and worsening problem.  Unfortunately the isoenzyme evaluation was not helpful to distinguish underlying etiology.  Discussed it could be combination of fatty liver disease and progressive arthritis.  Last liver ultrasound from 10 years ago was reassuring but did demonstrate a granuloma.  She has progressive arthritis and notes an area under her right rib cage which is nonbothersome the past 2 weeks but only with standing and nonexertional.  Asked her to keep an eye on this and we will plan to recheck her alkaline phosphatase in 6 months and if it continues to  trend upwards concerning we will proceed with updated liver ultrasound and bone scan.         Relevant Orders    Comp Metabolic Panel    Prediabetes     Chronic and improved problem.  A1c nearly back to the normal range.  Continue with lifestyle habits, no pharmacotherapy indicated at this time.         Relevant Orders    HEMOGLOBIN A1C    Rib pain on right side     New problem.  Does not appear to be cardiac as it is not exertional.  Could be related to standing and reaching.  She did have a fall in July but her left ribs were affected so that seems unlikely.  Advised her to keep monitoring closely and if there is increased frequency or severity to let me know and I can perform advanced imaging.  She voiced understanding will keep me updated.              Will request records from GI, likely no more colonoscopies needed due to age.    RTC: Return in about 6 months (around 6/7/2022).    I spent a total of 38 minutes with record review, exam, communication with the patient, communication with other providers, and documentation of this encounter.    PLEASE NOTE: This dictation was created using voice recognition software. I have made every reasonable attempt to correct obvious errors, but I expect that there are errors of grammar and possibly content that I did not discover before finalizing the note.      Asia Mchugh, DO  Geriatric and Internal Medicine  [facility]

## 2021-12-07 NOTE — LETTER
[facility]  Asia Mchugh D.O.  [address]  [place] [address]  Fax: [phone]   Authorization for Release/Disclosure of   Protected Health Information   Name: ONESIMO DURAN : 1945 SSN: [id number]   Address: [facility] [address]   [place] [address] Phone:    [phone] (home) [phone] (work)   I authorize the entity listed below to release/disclose the PHI below to:   [facility]/Asia Mchugh D.O. and Asia Mchugh D.O.   Provider or Entity Name:  [facility]   Address   City, State, Zip   Phone:      Fax:     Reason for request: continuity of care   Information to be released:    [ x ] LAST COLONOSCOPY,  including any PATH REPORT and follow-up  [  ] LAST FIT/COLOGUARD RESULT [  ] LAST DEXA  [  ] LAST MAMMOGRAM  [  ] LAST PAP  [  ] LAST LABS [  ] RETINA EXAM REPORT  [  ] IMMUNIZATION RECORDS  [ x ] Release all info      [ x ] Check here and initial the line next to each item to release ALL health information INCLUDING  _____ Care and treatment for drug and / or alcohol abuse  _____ HIV testing, infection status, or AIDS  _____ Genetic Testing    DATES OF SERVICE OR TIME PERIOD TO BE DISCLOSED: _____________  I understand and acknowledge that:  * This Authorization may be revoked at any time by you in writing, except if your health information has already been used or disclosed.  * Your health information that will be used or disclosed as a result of you signing this authorization could be re-disclosed by the recipient. If this occurs, your re-disclosed health information may no longer be protected by State or Federal laws.  * You may refuse to sign this Authorization. Your refusal will not affect your ability to obtain treatment.  * This Authorization becomes effective upon signing and will  on (date) __________.      If no date is indicated, this Authorization will  one (1) year from the signature date.    Name: Onesimo Braden  Darian    Signature:   Date:     12/7/2021       PLEASE FAX REQUESTED RECORDS BACK TO: [phone]

## 2021-12-07 NOTE — LETTER
[employer]  Asia Mchugh D.O.  [address]  [place] [address]  Fax: [phone]   Authorization for Release/Disclosure of   Protected Health Information   Name: ONESIMO DURAN : 1945 SSN: [id number]   Address: [facility] [address] [place] [address] Phone:    [phone] (home) [phone] (work)   I authorize the entity listed below to release/disclose the PHI below to:   [facility]/Asia Mchugh D.O. and Asia Mchugh D.O.   Provider or Entity Name:  [facility]   Address   City, State, Zip   Phone:      Fax:     Reason for request: continuity of care   Information to be released:    [  ] LAST COLONOSCOPY,  including any PATH REPORT and follow-up  [  ] LAST FIT/COLOGUARD RESULT [  ] LAST DEXA  [  ] LAST MAMMOGRAM  [  ] LAST PAP  [  ] LAST LABS [  ] RETINA EXAM REPORT  [  ] IMMUNIZATION RECORDS  [ x ] Release all info      [ x] Check here and initial the line next to each item to release ALL health information INCLUDING  _____ Care and treatment for drug and / or alcohol abuse  _____ HIV testing, infection status, or AIDS  _____ Genetic Testing    DATES OF SERVICE OR TIME PERIOD TO BE DISCLOSED: _____________  I understand and acknowledge that:  * This Authorization may be revoked at any time by you in writing, except if your health information has already been used or disclosed.  * Your health information that will be used or disclosed as a result of you signing this authorization could be re-disclosed by the recipient. If this occurs, your re-disclosed health information may no longer be protected by State or Federal laws.  * You may refuse to sign this Authorization. Your refusal will not affect your ability to obtain treatment.  * This Authorization becomes effective upon signing and will  on (date) __________.      If no date is indicated, this Authorization will  one (1) year from the signature date.    Name: Onesimo Braden  Darian    Signature:   Date:     12/7/2021       PLEASE FAX REQUESTED RECORDS BACK TO: [phone]

## 2021-12-07 NOTE — ASSESSMENT & PLAN NOTE
Chronic and ongoing problem, mood is well controlled on current regiment, continue Lexapro 10 mg daily.

## 2021-12-07 NOTE — PATIENT INSTRUCTIONS
Incontinence- if you want to try a medicine for the urge incontinence let us know and we can have our pharmacist at [facility] provide samples. The name of the medicine is mirabegron or myrbetriq 25 mg daily.    Let me know if the right rib pain is worsening and we can perform additional investigation.    We will recheck alkaline phosphatase level with our next visit, this can be elevated related to liver or bone

## 2021-12-07 NOTE — ASSESSMENT & PLAN NOTE
New and worsening problem.  Unfortunately the isoenzyme evaluation was not helpful to distinguish underlying etiology.  Discussed it could be combination of fatty liver disease and progressive arthritis.  Last liver ultrasound from 10 years ago was reassuring but did demonstrate a granuloma.  She has progressive arthritis and notes an area under her right rib cage which is nonbothersome the past 2 weeks but only with standing and nonexertional.  Asked her to keep an eye on this and we will plan to recheck her alkaline phosphatase in 6 months and if it continues to trend upwards concerning we will proceed with updated liver ultrasound and bone scan.

## 2022-01-12 DIAGNOSIS — I25.10 CORONARY ARTERY DISEASE INVOLVING NATIVE CORONARY ARTERY OF NATIVE HEART WITHOUT ANGINA PECTORIS: ICD-10-CM

## 2022-01-12 RX ORDER — CLOPIDOGREL BISULFATE 75 MG/1
TABLET ORAL
Qty: 100 TABLET | Refills: 0 | Status: SHIPPED | OUTPATIENT
Start: 2022-01-12 | End: 2022-04-28

## 2022-01-15 DIAGNOSIS — I10 ESSENTIAL HYPERTENSION, BENIGN: ICD-10-CM

## 2022-01-17 RX ORDER — METOPROLOL SUCCINATE 50 MG/1
TABLET, EXTENDED RELEASE ORAL
Qty: 50 TABLET | Refills: 0 | Status: SHIPPED | OUTPATIENT
Start: 2022-01-17 | End: 2022-04-25

## 2022-02-04 DIAGNOSIS — E78.5 HYPERLIPIDEMIA, UNSPECIFIED HYPERLIPIDEMIA TYPE: ICD-10-CM

## 2022-02-04 RX ORDER — ATORVASTATIN CALCIUM 40 MG/1
TABLET, FILM COATED ORAL
Qty: 30 TABLET | Refills: 0 | Status: SHIPPED | OUTPATIENT
Start: 2022-02-04 | End: 2022-02-28

## 2022-02-07 DIAGNOSIS — E78.5 HYPERLIPIDEMIA, UNSPECIFIED HYPERLIPIDEMIA TYPE: ICD-10-CM

## 2022-02-07 RX ORDER — ATORVASTATIN CALCIUM 40 MG/1
TABLET, FILM COATED ORAL
Qty: 100 TABLET | Refills: 0 | OUTPATIENT
Start: 2022-02-07

## 2022-02-23 ENCOUNTER — PATIENT MESSAGE (OUTPATIENT)
Dept: HEALTH INFORMATION MANAGEMENT | Facility: OTHER | Age: 77
End: 2022-02-23
Payer: MEDICARE

## 2022-02-28 DIAGNOSIS — E78.5 HYPERLIPIDEMIA, UNSPECIFIED HYPERLIPIDEMIA TYPE: ICD-10-CM

## 2022-02-28 RX ORDER — ATORVASTATIN CALCIUM 40 MG/1
TABLET, FILM COATED ORAL
Qty: 14 TABLET | Refills: 0 | Status: SHIPPED | OUTPATIENT
Start: 2022-02-28 | End: 2022-06-03 | Stop reason: SDUPTHER

## 2022-03-21 DIAGNOSIS — I10 ESSENTIAL HYPERTENSION: ICD-10-CM

## 2022-03-21 RX ORDER — LISINOPRIL 20 MG/1
20 TABLET ORAL
Qty: 14 TABLET | Refills: 0 | Status: SHIPPED | OUTPATIENT
Start: 2022-03-21 | End: 2022-04-14

## 2022-04-14 DIAGNOSIS — I10 ESSENTIAL HYPERTENSION: ICD-10-CM

## 2022-04-14 RX ORDER — LISINOPRIL 20 MG/1
20 TABLET ORAL
Qty: 7 TABLET | Refills: 0 | Status: SHIPPED | OUTPATIENT
Start: 2022-04-14 | End: 2022-06-03 | Stop reason: SDUPTHER

## 2022-04-23 DIAGNOSIS — I10 ESSENTIAL HYPERTENSION, BENIGN: ICD-10-CM

## 2022-04-25 RX ORDER — METOPROLOL SUCCINATE 50 MG/1
TABLET, EXTENDED RELEASE ORAL
Qty: 15 TABLET | Refills: 0 | Status: SHIPPED | OUTPATIENT
Start: 2022-04-25 | End: 2022-05-17

## 2022-06-02 ENCOUNTER — TELEPHONE (OUTPATIENT)
Dept: MEDICAL GROUP | Facility: PHYSICIAN GROUP | Age: 77
End: 2022-06-02
Payer: MEDICARE

## 2022-06-03 ENCOUNTER — OFFICE VISIT (OUTPATIENT)
Dept: MEDICAL GROUP | Facility: PHYSICIAN GROUP | Age: 77
End: 2022-06-03
Payer: MEDICARE

## 2022-06-03 VITALS
OXYGEN SATURATION: 95 % | RESPIRATION RATE: 12 BRPM | SYSTOLIC BLOOD PRESSURE: 138 MMHG | DIASTOLIC BLOOD PRESSURE: 78 MMHG | HEIGHT: 65 IN | HEART RATE: 51 BPM | WEIGHT: 205.7 LBS | TEMPERATURE: 96.8 F | BODY MASS INDEX: 34.27 KG/M2

## 2022-06-03 DIAGNOSIS — I10 ESSENTIAL HYPERTENSION, BENIGN: ICD-10-CM

## 2022-06-03 DIAGNOSIS — N18.31 STAGE 3A CHRONIC KIDNEY DISEASE: ICD-10-CM

## 2022-06-03 DIAGNOSIS — Z13.79 GENETIC SCREENING: ICD-10-CM

## 2022-06-03 DIAGNOSIS — Z78.9 PHYSICIAN ORDERS FOR LIFE-SUSTAINING TREATMENT (POLST) FORM INDICATES PATIENT WISH FOR FULL CODE RESUSCITATION STATUS: ICD-10-CM

## 2022-06-03 DIAGNOSIS — I70.0 ATHEROSCLEROSIS OF AORTA (HCC): ICD-10-CM

## 2022-06-03 DIAGNOSIS — E78.2 MIXED HYPERLIPIDEMIA: ICD-10-CM

## 2022-06-03 DIAGNOSIS — K21.9 GASTROESOPHAGEAL REFLUX DISEASE WITHOUT ESOPHAGITIS: ICD-10-CM

## 2022-06-03 DIAGNOSIS — F32.0 CURRENT MILD EPISODE OF MAJOR DEPRESSIVE DISORDER WITHOUT PRIOR EPISODE (HCC): ICD-10-CM

## 2022-06-03 DIAGNOSIS — Z71.89 ACP (ADVANCE CARE PLANNING): ICD-10-CM

## 2022-06-03 DIAGNOSIS — R73.03 PREDIABETES: ICD-10-CM

## 2022-06-03 DIAGNOSIS — I25.10 CORONARY ARTERY DISEASE INVOLVING NATIVE CORONARY ARTERY OF NATIVE HEART WITHOUT ANGINA PECTORIS: ICD-10-CM

## 2022-06-03 PROCEDURE — 99215 OFFICE O/P EST HI 40 MIN: CPT | Performed by: INTERNAL MEDICINE

## 2022-06-03 RX ORDER — LISINOPRIL 20 MG/1
20 TABLET ORAL
Qty: 100 TABLET | Refills: 3 | Status: SHIPPED | OUTPATIENT
Start: 2022-06-03 | End: 2023-07-03

## 2022-06-03 RX ORDER — OMEPRAZOLE 20 MG/1
40 CAPSULE, DELAYED RELEASE ORAL 2 TIMES DAILY
Qty: 200 CAPSULE | Refills: 3 | Status: SHIPPED | OUTPATIENT
Start: 2022-06-03 | End: 2022-07-18

## 2022-06-03 RX ORDER — ATORVASTATIN CALCIUM 40 MG/1
40 TABLET, FILM COATED ORAL DAILY
Qty: 100 TABLET | Refills: 3 | Status: SHIPPED | OUTPATIENT
Start: 2022-06-03 | End: 2023-07-03

## 2022-06-03 RX ORDER — METOPROLOL SUCCINATE 25 MG/1
25 TABLET, EXTENDED RELEASE ORAL
Qty: 100 TABLET | Refills: 3 | Status: SHIPPED | OUTPATIENT
Start: 2022-06-03 | End: 2023-07-03

## 2022-06-03 RX ORDER — CLOPIDOGREL BISULFATE 75 MG/1
75 TABLET ORAL
Qty: 100 TABLET | Refills: 3 | Status: SHIPPED | OUTPATIENT
Start: 2022-06-03 | End: 2023-07-03

## 2022-06-03 ASSESSMENT — PATIENT HEALTH QUESTIONNAIRE - PHQ9
1. LITTLE INTEREST OR PLEASURE IN DOING THINGS: NOT AT ALL
3. TROUBLE FALLING OR STAYING ASLEEP OR SLEEPING TOO MUCH: NOT AT ALL
4. FEELING TIRED OR HAVING LITTLE ENERGY: NOT AT ALL
5. POOR APPETITE OR OVEREATING: NOT AT ALL
2. FEELING DOWN, DEPRESSED, IRRITABLE, OR HOPELESS: NOT AT ALL
6. FEELING BAD ABOUT YOURSELF - OR THAT YOU ARE A FAILURE OR HAVE LET YOURSELF OR YOUR FAMILY DOWN: NOT AL ALL
9. THOUGHTS THAT YOU WOULD BE BETTER OFF DEAD, OR OF HURTING YOURSELF: NOT AT ALL
SUM OF ALL RESPONSES TO PHQ QUESTIONS 1-9: 0
SUM OF ALL RESPONSES TO PHQ9 QUESTIONS 1 AND 2: 0
7. TROUBLE CONCENTRATING ON THINGS, SUCH AS READING THE NEWSPAPER OR WATCHING TELEVISION: NOT AT ALL
8. MOVING OR SPEAKING SO SLOWLY THAT OTHER PEOPLE COULD HAVE NOTICED. OR THE OPPOSITE, BEING SO FIGETY OR RESTLESS THAT YOU HAVE BEEN MOVING AROUND A LOT MORE THAN USUAL: NOT AT ALL

## 2022-06-03 ASSESSMENT — FIBROSIS 4 INDEX: FIB4 SCORE: 1.71

## 2022-06-03 NOTE — ASSESSMENT & PLAN NOTE
Chronic and stable problem.  Continue PPI therapy.  Heartburn well controlled on current regiment.

## 2022-06-03 NOTE — ASSESSMENT & PLAN NOTE
Chronic and stable problem.  Update lipid panel to ensure stability, continue atorvastatin 40 mg daily.

## 2022-06-03 NOTE — ASSESSMENT & PLAN NOTE
Previous problem, requires follow-up, will update A1c with next set of lab work.  No indication to start pharmacotherapy at this time.

## 2022-06-03 NOTE — ASSESSMENT & PLAN NOTE
Chronic and stable problem, continue lisinopril metoprolol succinate, blood pressure borderline today but normally at goal.

## 2022-06-03 NOTE — ASSESSMENT & PLAN NOTE
New problem, will place copy of POLST form on her chart, she like to remain a full code at this time and will name her son Erick Farmer as her medical power of .

## 2022-06-03 NOTE — ASSESSMENT & PLAN NOTE
New discussion. Please see scanned in media patient is completed POLST form, she requests full code at this time with her son Erick Farmer is her medical power of , his phone number is in her demographics.

## 2022-06-03 NOTE — ASSESSMENT & PLAN NOTE
Chronic and ongoing problem.  Continue current medical regiment, we will update kidney function ensure stability.  Likely secondary to medical comorbidities and medications.

## 2022-06-03 NOTE — ASSESSMENT & PLAN NOTE
Chronic and stable problem, continue dual antiplatelet therapy and statin, update lipid panel to ensure stability.

## 2022-06-03 NOTE — ASSESSMENT & PLAN NOTE
Chronic and improved problem.  She feels well on the Lexapro and thinks it is a good dosage for her.  We will continue Lexapro 10 mg daily as it has been effective for her mood.

## 2022-06-03 NOTE — PROGRESS NOTES
Subjective:   Chief Complaint/History of Present Illness:  Griselda Farmer is a 77 y.o. female established patient who presents today to discuss medical problems as listed below. Griselda is unaccompanied for today's visit.    Problem   Acp (Advance Care Planning)    She believes she has complete advance care planning packet in the past but we do not have it on file.  She is agreeable to completing a POLST form in clinic today.  She would like to name her son who is a retired  as her medical power of .  She would like to remain a full code at this time unless she would develop of life limiting or terminal illness and then she would likely want to transition to less aggressive care.  She has discussed this with her son who she feels will do a great job advocating for her wishes.     Physician Orders for Life-Sustaining Treatment (Polst) Form Indicates Patient Wish for Full Code Resuscitation Status    Please see scanned in media patient is completed POLST form, she requests full code at this time with her son Erick Farmer is her medical power of , his phone number is in her demographics.     Prediabetes       Ref. Range 11/24/2021 08:00   Glycohemoglobin Latest Ref Range: 4.0 - 5.6 % 5.7 (H)   Estim. Avg Glu Latest Units: mg/dL 117     She has prediabetes that is mild, no prior pharmacotherapy for her blood sugar.  This has improved on most recent check.     Current Mild Episode of Major Depressive Disorder Without Prior Episode (Hcc)    She reports progressive worsening of her mood.  She feels that she sleeps too much and her appetite has been off.  She has low energy and poor motivation.  She worries that she may be depressed especially related to the ongoing COVID-19 pandemic. We started lexapro 10 mg daily in 8/2020 and she reports improvement in symptoms. PHQ9 at that time was 9 and now down to 0.    Current regimen: lexapro 10 mg daily     Ckd (Chronic Kidney Disease) Stage 3, Gfr 30-59  Ml/Min ([facility])       Ref. Range 11/24/2021 08:00   Bun Latest Ref Range: 8 - 22 mg/dL 13   Creatinine Latest Ref Range: 0.50 - 1.40 mg/dL 0.94   GFR If Non  Latest Ref Range: >60 mL/min/1.73 m 2 57 (A)     She has had recent development of CKD stage III in the past year, likely due to hypertension and heart disease. No prior episodes of nephrolithiasis or intrinsic kidney disease.  She had a urinary tract infection in April 2020 which respond antibiotic treatment.  PTH mildly elevated on last check. SPEP screening normal. No DM2 on A1c.  Renally metabolized medications at this time includes lisinopril, PPI, and statin.     Atherosclerosis of Aorta (Hcc)    Noted on chest xray from March 2018: Aortic calcified atherosclerotic plaque.    She is on dual antiplatelet therapy due to her history of CAD as well as atorvastatin 40 mg daily.    Most recent cholesterol panel is at goal with all parameters.     Cad (Coronary Artery Disease)    Echo (March 2019):  Normal left ventricular systolic function. LVEF 70%. Mild to moderate concentric left ventricular hypertrophy. Aortic sclerosis without stenosis.    She is status post stent placement x2 to left circumflex artery in January 2017.  She continues on dual antiplatelet therapy per her cardiologist due to high risk of recurrent heart attack.  She is also on Lipitor 40 mg daily, lisinopril 20 mg daily, and metoprolol succinate 25 mg daily.    No recurrent or ongoing angina at this time.     Gastroesophageal Reflux Disease Without Esophagitis    Longstanding history of heart burn. She has been on DAPT for history of CAD with stents and denies prior melena, hematochezia, or known peptic ulcer disease/gastritis. She has been using PPI therapy which works well for her to control symptoms.  No known complications from her PPI.     Mixed Hyperlipidemia       Ref. Range 3/3/2021 07:34   Cholesterol,Tot Latest Ref Range: 100 - 199 mg/dL 126   Triglycerides Latest Ref  Range: 0 - 149 mg/dL 114   HDL Latest Ref Range: >=40 mg/dL 62   LDL Latest Ref Range: <100 mg/dL 41     On standing history of elevated cholesterol.  Her current regimen includes atorvastatin.  No myalgias or GI upset on this medication.  She has prior history of coronary artery disease and aortic atherosclerosis.  No known history of cerebrovascular disease.    Current regimen: Atorvastatin 40 mg daily     Essential Hypertension, Benign    She was started on anti-hypertensives around 2004. She has been on a 2 drug regimen that has worked well. Normally well controlled with her highest known reading around 155 systolic. No signs or symptoms of orthostasis. Denies fatigue, orthostasis or other signs of overtreatment.     Current regimen includes lisinopril 20 mg daily (previously losartan 100 mg daily) and metoprolol succinate 25 mg daily.     Obesity (Bmi 30.0-34.9) (Resolved)    BMI today is 34.16.  On previous BMI she is been very similar with her weight stable between 204-205 pounds.  She reports stable weight over several years.  She has had significant challenges with weight loss even when she goes on diets with other people and they lose a lot of weight but she never seems to be able to.  She is slowly accumulated additional weight with aging.  Does not watch her diet too closely and is not any regular physical activity.          Current Medications:  Current Outpatient Medications Ordered in Epic   Medication Sig Dispense Refill   • metoprolol SR (TOPROL XL) 25 MG TABLET SR 24 HR Take 1 Tablet by mouth every day. 100 Tablet 3   • omeprazole (PRILOSEC) 20 MG delayed-release capsule Take 2 Capsules by mouth 2 times a day. 200 Capsule 3   • lisinopril (PRINIVIL) 20 MG Tab Take 1 Tablet by mouth every day. 100 Tablet 3   • clopidogrel (PLAVIX) 75 MG Tab Take 1 Tablet by mouth every day. 100 Tablet 3   • atorvastatin (LIPITOR) 40 MG Tab Take 1 Tablet by mouth every day. 100 Tablet 3   • gabapentin (NEURONTIN) 100  "MG Cap Take 1 Capsule by mouth 1 time a day as needed (nerve pain). 100 Capsule 3   • escitalopram (LEXAPRO) 10 MG Tab TAKE 1 TABLET BY MOUTH EVERY  Tablet 3   • acetaminophen (TYLENOL) 650 MG CR tablet Take 650-1,300 mg by mouth every 6 hours as needed for Moderate Pain.     • nitroglycerin (NITROSTAT) 0.4 MG SL Tab Place 1 Tab under tongue as needed for Chest Pain (up to 3 doses (if SBP greater than 90 mmHg)). 25 Tab 3   • Pumpkin Seed-Soy Germ (AZO BLADDER CONTROL/GO-LESS PO) Take 1 Cap by mouth every bedtime.     • aspirin EC (ECOTRIN) 81 MG Tablet Delayed Response Take 81 mg by mouth every evening. 30 Tab 0   • Cholecalciferol (VITAMIN D) 2000 UNIT TABS Take 1 Tab by mouth every evening.       No current Epic-ordered facility-administered medications on file.          Objective:   Physical Exam:    Vitals: /78 (BP Location: Right arm, Patient Position: Sitting, BP Cuff Size: Adult)   Pulse (!) 51   Temp 36 °C (96.8 °F) (Temporal)   Resp 12   Ht 1.651 m (5' 5\")   Wt 93.3 kg (205 lb 11.2 oz)   SpO2 95%    BMI: Body mass index is 34.23 kg/m².  Physical Exam  Constitutional:       General: She is not in acute distress.     Appearance: Normal appearance. She is not ill-appearing.   HENT:      Right Ear: External ear normal. There is impacted cerumen.      Left Ear: External ear normal. There is impacted cerumen.      Ears:      Comments: Right > left cerumen  Eyes:      General: No scleral icterus.     Conjunctiva/sclera: Conjunctivae normal.   Cardiovascular:      Rate and Rhythm: Regular rhythm. Bradycardia present.      Pulses: Normal pulses.   Pulmonary:      Effort: Pulmonary effort is normal. No respiratory distress.      Breath sounds: Normal breath sounds. No wheezing or rhonchi.   Abdominal:      General: Bowel sounds are normal.      Palpations: Abdomen is soft.      Tenderness: There is no abdominal tenderness.   Musculoskeletal:      Comments: Mild nonpitting lymphedema bilateral, " pretibial   Lymphadenopathy:      Cervical: No cervical adenopathy.   Skin:     General: Skin is warm and dry.      Findings: No bruising or rash.   Psychiatric:         Mood and Affect: Mood normal.         Behavior: Behavior normal.         Thought Content: Thought content normal.         Judgment: Judgment normal.          Assessment and Plan:   Griselda is a 77 y.o. female with the following:  Problem List Items Addressed This Visit     Essential hypertension, benign     Chronic and stable problem, continue lisinopril metoprolol succinate, blood pressure borderline today but normally at goal.           Relevant Medications    metoprolol SR (TOPROL XL) 25 MG TABLET SR 24 HR    lisinopril (PRINIVIL) 20 MG Tab    atorvastatin (LIPITOR) 40 MG Tab    Other Relevant Orders    CBC WITH DIFFERENTIAL    Comp Metabolic Panel    VITAMIN D,25 HYDROXY    VITAMIN B12    Mixed hyperlipidemia     Chronic and stable problem.  Update lipid panel to ensure stability, continue atorvastatin 40 mg daily.           Relevant Medications    metoprolol SR (TOPROL XL) 25 MG TABLET SR 24 HR    lisinopril (PRINIVIL) 20 MG Tab    atorvastatin (LIPITOR) 40 MG Tab    Other Relevant Orders    Lipid Profile    TSH WITH REFLEX TO FT4    Gastroesophageal reflux disease without esophagitis     Chronic and stable problem.  Continue PPI therapy.  Heartburn well controlled on current regiment.           Relevant Medications    omeprazole (PRILOSEC) 20 MG delayed-release capsule    CAD (coronary artery disease)     Previous problem, stable, encouraged to make follow-up appointment cardiology.  Continue current regiment with atorvastatin, lisinopril, metoprolol succinate, and dual antiplatelet therapy with as needed nitroglycerin.  She has not had to use nitroglycerin.           Relevant Medications    metoprolol SR (TOPROL XL) 25 MG TABLET SR 24 HR    lisinopril (PRINIVIL) 20 MG Tab    clopidogrel (PLAVIX) 75 MG Tab    atorvastatin (LIPITOR) 40 MG Tab     Atherosclerosis of aorta (HCC)     Chronic and stable problem, continue dual antiplatelet therapy and statin, update lipid panel to ensure stability.           Relevant Medications    metoprolol SR (TOPROL XL) 25 MG TABLET SR 24 HR    lisinopril (PRINIVIL) 20 MG Tab    atorvastatin (LIPITOR) 40 MG Tab    CKD (chronic kidney disease) stage 3, GFR 30-59 ml/min ([facility])     Chronic and ongoing problem.  Continue current medical regiment, we will update kidney function ensure stability.  Likely secondary to medical comorbidities and medications.           Current mild episode of major depressive disorder without prior episode ([facility])     Chronic and improved problem.  She feels well on the Lexapro and thinks it is a good dosage for her.  We will continue Lexapro 10 mg daily as it has been effective for her mood.           Prediabetes     Previous problem, requires follow-up, will update A1c with next set of lab work.  No indication to start pharmacotherapy at this time.           Relevant Orders    HEMOGLOBIN A1C    ACP (advance care planning)     New problem, will place copy of POLST form on her chart, she like to remain a full code at this time and will name her son Erick Farmer as her medical power of .           Physician orders for life-sustaining treatment (POLST) form indicates patient wish for full code resuscitation status     New discussion. Please see scanned in media patient is completed POLST form, she requests full code at this time with her son Erick Farmer is her medical power of , his phone number is in her demographics.             Other Visit Diagnoses     Genetic screening        Relevant Orders    Referral to Genetic Research Studies             Annual Health Assessment Questions:    1.  Are you currently engaging in any exercise or physical activity? Yes  walking  2.  How would you describe your mood or emotional well-being today? good    3.  Have you had any falls in the last year?  No    4.  Have you noticed any problems with your balance or had difficulty walking? No    5.  In the last six months have you experienced any leakage of urine? Yes    6. DPA/Advanced Directive: Patient has Advanced Directive, but it is not on file. Instructed to bring in a copy to scan into their chart.       RTC: Return in about 6 months (around 12/3/2022).    I spent a total of 42 minutes with record review, exam, communication with the patient, communication with other providers, and documentation of this encounter.    PLEASE NOTE: This dictation was created using voice recognition software. I have made every reasonable attempt to correct obvious errors, but I expect that there are errors of grammar and possibly content that I did not discover before finalizing the note.      Asia Mchugh, DO  Geriatric and Internal Medicine  [facility]

## 2022-06-03 NOTE — ASSESSMENT & PLAN NOTE
Previous problem, stable, encouraged to make follow-up appointment cardiology.  Continue current regiment with atorvastatin, lisinopril, metoprolol succinate, and dual antiplatelet therapy with as needed nitroglycerin.  She has not had to use nitroglycerin.

## 2022-07-06 PROBLEM — E66.01 SEVERE OBESITY (BMI 35.0-39.9) WITH COMORBIDITY (HCC): Status: ACTIVE | Noted: 2022-07-06

## 2022-07-06 PROBLEM — N18.31 STAGE 3A CHRONIC KIDNEY DISEASE: Status: ACTIVE | Noted: 2019-07-08

## 2022-07-06 PROBLEM — F39 MOOD DISORDER (HCC): Status: ACTIVE | Noted: 2022-07-06

## 2022-07-17 DIAGNOSIS — K21.9 GASTROESOPHAGEAL REFLUX DISEASE WITHOUT ESOPHAGITIS: ICD-10-CM

## 2022-07-18 RX ORDER — OMEPRAZOLE 20 MG/1
CAPSULE, DELAYED RELEASE ORAL
Qty: 180 CAPSULE | Refills: 0 | Status: SHIPPED | OUTPATIENT
Start: 2022-07-18 | End: 2022-10-31

## 2022-07-18 NOTE — TELEPHONE ENCOUNTER
Requested Prescriptions     Pending Prescriptions Disp Refills   • omeprazole (PRILOSEC) 20 MG delayed-release capsule [Pharmacy Med Name: OMEPRAZOLE DR 20 MG CAPSULE] 180 Capsule 0     Sig: TAKE 2 CAPSULES BY MOUTH EVERY DAY   Maria Eugenia Bernabe M.D.

## 2022-08-03 DIAGNOSIS — I10 ESSENTIAL HYPERTENSION, BENIGN: ICD-10-CM

## 2022-08-04 RX ORDER — METOPROLOL SUCCINATE 50 MG/1
TABLET, EXTENDED RELEASE ORAL
Qty: 15 TABLET | Refills: 0 | OUTPATIENT
Start: 2022-08-04

## 2022-08-04 NOTE — TELEPHONE ENCOUNTER
Is the patient due for a refill? No    Was the patient seen the past year? No    Date of last office visit: 10/21/20    Does the patient have an upcoming appointment?  No       Provider to refill: TT    Does the patients insurance require a 100 day supply?  Yes    metoprolol SR (TOPROL XL) 25 MG TABLET SR 24  Tablet 3/3 6/3/2022     Sig - Route: Take 1 Tablet by mouth every day. - Oral    Sent to pharmacy as: Metoprolol Succinate ER 25 MG Oral Tablet Extended Release 24 Hour (TOPROL XL)    E-Prescribing Status: Receipt confirmed by pharmacy (6/3/2022  8:38 AM PDT)

## 2022-10-04 NOTE — PROGRESS NOTES
"Griselda Farmer is a 73 y.o. female here for a non-provider visit for:   FLU    Reason for immunization: Annual Flu Vaccine  Immunization records indicate need for vaccine: Yes, confirmed with Epic  Minimum interval has been met for this vaccine: Yes  ABN completed: Not Indicated    Order and dose verified by: BC  VIS Dated  08/05/15 was given to patient: Yes  All IAC Questionnaire questions were answered \"No.\"    Patient tolerated injection and no adverse effects were observed or reported: Yes    Pt scheduled for next dose in series: No    " 24.4

## 2022-10-21 ENCOUNTER — OFFICE VISIT (OUTPATIENT)
Dept: URGENT CARE | Facility: CLINIC | Age: 77
End: 2022-10-21
Payer: MEDICARE

## 2022-10-21 VITALS
BODY MASS INDEX: 34.99 KG/M2 | WEIGHT: 210 LBS | TEMPERATURE: 96.9 F | HEART RATE: 64 BPM | HEIGHT: 65 IN | RESPIRATION RATE: 12 BRPM | SYSTOLIC BLOOD PRESSURE: 110 MMHG | OXYGEN SATURATION: 93 % | DIASTOLIC BLOOD PRESSURE: 58 MMHG

## 2022-10-21 DIAGNOSIS — J01.00 ACUTE NON-RECURRENT MAXILLARY SINUSITIS: ICD-10-CM

## 2022-10-21 DIAGNOSIS — R09.81 COMPLAINT OF NASAL CONGESTION: ICD-10-CM

## 2022-10-21 DIAGNOSIS — J06.9 VIRAL UPPER RESPIRATORY TRACT INFECTION: ICD-10-CM

## 2022-10-21 DIAGNOSIS — B97.89 VIRAL SINUSITIS: ICD-10-CM

## 2022-10-21 DIAGNOSIS — J32.9 VIRAL SINUSITIS: ICD-10-CM

## 2022-10-21 LAB
EXTERNAL QUALITY CONTROL: NORMAL
INT CON NEG: NORMAL
INT CON POS: NORMAL
SARS-COV+SARS-COV-2 AG RESP QL IA.RAPID: NEGATIVE

## 2022-10-21 PROCEDURE — 99213 OFFICE O/P EST LOW 20 MIN: CPT | Mod: CS | Performed by: PHYSICIAN ASSISTANT

## 2022-10-21 PROCEDURE — 87426 SARSCOV CORONAVIRUS AG IA: CPT | Performed by: PHYSICIAN ASSISTANT

## 2022-10-21 RX ORDER — AMOXICILLIN AND CLAVULANATE POTASSIUM 875; 125 MG/1; MG/1
1 TABLET, FILM COATED ORAL 2 TIMES DAILY
Qty: 10 TABLET | Refills: 0 | Status: SHIPPED | OUTPATIENT
Start: 2022-10-21 | End: 2022-10-26

## 2022-10-21 ASSESSMENT — FIBROSIS 4 INDEX: FIB4 SCORE: 1.71

## 2022-10-21 NOTE — PROGRESS NOTES
Subjective:   Griselda Farmer is a 77 y.o. female who presents for Congestion (X 4 days, chest congestion, drainage.)     Patient presents with sinus pressure, nasal discharge purulent, coughing x 4 days.  Today slightly improved.  Son wanted her to be seen.  No fever or chills.  No shortness of breath.  Cough nonproductive..  No underlying respiratory illness.    Son would like her to have a covid test today.  Home covid test negative.  Overall reports she is dramatically improved from initial onset.        Medications:  acetaminophen  aspirin EC Tbec  atorvastatin Tabs  AZO BLADDER CONTROL/GO-LESS PO  clopidogrel Tabs  escitalopram Tabs  gabapentin Caps  lisinopril Tabs  metoprolol SR Tb24  nitroglycerin Subl  omeprazole  vitamin D Tabs    Allergies:             Codeine and Seasonal    Surgical History:         Past Surgical History:   Procedure Laterality Date    [facility] CARDIAC CATH  1/19/17    Stents patent.    [facility] CARDIAC CATH  1/1/17    Overlapping Synergy stents to 99% Circ    OTHER CARDIAC SURGERY  January 2017    NON STEMI with stent    KNEE ARTHROPLASTY TOTAL  12/16/2014    Performed by Jose G Trotter M.D. at SURGERY [facility]    CATARACT PHACO WITH IOL  10/3/2013    Performed by Sylvester Raza M.D. at [facility]    CATARACT PHACO WITH IOL  9/19/2013    Performed by Sylvester Raza M.D. at [facility]    RECOVERY  3/29/2013    Performed by [facility] at [facility] SAME DAY [facility] ORS    HYSTERECTOMY, VAGINAL      ovaries were left    TONSILLECTOMY         Past Social Hx:  Griselda Farmer  reports that she quit smoking about 47 years ago. Her smoking use included cigarettes. She has a 7.50 pack-year smoking history. She has never used smokeless tobacco. She reports that she does not drink alcohol and does not use drugs.     Past Family Hx:   Griselda Farmer family history includes Arthritis in her brother, brother, sister, and sister; Cancer in her father;  "Diabetes in her mother.       Problem list, medications, and allergies reviewed by myself today in Epic.     Objective:     /58   Pulse 64   Temp 36.1 °C (96.9 °F) (Temporal)   Resp 12   Ht 1.651 m (5' 5\")   Wt 95.3 kg (210 lb)   SpO2 93%   BMI 34.95 kg/m²     Physical Exam  Vitals and nursing note reviewed.   Constitutional:       General: She is not in acute distress.     Appearance: Normal appearance. She is not ill-appearing.   HENT:      Head: Normocephalic.      Jaw: No trismus.      Right Ear: External ear normal. No drainage. A middle ear effusion is present. No mastoid tenderness. Tympanic membrane is not erythematous or bulging.      Left Ear: External ear normal. No drainage. A middle ear effusion is present. No mastoid tenderness. Tympanic membrane is not erythematous or bulging.      Nose: Congestion and rhinorrhea present.      Right Turbinates: Enlarged and swollen.      Left Turbinates: Enlarged and swollen.      Mouth/Throat:      Mouth: Mucous membranes are moist.      Tongue: No lesions. Tongue does not deviate from midline.      Palate: No lesions.      Pharynx: Uvula midline. Posterior oropharyngeal erythema present. No oropharyngeal exudate or uvula swelling.      Tonsils: No tonsillar exudate or tonsillar abscesses.   Eyes:      General:         Right eye: No discharge.         Left eye: No discharge.      Extraocular Movements: Extraocular movements intact.   Cardiovascular:      Rate and Rhythm: Normal rate and regular rhythm.      Pulses: Normal pulses.      Heart sounds: Normal heart sounds.   Pulmonary:      Effort: Pulmonary effort is normal. No accessory muscle usage or respiratory distress.      Breath sounds: Normal breath sounds and air entry. No stridor or decreased air movement. No wheezing, rhonchi or rales.   Musculoskeletal:      Cervical back: Normal range of motion.   Lymphadenopathy:      Head:      Right side of head: No tonsillar adenopathy.      Left side of " head: No tonsillar adenopathy.      Cervical: No cervical adenopathy.   Skin:     General: Skin is warm.      Findings: No rash.   Neurological:      Mental Status: She is alert.   Psychiatric:         Behavior: Behavior is cooperative.     Rapid covid negative  Assessment/Plan:     Diagnosis and Associated Orders:     1. Complaint of nasal congestion  - POCT SARS-COV Antigen IRWIN Manual Result    2. Viral sinusitis    3. Viral upper respiratory tract infection      Comments/MDM:  Rapid COVID-negative.    The patient's presenting symptoms and exam findings most likely are due to a viral etiology.     Symptomatic and supportive care:   Plenty of oral hydration and rest   Over the counter cough suppressant as directed.  Tylenol or ibuprofen for pain and fever as directed.   Warm salt water gargles for sore throat, soft foods, cool liquids.   Saline nasal spray, Flonase, and/or otc sudafed (if no history of hypertension) as a decongestant.   Infection control measures at home. Stay away from people, Hand washing, covering sneeze/cough, disinfect surfaces.   Remain home from work, school, and other populated environments while ill.  Overall, the patient is well-appearing. They are not hypoxic, afebrile, and a normal pulmonary exam.    Contingent antibiotic prescription given to patient to fill upon meeting criteria of guidelines discussed.  Discussed waiting 7 to 10 days and if persistent sinusitis with significant sinus pressure and purulent nasal discharge may initiate antibiotic treatment.    Test for COVID-19 and influenza performed if applicable. Result will be reviewed by myself. We will call/message back for positive results only and appropriate further instructions. Instructed to sign up for [email or web address] if they have not already. Result will be automatically released to [email or web address] application for patient review.     Patient should to proceed to ED for development of symptoms including but not limited to shortness of  breath breath, respiratory distress, increased fever, or worsening symptoms not manageable at home.      I personally reviewed prior external notes and test results pertinent to today's visit.  Red flags discussed as well as indications to present to the Emergency Department.  Supportive care, natural history, differential diagnoses, and indications for immediate follow-up discussed.  Patient expresses understanding and agrees to plan.  Patient denies any other questions or concerns.    Follow-up with the primary care physician for recheck, reevaluation, and consideration of further management.      Please note that this dictation was created using voice recognition software. I have made a reasonable attempt to correct obvious errors, but I expect that there are errors of grammar and possibly content that I did not discover before finalizing the note.    This note was electronically signed by Madeline Dale PA-C

## 2022-10-29 DIAGNOSIS — K21.9 GASTROESOPHAGEAL REFLUX DISEASE WITHOUT ESOPHAGITIS: ICD-10-CM

## 2022-10-31 RX ORDER — OMEPRAZOLE 20 MG/1
CAPSULE, DELAYED RELEASE ORAL
Qty: 200 CAPSULE | Refills: 3 | Status: SHIPPED | OUTPATIENT
Start: 2022-10-31 | End: 2023-06-27

## 2022-11-14 ENCOUNTER — DOCUMENTATION (OUTPATIENT)
Dept: HEALTH INFORMATION MANAGEMENT | Facility: OTHER | Age: 77
End: 2022-11-14
Payer: MEDICARE

## 2022-12-14 ENCOUNTER — OFFICE VISIT (OUTPATIENT)
Dept: MEDICAL GROUP | Facility: PHYSICIAN GROUP | Age: 77
End: 2022-12-14
Payer: MEDICARE

## 2022-12-14 VITALS
RESPIRATION RATE: 16 BRPM | HEART RATE: 68 BPM | WEIGHT: 204 LBS | HEIGHT: 65 IN | BODY MASS INDEX: 33.99 KG/M2 | DIASTOLIC BLOOD PRESSURE: 68 MMHG | OXYGEN SATURATION: 92 % | TEMPERATURE: 96.8 F | SYSTOLIC BLOOD PRESSURE: 122 MMHG

## 2022-12-14 DIAGNOSIS — R73.03 PREDIABETES: ICD-10-CM

## 2022-12-14 DIAGNOSIS — F32.0 CURRENT MILD EPISODE OF MAJOR DEPRESSIVE DISORDER WITHOUT PRIOR EPISODE (HCC): ICD-10-CM

## 2022-12-14 DIAGNOSIS — I10 ESSENTIAL HYPERTENSION: ICD-10-CM

## 2022-12-14 DIAGNOSIS — Z11.59 ENCOUNTER FOR HEPATITIS C SCREENING TEST FOR LOW RISK PATIENT: ICD-10-CM

## 2022-12-14 DIAGNOSIS — N18.31 STAGE 3A CHRONIC KIDNEY DISEASE: ICD-10-CM

## 2022-12-14 DIAGNOSIS — Z78.0 MENOPAUSE: ICD-10-CM

## 2022-12-14 DIAGNOSIS — Z01.00 EYE EXAM, ROUTINE: ICD-10-CM

## 2022-12-14 DIAGNOSIS — Z23 NEED FOR VACCINATION: ICD-10-CM

## 2022-12-14 DIAGNOSIS — H61.23 BILATERAL IMPACTED CERUMEN: ICD-10-CM

## 2022-12-14 DIAGNOSIS — E78.2 MIXED HYPERLIPIDEMIA: ICD-10-CM

## 2022-12-14 PROCEDURE — G0008 ADMIN INFLUENZA VIRUS VAC: HCPCS | Performed by: INTERNAL MEDICINE

## 2022-12-14 PROCEDURE — 99214 OFFICE O/P EST MOD 30 MIN: CPT | Mod: 25 | Performed by: INTERNAL MEDICINE

## 2022-12-14 PROCEDURE — 90662 IIV NO PRSV INCREASED AG IM: CPT | Performed by: INTERNAL MEDICINE

## 2022-12-14 ASSESSMENT — FIBROSIS 4 INDEX: FIB4 SCORE: 1.71

## 2022-12-14 NOTE — ASSESSMENT & PLAN NOTE
Chronic and ongoing problem.  She will continue to work to clean out her ears in the shower.  Offered audiology referral to assess her hearing and she declines at this time as she feels like her hearing is normal.

## 2022-12-14 NOTE — ASSESSMENT & PLAN NOTE
Chronic and stable problem.  Cholesterol is at goal, will update labs to ensure stability.  Continue treatment with atorvastatin 40 mg daily.

## 2022-12-14 NOTE — ASSESSMENT & PLAN NOTE
Chronic and stable problem.  Blood pressure is at goal and no known side effects to medicine.  Continue dual regiment with lisinopril 20 mg daily metoprolol succinate 25 mg daily.

## 2022-12-14 NOTE — ASSESSMENT & PLAN NOTE
Chronic and stable problem.  We will update hemoglobin A1c to ensure stability.  No indication for medication at this time.

## 2022-12-14 NOTE — ASSESSMENT & PLAN NOTE
Chronic and stable problem, will update GFR to ensure stability.  Continue with good oral hydration and limit nephrotoxic agents as able.  Blood pressure is under good control.   Ambulatory

## 2022-12-14 NOTE — ASSESSMENT & PLAN NOTE
New problem, will refer her to [facility] to establish care and get her eye exam updated.  No prior history of eye disease.

## 2022-12-14 NOTE — PROGRESS NOTES
Subjective:   Chief Complaint/History of Present Illness:  Griselda Farmer is a 77 y.o. female established patient who presents today to discuss medical problems as listed below. Griselda is unaccompanied for today's visit.    Problem   Eye Exam, Routine    She has not had a routine eye exam for some time.  She would like to establish with a local eye clinic and get this done as her 's license renewal will be due with her 78th birthday next month.     Prediabetes       Latest Reference Range & Units 11/24/21 08:00   Glycohemoglobin 4.0 - 5.6 % 5.7 (H)   Estim. Avg Glu mg/dL 117     She has prediabetes that is mild, no prior pharmacotherapy for her blood sugar.  This has improved on most recent check.     Current Mild Episode of Major Depressive Disorder Without Prior Episode (Hcc)    She reports progressive worsening of her mood.  She feels that she sleeps too much and her appetite has been off.  She has low energy and poor motivation.  She worries that she may be depressed especially related to the ongoing COVID-19 pandemic. We started lexapro 10 mg daily in 8/2020 and she reports improvement in symptoms. PHQ9 at that time was 9 and now down to 0.    Current regimen: lexapro 10 mg daily     Bilateral Impacted Cerumen    She reports a history of cerumen impaction over the years requiring wax removal.  She normally does this herself at home in the shower.  Does not note any hearing impairment.     Stage 3a Chronic Kidney Disease (Hcc)     Latest Reference Range & Units 11/24/21 08:00   Bun 8 - 22 mg/dL 13   Creatinine 0.50 - 1.40 mg/dL 0.94   GFR If Non African American >60 mL/min/1.73 m 2 57 !     She has had recent development of CKD stage III in the past year, likely due to hypertension and heart disease. No prior episodes of nephrolithiasis or intrinsic kidney disease.  She had a urinary tract infection in April 2020 which respond antibiotic treatment.  PTH mildly elevated on last check. SPEP screening  normal. No DM2 on A1c.  Renally metabolized medications at this time includes lisinopril, PPI, and statin.     Mixed Hyperlipidemia     Latest Reference Range & Units 03/03/21 07:34   Cholesterol,Tot 100 - 199 mg/dL 126   Triglycerides 0 - 149 mg/dL 114   HDL >=40 mg/dL 62   LDL <100 mg/dL 41     On standing history of elevated cholesterol.  Her current regimen includes atorvastatin.  No myalgias or GI upset on this medication.  She has prior history of coronary artery disease and aortic atherosclerosis.  No known history of cerebrovascular disease.    Current regimen: Atorvastatin 40 mg daily     Essential Hypertension    She was started on anti-hypertensives around 2004. She has been on a 2 drug regimen that has worked well. Normally well controlled with her highest known reading around 155 systolic. No signs or symptoms of orthostasis. Denies fatigue, orthostasis or other signs of overtreatment.     Blood pressure in clinic ranging 110-138/58-80    Current regimen includes lisinopril 20 mg daily (previously losartan 100 mg daily) and metoprolol succinate 25 mg daily.          Current Medications:  Current Outpatient Medications Ordered in Epic   Medication Sig Dispense Refill    omeprazole (PRILOSEC) 20 MG delayed-release capsule TAKE 2 CAPSULES BY MOUTH EVERY  Capsule 3    escitalopram (LEXAPRO) 10 MG Tab TAKE 1 TABLET BY MOUTH EVERY  Tablet 3    metoprolol SR (TOPROL XL) 25 MG TABLET SR 24 HR Take 1 Tablet by mouth every day. 100 Tablet 3    lisinopril (PRINIVIL) 20 MG Tab Take 1 Tablet by mouth every day. 100 Tablet 3    clopidogrel (PLAVIX) 75 MG Tab Take 1 Tablet by mouth every day. 100 Tablet 3    atorvastatin (LIPITOR) 40 MG Tab Take 1 Tablet by mouth every day. 100 Tablet 3    acetaminophen (TYLENOL) 650 MG CR tablet Take 650-1,300 mg by mouth every 6 hours as needed for Moderate Pain.      nitroglycerin (NITROSTAT) 0.4 MG SL Tab Place 1 Tab under tongue as needed for Chest Pain (up to 3  "doses (if SBP greater than 90 mmHg)). 25 Tab 3    Pumpkin Seed-Soy Germ (AZO BLADDER CONTROL/GO-LESS PO) Take 1 Cap by mouth every bedtime.      aspirin EC (ECOTRIN) 81 MG Tablet Delayed Response Take 81 mg by mouth every evening. 30 Tab 0    Cholecalciferol (VITAMIN D) 2000 UNIT TABS Take 1 Tab by mouth every evening.       No current Epic-ordered facility-administered medications on file.          Objective:   Physical Exam:    Vitals: /68 (BP Location: Left arm, Patient Position: Sitting, BP Cuff Size: Large adult)   Pulse 68   Temp 36 °C (96.8 °F) (Temporal)   Resp 16   Ht 1.651 m (5' 5\")   Wt 92.5 kg (204 lb)   SpO2 92%    BMI: Body mass index is 33.95 kg/m².  Physical Exam  Constitutional:       General: She is not in acute distress.     Appearance: Normal appearance. She is not ill-appearing.   HENT:      Right Ear: There is impacted cerumen.      Left Ear: There is impacted cerumen.   Eyes:      General: No scleral icterus.     Conjunctiva/sclera: Conjunctivae normal.   Cardiovascular:      Rate and Rhythm: Normal rate and regular rhythm.      Pulses: Normal pulses.   Pulmonary:      Effort: Pulmonary effort is normal. No respiratory distress.      Breath sounds: No wheezing or rhonchi.   Musculoskeletal:      Comments: Stable nonpitting lymphedema at b/l ankles   Skin:     General: Skin is warm and dry.      Findings: No rash.   Neurological:      Gait: Gait normal.   Psychiatric:         Mood and Affect: Mood normal.         Behavior: Behavior normal.         Thought Content: Thought content normal.         Judgment: Judgment normal.        Assessment and Plan:   Griselda is a 77 y.o. female with the following:  Problem List Items Addressed This Visit       Essential hypertension     Chronic and stable problem.  Blood pressure is at goal and no known side effects to medicine.  Continue dual regiment with lisinopril 20 mg daily metoprolol succinate 25 mg daily.         Relevant Orders    CBC WITH " DIFFERENTIAL    Comp Metabolic Panel    VITAMIN D,25 HYDROXY (DEFICIENCY)    VITAMIN B12    Mixed hyperlipidemia     Chronic and stable problem.  Cholesterol is at goal, will update labs to ensure stability.  Continue treatment with atorvastatin 40 mg daily.         Relevant Orders    Lipid Profile    TSH WITH REFLEX TO FT4    Stage 3a chronic kidney disease (HCC)     Chronic and stable problem, will update GFR to ensure stability.  Continue with good oral hydration and limit nephrotoxic agents as able.  Blood pressure is under good control.         Bilateral impacted cerumen     Chronic and ongoing problem.  She will continue to work to clean out her ears in the shower.  Offered audiology referral to assess her hearing and she declines at this time as she feels like her hearing is normal.         Current mild episode of major depressive disorder without prior episode (HCC)     Chronic and stable problem.  Mood doing better with Lexapro, continue Lexapro 10 mg daily.  She has good support in town with siblings and her son and his family.         Prediabetes     Chronic and stable problem.  We will update hemoglobin A1c to ensure stability.  No indication for medication at this time.         Relevant Orders    HEMOGLOBIN A1C    Eye exam, routine     New problem, will refer her to [facility] to establish care and get her eye exam updated.  No prior history of eye disease.         Relevant Orders    Referral to Optometry     Other Visit Diagnoses       Need for vaccination        Menopause        Relevant Orders    DS-BONE DENSITY STUDY (DEXA)    Encounter for hepatitis C screening test for low risk patient        Relevant Orders    HEP C VIRUS ANTIBODY             RTC: Return in about 6 months (around 6/14/2023).    I spent a total of 38 minutes with record review, exam, communication with the patient, communication with other providers, and documentation of this encounter.    PLEASE NOTE: This dictation was  created using voice recognition software. I have made every reasonable attempt to correct obvious errors, but I expect that there are errors of grammar and possibly content that I did not discover before finalizing the note.      Asia Mchugh, DO  Geriatric and Internal Medicine  [facility]

## 2022-12-14 NOTE — ASSESSMENT & PLAN NOTE
Chronic and stable problem.  Mood doing better with Lexapro, continue Lexapro 10 mg daily.  She has good support in town with siblings and her son and his family.

## 2023-02-13 ENCOUNTER — APPOINTMENT (OUTPATIENT)
Dept: RADIOLOGY | Facility: MEDICAL CENTER | Age: 78
End: 2023-02-13
Attending: INTERNAL MEDICINE
Payer: MEDICARE

## 2023-06-20 ENCOUNTER — APPOINTMENT (OUTPATIENT)
Dept: MEDICAL GROUP | Facility: PHYSICIAN GROUP | Age: 78
End: 2023-06-20
Payer: MEDICARE

## 2023-06-20 ENCOUNTER — TELEPHONE (OUTPATIENT)
Dept: HEALTH INFORMATION MANAGEMENT | Facility: OTHER | Age: 78
End: 2023-06-20

## 2023-06-27 DIAGNOSIS — K21.9 GASTROESOPHAGEAL REFLUX DISEASE WITHOUT ESOPHAGITIS: ICD-10-CM

## 2023-06-27 RX ORDER — OMEPRAZOLE 20 MG/1
CAPSULE, DELAYED RELEASE ORAL
Qty: 200 CAPSULE | Refills: 3 | Status: SHIPPED | OUTPATIENT
Start: 2023-06-27 | End: 2024-01-18

## 2023-07-03 DIAGNOSIS — E78.2 MIXED HYPERLIPIDEMIA: ICD-10-CM

## 2023-07-03 DIAGNOSIS — I25.10 CORONARY ARTERY DISEASE INVOLVING NATIVE CORONARY ARTERY OF NATIVE HEART WITHOUT ANGINA PECTORIS: ICD-10-CM

## 2023-07-03 DIAGNOSIS — I10 ESSENTIAL HYPERTENSION, BENIGN: ICD-10-CM

## 2023-07-03 RX ORDER — METOPROLOL SUCCINATE 25 MG/1
25 TABLET, EXTENDED RELEASE ORAL
Qty: 100 TABLET | Refills: 3 | Status: SHIPPED | OUTPATIENT
Start: 2023-07-03

## 2023-07-03 RX ORDER — LISINOPRIL 20 MG/1
20 TABLET ORAL
Qty: 100 TABLET | Refills: 3 | Status: SHIPPED | OUTPATIENT
Start: 2023-07-03

## 2023-07-03 RX ORDER — CLOPIDOGREL BISULFATE 75 MG/1
75 TABLET ORAL
Qty: 100 TABLET | Refills: 3 | Status: SHIPPED | OUTPATIENT
Start: 2023-07-03

## 2023-07-03 RX ORDER — ATORVASTATIN CALCIUM 40 MG/1
40 TABLET, FILM COATED ORAL DAILY
Qty: 100 TABLET | Refills: 3 | Status: SHIPPED | OUTPATIENT
Start: 2023-07-03

## 2023-07-04 NOTE — TELEPHONE ENCOUNTER
Received request via: Patient    Was the patient seen in the last year in this department? Yes    Does the patient have an active prescription (recently filled or refills available) for medication(s) requested? No    Does the patient have [facility] Plus and need 100 day supply (blood pressure, diabetes and cholesterol meds only)? Yes, quantity updated to 100 days

## 2023-11-02 DIAGNOSIS — F32.0 CURRENT MILD EPISODE OF MAJOR DEPRESSIVE DISORDER WITHOUT PRIOR EPISODE (HCC): ICD-10-CM

## 2023-11-02 RX ORDER — ESCITALOPRAM OXALATE 10 MG/1
TABLET ORAL
Qty: 100 TABLET | Refills: 3 | Status: SHIPPED | OUTPATIENT
Start: 2023-11-02

## 2023-12-06 ENCOUNTER — APPOINTMENT (OUTPATIENT)
Dept: MEDICAL GROUP | Facility: PHYSICIAN GROUP | Age: 78
End: 2023-12-06
Payer: MEDICARE

## 2024-01-17 DIAGNOSIS — K21.9 GASTROESOPHAGEAL REFLUX DISEASE WITHOUT ESOPHAGITIS: ICD-10-CM

## 2024-01-18 RX ORDER — OMEPRAZOLE 20 MG/1
40 CAPSULE, DELAYED RELEASE ORAL
Qty: 200 CAPSULE | Refills: 3 | Status: SHIPPED | OUTPATIENT
Start: 2024-01-18

## 2024-01-30 ENCOUNTER — APPOINTMENT (OUTPATIENT)
Dept: MEDICAL GROUP | Facility: PHYSICIAN GROUP | Age: 79
End: 2024-01-30

## 2024-02-21 DIAGNOSIS — Z95.5 S/P CORONARY ARTERY STENT PLACEMENT: ICD-10-CM

## 2024-02-21 DIAGNOSIS — I25.10 CORONARY ARTERY DISEASE INVOLVING NATIVE CORONARY ARTERY OF NATIVE HEART WITHOUT ANGINA PECTORIS: ICD-10-CM

## 2024-02-21 RX ORDER — NITROGLYCERIN 0.4 MG/1
0.4 TABLET SUBLINGUAL PRN
Qty: 25 TABLET | Refills: 3 | Status: SHIPPED | OUTPATIENT
Start: 2024-02-21

## 2024-02-21 NOTE — TELEPHONE ENCOUNTER
Received request via: Patient    Was the patient seen in the last year in this department? No    Does the patient have an active prescription (recently filled or refills available) for medication(s) requested? No    Pharmacy Name: [facility]    Does the patient have halfway Plus and need 100 day supply (blood pressure, diabetes and cholesterol meds only)? Yes, quantity updated to 100 days

## 2024-04-24 NOTE — TELEPHONE ENCOUNTER
----- Message from Natalee Seymour sent at 4/24/2024 10:39 AM CDT -----  Type: Needs Medical Advice  Who Called:  pt   Symptoms (please be specific):  follow up  Best Call Back Number: [phone]  Additional Information: pt stated she would like to be advised in regards to getting pts appt reschd that pt made in office due to vicky conflict and pt having a wedding to attend please call back to advise asap thanks!   ESTABLISHED PATIENT PRE-VISIT PLANNING     Patient was NOT contacted to complete PVP.     Note: Patient will not be contacted if there is no indication to call.     1.  Reviewed notes from the last few office visits within the medical group: Yes    2.  If any orders were placed at last visit or intended to be done for this visit (i.e. 6 mos follow-up), do we have Results/Consult Notes?         •  Labs - Labs ordered, NOT completed. Patient advised to complete prior to next appointment.  Note: If patient appointment is for lab review and patient did not complete labs, check with provider if OK to reschedule patient until labs completed.       •  Imaging - Imaging was not ordered at last office visit.       •  Referrals - No referrals were ordered at last office visit.    3. Is this appointment scheduled as a Hospital Follow-Up? No    4.  Immunizations were updated in Epic using Reconcile Outside Information activity? Yes    5.  Patient is due for the following Health Maintenance Topics:   There are no preventive care reminders to display for this patient.      6.  AHA (Pulse8) form printed for Provider? Yes

## 2024-10-01 NOTE — ED NOTES
----- Message from Yovany BARBA sent at 10/1/2024 11:18 AM CDT -----  Quantiferon positive high suspicion for latent TB. This limits our ability to start biologic therapy and will likely need to be treated first. Will obtain CXR and refer to ID for their thoughts and possible treatment. She will need to follow up in clinic once this is done.        Yovany Brock DO Lemke, Regan, RN  she will need CXR and likely treatment for latent TB prior to stating biologic. In the meantime our options for escalating therapy are limited.    Yovany Brock DO  Gastroenterology & Hepatology Fellow, PGY-5  [phone])  10/1/2024   Lab at bedside

## 2025-07-01 ENCOUNTER — APPOINTMENT (OUTPATIENT)
Dept: MEDICAL GROUP | Facility: PHYSICIAN GROUP | Age: 80
End: 2025-07-01